# Patient Record
Sex: FEMALE | Race: WHITE | NOT HISPANIC OR LATINO | Employment: OTHER | URBAN - METROPOLITAN AREA
[De-identification: names, ages, dates, MRNs, and addresses within clinical notes are randomized per-mention and may not be internally consistent; named-entity substitution may affect disease eponyms.]

---

## 2017-01-03 ENCOUNTER — TELEPHONE (OUTPATIENT)
Dept: INTERNAL MEDICINE | Facility: CLINIC | Age: 62
End: 2017-01-03

## 2017-01-03 NOTE — TELEPHONE ENCOUNTER
Pt went to the ER with SOB and chest pain radiating into the neck. Feeling weak at times. The ER told her she needed to call us and get a stress test ordered

## 2017-01-03 NOTE — TELEPHONE ENCOUNTER
Called pt back to make an appt and she said that she doesn't;'t understand why I need to be seen first. I explained that dr sepulveda ask that she have an appt first. She said that she would have to call me back

## 2017-01-03 NOTE — TELEPHONE ENCOUNTER
----- Message from Raquel Hanna sent at 1/3/2017  9:40 AM CST -----  Contact: pt  Please call pt @ [phone] regarding an order to get a stress test done.

## 2017-02-16 ENCOUNTER — OFFICE VISIT (OUTPATIENT)
Dept: INTERNAL MEDICINE | Facility: CLINIC | Age: 62
End: 2017-02-16
Payer: COMMERCIAL

## 2017-02-16 VITALS
TEMPERATURE: 99 F | OXYGEN SATURATION: 97 % | BODY MASS INDEX: 28.13 KG/M2 | DIASTOLIC BLOOD PRESSURE: 60 MMHG | HEIGHT: 67 IN | SYSTOLIC BLOOD PRESSURE: 124 MMHG | RESPIRATION RATE: 16 BRPM | HEART RATE: 100 BPM | WEIGHT: 179.25 LBS

## 2017-02-16 DIAGNOSIS — M19.042 PRIMARY LOCALIZED OSTEOARTHROSIS OF HANDS, BILATERAL: ICD-10-CM

## 2017-02-16 DIAGNOSIS — J20.9 ACUTE BRONCHITIS, UNSPECIFIED ORGANISM: Primary | ICD-10-CM

## 2017-02-16 DIAGNOSIS — M19.041 PRIMARY LOCALIZED OSTEOARTHROSIS OF HANDS, BILATERAL: ICD-10-CM

## 2017-02-16 DIAGNOSIS — M79.7 FIBROMYALGIA: ICD-10-CM

## 2017-02-16 PROCEDURE — 99214 OFFICE O/P EST MOD 30 MIN: CPT | Mod: S$GLB,,, | Performed by: FAMILY MEDICINE

## 2017-02-16 PROCEDURE — 99999 PR PBB SHADOW E&M-EST. PATIENT-LVL III: CPT | Mod: PBBFAC,,, | Performed by: FAMILY MEDICINE

## 2017-02-16 PROCEDURE — 3078F DIAST BP <80 MM HG: CPT | Mod: S$GLB,,, | Performed by: FAMILY MEDICINE

## 2017-02-16 PROCEDURE — 3074F SYST BP LT 130 MM HG: CPT | Mod: S$GLB,,, | Performed by: FAMILY MEDICINE

## 2017-02-16 RX ORDER — PRAMIPEXOLE DIHYDROCHLORIDE 0.5 MG/1
0.5 TABLET ORAL DAILY
Qty: 90 TABLET | Refills: 3 | Status: SHIPPED | OUTPATIENT
Start: 2017-02-16 | End: 2017-03-08 | Stop reason: SDUPTHER

## 2017-02-16 RX ORDER — BENAZEPRIL HYDROCHLORIDE AND HYDROCHLOROTHIAZIDE 20; 12.5 MG/1; MG/1
1 TABLET ORAL DAILY
Qty: 90 TABLET | Refills: 3 | Status: SHIPPED | OUTPATIENT
Start: 2017-02-16 | End: 2017-03-08 | Stop reason: SDUPTHER

## 2017-02-16 RX ORDER — BENZONATATE 100 MG/1
100 CAPSULE ORAL 3 TIMES DAILY PRN
Qty: 30 CAPSULE | Refills: 1 | Status: SHIPPED | OUTPATIENT
Start: 2017-02-16 | End: 2017-05-23

## 2017-02-16 RX ORDER — MELOXICAM 7.5 MG/1
1 TABLET ORAL DAILY
COMMUNITY
Start: 2017-01-14 | End: 2017-08-07 | Stop reason: SDUPTHER

## 2017-02-16 RX ORDER — TRAMADOL HYDROCHLORIDE 50 MG/1
50 TABLET ORAL EVERY 8 HOURS PRN
Qty: 90 TABLET | Refills: 1 | Status: SHIPPED | OUTPATIENT
Start: 2017-02-16 | End: 2017-03-18

## 2017-02-16 RX ORDER — CYCLOBENZAPRINE HCL 5 MG
5 TABLET ORAL 3 TIMES DAILY PRN
COMMUNITY
End: 2017-08-07 | Stop reason: SDUPTHER

## 2017-02-16 RX ORDER — PANTOPRAZOLE SODIUM 40 MG/1
40 TABLET, DELAYED RELEASE ORAL DAILY
COMMUNITY
Start: 2015-05-12 | End: 2017-02-16 | Stop reason: SDUPTHER

## 2017-02-16 RX ORDER — PANTOPRAZOLE SODIUM 40 MG/1
40 TABLET, DELAYED RELEASE ORAL DAILY
Qty: 90 TABLET | Refills: 3 | Status: SHIPPED | OUTPATIENT
Start: 2017-02-16 | End: 2017-05-17

## 2017-02-16 RX ORDER — METHYLPREDNISOLONE 4 MG/1
TABLET ORAL
Qty: 1 PACKAGE | Refills: 0 | Status: SHIPPED | OUTPATIENT
Start: 2017-02-16 | End: 2017-05-23

## 2017-02-16 RX ORDER — AZITHROMYCIN 250 MG/1
250 TABLET, FILM COATED ORAL DAILY
Qty: 6 TABLET | Refills: 0 | Status: SHIPPED | OUTPATIENT
Start: 2017-02-16 | End: 2017-02-21

## 2017-02-16 RX ORDER — PRAMIPEXOLE DIHYDROCHLORIDE 0.5 MG/1
0.5 TABLET ORAL DAILY
COMMUNITY
End: 2017-02-16 | Stop reason: SDUPTHER

## 2017-02-16 RX ORDER — DULOXETIN HYDROCHLORIDE 60 MG/1
60 CAPSULE, DELAYED RELEASE ORAL DAILY
Qty: 90 CAPSULE | Refills: 1 | Status: SHIPPED | OUTPATIENT
Start: 2017-02-16 | End: 2017-03-08 | Stop reason: SDUPTHER

## 2017-02-16 NOTE — PROGRESS NOTES
Chief Complaint:    Chief Complaint   Patient presents with    Nasal Congestion    Cough       History of Present Illness:    Patient presents today with complaints of congestion cough and wheezing postnasal drip going on for the past several days no fever.    She also complains of serious pain involving bilateral hand joints and arthritis everywhere.  She does have significantly elevated sedimentation rate was sent to a rheumatologist one time but was told that she does not have rheumatoid arthritis she had seen Dr. Chong.  She is to see pain management but is not going back to them anymore.  Would like to take some tramadol.    She also has fibromyalgia and has constant pain and fatigue.    ROS:  Review of Systems   Constitutional: Negative for activity change, chills, fatigue, fever and unexpected weight change.   HENT: Positive for congestion. Negative for ear discharge, ear pain, hearing loss, postnasal drip and rhinorrhea.    Eyes: Negative for pain and visual disturbance.   Respiratory: Positive for cough and wheezing. Negative for chest tightness and shortness of breath.    Cardiovascular: Negative for chest pain and palpitations.   Gastrointestinal: Negative for abdominal pain, diarrhea and vomiting.   Endocrine: Negative for heat intolerance.   Genitourinary: Negative for dysuria, flank pain, frequency and hematuria.   Musculoskeletal: Negative for back pain, gait problem and neck pain.   Skin: Negative for color change and rash.   Neurological: Negative for dizziness, tremors, seizures, numbness and headaches.   Psychiatric/Behavioral: Negative for agitation, hallucinations, self-injury, sleep disturbance and suicidal ideas. The patient is not nervous/anxious.        Past Medical History   Diagnosis Date    Anxiety     Arthritis     Depression     Hyperlipidemia        Social History:  Social History     Social History    Marital status:      Spouse name: N/A    Number of children: 2     "Years of education: N/A     Social History Main Topics    Smoking status: Former Smoker     Packs/day: 1.00     Years: 40.00     Quit date: 6/10/2012    Smokeless tobacco: Never Used    Alcohol use No    Drug use: No    Sexual activity: Yes     Partners: Male     Birth control/ protection: None     Other Topics Concern    None     Social History Narrative       Family History:   family history includes Cancer in her maternal grandfather and maternal grandmother; Clotting disorder in her maternal grandfather, maternal grandmother, and mother; Hemochromatosis in her mother; Hypertension in her father and mother.    Health Maintenance   Topic Date Due    Hepatitis C Screening  1955    Zoster Vaccine  05/04/2016    Influenza Vaccine  08/01/2016    Mammogram  04/29/2017    Lipid Panel  05/11/2017    Colonoscopy  07/23/2017    Pap Smear  12/01/2018    TETANUS VACCINE  07/19/2026       Physical Exam:    Vital Signs  Temp: 98.5 °F (36.9 °C)  Temp src: Tympanic  Pulse: 100  Resp: 16  SpO2: 97 %  BP: 124/60  BP Location: Left arm  BP Method: Manual  Patient Position: Sitting  Height and Weight  Height: 5' 7" (170.2 cm)  Weight: 81.3 kg (179 lb 3.7 oz)  BSA (Calculated - sq m): 1.96 sq meters  BMI (Calculated): 28.1  Weight in (lb) to have BMI = 25: 159.3]    Body mass index is 28.07 kg/(m^2).    Physical Exam   Constitutional: She is oriented to person, place, and time. She appears well-developed.   HENT:   Mouth/Throat: Oropharynx is clear and moist.   Eyes: Conjunctivae are normal. Pupils are equal, round, and reactive to light.   Neck: Normal range of motion. Neck supple.   Cardiovascular: Normal rate, regular rhythm and normal heart sounds.    No murmur heard.  Pulmonary/Chest: Effort normal and breath sounds normal. No respiratory distress. She has no wheezes. She has no rales. She exhibits no tenderness.   Abdominal: Soft. She exhibits no distension and no mass. There is no tenderness. There is no " guarding.   Musculoskeletal: She exhibits no edema or tenderness.        Hands:  Lymphadenopathy:     She has no cervical adenopathy.   Neurological: She is alert and oriented to person, place, and time. She has normal reflexes.   Skin: Skin is warm and dry.   Psychiatric: She has a normal mood and affect. Her behavior is normal. Judgment and thought content normal.       Lab Results   Component Value Date    CHOL 209 (H) 05/11/2016    CHOL 179 06/06/2013    CHOL 199 02/05/2013    TRIG 173 (H) 05/11/2016    TRIG 149 06/06/2013    TRIG 213 (H) 02/05/2013    HDL 40 05/11/2016    HDL 38 (L) 06/06/2013    HDL 38 (L) 02/05/2013    TOTALCHOLEST 5.2 (H) 05/11/2016    TOTALCHOLEST 4.7 06/06/2013    TOTALCHOLEST 5.2 (H) 02/05/2013    NONHDLCHOL 169 05/11/2016       Lab Results   Component Value Date    HGBA1C 5.7 05/11/2016       Assessment:      ICD-10-CM ICD-9-CM   1. Acute bronchitis, unspecified organism J20.9 466.0   2. Primary localized osteoarthrosis of hands, bilateral M19.041 715.14    M19.042    3. Fibromyalgia M79.7 729.1         Plan:  1.  Acute bronchitis treat with Z-Jeremy and Medrol Dosepak.  2.  Arthritis involving hand DIP and PIP will refer her to rheumatology Dr. PARKER for further evaluation and treatment  3.  Fibromyalgia, we can definitely treat her with tramadol limited to no more than 3 pills per day, #90 given with 1 refill.  Addiction potential explained.  Orders Placed This Encounter   Procedures    Ambulatory Referral to Rheumatology       Current Outpatient Prescriptions   Medication Sig Dispense Refill    albuterol (ACCUNEB) 0.63 mg/3 mL Nebu Take 3 mLs (0.63 mg total) by nebulization every 6 (six) hours as needed. 3 mL 11    amlodipine (NORVASC) 10 MG tablet Take 1 tablet (10 mg total) by mouth once daily. 90 tablet 3    aspirin-acetaminophen-caffeine 250-250-65 mg (EXCEDRIN MIGRAINE) 250-250-65 mg per tablet Take 1 tablet by mouth every 6 (six) hours as needed for Pain.       benazepril-hydrochlorthiazide (LOTENSIN HCT) 20-12.5 mg per tablet Take 1 tablet by mouth once daily. 90 tablet 3    buPROPion (WELLBUTRIN SR) 150 MG TBSR 12 hr tablet Take 1 tablet (150 mg total) by mouth 2 (two) times daily. 180 tablet 3    cyclobenzaprine (FLEXERIL) 5 MG tablet Take 5 mg by mouth 3 (three) times daily as needed for Muscle spasms.      duloxetine (CYMBALTA) 60 MG capsule Take 1 capsule (60 mg total) by mouth once daily. 90 capsule 1    inhalation device (BREATHERITE SPACER-MASK,ADULT) Use as directed for inhalation. 1 Device 0    meloxicam (MOBIC) 7.5 MG tablet Take 1 tablet by mouth once daily.      pantoprazole (PROTONIX) 40 MG tablet Take 1 tablet (40 mg total) by mouth once daily. 90 tablet 3    potassium 99 mg Tab Take by mouth. 2 tablet Oral Every day      pramipexole (MIRAPEX) 0.5 MG tablet Take 1 tablet (0.5 mg total) by mouth once daily. 90 tablet 3    azithromycin (ZITHROMAX Z-ADRIA) 250 MG tablet Take 1 tablet (250 mg total) by mouth once daily. Take 2 tabs on day 1 thereafter one tab daily by mouth for 4 days. 6 tablet 0    benzonatate (TESSALON) 100 MG capsule Take 1 capsule (100 mg total) by mouth 3 (three) times daily as needed for Cough. 30 capsule 1    methylPREDNISolone (MEDROL DOSEPACK) 4 mg tablet use as directed 1 Package 0    tramadol (ULTRAM) 50 mg tablet Take 1 tablet (50 mg total) by mouth every 8 (eight) hours as needed. 90 tablet 1     Current Facility-Administered Medications   Medication Dose Route Frequency Provider Last Rate Last Dose    albuterol inhaler 2 puff  2 puff Inhalation Q6H PRN Helena Jones MD        albuterol sulfate nebulizer solution 2.5 mg  2.5 mg Nebulization 1 time in Clinic/HOD Helena Jones MD           Medications Discontinued During This Encounter   Medication Reason    cyclobenzaprine (FLEXERIL) 5 MG tablet Patient no longer taking    zolpidem (AMBIEN) 5 MG Tab Patient no longer taking    vitamin E 1000 UNIT capsule Patient no  longer taking    pramipexole (MIRAPEX) 1 MG tablet     pramipexole (MIRAPEX) 1 MG tablet     pantoprazole (PROTONIX) 40 MG tablet     pantoprazole (PROTONIX) 40 MG tablet     pramipexole (MIRAPEX) 0.5 MG tablet Reorder    pantoprazole (PROTONIX) 40 MG tablet Reorder    duloxetine (CYMBALTA) 60 MG capsule Reorder    benazepril-hydrochlorthiazide (LOTENSIN HCT) 20-12.5 mg per tablet Reorder       No Follow-up on file.      Dr Helena Jones MD    Disclaimer: This note is prepared using voice recognition system and as such is likely to have errors and is not proof read.

## 2017-02-16 NOTE — MR AVS SNAPSHOT
[facility]  [address] [place] [address]  Phone: [phone]                  Erin Soriano   2017 4:40 PM   Office Visit    Description:  Female : 1955   Provider:  Helena Jones MD   Department:  [facility]           Reason for Visit     Nasal Congestion     Cough           Diagnoses this Visit        Comments    Acute bronchitis, unspecified organism    -  Primary     Primary localized osteoarthrosis of hands, bilateral         Fibromyalgia                To Do List           Goals (5 Years of Data)     None       These Medications        Disp Refills Start End    pramipexole (MIRAPEX) 0.5 MG tablet 90 tablet 3 2017    Take 1 tablet (0.5 mg total) by mouth once daily. - Oral    Pharmacy: [place] [address] Ph #: [phone]       pantoprazole (PROTONIX) 40 MG tablet 90 tablet 3 2017    Take 1 tablet (40 mg total) by mouth once daily. - Oral    Pharmacy: [facility] [address] [place] [address] Ph #: [phone]       duloxetine (CYMBALTA) 60 MG capsule 90 capsule 1 2017     Take 1 capsule (60 mg total) by mouth once daily. - Oral    Pharmacy: [facility] [address] [place] [address] Ph #: [phone]       benazepril-hydrochlorthiazide (LOTENSIN HCT) 20-12.5 mg per tablet 90 tablet 3 2017     Take 1 tablet by mouth once daily. - Oral    Pharmacy: [facility] [address] [place] [address] Ph #: [phone]       tramadol (ULTRAM) 50 mg tablet 90 tablet 1 2017 3/18/2017    Take 1 tablet (50 mg total) by mouth every 8 (eight) hours as needed. - Oral    Pharmacy: [facility] [address] [place] [address] Ph #: [phone]       methylPREDNISolone (MEDROL DOSEPACK) 4 mg tablet 1 Package 0 2017     use as directed    Pharmacy: [facility] [address] [place] [address] [id number] [address] #: [phone]       azithromycin (ZITHROMAX Z-ADRIA) 250 MG tablet 6 tablet 0 2/16/2017 2/21/2017    Take 1 tablet (250 mg total) by mouth once daily. Take 2 tabs on day 1 thereafter one tab daily by mouth for 4 days. - Oral    Pharmacy: [facility] [address] [place] [address] #: [phone]       Notes to Pharmacy: Pharmacist to go over side effects.    benzonatate (TESSALON) 100 MG capsule 30 capsule 1 2/16/2017     Take 1 capsule (100 mg total) by mouth 3 (three) times daily as needed for Cough. - Oral    Pharmacy: [address] #: [phone]         [facility]     [facility] On Call Nurse Care Line - 24/7 Assistance  Registered nurses in the [facility] provide clinical advisement, health education, appointment booking, and other advisory services.  Call for this free service at [phone].             Medications           Message regarding Medications     Verify the changes and/or additions to your medication regime listed below are the same as discussed with your clinician today.  If any of these changes or additions are incorrect, please notify your healthcare provider.        START taking these NEW medications        Refills    pramipexole (MIRAPEX) 0.5 MG tablet 3    Sig: Take 1 tablet (0.5 mg total) by mouth once daily.    Class: Normal    Route: Oral    pantoprazole (PROTONIX) 40 MG tablet 3    Sig: Take 1 tablet (40 mg total) by mouth once daily.    Class: Normal    Route: Oral    tramadol (ULTRAM) 50 mg tablet 1    Sig: Take 1 tablet (50 mg total) by mouth every 8 (eight) hours as needed.    Class: Normal    Route: Oral    methylPREDNISolone (MEDROL DOSEPACK) 4 mg tablet 0    Sig: use as directed    Class: Normal    azithromycin (ZITHROMAX Z-ADRIA) 250 MG tablet 0    Sig: Take 1 tablet (250 mg total) by mouth once daily. Take 2 tabs on day 1  thereafter one tab daily by mouth for 4 days.    Class: Normal    Route: Oral    benzonatate (TESSALON) 100 MG capsule 1    Sig: Take 1 capsule (100 mg total) by mouth 3 (three) times daily as needed for Cough.    Class: Normal    Route: Oral      STOP taking these medications     zolpidem (AMBIEN) 5 MG Tab Take 1 tablet (5 mg total) by mouth nightly as needed.    vitamin E 1000 UNIT capsule Take 1,000 Units by mouth once daily.           Verify that the below list of medications is an accurate representation of the medications you are currently taking.  If none reported, the list may be blank. If incorrect, please contact your healthcare provider. Carry this list with you in case of emergency.           Current Medications     albuterol (ACCUNEB) 0.63 mg/3 mL Nebu Take 3 mLs (0.63 mg total) by nebulization every 6 (six) hours as needed.    amlodipine (NORVASC) 10 MG tablet Take 1 tablet (10 mg total) by mouth once daily.    aspirin-acetaminophen-caffeine 250-250-65 mg (EXCEDRIN MIGRAINE) 250-250-65 mg per tablet Take 1 tablet by mouth every 6 (six) hours as needed for Pain.    benazepril-hydrochlorthiazide (LOTENSIN HCT) 20-12.5 mg per tablet Take 1 tablet by mouth once daily.    buPROPion (WELLBUTRIN SR) 150 MG TBSR 12 hr tablet Take 1 tablet (150 mg total) by mouth 2 (two) times daily.    cyclobenzaprine (FLEXERIL) 5 MG tablet Take 5 mg by mouth 3 (three) times daily as needed for Muscle spasms.    duloxetine (CYMBALTA) 60 MG capsule Take 1 capsule (60 mg total) by mouth once daily.    inhalation device (BREATHERITE SPACER-MASK,ADULT) Use as directed for inhalation.    meloxicam (MOBIC) 7.5 MG tablet Take 1 tablet by mouth once daily.    pantoprazole (PROTONIX) 40 MG tablet Take 1 tablet (40 mg total) by mouth once daily.    potassium 99 mg Tab Take by mouth. 2 tablet Oral Every day    pramipexole (MIRAPEX) 0.5 MG tablet Take 1 tablet (0.5 mg total) by mouth once daily.    azithromycin (ZITHROMAX Z-ADRIA) 250 MG  "tablet Take 1 tablet (250 mg total) by mouth once daily. Take 2 tabs on day 1 thereafter one tab daily by mouth for 4 days.    benzonatate (TESSALON) 100 MG capsule Take 1 capsule (100 mg total) by mouth 3 (three) times daily as needed for Cough.    methylPREDNISolone (MEDROL DOSEPACK) 4 mg tablet use as directed    tramadol (ULTRAM) 50 mg tablet Take 1 tablet (50 mg total) by mouth every 8 (eight) hours as needed.           Clinical Reference Information           Your Vitals Were     BP Pulse Temp Resp    124/60 (BP Location: Left arm, Patient Position: Sitting, BP Method: Manual) 100 98.5 °F (36.9 °C) (Tympanic) 16    Height Weight SpO2 BMI    5' 7" (1.702 m) 81.3 kg (179 lb 3.7 oz) 97% 28.07 kg/m2      Blood Pressure          Most Recent Value    BP  124/60      Allergies as of 2/16/2017     Clindamycin      Immunizations Administered on Date of Encounter - 2/16/2017     None      Orders Placed During Today's Visit      Normal Orders This Visit    Ambulatory Referral to Rheumatology       Language Assistance Services     ATTENTION: Language assistance services are available, free of charge. Please call [phone].      ATENCIÓN: Si habla leonel, tiene a rosario disposición servicios gratuitos de asistencia lingüística. Llame al [phone].     AYALA Ý: N?u b?n nói Ti?ng Vi?t, có các d?ch v? h? tr? ngôn ng? mi?n phí dành cho b?n. G?i s? [phone].         [facility] complies with applicable Federal civil rights laws and does not discriminate on the basis of race, color, national origin, age, disability, or sex.        "

## 2017-02-21 ENCOUNTER — TELEPHONE (OUTPATIENT)
Dept: RHEUMATOLOGY | Facility: CLINIC | Age: 62
End: 2017-02-21

## 2017-02-22 ENCOUNTER — TELEPHONE (OUTPATIENT)
Dept: RHEUMATOLOGY | Facility: CLINIC | Age: 62
End: 2017-02-22

## 2017-02-22 NOTE — TELEPHONE ENCOUNTER
Called patient regarding referral from Dr. Jones, no answer, left message for pt to call clinic back.

## 2017-02-23 ENCOUNTER — TELEPHONE (OUTPATIENT)
Dept: RHEUMATOLOGY | Facility: CLINIC | Age: 62
End: 2017-02-23

## 2017-02-23 NOTE — TELEPHONE ENCOUNTER
----- Message from Jaxon Amin LPN sent at 2/23/2017  8:29 AM CST -----      ----- Message -----     From: Nikki Angeles     Sent: 2/22/2017   3:12 PM       To: [facility] Staff    Patient states that she was returning your call regarding a new patient appointment.   Call her at [phone].                                            edmonds

## 2017-03-06 ENCOUNTER — TELEPHONE (OUTPATIENT)
Dept: RHEUMATOLOGY | Facility: CLINIC | Age: 62
End: 2017-03-06

## 2017-03-06 NOTE — TELEPHONE ENCOUNTER
----- Message from Robert Elliott sent at 3/6/2017 12:34 PM CST -----  Contact: New Pt  Caller states she is returning nurse call to schedule an apt, please contact caller at [phone]

## 2017-03-06 NOTE — TELEPHONE ENCOUNTER
Returned call to patient regarding Dr. Jones, made an apt with Dr. PARKER for 5.30.17 at 8 am, added to wait list as requested. Will mail apt slip as well. Pt verbalized understanding.

## 2017-03-08 ENCOUNTER — OFFICE VISIT (OUTPATIENT)
Dept: INTERNAL MEDICINE | Facility: CLINIC | Age: 62
End: 2017-03-08
Payer: COMMERCIAL

## 2017-03-08 ENCOUNTER — TELEPHONE (OUTPATIENT)
Dept: INTERNAL MEDICINE | Facility: CLINIC | Age: 62
End: 2017-03-08

## 2017-03-08 ENCOUNTER — APPOINTMENT (OUTPATIENT)
Dept: LAB | Facility: HOSPITAL | Age: 62
End: 2017-03-08
Attending: FAMILY MEDICINE
Payer: COMMERCIAL

## 2017-03-08 VITALS
WEIGHT: 182.56 LBS | TEMPERATURE: 98 F | BODY MASS INDEX: 28.65 KG/M2 | DIASTOLIC BLOOD PRESSURE: 80 MMHG | RESPIRATION RATE: 16 BRPM | OXYGEN SATURATION: 98 % | HEART RATE: 102 BPM | SYSTOLIC BLOOD PRESSURE: 140 MMHG | HEIGHT: 67 IN

## 2017-03-08 DIAGNOSIS — J01.90 ACUTE SINUSITIS, UNSPECIFIED: Primary | ICD-10-CM

## 2017-03-08 DIAGNOSIS — M54.9 BACKACHE WITHOUT RADIATION: ICD-10-CM

## 2017-03-08 LAB
BILIRUB UR QL STRIP: NEGATIVE
CLARITY UR REFRACT.AUTO: CLEAR
COLOR UR AUTO: YELLOW
GLUCOSE UR QL STRIP: NEGATIVE
HGB UR QL STRIP: NEGATIVE
KETONES UR QL STRIP: NEGATIVE
LEUKOCYTE ESTERASE UR QL STRIP: NEGATIVE
NITRITE UR QL STRIP: NEGATIVE
PH UR STRIP: 5 [PH] (ref 5–8)
PROT UR QL STRIP: NEGATIVE
SP GR UR STRIP: 1.01 (ref 1–1.03)
URN SPEC COLLECT METH UR: NORMAL
UROBILINOGEN UR STRIP-ACNC: NEGATIVE EU/DL

## 2017-03-08 PROCEDURE — 87147 CULTURE TYPE IMMUNOLOGIC: CPT

## 2017-03-08 PROCEDURE — 81003 URINALYSIS AUTO W/O SCOPE: CPT

## 2017-03-08 PROCEDURE — 96372 THER/PROPH/DIAG INJ SC/IM: CPT | Mod: S$GLB,,, | Performed by: FAMILY MEDICINE

## 2017-03-08 PROCEDURE — 87088 URINE BACTERIA CULTURE: CPT

## 2017-03-08 PROCEDURE — 87086 URINE CULTURE/COLONY COUNT: CPT

## 2017-03-08 PROCEDURE — 99999 PR PBB SHADOW E&M-EST. PATIENT-LVL V: CPT | Mod: PBBFAC,,, | Performed by: FAMILY MEDICINE

## 2017-03-08 PROCEDURE — 99214 OFFICE O/P EST MOD 30 MIN: CPT | Mod: 25,S$GLB,, | Performed by: FAMILY MEDICINE

## 2017-03-08 PROCEDURE — 3077F SYST BP >= 140 MM HG: CPT | Mod: S$GLB,,, | Performed by: FAMILY MEDICINE

## 2017-03-08 PROCEDURE — 1160F RVW MEDS BY RX/DR IN RCRD: CPT | Mod: S$GLB,,, | Performed by: FAMILY MEDICINE

## 2017-03-08 PROCEDURE — 3079F DIAST BP 80-89 MM HG: CPT | Mod: S$GLB,,, | Performed by: FAMILY MEDICINE

## 2017-03-08 RX ORDER — BENAZEPRIL HYDROCHLORIDE AND HYDROCHLOROTHIAZIDE 20; 12.5 MG/1; MG/1
1 TABLET ORAL DAILY
Qty: 90 TABLET | Refills: 3 | Status: SHIPPED | OUTPATIENT
Start: 2017-03-08 | End: 2017-11-14

## 2017-03-08 RX ORDER — PRAMIPEXOLE DIHYDROCHLORIDE 0.5 MG/1
0.5 TABLET ORAL DAILY
Qty: 90 TABLET | Refills: 3 | Status: SHIPPED | OUTPATIENT
Start: 2017-03-08 | End: 2017-08-07

## 2017-03-08 RX ORDER — BENZONATATE 100 MG/1
100 CAPSULE ORAL 3 TIMES DAILY PRN
Qty: 30 CAPSULE | Refills: 1 | Status: SHIPPED | OUTPATIENT
Start: 2017-03-08 | End: 2017-05-23

## 2017-03-08 RX ORDER — DULOXETIN HYDROCHLORIDE 60 MG/1
60 CAPSULE, DELAYED RELEASE ORAL DAILY
Qty: 90 CAPSULE | Refills: 1 | Status: SHIPPED | OUTPATIENT
Start: 2017-03-08 | End: 2018-01-26 | Stop reason: SDUPTHER

## 2017-03-08 RX ORDER — AMOXICILLIN AND CLAVULANATE POTASSIUM 875; 125 MG/1; MG/1
1 TABLET, FILM COATED ORAL EVERY 12 HOURS
Qty: 14 TABLET | Refills: 0 | Status: SHIPPED | OUTPATIENT
Start: 2017-03-08 | End: 2017-03-15

## 2017-03-08 RX ORDER — METHYLPREDNISOLONE ACETATE 80 MG/ML
80 INJECTION, SUSPENSION INTRA-ARTICULAR; INTRALESIONAL; INTRAMUSCULAR; SOFT TISSUE
Status: COMPLETED | OUTPATIENT
Start: 2017-03-08 | End: 2017-03-08

## 2017-03-08 RX ADMIN — METHYLPREDNISOLONE ACETATE 80 MG: 80 INJECTION, SUSPENSION INTRA-ARTICULAR; INTRALESIONAL; INTRAMUSCULAR; SOFT TISSUE at 08:03

## 2017-03-08 NOTE — TELEPHONE ENCOUNTER
----- Message from Samy Harris sent at 3/8/2017 10:13 AM CST -----  Contact: [phone]  Pt Would like to consult with the nurse about Rx Medication , pt states that Telson Pearls does not work and would like cough syrup with codeine.  Please call in a cough Syrup Rx to the pharmacy.  Please call back @ [phone].  Thanks-[facility]      Pt uses:  [facility]  - [facility] [place]

## 2017-03-08 NOTE — MR AVS SNAPSHOT
[facility]  [address] [place] [id number]  Phone: [phone]                  Erin Soriano   3/8/2017 8:00 AM   Office Visit    Description:  Female : 1955   Provider:  Helena Jones MD   Department:  [facility]           Reason for Visit     Sore Throat           Diagnoses this Visit        Comments    Acute sinusitis, unspecified    -  Primary     Backache without radiation                To Do List           Future Appointments        Provider Department Dept Phone    2017 8:00 AM Pasquale Whyte MD [facility] - Rheumatology [phone]      Goals (5 Years of Data)     None       These Medications        Disp Refills Start End    pramipexole (MIRAPEX) 0.5 MG tablet 90 tablet 3 3/8/2017 2017    Take 1 tablet (0.5 mg total) by mouth once daily. - Oral    Pharmacy: [facility] - [address] Ph #: [phone]       duloxetine (CYMBALTA) 60 MG capsule 90 capsule 1 3/8/2017     Take 1 capsule (60 mg total) by mouth once daily. - Oral    Pharmacy: [facility] - [address] Ph #: [phone]       benazepril-hydrochlorthiazide (LOTENSIN HCT) 20-12.5 mg per tablet 90 tablet 3 3/8/2017     Take 1 tablet by mouth once daily. - Oral    Pharmacy: [facility] - [address] Ph #: [phone]       amoxicillin-clavulanate 875-125mg (AUGMENTIN) 875-125 mg per tablet 14 tablet 0 3/8/2017 3/15/2017    Take 1 tablet by mouth every 12 (twelve) hours. - Oral    Pharmacy: [facility] [address] [place] [address] [address] [address] Ph #: [phone]       benzonatate (TESSALON) 100 MG capsule 30 capsule 1 3/8/2017     Take 1 capsule (100 mg total) by mouth 3 (three) times daily as needed for Cough. - Oral    Pharmacy: [facility] [address] [place] [address] [address] [address] Ph #: [phone]          [facility]     [facility] - 24/7 Assistance  Registered nurses in the [facility] provide clinical advisement, health education, appointment booking, and other advisory services.  Call for this free service at [phone].             Medications           Message regarding Medications     Verify the changes and/or additions to your medication regime listed below are the same as discussed with your clinician today.  If any of these changes or additions are incorrect, please notify your healthcare provider.        START taking these NEW medications        Refills    amoxicillin-clavulanate 875-125mg (AUGMENTIN) 875-125 mg per tablet 0    Sig: Take 1 tablet by mouth every 12 (twelve) hours.    Class: Normal    Route: Oral    benzonatate (TESSALON) 100 MG capsule 1    Sig: Take 1 capsule (100 mg total) by mouth 3 (three) times daily as needed for Cough.    Class: Normal    Route: Oral      These medications were administered today        Dose Freq    methylPREDNISolone acetate injection 80 mg 80 mg Clinic/HOD 1 time    Sig: Inject 1 mL (80 mg total) into the muscle one time.    Class: Normal    Route: Intramuscular           Verify that the below list of medications is an accurate representation of the medications you are currently taking.  If none reported, the list may be blank. If incorrect, please contact your healthcare provider. Carry this list with you in case of emergency.           Current Medications     albuterol (ACCUNEB) 0.63 mg/3 mL Nebu Take 3 mLs (0.63 mg total) by nebulization every 6 (six) hours as needed.    amlodipine (NORVASC) 10 MG tablet Take 1 tablet (10 mg total) by mouth once daily.    aspirin-acetaminophen-caffeine 250-250-65 mg (EXCEDRIN MIGRAINE) 250-250-65 mg per tablet Take 1 tablet by mouth every 6 (six) hours as needed for Pain.    benazepril-hydrochlorthiazide (LOTENSIN HCT) 20-12.5 mg per tablet Take 1 tablet by mouth once daily.     "benzonatate (TESSALON) 100 MG capsule Take 1 capsule (100 mg total) by mouth 3 (three) times daily as needed for Cough.    buPROPion (WELLBUTRIN SR) 150 MG TBSR 12 hr tablet Take 1 tablet (150 mg total) by mouth 2 (two) times daily.    cyclobenzaprine (FLEXERIL) 5 MG tablet Take 5 mg by mouth 3 (three) times daily as needed for Muscle spasms.    duloxetine (CYMBALTA) 60 MG capsule Take 1 capsule (60 mg total) by mouth once daily.    inhalation device (BREATHERITE SPACER-MASK,ADULT) Use as directed for inhalation.    meloxicam (MOBIC) 7.5 MG tablet Take 1 tablet by mouth once daily.    methylPREDNISolone (MEDROL DOSEPACK) 4 mg tablet use as directed    pantoprazole (PROTONIX) 40 MG tablet Take 1 tablet (40 mg total) by mouth once daily.    potassium 99 mg Tab Take by mouth. 2 tablet Oral Every day    pramipexole (MIRAPEX) 0.5 MG tablet Take 1 tablet (0.5 mg total) by mouth once daily.    tramadol (ULTRAM) 50 mg tablet Take 1 tablet (50 mg total) by mouth every 8 (eight) hours as needed.    amoxicillin-clavulanate 875-125mg (AUGMENTIN) 875-125 mg per tablet Take 1 tablet by mouth every 12 (twelve) hours.    benzonatate (TESSALON) 100 MG capsule Take 1 capsule (100 mg total) by mouth 3 (three) times daily as needed for Cough.           Clinical Reference Information           Your Vitals Were     BP Pulse Temp Resp    140/80 (BP Location: Left arm, Patient Position: Sitting, BP Method: Manual) 102 98.3 °F (36.8 °C) (Tympanic) 16    Height Weight SpO2 BMI    5' 7" (1.702 m) 82.8 kg (182 lb 8.7 oz) 98% 28.59 kg/m2      Blood Pressure          Most Recent Value    BP  (!)  140/80      Allergies as of 3/8/2017     Clindamycin      Immunizations Administered on Date of Encounter - 3/8/2017     None      Orders Placed During Today's Visit      Normal Orders This Visit    URINALYSIS     Urine culture       Administrations This Visit     methylPREDNISolone acetate injection 80 mg     Admin Date Action Dose Route Administered " By             03/08/2017 Given 80 mg Intramuscular Sasha Buchanan LPN                      Language Assistance Services     ATTENTION: Language assistance services are available, free of charge. Please call [phone].      ATENCIÓN: Si mica castaneda, tiene a rosario disposición servicios gratuitos de asistencia lingüística. Llame al [phone].     [facility] Ý: N?u b?n nói Ti?ng Vi?t, có các d?ch v? h? tr? ngôn ng? mi?n phí dành cho b?n. G?i s? [phone].         [facility] complies with applicable Federal civil rights laws and does not discriminate on the basis of race, color, national origin, age, disability, or sex.

## 2017-03-08 NOTE — PROGRESS NOTES
Chief Complaint:    Chief Complaint   Patient presents with    Sore Throat       History of Present Illness:  Patient presents today with a three-day history of congestion sinus pain body aches sore throat cough but no fever.  She's also been having some low back pain for the past few days is not radiate no tingling numbness weakness no bladder or bowel dysfunction.  She thinks she could have a UTI.      ROS:  Review of Systems   Constitutional: Negative for activity change, chills, fatigue, fever and unexpected weight change.   HENT: Positive for congestion, rhinorrhea, sinus pressure and sore throat. Negative for ear discharge, ear pain, hearing loss and postnasal drip.    Eyes: Negative for pain and visual disturbance.   Respiratory: Negative for cough, chest tightness and shortness of breath.    Cardiovascular: Negative for chest pain and palpitations.   Gastrointestinal: Negative for abdominal pain, diarrhea and vomiting.   Endocrine: Negative for heat intolerance.   Genitourinary: Negative for dysuria, flank pain, frequency and hematuria.   Musculoskeletal: Positive for back pain. Negative for gait problem and neck pain.   Skin: Negative for color change and rash.   Neurological: Negative for dizziness, tremors, seizures, numbness and headaches.   Psychiatric/Behavioral: Negative for agitation, hallucinations, self-injury, sleep disturbance and suicidal ideas. The patient is not nervous/anxious.        Past Medical History:   Diagnosis Date    Anxiety     Arthritis     Depression     Hyperlipidemia        Social History:  Social History     Social History    Marital status:      Spouse name: N/A    Number of children: 2    Years of education: N/A     Social History Main Topics    Smoking status: Former Smoker     Packs/day: 1.00     Years: 40.00     Quit date: 6/10/2012    Smokeless tobacco: Never Used    Alcohol use No    Drug use: No    Sexual activity: Yes     Partners: Male     Birth  "control/ protection: None     Other Topics Concern    None     Social History Narrative    None       Family History:   family history includes Cancer in her maternal grandfather and maternal grandmother; Clotting disorder in her maternal grandfather, maternal grandmother, and mother; Hemochromatosis in her mother; Hypertension in her father and mother.    Health Maintenance   Topic Date Due    Zoster Vaccine  05/04/2016    Influenza Vaccine  08/01/2016    Mammogram  04/29/2017    Lipid Panel  05/11/2017    Colonoscopy  07/23/2017    Pap Smear  12/01/2018    TETANUS VACCINE  07/19/2026    Hepatitis C Screening  Completed       Physical Exam:    Vital Signs  Temp: 98.3 °F (36.8 °C)  Temp src: Tympanic  Pulse: 102  Resp: 16  SpO2: 98 %  BP: (!) 140/80  BP Location: Left arm  BP Method: Manual  Patient Position: Sitting  Pain Score:   9  Height and Weight  Height: 5' 7" (170.2 cm)  Weight: 82.8 kg (182 lb 8.7 oz)  BSA (Calculated - sq m): 1.98 sq meters  BMI (Calculated): 28.6  Weight in (lb) to have BMI = 25: 159.3]    Body mass index is 28.59 kg/(m^2).    Physical Exam   Constitutional: She is oriented to person, place, and time. She appears well-developed.   HENT:   Right Ear: External ear normal.   Left Ear: External ear normal.   Mouth/Throat: Oropharynx is clear and moist.       Eyes: Conjunctivae are normal. Pupils are equal, round, and reactive to light.   Neck: Normal range of motion. Neck supple.   Cardiovascular: Normal rate, regular rhythm and normal heart sounds.    No murmur heard.  Pulmonary/Chest: Effort normal and breath sounds normal. No respiratory distress. She has no wheezes. She has no rales. She exhibits no tenderness.   Abdominal: Soft. She exhibits no distension and no mass. There is no tenderness. There is no guarding.   Musculoskeletal: She exhibits no edema.        Lumbar back: She exhibits tenderness.   Straight leg raising test is negative bilaterally.   Lymphadenopathy:     She " has no cervical adenopathy.   Neurological: She is alert and oriented to person, place, and time. She has normal reflexes.   Skin: Skin is warm and dry.   Psychiatric: She has a normal mood and affect. Her behavior is normal. Judgment and thought content normal.       Lab Results   Component Value Date    CHOL 209 (H) 05/11/2016    CHOL 179 06/06/2013    CHOL 199 02/05/2013    TRIG 173 (H) 05/11/2016    TRIG 149 06/06/2013    TRIG 213 (H) 02/05/2013    HDL 40 05/11/2016    HDL 38 (L) 06/06/2013    HDL 38 (L) 02/05/2013    TOTALCHOLEST 5.2 (H) 05/11/2016    TOTALCHOLEST 4.7 06/06/2013    TOTALCHOLEST 5.2 (H) 02/05/2013    NONHDLCHOL 169 05/11/2016       Lab Results   Component Value Date    HGBA1C 5.7 05/11/2016       Assessment:      ICD-10-CM ICD-9-CM   1. Acute sinusitis, unspecified J01.90 461.9   2. Backache without radiation M54.9 724.5         Plan:  1.  Sinusitis treat with Augmentin and Depo-Medrol 80 mg given  2.  Back pain appears muscular however per patient's request will check a urinalysis and culture.  Orders Placed This Encounter   Procedures    Urine culture    URINALYSIS       Current Outpatient Prescriptions   Medication Sig Dispense Refill    albuterol (ACCUNEB) 0.63 mg/3 mL Nebu Take 3 mLs (0.63 mg total) by nebulization every 6 (six) hours as needed. 3 mL 11    amlodipine (NORVASC) 10 MG tablet Take 1 tablet (10 mg total) by mouth once daily. 90 tablet 3    aspirin-acetaminophen-caffeine 250-250-65 mg (EXCEDRIN MIGRAINE) 250-250-65 mg per tablet Take 1 tablet by mouth every 6 (six) hours as needed for Pain.      benazepril-hydrochlorthiazide (LOTENSIN HCT) 20-12.5 mg per tablet Take 1 tablet by mouth once daily. 90 tablet 3    benzonatate (TESSALON) 100 MG capsule Take 1 capsule (100 mg total) by mouth 3 (three) times daily as needed for Cough. 30 capsule 1    buPROPion (WELLBUTRIN SR) 150 MG TBSR 12 hr tablet Take 1 tablet (150 mg total) by mouth 2 (two) times daily. 180 tablet 3     cyclobenzaprine (FLEXERIL) 5 MG tablet Take 5 mg by mouth 3 (three) times daily as needed for Muscle spasms.      duloxetine (CYMBALTA) 60 MG capsule Take 1 capsule (60 mg total) by mouth once daily. 90 capsule 1    inhalation device (BREATHERITE SPACER-MASK,ADULT) Use as directed for inhalation. 1 Device 0    meloxicam (MOBIC) 7.5 MG tablet Take 1 tablet by mouth once daily.      methylPREDNISolone (MEDROL DOSEPACK) 4 mg tablet use as directed 1 Package 0    pantoprazole (PROTONIX) 40 MG tablet Take 1 tablet (40 mg total) by mouth once daily. 90 tablet 3    potassium 99 mg Tab Take by mouth. 2 tablet Oral Every day      pramipexole (MIRAPEX) 0.5 MG tablet Take 1 tablet (0.5 mg total) by mouth once daily. 90 tablet 3    tramadol (ULTRAM) 50 mg tablet Take 1 tablet (50 mg total) by mouth every 8 (eight) hours as needed. 90 tablet 1    amoxicillin-clavulanate 875-125mg (AUGMENTIN) 875-125 mg per tablet Take 1 tablet by mouth every 12 (twelve) hours. 14 tablet 0    benzonatate (TESSALON) 100 MG capsule Take 1 capsule (100 mg total) by mouth 3 (three) times daily as needed for Cough. 30 capsule 1     Current Facility-Administered Medications   Medication Dose Route Frequency Provider Last Rate Last Dose    albuterol inhaler 2 puff  2 puff Inhalation Q6H PRN Helena Jones MD        albuterol sulfate nebulizer solution 2.5 mg  2.5 mg Nebulization 1 time in Clinic/HOD Helena Jones MD           Medications Discontinued During This Encounter   Medication Reason    pramipexole (MIRAPEX) 0.5 MG tablet Reorder    duloxetine (CYMBALTA) 60 MG capsule Reorder    benazepril-hydrochlorthiazide (LOTENSIN HCT) 20-12.5 mg per tablet Reorder       No Follow-up on file.      Dr Helena Jones MD    Disclaimer: This note is prepared using voice recognition system and as such is likely to have errors and is not proof read.

## 2017-03-08 NOTE — PROGRESS NOTES
Depo Medrol 80 mg given IM left ventrogluteal, pt tolerated well. She left the clinic in stable condition

## 2017-03-10 LAB — BACTERIA UR CULT: NORMAL

## 2017-04-19 RX ORDER — PANTOPRAZOLE SODIUM 40 MG/1
TABLET, DELAYED RELEASE ORAL
Qty: 90 TABLET | Refills: 2 | Status: SHIPPED | OUTPATIENT
Start: 2017-04-19 | End: 2018-01-14 | Stop reason: SDUPTHER

## 2017-05-03 RX ORDER — TRAMADOL HYDROCHLORIDE 50 MG/1
TABLET ORAL
Qty: 90 TABLET | Refills: 0 | OUTPATIENT
Start: 2017-05-03

## 2017-05-23 ENCOUNTER — OFFICE VISIT (OUTPATIENT)
Dept: INTERNAL MEDICINE | Facility: CLINIC | Age: 62
End: 2017-05-23
Payer: COMMERCIAL

## 2017-05-23 VITALS
SYSTOLIC BLOOD PRESSURE: 138 MMHG | HEIGHT: 66 IN | DIASTOLIC BLOOD PRESSURE: 84 MMHG | BODY MASS INDEX: 29.55 KG/M2 | OXYGEN SATURATION: 96 % | WEIGHT: 183.88 LBS | TEMPERATURE: 99 F | HEART RATE: 94 BPM

## 2017-05-23 DIAGNOSIS — M54.50 ACUTE BILATERAL LOW BACK PAIN WITHOUT SCIATICA: ICD-10-CM

## 2017-05-23 DIAGNOSIS — J06.9 VIRAL URI WITH COUGH: ICD-10-CM

## 2017-05-23 PROCEDURE — 3075F SYST BP GE 130 - 139MM HG: CPT | Mod: S$GLB,,, | Performed by: NURSE PRACTITIONER

## 2017-05-23 PROCEDURE — 99999 PR PBB SHADOW E&M-EST. PATIENT-LVL IV: CPT | Mod: PBBFAC,,, | Performed by: NURSE PRACTITIONER

## 2017-05-23 PROCEDURE — 3079F DIAST BP 80-89 MM HG: CPT | Mod: S$GLB,,, | Performed by: NURSE PRACTITIONER

## 2017-05-23 PROCEDURE — 99213 OFFICE O/P EST LOW 20 MIN: CPT | Mod: 25,S$GLB,, | Performed by: NURSE PRACTITIONER

## 2017-05-23 PROCEDURE — 1160F RVW MEDS BY RX/DR IN RCRD: CPT | Mod: S$GLB,,, | Performed by: NURSE PRACTITIONER

## 2017-05-23 PROCEDURE — 96372 THER/PROPH/DIAG INJ SC/IM: CPT | Mod: S$GLB,,, | Performed by: NURSE PRACTITIONER

## 2017-05-23 RX ORDER — TRAMADOL HYDROCHLORIDE 50 MG/1
50 TABLET ORAL EVERY 12 HOURS PRN
Qty: 60 TABLET | Refills: 0 | Status: SHIPPED | OUTPATIENT
Start: 2017-05-23 | End: 2017-08-07 | Stop reason: SDUPTHER

## 2017-05-23 RX ORDER — METHYLPREDNISOLONE ACETATE 80 MG/ML
80 INJECTION, SUSPENSION INTRA-ARTICULAR; INTRALESIONAL; INTRAMUSCULAR; SOFT TISSUE
Status: COMPLETED | OUTPATIENT
Start: 2017-05-23 | End: 2017-05-23

## 2017-05-23 RX ORDER — ZOLPIDEM TARTRATE 5 MG/1
5 TABLET ORAL NIGHTLY PRN
COMMUNITY
Start: 2017-04-03 | End: 2017-08-07

## 2017-05-23 RX ORDER — HYDROCODONE POLISTIREX AND CHLORPHENIRAMINE POLISTIREX 10; 8 MG/5ML; MG/5ML
5 SUSPENSION, EXTENDED RELEASE ORAL NIGHTLY PRN
Qty: 240 ML | Refills: 0 | Status: SHIPPED | OUTPATIENT
Start: 2017-05-23 | End: 2017-08-07

## 2017-05-23 RX ORDER — TRAMADOL HYDROCHLORIDE 50 MG/1
TABLET ORAL
COMMUNITY
Start: 2017-04-03 | End: 2017-05-23 | Stop reason: SDUPTHER

## 2017-05-23 RX ADMIN — METHYLPREDNISOLONE ACETATE 80 MG: 80 INJECTION, SUSPENSION INTRA-ARTICULAR; INTRALESIONAL; INTRAMUSCULAR; SOFT TISSUE at 01:05

## 2017-05-23 NOTE — PROGRESS NOTES
"Subjective:      Patient ID: Erin Soriano is a 62 y.o. female.    Chief Complaint: Cough (congestion)    HPI:  Patient states for the last 4 days she has been congested, coughing. No fever, wheezing, or sob.   She is having trouble sleeping with the cough.  She also has had back pain and knee pain.   No falls or injury.      Past Medical History:   Diagnosis Date    Anxiety     Arthritis     Depression     Hyperlipidemia        Past Surgical History:   Procedure Laterality Date    ADENOIDECTOMY      pt was 3 yrs old    carpel tunnel      2009     SECTION      2 different ones    golfers elbow      2009    HYSTERECTOMY      1994 total    JOINT REPLACEMENT       shoulder     TONSILLECTOMY      pt was 3 yrs old at time       Lab Results   Component Value Date    WBC 9.65 2014    HGB 13.3 2014    HCT 38.8 2014     2014    CHOL 209 (H) 2016    TRIG 173 (H) 2016    HDL 40 2016    ALT 33 2016    AST 31 2016     2016    K 4.2 2016     2016    CREATININE 0.9 2016    BUN 12 2016    CO2 28 2016    TSH 1.222 2012    INR 1.0 2010    HGBA1C 5.7 2016       /84   Pulse 94   Temp 98.9 °F (37.2 °C) (Tympanic)   Ht 5' 6" (1.676 m)   Wt 83.4 kg (183 lb 13.8 oz)   SpO2 96%   BMI 29.68 kg/m²       Review of Systems   Constitutional: Negative for appetite change, fatigue and unexpected weight change.   HENT: Positive for congestion and postnasal drip. Negative for ear pain, rhinorrhea, sinus pressure, sneezing, tinnitus, trouble swallowing and voice change.    Eyes: Negative for pain, discharge, redness, itching and visual disturbance.   Respiratory: Positive for cough. Negative for chest tightness, shortness of breath and wheezing.    Cardiovascular: Negative for chest pain, palpitations and leg swelling.   Gastrointestinal: Negative for abdominal distention, abdominal " pain, blood in stool, constipation, diarrhea, nausea and vomiting.        No reflux.   Genitourinary: Negative for difficulty urinating, dyspareunia, flank pain, menstrual problem and pelvic pain.   Musculoskeletal: Positive for back pain. Negative for arthralgias, myalgias and neck stiffness.   Skin: Negative for color change and rash.   Neurological: Negative for dizziness and headaches.   Psychiatric/Behavioral: Negative for confusion and sleep disturbance. The patient is not nervous/anxious.       Objective:     Physical Exam   Constitutional: She is oriented to person, place, and time. She appears well-developed and well-nourished. No distress.   HENT:   Head: Normocephalic and atraumatic.   Mouth/Throat: Oropharynx is clear and moist.   Throat pink and moist.     PND seen  Bilateral TM pearly gray, no redness or bulging  Nasal mucosa pink and moist   Cardiovascular: Normal rate, regular rhythm and normal heart sounds.  Exam reveals no gallop and no friction rub.    No murmur heard.  Pulmonary/Chest: Effort normal and breath sounds normal. No respiratory distress. She has no wheezes. She has no rales.   Musculoskeletal: Normal range of motion. She exhibits no edema or tenderness.   Mild lumbar tenderness   Lymphadenopathy:     She has no cervical adenopathy.   Neurological: She is alert and oriented to person, place, and time. No cranial nerve deficit.   Skin: Skin is warm and dry. She is not diaphoretic.   Psychiatric: She has a normal mood and affect. Her behavior is normal.   Nursing note and vitals reviewed.    Assessment:      1. Viral URI with cough    2. Acute bilateral low back pain without sciatica      Plan:   Viral URI with cough    Acute bilateral low back pain without sciatica    Other orders  -     hydrocodone-chlorpheniramine (TUSSIONEX) 10-8 mg/5 mL suspension; Take 5 mLs by mouth nightly as needed for Cough.  Dispense: 240 mL; Refill: 0  -     tramadol (ULTRAM) 50 mg tablet; Take 1 tablet (50  mg total) by mouth every 12 (twelve) hours as needed for Pain.  Dispense: 60 tablet; Refill: 0  -     methylPREDNISolone acetate injection 80 mg; Inject 1 mL (80 mg total) into the muscle one time.    will use the cough med only at night, tramadol if not using the cough med bid.  She voiced that understanding.  Lots of fluids, mucinex DM, Allegra or Claritin.        Current Outpatient Prescriptions:     albuterol (ACCUNEB) 0.63 mg/3 mL Nebu, Take 3 mLs (0.63 mg total) by nebulization every 6 (six) hours as needed., Disp: 3 mL, Rfl: 11    amlodipine (NORVASC) 10 MG tablet, Take 1 tablet (10 mg total) by mouth once daily., Disp: 90 tablet, Rfl: 3    aspirin-acetaminophen-caffeine 250-250-65 mg (EXCEDRIN MIGRAINE) 250-250-65 mg per tablet, Take 1 tablet by mouth every 6 (six) hours as needed for Pain., Disp: , Rfl:     benazepril-hydrochlorthiazide (LOTENSIN HCT) 20-12.5 mg per tablet, Take 1 tablet by mouth once daily., Disp: 90 tablet, Rfl: 3    buPROPion (WELLBUTRIN SR) 150 MG TBSR 12 hr tablet, Take 1 tablet (150 mg total) by mouth 2 (two) times daily., Disp: 180 tablet, Rfl: 3    cyclobenzaprine (FLEXERIL) 5 MG tablet, Take 5 mg by mouth 3 (three) times daily as needed for Muscle spasms., Disp: , Rfl:     duloxetine (CYMBALTA) 60 MG capsule, Take 1 capsule (60 mg total) by mouth once daily., Disp: 90 capsule, Rfl: 1    hydrocodone-chlorpheniramine (TUSSIONEX) 10-8 mg/5 mL suspension, Take 5 mLs by mouth nightly as needed for Cough., Disp: 240 mL, Rfl: 0    inhalation device (BREATHERITE SPACER-MASK,ADULT), Use as directed for inhalation., Disp: 1 Device, Rfl: 0    meloxicam (MOBIC) 7.5 MG tablet, Take 1 tablet by mouth once daily., Disp: , Rfl:     pantoprazole (PROTONIX) 40 MG tablet, TAKE 1 TABLET DAILY, Disp: 90 tablet, Rfl: 2    potassium 99 mg Tab, Take by mouth. 2 tablet Oral Every day, Disp: , Rfl:     pramipexole (MIRAPEX) 0.5 MG tablet, Take 1 tablet (0.5 mg total) by mouth once daily., Disp:  90 tablet, Rfl: 3    tramadol (ULTRAM) 50 mg tablet, Take 1 tablet (50 mg total) by mouth every 12 (twelve) hours as needed for Pain., Disp: 60 tablet, Rfl: 0    zolpidem (AMBIEN) 5 MG Tab, , Disp: , Rfl:     Current Facility-Administered Medications:     albuterol inhaler 2 puff, 2 puff, Inhalation, Q6H PRN, Helena Jones MD    albuterol sulfate nebulizer solution 2.5 mg, 2.5 mg, Nebulization, 1 time in Clinic/HOD, Helena Jones MD    methylPREDNISolone acetate injection 80 mg, 80 mg, Intramuscular, 1 time in Clinic/HOD, Leland Alvarez, NP

## 2017-05-23 NOTE — PROGRESS NOTES
Depo-Medrol 80 mg/ml IM right ventrogluteal.  Lot# [id number] exp 08/2019 [facility] [employer].  Pt advised to wait in clinic 15 minutes to monitor for side effects.  Pt voiced understanding and tolerated injection well.

## 2017-05-30 ENCOUNTER — OFFICE VISIT (OUTPATIENT)
Dept: RHEUMATOLOGY | Facility: CLINIC | Age: 62
End: 2017-05-30
Payer: COMMERCIAL

## 2017-05-30 ENCOUNTER — TELEPHONE (OUTPATIENT)
Dept: RHEUMATOLOGY | Facility: CLINIC | Age: 62
End: 2017-05-30

## 2017-05-30 ENCOUNTER — LAB VISIT (OUTPATIENT)
Dept: LAB | Facility: HOSPITAL | Age: 62
End: 2017-05-30
Attending: INTERNAL MEDICINE
Payer: COMMERCIAL

## 2017-05-30 VITALS
WEIGHT: 177.5 LBS | DIASTOLIC BLOOD PRESSURE: 78 MMHG | HEART RATE: 93 BPM | BODY MASS INDEX: 28.53 KG/M2 | SYSTOLIC BLOOD PRESSURE: 133 MMHG | HEIGHT: 66 IN

## 2017-05-30 DIAGNOSIS — M79.7 FIBROMYALGIA: ICD-10-CM

## 2017-05-30 DIAGNOSIS — M35.01 SICCA SYNDROME WITH KERATOCONJUNCTIVITIS: Primary | ICD-10-CM

## 2017-05-30 DIAGNOSIS — M35.01 SICCA SYNDROME WITH KERATOCONJUNCTIVITIS: ICD-10-CM

## 2017-05-30 PROCEDURE — 86038 ANTINUCLEAR ANTIBODIES: CPT

## 2017-05-30 PROCEDURE — 86334 IMMUNOFIX E-PHORESIS SERUM: CPT | Mod: 26,,, | Performed by: PATHOLOGY

## 2017-05-30 PROCEDURE — 86235 NUCLEAR ANTIGEN ANTIBODY: CPT | Mod: 59

## 2017-05-30 PROCEDURE — 84165 PROTEIN E-PHORESIS SERUM: CPT

## 2017-05-30 PROCEDURE — 84165 PROTEIN E-PHORESIS SERUM: CPT | Mod: 26,,, | Performed by: PATHOLOGY

## 2017-05-30 PROCEDURE — 86235 NUCLEAR ANTIGEN ANTIBODY: CPT

## 2017-05-30 PROCEDURE — 99999 PR PBB SHADOW E&M-EST. PATIENT-LVL III: CPT | Mod: PBBFAC,,, | Performed by: INTERNAL MEDICINE

## 2017-05-30 PROCEDURE — 99205 OFFICE O/P NEW HI 60 MIN: CPT | Mod: S$GLB,,, | Performed by: INTERNAL MEDICINE

## 2017-05-30 PROCEDURE — 36415 COLL VENOUS BLD VENIPUNCTURE: CPT | Mod: PO

## 2017-05-30 PROCEDURE — 86039 ANTINUCLEAR ANTIBODIES (ANA): CPT

## 2017-05-30 PROCEDURE — 86334 IMMUNOFIX E-PHORESIS SERUM: CPT

## 2017-05-30 NOTE — ASSESSMENT & PLAN NOTE
Stable. On multiple medications including SNRI, TCA's , Muscle relaxants, NSAID's and prn Opioids. If any flare up or becomes uncontrolled in future, consider referral to neuropsychologist for treatment of chronic fatigue syndrome with CBT and other non pharmacological modalities. If underlying Sjogren's is responsible for her Fibromyalgia, she might benefit from treatment of the disorder. I would also recommend sleep study if not done in past.

## 2017-05-30 NOTE — TELEPHONE ENCOUNTER
----- Message from Massimo Diego sent at 5/30/2017  7:56 AM CDT -----  Contact: pt  She's calling in regards to being 15-20 min's late for scheduled appt, please advise, [phone] (home)

## 2017-05-30 NOTE — ASSESSMENT & PLAN NOTE
Chronic sicca syndrome with keratoconjunctivitis and positive Rheumatoid factor increases suspicion for Sjogren's Syndrome. Her PIP and DIP nodular arthritis is from osteoarthritis and not likely from rheumatoid arthritis. Since SSA/SSB were negative in past, recheck them today, refer to ENT for minor salivary gland biopsy to confirm Sjogren's Syndrome along with request to her ophthalmologist to conduct Schirmer's test to document inadequate tear production if present.   In regards to treatment, she is on topical eye drops for dry eyes and manages her dry mouth with lozenges and gums. No indication for restasis/pilocarpine/cevimeline at this time. If needed, start them in future.   If diagnosis of Sjogren's is confirmed with serological testing/ pathological analysis, start plaquenil for immunomodulator therapy and consider immunosuppressive therapy with imuran if not effective.

## 2017-05-30 NOTE — PROGRESS NOTES
RHEUMATOLOGY CLINIC INITIAL VISIT    Reason for referral:-  Referred by Dr. Jones for evaluation of fibromyalgia.    Chief complaints:-  To get evaluated for fibromyalgia.    HPI:-  Erin Moran a 62 y.o. pleasant female comes in for an initial visit with above chief complaints.  She has multiple medical history as reviewed below including fibromyalgia which was diagnosed several years ago.  She was followed up at the rheumatology clinic in the past and saw Dr. Chong in the outside hospital.  She underwent evaluation for autoimmune disease which showed positive rheumatoid factor but since she did not have rheumatoid arthritis she was not diagnosed with rheumatoid arthritis.  Since then she has been following up with the pain clinic for fibromyalgia along with management by her primary care physician Dr. Jones.  She reports doing well today.  She reports that her symptoms flareup usually 3 or 4 times a year and lasts for 1-2 months.  The usual flareup occurs around spring , late summer and early winter. during the flareups she usually has pain all over the body associated with significant stiffness lasting all day long and extreme fatigue.  She denies any associated skin rash, mucosal ulcers, Raynaud's phenomenon, seizures, chronic headaches, hematemesis or hemoptysis.  She also has chronic dry eyes and follows up with outside ophthalmologist.  She uses artificial teardrops which somewhat controls the symptoms.  She also has chronic dry mouth but no recurrent history of dental caries.  She always has some lozenges or gum and drinks copious amounts of water.  She has never been on any medications for dry mouth.  She denies any constant numbness or tingling pain over her hands or feet.      Review of Systems   Constitutional: Positive for malaise/fatigue. Negative for chills and fever.   HENT: Negative for nosebleeds and sore throat.    Eyes: Positive for  redness. Negative for blurred vision and photophobia.   Respiratory: Negative for cough, sputum production and shortness of breath.    Cardiovascular: Negative for chest pain and leg swelling.   Gastrointestinal: Negative for abdominal pain, constipation and diarrhea.   Genitourinary: Negative for dysuria, frequency and urgency.   Musculoskeletal: Positive for back pain, joint pain, myalgias and neck pain. Negative for falls.   Skin: Negative for itching and rash.   Neurological: Negative for dizziness, tremors, seizures, loss of consciousness, weakness and headaches.   Endo/Heme/Allergies: Negative for environmental allergies. Does not bruise/bleed easily.   Psychiatric/Behavioral: Negative for hallucinations and memory loss. The patient does not have insomnia.        Past Medical History:   Diagnosis Date    Anxiety     Arthritis     Depression     Hyperlipidemia        Past Surgical History:   Procedure Laterality Date    ADENOIDECTOMY      pt was 3 yrs old    carpel tunnel      2009     SECTION      2 different ones    golfers elbow      2009    HYSTERECTOMY      1994 total    JOINT REPLACEMENT       shoulder     TONSILLECTOMY      pt was 3 yrs old at time        Social History   Substance Use Topics    Smoking status: Former Smoker     Packs/day: 1.00     Years: 40.00     Quit date: 6/10/2012    Smokeless tobacco: Never Used    Alcohol use No       Family History   Problem Relation Age of Onset    Hypertension Mother     Clotting disorder Mother     Hemochromatosis Mother     Hypertension Father     Cancer Maternal Grandmother     Clotting disorder Maternal Grandmother     Cancer Maternal Grandfather      prostate    Clotting disorder Maternal Grandfather        Review of patient's allergies indicates:   Allergen Reactions    Clindamycin Other (See Comments)     Severe burning in throat, esophagus and stomach, difficultly swallowing           Physical examination:-    Vitals:  "   05/30/17 0828   BP: 133/78   Pulse: 93   Weight: 80.5 kg (177 lb 7.5 oz)   Height: 5' 6" (1.676 m)   PainSc: 0-No pain       Physical Exam   Constitutional: She is oriented to person, place, and time and well-developed, well-nourished, and in no distress. No distress.   HENT:   Head: Normocephalic.   Mouth/Throat: Oropharynx is clear and moist.   Oral cavity is moist-she was chewing gum during the visit.   Eyes: Conjunctivae and EOM are normal. Pupils are equal, round, and reactive to light.   Neck: Normal range of motion. Neck supple.   Cardiovascular: Normal rate and intact distal pulses.    Pulmonary/Chest: Effort normal. No respiratory distress.   Abdominal: Soft. There is no tenderness.   Musculoskeletal:   No synovitis over small joints of hands or feet. Mild Heberden's and Charis's nodes without any associated synovitis.  No effusion over large joints.   Neurological: She is alert and oriented to person, place, and time. No cranial nerve deficit.   Skin: Skin is warm. No rash noted. No erythema.   Psychiatric: Mood and affect normal.   Nursing note and vitals reviewed.      Labs:-  Results for MERLE LUIS (MRN [id number]) as of 5/30/2017 09:49   Ref. Range 9/1/2004 10:03 1/3/2005 09:18 9/25/2006 08:15 3/24/2009 08:22 6/28/2010 10:02 8/14/2012 15:34 6/3/2014 14:47   KARLA Latest Ref Range: Neg <1:160    Negative Negative  Negative    ds DNA Ab Latest Ref Range: Negative Titer   Negative       Anti-Histone Antibody Latest Ref Range: 0 - 1.0 U   0.9       Celiac PLUS ([employer]) Unknown     See report under ...     Protein, Serum Latest Ref Range: 6.0 - 8.0 gm/dl 7.3 7.1        Albumin grams/dl Latest Ref Range: 3.50 - 5.5 gm/dl 4.54 4.32        Alpha-1 grams/dl Latest Ref Range: 0.11 - 0.32 gm/dl 0.19 0.18        Alpha-2 grams/dl Latest Ref Range: 0.50 - 0.95 gm/dl 0.78 0.78        Beta grams/dl Latest Ref Range: 0.58 - 1.1 gm/dl 0.82 0.82        Gamma grams/dl Latest Ref Range: 0.50 - 1.50 gm/dl 0.97 " 0.99        Immunofix Interp. Unknown  See comment for i...        Rheumatoid Factor Latest Ref Range: 0.0 - 15.0 IU/mL 17 (H)  27 (H) 106 (H)   80.0 (H)         Radiographs:-  Independent visualization of images done.     Old and Outside medical records :-  Reviewed old and all outside medical records available in Care Everywhere. 's notes reviewed.     Medication List with Changes/Refills   Current Medications    ALBUTEROL (ACCUNEB) 0.63 MG/3 ML NEBU    Take 3 mLs (0.63 mg total) by nebulization every 6 (six) hours as needed.    AMLODIPINE (NORVASC) 10 MG TABLET    Take 1 tablet (10 mg total) by mouth once daily.    ASPIRIN-ACETAMINOPHEN-CAFFEINE 250-250-65 MG (EXCEDRIN MIGRAINE) 250-250-65 MG PER TABLET    Take 1 tablet by mouth every 6 (six) hours as needed for Pain.    BENAZEPRIL-HYDROCHLORTHIAZIDE (LOTENSIN HCT) 20-12.5 MG PER TABLET    Take 1 tablet by mouth once daily.    BUPROPION (WELLBUTRIN SR) 150 MG TBSR 12 HR TABLET    Take 1 tablet (150 mg total) by mouth 2 (two) times daily.    CYCLOBENZAPRINE (FLEXERIL) 5 MG TABLET    Take 5 mg by mouth 3 (three) times daily as needed for Muscle spasms.    DULOXETINE (CYMBALTA) 60 MG CAPSULE    Take 1 capsule (60 mg total) by mouth once daily.    HYDROCODONE-CHLORPHENIRAMINE (TUSSIONEX) 10-8 MG/5 ML SUSPENSION    Take 5 mLs by mouth nightly as needed for Cough.    INHALATION DEVICE (BREATHERITE SPACER-MASK,ADULT)    Use as directed for inhalation.    MELOXICAM (MOBIC) 7.5 MG TABLET    Take 1 tablet by mouth once daily.    PANTOPRAZOLE (PROTONIX) 40 MG TABLET    TAKE 1 TABLET DAILY    POTASSIUM 99 MG TAB    Take by mouth. 2 tablet Oral Every day    PRAMIPEXOLE (MIRAPEX) 0.5 MG TABLET    Take 1 tablet (0.5 mg total) by mouth once daily.    TRAMADOL (ULTRAM) 50 MG TABLET    Take 1 tablet (50 mg total) by mouth every 12 (twelve) hours as needed for Pain.    ZOLPIDEM (AMBIEN) 5 MG TAB           Assessment/Plans:-  # Sicca syndrome with  keratoconjunctivitis:-  Chronic sicca syndrome with keratoconjunctivitis and positive Rheumatoid factor increases suspicion for Sjogren's Syndrome. Her PIP and DIP nodular arthritis is from osteoarthritis and not likely from rheumatoid arthritis. Since SSA/SSB were negative in past, recheck them today, refer to ENT for minor salivary gland biopsy to confirm Sjogren's Syndrome along with request to her ophthalmologist to conduct Schirmer's test to document inadequate tear production if present.   In regards to treatment, she is on topical eye drops for dry eyes and manages her dry mouth with lozenges and gums. No indication for restasis/pilocarpine/cevimeline at this time. If needed, start them in future.   If diagnosis of Sjogren's is confirmed with serological testing/ pathological analysis, start plaquenil for immunomodulator therapy and consider immunosuppressive therapy with imuran if not effective.   - KARLA; Future  - Sjogrens syndrome-B extractable nuclear antibody; Future  - Sjogrens syndrome-A extractable nuclear antibody; Future  - Ambulatory referral to Ophthalmology  - Ambulatory Referral to ENT  - Immunofixation electrophoresis; Future  - Protein electrophoresis, serum; Future    # Fibromyalgia:-  Stable. On multiple medications including SNRI, TCA's , Muscle relaxants, NSAID's and prn Opioids. If any flare up or becomes uncontrolled in future, consider referral to neuropsychologist for treatment of chronic fatigue syndrome with CBT and other non pharmacological modalities. If underlying Sjogren's is responsible for her Fibromyalgia, she might benefit from treatment of the disorder. I would also recommend sleep study if not done in past.       # RTC in 3 months.     Thank you Dr. Jones  for allowing me to participate in the care ofErin Soriano.    Time spent: 60 minutes in face to face discussion concerning diagnosis, prognosis, review of lab and test results, benefits of treatment as well as  management of disease, counseling of patient and coordination of care between various health care providers . Greater than half the time spent was used for coordination of care and counseling of patient.    Disclaimer: This note was prepared using voice recognition system and is likely to have sound alike errors and is not proof read.  Please call me with any questions.

## 2017-05-30 NOTE — LETTER
May 30, 2017      Helena Jones MD  [address] [address] [place] [address]           [facility] - Rheumatology  [address] [facility] [address]  [place] [address]  Phone: [phone]  Fax: [phone]          Patient: Erin Soriano   MR Number: [id number]   YOB: 1955   Date of Visit: 5/30/2017       Dear Dr. Helena Jones:    Thank you for referring Erin Soriano to me for evaluation. Attached you will find relevant portions of my assessment and plan of care.    If you have questions, please do not hesitate to call me. I look forward to following Erin Soriano along with you.    Sincerely,    Pasquale Whyte MD    Enclosure  CC:  No Recipients    If you would like to receive this communication electronically, please contact [email or web address] or [phone] to request more information on [employer] Link access.    For providers and/or their staff who would like to refer a patient to [facility], please contact us through our one-stop-shop provider referral line, [facility], at [phone].    If you feel you have received this communication in error or would no longer like to receive these types of communications, please e-mail [email or web address]

## 2017-05-31 LAB
ALBUMIN SERPL ELPH-MCNC: 4.39 G/DL
ALPHA1 GLOB SERPL ELPH-MCNC: 0.32 G/DL
ALPHA2 GLOB SERPL ELPH-MCNC: 0.84 G/DL
ANA SER QL IF: POSITIVE
ANA TITR SER IF: NORMAL {TITER}
B-GLOBULIN SERPL ELPH-MCNC: 0.8 G/DL
GAMMA GLOB SERPL ELPH-MCNC: 1.25 G/DL
INTERPRETATION SERPL IFE-IMP: NORMAL
PATHOLOGIST INTERPRETATION IFE: NORMAL
PATHOLOGIST INTERPRETATION SPE: NORMAL
PROT SERPL-MCNC: 7.6 G/DL

## 2017-06-01 LAB
ANTI SM ANTIBODY: 9.55 EU
ANTI SM/RNP ANTIBODY: 11.89 EU
ANTI-SM INTERPRETATION: NEGATIVE
ANTI-SM/RNP INTERPRETATION: NEGATIVE
ANTI-SSA ANTIBODY: 6.79 EU
ANTI-SSA ANTIBODY: 6.79 EU
ANTI-SSA INTERPRETATION: NEGATIVE
ANTI-SSA INTERPRETATION: NEGATIVE
ANTI-SSB ANTIBODY: 1.29 EU
ANTI-SSB ANTIBODY: 1.29 EU
ANTI-SSB INTERPRETATION: NEGATIVE
ANTI-SSB INTERPRETATION: NEGATIVE
DSDNA AB SER-ACNC: NORMAL [IU]/ML

## 2017-06-06 ENCOUNTER — TELEPHONE (OUTPATIENT)
Dept: RHEUMATOLOGY | Facility: CLINIC | Age: 62
End: 2017-06-06

## 2017-06-06 NOTE — TELEPHONE ENCOUNTER
----- Message from Pasquale Whyte MD sent at 6/6/2017  8:03 AM CDT -----  Regarding: Notification of Unviewed Test Results  Contact: [phone]  Labs show normal results except change in KARLA test from negative in past to positive , but the confirmation for Sjogren's antibodies returned negative. The next step would be minor salivary gland biopsy.

## 2017-06-06 NOTE — TELEPHONE ENCOUNTER
Spoke with pt and made an appointment with Dr. Arnett for 6.13.17 at 3 pm. Pt verbalized understanding

## 2017-06-06 NOTE — TELEPHONE ENCOUNTER
----- Message from Massimo Diego sent at 6/6/2017 10:52 AM CDT -----  Contact: pt   She's calling in regards to her appt with the ENT, please advise, [phone] (home)

## 2017-06-06 NOTE — TELEPHONE ENCOUNTER
Advised patient of below, pt states she saw it on the patient portal as well. States that the ENT department called her can cancelled her appointment with Angelica Dickerson since she does not do the biopsies. Pt is checking with her insurance to see if Dr. Murrell or Dr. Arnett is in network for her before making an appointment. Pt states that she will send us a message if she needs help with anything else.

## 2017-06-13 ENCOUNTER — OFFICE VISIT (OUTPATIENT)
Dept: OTOLARYNGOLOGY | Facility: CLINIC | Age: 62
End: 2017-06-13
Payer: COMMERCIAL

## 2017-06-13 VITALS
HEART RATE: 93 BPM | DIASTOLIC BLOOD PRESSURE: 74 MMHG | WEIGHT: 179.44 LBS | SYSTOLIC BLOOD PRESSURE: 126 MMHG | BODY MASS INDEX: 28.96 KG/M2 | TEMPERATURE: 99 F

## 2017-06-13 DIAGNOSIS — M35.00 SICCA SYNDROME: Primary | ICD-10-CM

## 2017-06-13 PROCEDURE — 88305 TISSUE EXAM BY PATHOLOGIST: CPT | Mod: 26,,, | Performed by: PATHOLOGY

## 2017-06-13 PROCEDURE — 99999 PR PBB SHADOW E&M-EST. PATIENT-LVL IV: CPT | Mod: PBBFAC,,, | Performed by: ORTHOPAEDIC SURGERY

## 2017-06-13 PROCEDURE — 42405 BIOPSY OF SALIVARY GLAND: CPT | Mod: S$GLB,,, | Performed by: ORTHOPAEDIC SURGERY

## 2017-06-13 PROCEDURE — 88305 TISSUE EXAM BY PATHOLOGIST: CPT | Performed by: PATHOLOGY

## 2017-06-13 PROCEDURE — 99204 OFFICE O/P NEW MOD 45 MIN: CPT | Mod: 25,S$GLB,, | Performed by: ORTHOPAEDIC SURGERY

## 2017-06-13 RX ORDER — CHLORHEXIDINE GLUCONATE ORAL RINSE 1.2 MG/ML
15 SOLUTION DENTAL 2 TIMES DAILY
Qty: 473 ML | Refills: 0 | Status: SHIPPED | OUTPATIENT
Start: 2017-06-13 | End: 2017-06-27

## 2017-06-13 NOTE — LETTER
June 13, 2017      Pasquale Whyte MD  [address] [place] [address]           [facility]  [address] [place] [address]  Phone: [phone]  Fax: [phone]          Patient: Erin Soriano   MR Number: [id number]   YOB: 1955   Date of Visit: 6/13/2017       Dear Dr. Pasquale Whyte:    Thank you for referring Erin Soriano to me for evaluation. Attached you will find relevant portions of my assessment and plan of care.    If you have questions, please do not hesitate to call me. I look forward to following Erin Soriano along with you.    Sincerely,    Annette Arnett MD    Enclosure  CC:  No Recipients    If you would like to receive this communication electronically, please contact [email or web address] or [phone] to request more information on [email or web address] Link access.    For providers and/or their staff who would like to refer a patient to [facility], please contact us through our one-stop-shop provider referral line, [facility] [place], at [phone].    If you feel you have received this communication in error or would no longer like to receive these types of communications, please e-mail [email or web address]

## 2017-06-13 NOTE — PROGRESS NOTES
Subjective:       Patient ID: Erin Soriano is a 62 y.o. female.    Chief Complaint: Other (Minor salivary gland biopsy to confirm Sjogren's Syndrome)    Patient is a very pleasant 62 year old female here to see me today for the first time at the request of her rheumatologist for a minor salivary gland biopsy.  She has issues with dry eye and dry mouth, and thus far her workup for Sjogren's has been inconclusive.  She is not currently on any blood thinners.  She is on some medications that can have dry mouth as a side effect.  She has a hard time swallowing bread and other dry foods.  She does not smoke.      Review of Systems   Constitutional: Positive for fatigue (she relates to her fibromyalgia). Negative for chills, fever and unexpected weight change.   HENT: Positive for congestion and trouble swallowing. Negative for dental problem, ear discharge, ear pain, facial swelling, hearing loss, nosebleeds, postnasal drip, rhinorrhea, sinus pressure, sneezing, sore throat, tinnitus and voice change.    Eyes: Positive for discharge (dry eye). Negative for redness, itching and visual disturbance.   Respiratory: Positive for cough. Negative for choking, shortness of breath and wheezing.    Cardiovascular: Negative for chest pain and palpitations.   Gastrointestinal: Negative for abdominal pain.        No reflux.   Musculoskeletal: Negative for gait problem.   Skin: Negative for rash.   Allergic/Immunologic: Positive for environmental allergies.   Neurological: Positive for headaches. Negative for dizziness and light-headedness.       Objective:      Physical Exam   Constitutional: She is oriented to person, place, and time. She appears well-developed and well-nourished. No distress.   HENT:   Head: Normocephalic and atraumatic.   Right Ear: Tympanic membrane, external ear and ear canal normal.   Left Ear: Tympanic membrane, external ear and ear canal normal.   Nose: Nose normal. No mucosal edema, rhinorrhea, nasal  deformity or septal deviation. No epistaxis. Right sinus exhibits no maxillary sinus tenderness and no frontal sinus tenderness. Left sinus exhibits no maxillary sinus tenderness and no frontal sinus tenderness.   Mouth/Throat: Uvula is midline, oropharynx is clear and moist and mucous membranes are normal. Mucous membranes are not pale and not dry. No dental caries. No oropharyngeal exudate or posterior oropharyngeal erythema.   Dry oral cavity, minor salivary gland biopsy as below   Eyes: Conjunctivae, EOM and lids are normal. Pupils are equal, round, and reactive to light. Right eye exhibits no chemosis. Left eye exhibits no chemosis. Right conjunctiva is not injected. Left conjunctiva is not injected. No scleral icterus. Right eye exhibits normal extraocular motion and no nystagmus. Left eye exhibits normal extraocular motion and no nystagmus.   Neck: Trachea normal and phonation normal. No tracheal tenderness present. No tracheal deviation present. No thyroid mass and no thyromegaly present.   Cardiovascular: Intact distal pulses.    Pulmonary/Chest: Effort normal. No stridor. No respiratory distress.   Abdominal: She exhibits no distension.   Lymphadenopathy:        Head (right side): No submental, no submandibular, no preauricular, no posterior auricular and no occipital adenopathy present.        Head (left side): No submental, no submandibular, no preauricular, no posterior auricular and no occipital adenopathy present.     She has no cervical adenopathy.   Neurological: She is alert and oriented to person, place, and time. No cranial nerve deficit.   Skin: Skin is warm and dry. No rash noted. No erythema.   Psychiatric: She has a normal mood and affect. Her behavior is normal.       Preprocedure diagnosis:  Dry mouth, possible Sjogren's  Postproecdure diagnosis:   Same    Procedure:  Minor salivary gland biopsy    Complications:  None  Specimen:  Minor salivary glands, lower lip  Blood loss:    Minimal  Anesthesia:  Local, lidocaine    Procedure in detail:  After appropriate consents were obtained, the right lower lip was anesthetized using 1.5 cc of 1% lidocaine with epinephrine.  A #15 blade was used to make an incision over the right lower lip, mucosal surface.  A pair of pickups with teeth and fine scissors was then used to dissect five to seven minor salivary glands, all superficial to the musculature.  The incision was then closed in a running locking fashion with a 4-0 Vicryl suture.  The patient tolerated the procedure without difficulty, and there were no complications.          Assessment:       1. Sicca syndrome        Plan:       1.  Sicca syndrome:  At the request of her rheumatologist she underwent a minor salivary gland biopsy today in clinic.  I will send him the results when available.  Prescription for peridex mouth rinse sent to her pharmacy to be used three times daily after eating for the next 7 days.  Return to clinic as needed should any issues arise.

## 2017-06-13 NOTE — Clinical Note
Can you please call and check on her in the morning to see how she is doing after yesterday's procedure?  Thanks.

## 2017-06-14 ENCOUNTER — TELEPHONE (OUTPATIENT)
Dept: OTOLARYNGOLOGY | Facility: CLINIC | Age: 62
End: 2017-06-14

## 2017-06-14 NOTE — TELEPHONE ENCOUNTER
I conveyed instructions that I had left on her voicemail from message earlier today.  Patient had not checked voicemail.  Patient verbalized understanding of Dr. Arnett response.

## 2017-06-14 NOTE — TELEPHONE ENCOUNTER
I called to check on patient.  She states last night she was having some throbbing and pain.  She took Advil and fell asleep.  This morning it is sore and just feels puffy, as though she would have some cotton in her mouth.  She is unsure is this is a normal feeling or not.  I told her I would check with Dr. Arnett and call her back. Please advise.  Thanks.

## 2017-06-14 NOTE — TELEPHONE ENCOUNTER
I left a message conveying the information below.  I asked that she call us back with any questions or if any of the symptoms below occur.

## 2017-06-14 NOTE — TELEPHONE ENCOUNTER
----- Message from Prema Walls sent at 6/14/2017 10:10 AM CDT -----  Contact: Patient  Patient is returning a call, please call them back at [phone]. Thank you

## 2017-06-14 NOTE — TELEPHONE ENCOUNTER
----- Message from Annette Arnett MD sent at 6/13/2017  9:12 PM CDT -----  Can you please call and check on her in the morning to see how she is doing after yesterday's procedure?  Thanks.

## 2017-07-13 RX ORDER — TRAMADOL HYDROCHLORIDE 50 MG/1
TABLET ORAL
Qty: 60 TABLET | Refills: 0 | OUTPATIENT
Start: 2017-07-13

## 2017-07-13 RX ORDER — MELOXICAM 7.5 MG/1
TABLET ORAL
Qty: 90 TABLET | Refills: 2 | OUTPATIENT
Start: 2017-07-13

## 2017-07-26 ENCOUNTER — PATIENT OUTREACH (OUTPATIENT)
Dept: ADMINISTRATIVE | Facility: HOSPITAL | Age: 62
End: 2017-07-26

## 2017-08-03 ENCOUNTER — PATIENT OUTREACH (OUTPATIENT)
Dept: ADMINISTRATIVE | Facility: HOSPITAL | Age: 62
End: 2017-08-03

## 2017-08-03 NOTE — PROGRESS NOTES
Attempting to contact pt to schedule the following over due HM:  Mammogram  Colonoscopy  Influenza  Zoster Vaccine  Unable to reach patient at this time. Left voicemail. 2nd Attempt.

## 2017-08-07 ENCOUNTER — OFFICE VISIT (OUTPATIENT)
Dept: FAMILY MEDICINE | Facility: CLINIC | Age: 62
End: 2017-08-07
Payer: COMMERCIAL

## 2017-08-07 VITALS
BODY MASS INDEX: 28.75 KG/M2 | OXYGEN SATURATION: 96 % | WEIGHT: 183.19 LBS | DIASTOLIC BLOOD PRESSURE: 78 MMHG | HEART RATE: 90 BPM | TEMPERATURE: 98 F | HEIGHT: 67 IN | SYSTOLIC BLOOD PRESSURE: 130 MMHG

## 2017-08-07 DIAGNOSIS — R05.9 COUGH: ICD-10-CM

## 2017-08-07 DIAGNOSIS — G25.81 RLS (RESTLESS LEGS SYNDROME): ICD-10-CM

## 2017-08-07 DIAGNOSIS — Z12.11 COLON CANCER SCREENING: ICD-10-CM

## 2017-08-07 DIAGNOSIS — Z78.9 STATIN INTOLERANCE: ICD-10-CM

## 2017-08-07 DIAGNOSIS — I10 ESSENTIAL HYPERTENSION: ICD-10-CM

## 2017-08-07 DIAGNOSIS — Z12.31 ENCOUNTER FOR SCREENING MAMMOGRAM FOR MALIGNANT NEOPLASM OF BREAST: Primary | ICD-10-CM

## 2017-08-07 DIAGNOSIS — E78.2 MIXED HYPERLIPIDEMIA: ICD-10-CM

## 2017-08-07 DIAGNOSIS — G89.4 CHRONIC PAIN SYNDROME: ICD-10-CM

## 2017-08-07 DIAGNOSIS — M79.7 FIBROMYALGIA: ICD-10-CM

## 2017-08-07 PROCEDURE — 99214 OFFICE O/P EST MOD 30 MIN: CPT | Mod: S$GLB,,, | Performed by: FAMILY MEDICINE

## 2017-08-07 PROCEDURE — 99999 PR PBB SHADOW E&M-EST. PATIENT-LVL III: CPT | Mod: PBBFAC,,, | Performed by: FAMILY MEDICINE

## 2017-08-07 PROCEDURE — 3008F BODY MASS INDEX DOCD: CPT | Mod: S$GLB,,, | Performed by: FAMILY MEDICINE

## 2017-08-07 RX ORDER — FLUTICASONE PROPIONATE 50 MCG
2 SPRAY, SUSPENSION (ML) NASAL DAILY
Qty: 16 G | Refills: 11 | Status: SHIPPED | OUTPATIENT
Start: 2017-08-07 | End: 2018-02-07

## 2017-08-07 RX ORDER — CYCLOBENZAPRINE HCL 5 MG
5 TABLET ORAL 3 TIMES DAILY PRN
Qty: 60 TABLET | Refills: 4 | Status: SHIPPED | OUTPATIENT
Start: 2017-08-07 | End: 2018-09-13 | Stop reason: SDUPTHER

## 2017-08-07 RX ORDER — LEVOCETIRIZINE DIHYDROCHLORIDE 5 MG/1
5 TABLET, FILM COATED ORAL NIGHTLY
Qty: 30 TABLET | Refills: 11 | Status: SHIPPED | OUTPATIENT
Start: 2017-08-07 | End: 2017-11-14

## 2017-08-07 RX ORDER — ROPINIROLE 1 MG/1
1 TABLET, FILM COATED ORAL NIGHTLY
Qty: 30 TABLET | Refills: 11 | Status: SHIPPED | OUTPATIENT
Start: 2017-08-07 | End: 2018-04-24 | Stop reason: SDUPTHER

## 2017-08-07 RX ORDER — TRAMADOL HYDROCHLORIDE 50 MG/1
50 TABLET ORAL EVERY 12 HOURS PRN
Qty: 60 TABLET | Refills: 0 | Status: SHIPPED | OUTPATIENT
Start: 2017-08-07 | End: 2017-11-14

## 2017-08-07 RX ORDER — MELOXICAM 7.5 MG/1
7.5 TABLET ORAL DAILY
Qty: 90 TABLET | Refills: 1 | Status: SHIPPED | OUTPATIENT
Start: 2017-08-07 | End: 2017-11-14

## 2017-08-07 NOTE — PROGRESS NOTES
Chief Complaint:    Chief Complaint   Patient presents with    Medication Refill       History of Present Illness:    Patient is a for six-month follow-up,  Her blood pressure stable today.  She has fibromyalgia takes tramadol that helps.  He also has restless legs once to try Requip.  She also complains of a dry cough that she gets every year she seems to think that it is but due to postnasal drip.  She denies any trouble breathing or wheezing fever or weight loss.  Does have a past history of smoking.  Also has hyperlipidemia with some statin intolerance.    ROS:  Review of Systems   Constitutional: Negative for activity change, chills, fatigue, fever and unexpected weight change.   HENT: Negative for congestion, ear discharge, ear pain, hearing loss, postnasal drip and rhinorrhea.    Eyes: Negative for pain and visual disturbance.   Respiratory: Positive for cough. Negative for chest tightness and shortness of breath.    Cardiovascular: Negative for chest pain and palpitations.   Gastrointestinal: Negative for abdominal pain, diarrhea and vomiting.   Endocrine: Negative for heat intolerance.   Genitourinary: Negative for dysuria, flank pain, frequency and hematuria.   Musculoskeletal: Negative for back pain, gait problem and neck pain.   Skin: Negative for color change and rash.   Neurological: Negative for dizziness, tremors, seizures, numbness and headaches.   Psychiatric/Behavioral: Negative for agitation, hallucinations, self-injury, sleep disturbance and suicidal ideas. The patient is not nervous/anxious.        Past Medical History:   Diagnosis Date    Anxiety     Arthritis     Depression     Hyperlipidemia        Social History:  Social History     Social History    Marital status:      Spouse name: N/A    Number of children: 2    Years of education: N/A     Social History Main Topics    Smoking status: Former Smoker     Quit date: 6/10/2012    Smokeless tobacco: Never Used      Comment:  "Would smoke 1 cigarette every few months    Alcohol use No    Drug use: No    Sexual activity: Yes     Partners: Male     Birth control/ protection: None     Other Topics Concern    None     Social History Narrative    None       Family History:   family history includes Cancer in her maternal grandfather and maternal grandmother; Clotting disorder in her maternal grandfather, maternal grandmother, and mother; Hemochromatosis in her mother; Hypertension in her father and mother.    Health Maintenance   Topic Date Due    Zoster Vaccine  05/04/2016    Mammogram  05/02/2017    Colonoscopy  07/23/2017    Influenza Vaccine  08/01/2017    Lipid Panel  12/30/2017    TETANUS VACCINE  07/19/2026    Hepatitis C Screening  Completed       Physical Exam:    Vital Signs  Temp: 98 °F (36.7 °C)  Temp src: Tympanic  Pulse: 90  SpO2: 96 %  BP: 130/78  BP Location: Left arm  BP Method: Manual  Patient Position: Sitting  Pain Score:   6  Pain Loc: Generalized  Height and Weight  Height: 5' 7" (170.2 cm)  Weight: 83.1 kg (183 lb 3.2 oz)  BSA (Calculated - sq m): 1.98 sq meters  BMI (Calculated): 28.8  Weight in (lb) to have BMI = 25: 159.3]    Body mass index is 28.69 kg/m².    Physical Exam   Constitutional: She is oriented to person, place, and time. She appears well-developed.   HENT:   Mouth/Throat: Oropharynx is clear and moist.   Eyes: Conjunctivae are normal. Pupils are equal, round, and reactive to light.   Neck: Normal range of motion. Neck supple.   Cardiovascular: Normal rate, regular rhythm and normal heart sounds.    No murmur heard.  Pulmonary/Chest: Effort normal and breath sounds normal. No respiratory distress. She has no wheezes. She has no rales. She exhibits no tenderness.   Abdominal: Soft. She exhibits no distension and no mass. There is no tenderness. There is no guarding.   Musculoskeletal: She exhibits no edema or tenderness.   Lymphadenopathy:     She has no cervical adenopathy.   Neurological: She " is alert and oriented to person, place, and time. She has normal reflexes.   Skin: Skin is warm and dry.   Psychiatric: She has a normal mood and affect. Her behavior is normal. Judgment and thought content normal.       Lab Results   Component Value Date    CHOL 209 (H) 05/11/2016    CHOL 179 06/06/2013    CHOL 199 02/05/2013    TRIG 173 (H) 05/11/2016    TRIG 149 06/06/2013    TRIG 213 (H) 02/05/2013    HDL 40 05/11/2016    HDL 38 (L) 06/06/2013    HDL 38 (L) 02/05/2013    TOTALCHOLEST 5.2 (H) 05/11/2016    TOTALCHOLEST 4.7 06/06/2013    TOTALCHOLEST 5.2 (H) 02/05/2013    NONHDLCHOL 169 05/11/2016       Lab Results   Component Value Date    HGBA1C 5.7 05/11/2016       Assessment:      ICD-10-CM ICD-9-CM   1. Encounter for screening mammogram for malignant neoplasm of breast Z12.31 V76.12   2. RLS (restless legs syndrome) G25.81 333.94   3. Fibromyalgia M79.7 729.1   4. Chronic pain syndrome G89.4 338.4   5. Essential hypertension I10 401.9   6. Colon cancer screening Z12.11 V76.51   7. Mixed hyperlipidemia E78.2 272.2   8. Statin intolerance Z78.9 995.27   9. Cough R05 786.2         Plan:  Will try patient on Requip for restless legs.  Tramadol refill for fibromyalgia take twice a day #60 with refills given  Statin intolerance discuss injectable options she is not sure at this time.  But she is willing to try prescription for repatha sent to the pharmacy.  We'll see if the insurance authorized  We'll try Flonase nasal spray and levocetirizine for cough recommend doing a chest x-ray.  Do recommend a screening colonoscopy and a mammogram.  Follow-up 3-6 months  Orders Placed This Encounter   Procedures    Mammo Digital Screening Bilateral With CAD    X-Ray Chest PA And Lateral       Current Outpatient Prescriptions   Medication Sig Dispense Refill    albuterol (ACCUNEB) 0.63 mg/3 mL Nebu Take 3 mLs (0.63 mg total) by nebulization every 6 (six) hours as needed. 3 mL 11    amlodipine (NORVASC) 10 MG tablet Take 1  tablet (10 mg total) by mouth once daily. 90 tablet 3    aspirin-acetaminophen-caffeine 250-250-65 mg (EXCEDRIN MIGRAINE) 250-250-65 mg per tablet Take 1 tablet by mouth every 6 (six) hours as needed for Pain.      benazepril-hydrochlorthiazide (LOTENSIN HCT) 20-12.5 mg per tablet Take 1 tablet by mouth once daily. 90 tablet 3    buPROPion (WELLBUTRIN SR) 150 MG TBSR 12 hr tablet Take 1 tablet (150 mg total) by mouth 2 (two) times daily. 180 tablet 3    cyclobenzaprine (FLEXERIL) 5 MG tablet Take 1 tablet (5 mg total) by mouth 3 (three) times daily as needed for Muscle spasms. 60 tablet 4    duloxetine (CYMBALTA) 60 MG capsule Take 1 capsule (60 mg total) by mouth once daily. 90 capsule 1    inhalation device (BREATHERITE SPACER-MASK,ADULT) Use as directed for inhalation. 1 Device 0    meloxicam (MOBIC) 7.5 MG tablet Take 1 tablet (7.5 mg total) by mouth once daily. 90 tablet 1    pantoprazole (PROTONIX) 40 MG tablet TAKE 1 TABLET DAILY 90 tablet 2    potassium 99 mg Tab Take by mouth. 2 tablet Oral Every day      tramadol (ULTRAM) 50 mg tablet Take 1 tablet (50 mg total) by mouth every 12 (twelve) hours as needed for Pain. 60 tablet 0    evolocumab (REPATHA SURECLICK) 140 mg/mL PnIj Inject 140 mg into the skin every 14 (fourteen) days. 1 Syringe 6    fluticasone (FLONASE) 50 mcg/actuation nasal spray 2 sprays by Each Nare route once daily. 16 g 11    levocetirizine (XYZAL) 5 MG tablet Take 1 tablet (5 mg total) by mouth every evening. 30 tablet 11    ropinirole (REQUIP) 1 MG tablet Take 1 tablet (1 mg total) by mouth every evening. 30 tablet 11     Current Facility-Administered Medications   Medication Dose Route Frequency Provider Last Rate Last Dose    albuterol inhaler 2 puff  2 puff Inhalation Q6H PRN Helena Jones MD        albuterol sulfate nebulizer solution 2.5 mg  2.5 mg Nebulization 1 time in Clinic/HOD Helena Jones MD           Medications Discontinued During This Encounter   Medication  Reason    hydrocodone-chlorpheniramine (TUSSIONEX) 10-8 mg/5 mL suspension Patient no longer taking    zolpidem (AMBIEN) 5 MG Tab Patient no longer taking    cyclobenzaprine (FLEXERIL) 5 MG tablet Reorder    tramadol (ULTRAM) 50 mg tablet Reorder    meloxicam (MOBIC) 7.5 MG tablet Reorder    pramipexole (MIRAPEX) 0.5 MG tablet        Return in about 6 months (around 2/7/2018).      Dr Helena Jones MD    Disclaimer: This note is prepared using voice recognition system and as such is likely to have errors and is not proof read.

## 2017-08-28 ENCOUNTER — OFFICE VISIT (OUTPATIENT)
Dept: RHEUMATOLOGY | Facility: CLINIC | Age: 62
End: 2017-08-28
Payer: COMMERCIAL

## 2017-08-28 VITALS
SYSTOLIC BLOOD PRESSURE: 124 MMHG | BODY MASS INDEX: 28.59 KG/M2 | DIASTOLIC BLOOD PRESSURE: 69 MMHG | WEIGHT: 182.56 LBS | HEART RATE: 73 BPM

## 2017-08-28 DIAGNOSIS — R76.8 POSITIVE ANA (ANTINUCLEAR ANTIBODY): ICD-10-CM

## 2017-08-28 DIAGNOSIS — M35.01 SICCA SYNDROME WITH KERATOCONJUNCTIVITIS: Primary | ICD-10-CM

## 2017-08-28 DIAGNOSIS — M79.7 FIBROMYALGIA: ICD-10-CM

## 2017-08-28 PROCEDURE — 3008F BODY MASS INDEX DOCD: CPT | Mod: S$GLB,,, | Performed by: INTERNAL MEDICINE

## 2017-08-28 PROCEDURE — 99999 PR PBB SHADOW E&M-EST. PATIENT-LVL III: CPT | Mod: PBBFAC,,, | Performed by: INTERNAL MEDICINE

## 2017-08-28 PROCEDURE — 3078F DIAST BP <80 MM HG: CPT | Mod: S$GLB,,, | Performed by: INTERNAL MEDICINE

## 2017-08-28 PROCEDURE — 3074F SYST BP LT 130 MM HG: CPT | Mod: S$GLB,,, | Performed by: INTERNAL MEDICINE

## 2017-08-28 PROCEDURE — 99214 OFFICE O/P EST MOD 30 MIN: CPT | Mod: S$GLB,,, | Performed by: INTERNAL MEDICINE

## 2017-08-28 RX ORDER — OXYCODONE AND ACETAMINOPHEN 7.5; 325 MG/1; MG/1
TABLET ORAL
COMMUNITY
Start: 2017-08-24 | End: 2017-11-14

## 2017-08-28 RX ORDER — CLINDAMYCIN HYDROCHLORIDE 300 MG/1
CAPSULE ORAL
COMMUNITY
Start: 2017-08-24 | End: 2017-11-14

## 2017-08-28 RX ORDER — PILOCARPINE HYDROCHLORIDE 5 MG/1
5 TABLET, FILM COATED ORAL 3 TIMES DAILY
Qty: 90 TABLET | Refills: 1 | Status: SHIPPED | OUTPATIENT
Start: 2017-08-28 | End: 2017-11-14

## 2017-08-28 RX ORDER — OLOPATADINE HYDROCHLORIDE 1 MG/ML
SOLUTION/ DROPS OPHTHALMIC
COMMUNITY
Start: 2017-08-23 | End: 2017-11-14

## 2017-08-28 NOTE — PROGRESS NOTES
RHEUMATOLOGY CLINIC FOLLOW UP VISIT  Chief complaints:-  I hurt all over.    HPI:-  Erin Moran a 62 y.o. pleasant female comes in for a follow up visit with above chief complaints.  She has significant past medical history as reviewed below including fibromyalgia, chronic sicca syndrome keratoconjunctivitis and positive KARLA.  Her sicca syndrome raised possibility of Sjogren's syndrome along with her abnormal serologies.  Since her SSA SSB antibodies were negative we did a small salivary gland biopsy with the help of our otorhinolaryngologist.  The biopsy came back negative for any Sjogren's syndrome.  The glandular tissue showed no lymphocyte accumulation.  She has persistent symptoms.  She also has pain all over the body which has been worsening.  She is already on high-dose Cymbalta and Flexeril.  She failed gabapentin and had severe hallucinations with the Lyrica.  No skin rash today.    Physical examination:-    Vitals:    08/28/17 1601   BP: 124/69   Pulse: 73   Weight: 82.8 kg (182 lb 8.7 oz)   PainSc:   5   PainLoc: Generalized       Physical Exam   Constitutional: She is oriented to person, place, and time and well-developed, well-nourished, and in no distress. No distress.   HENT:   Head: Normocephalic.   Mouth/Throat: Oropharynx is clear and moist.   Eyes: Conjunctivae and EOM are normal. Pupils are equal, round, and reactive to light.   Neck: Normal range of motion. Neck supple.   Cardiovascular: Normal rate and intact distal pulses.    Pulmonary/Chest: Effort normal. No respiratory distress.   Abdominal: Soft. There is no tenderness.   Musculoskeletal:   Several tender points. No synovitis over small joints of hands or feet.  No effusion over large joints.   Neurological: She is alert and oriented to person, place, and time. No cranial nerve deficit.   Skin: Skin is warm. No rash noted. No erythema.   Psychiatric: Mood and affect normal.   Nursing note and vitals  reviewed.      Labs:-  Results for MERLE LUIS (MRN [id number]) as of 8/28/2017 16:28   Ref. Range 6/3/2014 14:47 5/30/2017 09:45 5/30/2017 09:45   KARLA HEP-2 Titer Unknown  Positive 1:320 Ho...    Anti-SSA Antibody Latest Ref Range: 0.00 - 19.99 EU  6.79 6.79   Anti-SSA Interpretation Latest Ref Range: Negative   Negative Negative   Anti-SSB Antibody Latest Ref Range: 0.00 - 19.99 EU  1.29 1.29   Anti-SSB Interpretation Latest Ref Range: Negative   Negative Negative   ds DNA Ab Latest Ref Range: Negative 1:10    Negative 1:10   Anti Sm Antibody Latest Ref Range: 0.00 - 19.99 EU   9.55   Anti-Sm Interpretation Latest Ref Range: Negative    Negative   Anti Sm/RNP Antibody Latest Ref Range: 0.00 - 19.99 EU   11.89   Anti-Sm/RNP Interpretation Latest Ref Range: Negative    Negative   Protein, Serum Latest Ref Range: 6.0 - 8.4 g/dL  7.6    Albumin grams/dl Latest Ref Range: 3.35 - 5.55 g/dL  4.39    Alpha-1 grams/dl Latest Ref Range: 0.17 - 0.41 g/dL  0.32    Alpha-2 grams/dl Latest Ref Range: 0.43 - 0.99 g/dL  0.84    Beta grams/dl Latest Ref Range: 0.50 - 1.10 g/dL  0.80    Gamma grams/dl Latest Ref Range: 0.67 - 1.58 g/dL  1.25    Pathologist Interpretation SPE Unknown  REVIEWED    Pathologist Interpretation SEBAS Unknown  REVIEWED    Immunofix Interp. Unknown  SEE COMMENT    Rheumatoid Factor Latest Ref Range: 0.0 - 15.0 IU/mL 80.0 (H)           Medication List with Changes/Refills   New Medications    PILOCARPINE (SALAGEN) 5 MG TAB    Take 1 tablet (5 mg total) by mouth 3 (three) times daily.   Current Medications    ALBUTEROL (ACCUNEB) 0.63 MG/3 ML NEBU    Take 3 mLs (0.63 mg total) by nebulization every 6 (six) hours as needed.    AMLODIPINE (NORVASC) 10 MG TABLET    Take 1 tablet (10 mg total) by mouth once daily.    ASPIRIN-ACETAMINOPHEN-CAFFEINE 250-250-65 MG (EXCEDRIN MIGRAINE) 250-250-65 MG PER TABLET    Take 1 tablet by mouth every 6 (six) hours as needed for Pain.    BENAZEPRIL-HYDROCHLORTHIAZIDE  (LOTENSIN HCT) 20-12.5 MG PER TABLET    Take 1 tablet by mouth once daily.    BUPROPION (WELLBUTRIN SR) 150 MG TBSR 12 HR TABLET    Take 1 tablet (150 mg total) by mouth 2 (two) times daily.    CLINDAMYCIN (CLEOCIN) 300 MG CAPSULE        CYCLOBENZAPRINE (FLEXERIL) 5 MG TABLET    Take 1 tablet (5 mg total) by mouth 3 (three) times daily as needed for Muscle spasms.    DULOXETINE (CYMBALTA) 60 MG CAPSULE    Take 1 capsule (60 mg total) by mouth once daily.    EVOLOCUMAB (REPATHA SURECLICK) 140 MG/ML PNIJ    Inject 140 mg into the skin every 14 (fourteen) days.    FLUTICASONE (FLONASE) 50 MCG/ACTUATION NASAL SPRAY    2 sprays by Each Nare route once daily.    INHALATION DEVICE (BREATHERITE SPACER-MASK,ADULT)    Use as directed for inhalation.    LEVOCETIRIZINE (XYZAL) 5 MG TABLET    Take 1 tablet (5 mg total) by mouth every evening.    MELOXICAM (MOBIC) 7.5 MG TABLET    Take 1 tablet (7.5 mg total) by mouth once daily.    OLOPATADINE (PATANOL) 0.1 % OPHTHALMIC SOLUTION        OXYCODONE-ACETAMINOPHEN (PERCOCET) 7.5-325 MG PER TABLET        PANTOPRAZOLE (PROTONIX) 40 MG TABLET    TAKE 1 TABLET DAILY    POTASSIUM 99 MG TAB    Take by mouth. 2 tablet Oral Every day    ROPINIROLE (REQUIP) 1 MG TABLET    Take 1 tablet (1 mg total) by mouth every evening.    TRAMADOL (ULTRAM) 50 MG TABLET    Take 1 tablet (50 mg total) by mouth every 12 (twelve) hours as needed for Pain.       Assessment/Plans:-  Sicca syndrome with keratoconjunctivitis  Chronic sicca syndrome with keratoconjunctivitis and positive rheumatoid factor.  Minor salivary gland biopsy negative for Sjogren syndrome.  Positive KARLA with negative OTILIA.  Could be underlying undifferentiated connective tissue disease or sicca symptoms are related to polypharmacy.  We will try pilocarpine 5 and request primary care physician Dr. Jones to evaluate for sicca syndrome secondary to medications.  - pilocarpine (SALAGEN) 5 MG Tab; Take 1 tablet (5 mg total) by mouth 3 (three)  times daily.  Dispense: 90 tablet; Refill: 1    Positive KARLA (antinuclear antibody)  Positive KARLA with negative OTILIA labs.  No evidence of systemic lupus erythematosus, myositis, rheumatoid arthritis, scleroderma at this time.  Sicca syndrome was considered possibly secondary to Sjogren's but with negative SSA SSB and a negative minor salivary gland biopsy makes it very unlikely.  Monitor.    Fibromyalgia  Uncontrolled fibromyalgia on multiple medications including high-dose Cymbalta and Flexeril.  No benefit from gabapentin.  Had hallucinations from Lyrica.  [facility] for further management of fibromyalgia and also treatment of her spondylosis.  - Ambulatory Referral to Pain Clinic     # Return in about 1 year (around 8/28/2018).  Disclaimer: This note was prepared using voice recognition system and is likely to have sound alike errors and is not proof read.  Please call me with any questions.

## 2017-08-28 NOTE — ASSESSMENT & PLAN NOTE
Chronic sicca syndrome with keratoconjunctivitis and positive rheumatoid factor.  Minor salivary gland biopsy negative for Sjogren syndrome.  Positive KARLA with negative OTILIA.  Could be underlying undifferentiated connective tissue disease or sicca symptoms are related to polypharmacy.  We will try pilocarpine 5 and request primary care physician Dr. Jones to evaluate for sicca syndrome secondary to medications.

## 2017-08-28 NOTE — ASSESSMENT & PLAN NOTE
Positive KARLA with negative OTILIA labs.  No evidence of systemic lupus erythematosus, myositis, rheumatoid arthritis, scleroderma at this time.  Sicca syndrome was considered possibly secondary to Sjogren's but with negative SSA SSB and a negative minor salivary gland biopsy makes it very unlikely.  Monitor.

## 2017-08-28 NOTE — ASSESSMENT & PLAN NOTE
Uncontrolled fibromyalgia on multiple medications including high-dose Cymbalta and Flexeril.  No benefit from gabapentin.  Had hallucinations from Lyrica.  [facility] for further management of fibromyalgia and also treatment of her spondylosis.

## 2017-09-08 ENCOUNTER — TELEPHONE (OUTPATIENT)
Dept: RHEUMATOLOGY | Facility: CLINIC | Age: 62
End: 2017-09-08

## 2017-09-08 DIAGNOSIS — M79.7 FIBROMYALGIA: Primary | ICD-10-CM

## 2017-09-08 NOTE — TELEPHONE ENCOUNTER
----- Message from Samy Harris sent at 9/8/2017  1:22 PM CDT -----  Contact: [phone]   Pt would like a new referral for pain management, please joanne back to consult at [phone].  Thx-[facility]

## 2017-09-08 NOTE — TELEPHONE ENCOUNTER
Spoke with pt and she would like a referral to [facility] with Dr. Abran Bull since she is not able to see Dr. Ramirez due to being discharged from pain management before. Pt states she spoke with [facility] and they will accept her as a patient with a referral. Please Advise,

## 2017-11-13 ENCOUNTER — PATIENT MESSAGE (OUTPATIENT)
Dept: FAMILY MEDICINE | Facility: CLINIC | Age: 62
End: 2017-11-13

## 2017-11-14 ENCOUNTER — OFFICE VISIT (OUTPATIENT)
Dept: FAMILY MEDICINE | Facility: CLINIC | Age: 62
End: 2017-11-14
Payer: COMMERCIAL

## 2017-11-14 VITALS
OXYGEN SATURATION: 98 % | BODY MASS INDEX: 28.88 KG/M2 | TEMPERATURE: 98 F | WEIGHT: 179.69 LBS | HEIGHT: 66 IN | HEART RATE: 97 BPM | SYSTOLIC BLOOD PRESSURE: 122 MMHG | DIASTOLIC BLOOD PRESSURE: 70 MMHG

## 2017-11-14 DIAGNOSIS — I10 ESSENTIAL HYPERTENSION: ICD-10-CM

## 2017-11-14 DIAGNOSIS — T78.3XXA ANGIOEDEMA, INITIAL ENCOUNTER: Primary | ICD-10-CM

## 2017-11-14 DIAGNOSIS — R60.0 PEDAL EDEMA: ICD-10-CM

## 2017-11-14 PROCEDURE — 99214 OFFICE O/P EST MOD 30 MIN: CPT | Mod: S$GLB,,, | Performed by: FAMILY MEDICINE

## 2017-11-14 PROCEDURE — 99999 PR PBB SHADOW E&M-EST. PATIENT-LVL III: CPT | Mod: PBBFAC,,, | Performed by: FAMILY MEDICINE

## 2017-11-14 RX ORDER — OXYCODONE AND ACETAMINOPHEN 10; 325 MG/1; MG/1
1.5 TABLET ORAL DAILY
Refills: 0 | COMMUNITY
Start: 2017-10-27 | End: 2018-04-24

## 2017-11-14 RX ORDER — GABAPENTIN 300 MG/1
1 CAPSULE ORAL 2 TIMES DAILY
COMMUNITY
Start: 2017-11-11 | End: 2018-04-12 | Stop reason: SDUPTHER

## 2017-11-14 RX ORDER — EPINEPHRINE 0.3 MG/.3ML
1 INJECTION SUBCUTANEOUS ONCE
Qty: 0.3 ML | Refills: 4 | Status: SHIPPED | OUTPATIENT
Start: 2017-11-14 | End: 2019-11-07 | Stop reason: SDUPTHER

## 2017-11-14 RX ORDER — LOSARTAN POTASSIUM AND HYDROCHLOROTHIAZIDE 12.5; 5 MG/1; MG/1
1 TABLET ORAL DAILY
Qty: 90 TABLET | Refills: 3 | Status: SHIPPED | OUTPATIENT
Start: 2017-11-14 | End: 2018-04-24 | Stop reason: SDUPTHER

## 2017-11-14 NOTE — PROGRESS NOTES
Chief Complaint:    Chief Complaint   Patient presents with    Follow-up       History of Present Illness:    Patient presents today she says over the last several weeks or possibly a month or 2 she's had trouble with the lip swelling.  She is unable to tell me if it's something the diaper medicine that's doing it.  She denies any instances where she had trouble breathing.  She denies any wheezing.  She has not started any new medicine  She's also had some increased edema when she stands up she has tried some compression stocking that help her  She has hypertension and blood pressures running okay she takes Lotensin.    ROS:  Review of Systems   Constitutional: Negative for activity change, chills, fatigue, fever and unexpected weight change.   HENT: Negative for congestion, ear discharge, ear pain, hearing loss, postnasal drip and rhinorrhea.    Eyes: Negative for pain and visual disturbance.   Respiratory: Negative for cough, chest tightness and shortness of breath.    Cardiovascular: Negative for chest pain and palpitations.   Gastrointestinal: Negative for abdominal pain, diarrhea and vomiting.   Endocrine: Negative for heat intolerance.   Genitourinary: Negative for dysuria, flank pain, frequency and hematuria.   Musculoskeletal: Negative for back pain, gait problem and neck pain.   Skin: Negative for color change and rash.   Neurological: Negative for dizziness, tremors, seizures, numbness and headaches.   Psychiatric/Behavioral: Negative for agitation, hallucinations, self-injury, sleep disturbance and suicidal ideas. The patient is not nervous/anxious.        Past Medical History:   Diagnosis Date    Anxiety     Arthritis     Depression     Hyperlipidemia        Social History:  Social History     Social History    Marital status:      Spouse name: N/A    Number of children: 2    Years of education: N/A     Social History Main Topics    Smoking status: Former Smoker     Quit date: 6/10/2012  "   Smokeless tobacco: Never Used      Comment: Would smoke 1 cigarette every few months    Alcohol use No    Drug use: No    Sexual activity: Yes     Partners: Male     Birth control/ protection: None     Other Topics Concern    None     Social History Narrative    None       Family History:   family history includes Cancer in her maternal grandfather and maternal grandmother; Clotting disorder in her maternal grandfather, maternal grandmother, and mother; Hemochromatosis in her mother; Hypertension in her father and mother.    Health Maintenance   Topic Date Due    Zoster Vaccine  05/04/2016    Mammogram  05/02/2017    Colonoscopy  07/23/2017    Influenza Vaccine  08/01/2017    Lipid Panel  12/30/2017    TETANUS VACCINE  07/19/2026    Hepatitis C Screening  Completed       Physical Exam:    Vital Signs  Temp: 98.1 °F (36.7 °C)  Temp src: Tympanic  Pulse: 97  SpO2: 98 %  BP: 122/70  BP Location: Left arm  Patient Position: Sitting  Pain Score: 0-No pain  Height and Weight  Height: 5' 6" (167.6 cm)  Weight: 81.5 kg (179 lb 10.8 oz)  BSA (Calculated - sq m): 1.95 sq meters  BMI (Calculated): 29.1  Weight in (lb) to have BMI = 25: 154.6]    Body mass index is 29 kg/m².    Physical Exam   Constitutional: She is oriented to person, place, and time. She appears well-developed.   HENT:   Mouth/Throat: Oropharynx is clear and moist.   Eyes: Conjunctivae are normal. Pupils are equal, round, and reactive to light.   Neck: Normal range of motion. Neck supple.   Cardiovascular: Normal rate, regular rhythm and normal heart sounds.    No murmur heard.  Pulmonary/Chest: Effort normal and breath sounds normal. No respiratory distress. She has no wheezes. She has no rales. She exhibits no tenderness.   Abdominal: Soft. She exhibits no distension and no mass. There is no tenderness. There is no guarding.   Musculoskeletal: She exhibits no edema or tenderness.   Lymphadenopathy:     She has no cervical adenopathy. "   Neurological: She is alert and oriented to person, place, and time. She has normal reflexes.   Skin: Skin is warm and dry.   Psychiatric: She has a normal mood and affect. Her behavior is normal. Judgment and thought content normal.       Lab Results   Component Value Date    CHOL 209 (H) 05/11/2016    CHOL 179 06/06/2013    CHOL 199 02/05/2013    TRIG 173 (H) 05/11/2016    TRIG 149 06/06/2013    TRIG 213 (H) 02/05/2013    HDL 40 05/11/2016    HDL 38 (L) 06/06/2013    HDL 38 (L) 02/05/2013    TOTALCHOLEST 5.2 (H) 05/11/2016    TOTALCHOLEST 4.7 06/06/2013    TOTALCHOLEST 5.2 (H) 02/05/2013    NONHDLCHOL 169 05/11/2016       Lab Results   Component Value Date    HGBA1C 5.7 05/11/2016       Assessment:      ICD-10-CM ICD-9-CM   1. Angioedema, initial encounter T78.3XXA 995.1   2. Essential hypertension I10 401.9   3. Pedal edema R60.0 782.3         Plan:  It appears that she is has a case of angioedema with immediately stop Lotensin hydrochlorothiazide and put her in her ALLERGY list, start her on a losartan hydrochlorothiazide 50-12 0.5 she's to monitor blood pressure carefully  Offered appointment with our ALLERGY specialist but she refused she has an established ALLERGY specialist that she will go and see.  I have also given her prescription for EpiPen to use if she experience angioedema threatening to cause laryngospasm.  Recommend that she monitor blood pressure carefully  Recommend that she use compression stockings for edema    No orders of the defined types were placed in this encounter.      Current Outpatient Prescriptions   Medication Sig Dispense Refill    aspirin-acetaminophen-caffeine 250-250-65 mg (EXCEDRIN MIGRAINE) 250-250-65 mg per tablet Take 1 tablet by mouth every 6 (six) hours as needed for Pain.      cyclobenzaprine (FLEXERIL) 5 MG tablet Take 1 tablet (5 mg total) by mouth 3 (three) times daily as needed for Muscle spasms. 60 tablet 4    duloxetine (CYMBALTA) 60 MG capsule Take 1 capsule  (60 mg total) by mouth once daily. 90 capsule 1    fluticasone (FLONASE) 50 mcg/actuation nasal spray 2 sprays by Each Nare route once daily. 16 g 11    gabapentin (NEURONTIN) 300 MG capsule Take 1 capsule by mouth 2 (two) times daily.      oxyCODONE-acetaminophen (PERCOCET)  mg per tablet Take 1.5 tablets by mouth once daily.  0    pantoprazole (PROTONIX) 40 MG tablet TAKE 1 TABLET DAILY 90 tablet 2    potassium 99 mg Tab Take by mouth. 2 tablet Oral Every day      ropinirole (REQUIP) 1 MG tablet Take 1 tablet (1 mg total) by mouth every evening. 30 tablet 11    amlodipine (NORVASC) 10 MG tablet Take 1 tablet (10 mg total) by mouth once daily. 90 tablet 3    EPINEPHrine (EPIPEN) 0.3 mg/0.3 mL AtIn Inject 0.3 mLs (0.3 mg total) into the muscle once. 0.3 mL 4    losartan-hydrochlorothiazide 50-12.5 mg (HYZAAR) 50-12.5 mg per tablet Take 1 tablet by mouth once daily. 90 tablet 3     Current Facility-Administered Medications   Medication Dose Route Frequency Provider Last Rate Last Dose    albuterol inhaler 2 puff  2 puff Inhalation Q6H PRN Helena Jones MD        albuterol sulfate nebulizer solution 2.5 mg  2.5 mg Nebulization 1 time in Clinic/HOD Helena Jones MD           Medications Discontinued During This Encounter   Medication Reason    clindamycin (CLEOCIN) 300 MG capsule Patient no longer taking    evolocumab (REPATHA SURECLICK) 140 mg/mL PnIj Patient no longer taking    levocetirizine (XYZAL) 5 MG tablet Patient no longer taking    meloxicam (MOBIC) 7.5 MG tablet Patient no longer taking    olopatadine (PATANOL) 0.1 % ophthalmic solution Patient no longer taking    oxycodone-acetaminophen (PERCOCET) 7.5-325 mg per tablet Patient no longer taking    tramadol (ULTRAM) 50 mg tablet Patient no longer taking    pilocarpine (SALAGEN) 5 MG Tab Patient no longer taking    inhalation device (BREATHERITE SPACER-MASK,ADULT) Therapy completed    benazepril-hydrochlorthiazide (LOTENSIN HCT)  20-12.5 mg per tablet        No Follow-up on file.      Helena Jones MD

## 2017-12-23 RX ORDER — BUPROPION HYDROCHLORIDE 150 MG/1
TABLET, EXTENDED RELEASE ORAL
Qty: 180 TABLET | Refills: 3 | Status: SHIPPED | OUTPATIENT
Start: 2017-12-23 | End: 2018-02-21 | Stop reason: SDUPTHER

## 2018-01-14 RX ORDER — PANTOPRAZOLE SODIUM 40 MG/1
TABLET, DELAYED RELEASE ORAL
Qty: 90 TABLET | Refills: 2 | Status: SHIPPED | OUTPATIENT
Start: 2018-01-14 | End: 2018-04-24 | Stop reason: SDUPTHER

## 2018-01-26 RX ORDER — SODIUM, POTASSIUM,MAG SULFATES 17.5-3.13G
SOLUTION, RECONSTITUTED, ORAL ORAL
Qty: 354 ML | Refills: 0 | Status: SHIPPED | OUTPATIENT
Start: 2018-01-26 | End: 2018-02-07 | Stop reason: ALTCHOICE

## 2018-01-27 RX ORDER — DULOXETIN HYDROCHLORIDE 60 MG/1
60 CAPSULE, DELAYED RELEASE ORAL DAILY
Qty: 90 CAPSULE | Refills: 1 | Status: SHIPPED | OUTPATIENT
Start: 2018-01-27 | End: 2018-02-07

## 2018-01-30 ENCOUNTER — PATIENT OUTREACH (OUTPATIENT)
Dept: ADMINISTRATIVE | Facility: HOSPITAL | Age: 63
End: 2018-01-30

## 2018-01-30 DIAGNOSIS — E78.5 HYPERLIPIDEMIA, UNSPECIFIED HYPERLIPIDEMIA TYPE: Primary | ICD-10-CM

## 2018-01-30 NOTE — PROGRESS NOTES
I spoke with pt that did schedule her lipid panel for right after her F/U appt with Dr Hunt. Pt will come in fasting. Pt verbalized understanding of needed appt.

## 2018-02-07 ENCOUNTER — OFFICE VISIT (OUTPATIENT)
Dept: FAMILY MEDICINE | Facility: CLINIC | Age: 63
End: 2018-02-07
Payer: COMMERCIAL

## 2018-02-07 ENCOUNTER — LAB VISIT (OUTPATIENT)
Dept: LAB | Facility: HOSPITAL | Age: 63
End: 2018-02-07
Attending: FAMILY MEDICINE
Payer: COMMERCIAL

## 2018-02-07 VITALS
SYSTOLIC BLOOD PRESSURE: 122 MMHG | HEIGHT: 66 IN | TEMPERATURE: 97 F | DIASTOLIC BLOOD PRESSURE: 70 MMHG | WEIGHT: 181.44 LBS | BODY MASS INDEX: 29.16 KG/M2 | HEART RATE: 94 BPM | OXYGEN SATURATION: 96 %

## 2018-02-07 DIAGNOSIS — R60.0 PEDAL EDEMA: Primary | ICD-10-CM

## 2018-02-07 DIAGNOSIS — R10.9 FLANK PAIN: ICD-10-CM

## 2018-02-07 DIAGNOSIS — I10 ESSENTIAL HYPERTENSION: ICD-10-CM

## 2018-02-07 DIAGNOSIS — R60.0 PEDAL EDEMA: ICD-10-CM

## 2018-02-07 DIAGNOSIS — G25.81 RLS (RESTLESS LEGS SYNDROME): ICD-10-CM

## 2018-02-07 DIAGNOSIS — R07.9 CHEST PAIN, UNSPECIFIED TYPE: ICD-10-CM

## 2018-02-07 LAB
ALBUMIN SERPL BCP-MCNC: 3.8 G/DL
ALP SERPL-CCNC: 163 U/L
ALT SERPL W/O P-5'-P-CCNC: 35 U/L
AMORPH CRY UR QL COMP ASSIST: NORMAL
ANION GAP SERPL CALC-SCNC: 8 MMOL/L
AST SERPL-CCNC: 27 U/L
BACTERIA #/AREA URNS AUTO: NORMAL /HPF
BASOPHILS # BLD AUTO: 0.11 K/UL
BASOPHILS NFR BLD: 1.2 %
BILIRUB SERPL-MCNC: 0.5 MG/DL
BILIRUB UR QL STRIP: NEGATIVE
BNP SERPL-MCNC: <10 PG/ML
BUN SERPL-MCNC: 16 MG/DL
CALCIUM SERPL-MCNC: 9.5 MG/DL
CHLORIDE SERPL-SCNC: 104 MMOL/L
CHOLEST SERPL-MCNC: 197 MG/DL
CHOLEST/HDLC SERPL: 5.1 {RATIO}
CLARITY UR REFRACT.AUTO: ABNORMAL
CO2 SERPL-SCNC: 29 MMOL/L
COLOR UR AUTO: YELLOW
CREAT SERPL-MCNC: 0.8 MG/DL
DIFFERENTIAL METHOD: ABNORMAL
EOSINOPHIL # BLD AUTO: 0.3 K/UL
EOSINOPHIL NFR BLD: 3.3 %
ERYTHROCYTE [DISTWIDTH] IN BLOOD BY AUTOMATED COUNT: 13.4 %
EST. GFR  (AFRICAN AMERICAN): >60 ML/MIN/1.73 M^2
EST. GFR  (NON AFRICAN AMERICAN): >60 ML/MIN/1.73 M^2
ESTIMATED AVG GLUCOSE: 117 MG/DL
GLUCOSE SERPL-MCNC: 105 MG/DL
GLUCOSE UR QL STRIP: NEGATIVE
HBA1C MFR BLD HPLC: 5.7 %
HCT VFR BLD AUTO: 40.4 %
HDLC SERPL-MCNC: 39 MG/DL
HDLC SERPL: 19.8 %
HGB BLD-MCNC: 13.8 G/DL
HGB UR QL STRIP: NEGATIVE
IMM GRANULOCYTES # BLD AUTO: 0.02 K/UL
IMM GRANULOCYTES NFR BLD AUTO: 0.2 %
KETONES UR QL STRIP: NEGATIVE
LDLC SERPL CALC-MCNC: 122.4 MG/DL
LEUKOCYTE ESTERASE UR QL STRIP: ABNORMAL
LYMPHOCYTES # BLD AUTO: 3 K/UL
LYMPHOCYTES NFR BLD: 32.9 %
MCH RBC QN AUTO: 26.8 PG
MCHC RBC AUTO-ENTMCNC: 34.2 G/DL
MCV RBC AUTO: 79 FL
MICROSCOPIC COMMENT: NORMAL
MONOCYTES # BLD AUTO: 0.6 K/UL
MONOCYTES NFR BLD: 6.3 %
NEUTROPHILS # BLD AUTO: 5.2 K/UL
NEUTROPHILS NFR BLD: 56.1 %
NITRITE UR QL STRIP: NEGATIVE
NONHDLC SERPL-MCNC: 158 MG/DL
NRBC BLD-RTO: 0 /100 WBC
PH UR STRIP: 5 [PH] (ref 5–8)
PLATELET # BLD AUTO: 216 K/UL
PMV BLD AUTO: 11.2 FL
POTASSIUM SERPL-SCNC: 3.9 MMOL/L
PROT SERPL-MCNC: 7.4 G/DL
PROT UR QL STRIP: NEGATIVE
RBC # BLD AUTO: 5.14 M/UL
SODIUM SERPL-SCNC: 141 MMOL/L
SP GR UR STRIP: 1.02 (ref 1–1.03)
SQUAMOUS #/AREA URNS AUTO: 1 /HPF
TRIGL SERPL-MCNC: 178 MG/DL
URN SPEC COLLECT METH UR: ABNORMAL
UROBILINOGEN UR STRIP-ACNC: NEGATIVE EU/DL
WBC # BLD AUTO: 9.21 K/UL
WBC #/AREA URNS AUTO: 1 /HPF (ref 0–5)

## 2018-02-07 PROCEDURE — 83036 HEMOGLOBIN GLYCOSYLATED A1C: CPT

## 2018-02-07 PROCEDURE — 90686 IIV4 VACC NO PRSV 0.5 ML IM: CPT | Mod: S$GLB,,, | Performed by: FAMILY MEDICINE

## 2018-02-07 PROCEDURE — 90471 IMMUNIZATION ADMIN: CPT | Mod: S$GLB,,, | Performed by: FAMILY MEDICINE

## 2018-02-07 PROCEDURE — 99999 PR PBB SHADOW E&M-EST. PATIENT-LVL V: CPT | Mod: PBBFAC,,, | Performed by: FAMILY MEDICINE

## 2018-02-07 PROCEDURE — 80061 LIPID PANEL: CPT

## 2018-02-07 PROCEDURE — 85025 COMPLETE CBC W/AUTO DIFF WBC: CPT

## 2018-02-07 PROCEDURE — 99214 OFFICE O/P EST MOD 30 MIN: CPT | Mod: 25,S$GLB,, | Performed by: FAMILY MEDICINE

## 2018-02-07 PROCEDURE — 3008F BODY MASS INDEX DOCD: CPT | Mod: S$GLB,,, | Performed by: FAMILY MEDICINE

## 2018-02-07 PROCEDURE — 83880 ASSAY OF NATRIURETIC PEPTIDE: CPT

## 2018-02-07 PROCEDURE — 36415 COLL VENOUS BLD VENIPUNCTURE: CPT | Mod: PO

## 2018-02-07 PROCEDURE — 87086 URINE CULTURE/COLONY COUNT: CPT

## 2018-02-07 PROCEDURE — 80053 COMPREHEN METABOLIC PANEL: CPT

## 2018-02-07 PROCEDURE — 81001 URINALYSIS AUTO W/SCOPE: CPT

## 2018-02-07 RX ORDER — IBUPROFEN 100 MG/5ML
1000 SUSPENSION, ORAL (FINAL DOSE FORM) ORAL DAILY
COMMUNITY

## 2018-02-07 RX ORDER — PROMETHAZINE HYDROCHLORIDE 12.5 MG/1
12.5 TABLET ORAL 4 TIMES DAILY
Qty: 10 TABLET | Refills: 0 | Status: SHIPPED | OUTPATIENT
Start: 2018-02-07 | End: 2018-05-31 | Stop reason: ALTCHOICE

## 2018-02-07 RX ORDER — ESCITALOPRAM OXALATE 10 MG/1
10 TABLET ORAL DAILY
Qty: 30 TABLET | Refills: 11 | Status: SHIPPED | OUTPATIENT
Start: 2018-02-07 | End: 2018-04-24

## 2018-02-07 RX ORDER — PRAMIPEXOLE DIHYDROCHLORIDE 0.5 MG/1
0.5 TABLET ORAL DAILY PRN
COMMUNITY
End: 2018-04-24 | Stop reason: SDUPTHER

## 2018-02-07 RX ORDER — AMLODIPINE BESYLATE 10 MG/1
10 TABLET ORAL DAILY
COMMUNITY
End: 2018-03-07 | Stop reason: SDUPTHER

## 2018-02-07 NOTE — PROGRESS NOTES
Chief Complaint:    Chief Complaint   Patient presents with    Follow-up       History of Present Illness:    She presents today she has complaints of swelling of left leg it starts to hurt worse towards the end of the day.  She is worried about heart failure she has some shortness of breath and some chest pain but not associated with exertion.  She has seen Dr. Villarreal in the past.  Wants to change from Cymbalta to Lexapro because of weight gain depression is controlled.  She has chronic pain and seeing pain management but is not satisfied with them.    She's also had some flank pain on the left side she thinks it could be urinary tract infection.  Her urine checked but she denies any fever or any blood in urine or any pain.  Blood pressure stable        ROS:  Review of Systems   Constitutional: Negative for activity change, chills, fatigue, fever and unexpected weight change.   HENT: Negative for congestion, ear discharge, ear pain, hearing loss, postnasal drip and rhinorrhea.    Eyes: Negative for pain and visual disturbance.   Respiratory: Negative for cough, chest tightness and shortness of breath.    Cardiovascular: Negative for chest pain and palpitations.   Gastrointestinal: Negative for abdominal pain, diarrhea and vomiting.   Endocrine: Negative for heat intolerance.   Genitourinary: Negative for dysuria, flank pain, frequency and hematuria.   Musculoskeletal: Negative for back pain, gait problem and neck pain.   Skin: Negative for color change and rash.   Neurological: Negative for dizziness, tremors, seizures, numbness and headaches.   Psychiatric/Behavioral: Negative for agitation, hallucinations, self-injury, sleep disturbance and suicidal ideas. The patient is not nervous/anxious.        Past Medical History:   Diagnosis Date    Anxiety     Arthritis     Depression     Hyperlipidemia        Social History:  Social History     Social History    Marital status:      Spouse name: N/A     "Number of children: 2    Years of education: N/A     Social History Main Topics    Smoking status: Former Smoker     Quit date: 6/10/2012    Smokeless tobacco: Never Used      Comment: Would smoke 1 cigarette every few months    Alcohol use No    Drug use: No    Sexual activity: Yes     Partners: Male     Birth control/ protection: None     Other Topics Concern    None     Social History Narrative    None       Family History:   family history includes Cancer in her maternal grandfather and maternal grandmother; Clotting disorder in her maternal grandfather, maternal grandmother, and mother; Hemochromatosis in her mother; Hypertension in her father and mother.    Health Maintenance   Topic Date Due    Zoster Vaccine  05/04/2016    Mammogram  05/02/2017    Colonoscopy  07/23/2017    Influenza Vaccine  08/01/2017    Lipid Panel  12/30/2017    TETANUS VACCINE  07/19/2026    Hepatitis C Screening  Completed       Physical Exam:    Vital Signs  Temp: 96.8 °F (36 °C)  Temp src: Tympanic  Pulse: 94  SpO2: 96 %  BP: 122/70  BP Location: Left arm  Patient Position: Sitting  Pain Score:   7  Height and Weight  Height: 5' 6" (167.6 cm)  Weight: 82.3 kg (181 lb 7 oz)  BSA (Calculated - sq m): 1.96 sq meters  BMI (Calculated): 29.3  Weight in (lb) to have BMI = 25: 154.6]    Body mass index is 29.28 kg/m².    Physical Exam   Musculoskeletal:        Feet:        Lab Results   Component Value Date    CHOL 209 (H) 05/11/2016    CHOL 179 06/06/2013    CHOL 199 02/05/2013    TRIG 173 (H) 05/11/2016    TRIG 149 06/06/2013    TRIG 213 (H) 02/05/2013    HDL 40 05/11/2016    HDL 38 (L) 06/06/2013    HDL 38 (L) 02/05/2013    TOTALCHOLEST 5.2 (H) 05/11/2016    TOTALCHOLEST 4.7 06/06/2013    TOTALCHOLEST 5.2 (H) 02/05/2013    NONHDLCHOL 169 05/11/2016       Lab Results   Component Value Date    HGBA1C 5.7 05/11/2016       Assessment:      ICD-10-CM ICD-9-CM   1. Pedal edema R60.0 782.3   2. Flank pain R10.9 789.09   3. Chest " pain, unspecified type R07.9 786.50   4. Essential hypertension I10 401.9   5. RLS (restless legs syndrome) G25.81 333.94         Plan:  We will get a venous Doppler to rule out a DVT.  She will be referred to Dr. Villarreal for a full workup.  We'll check a urinalysis and culture.  Cymbalta will be weaned off and once off will start on Lexapro  Other medical problems are stable continue current plan.  Phenergan given to help with the nausea associated with didn't drinking the GI prep for colonoscopy she says that appointment as scheduled and she just wants to reschedule it.  She also has a mammogram scheduled.  Orders Placed This Encounter   Procedures    US Lower Extremity Veins Left    CBC auto differential    Comprehensive metabolic panel    Lipid panel    Hemoglobin A1c    Brain natriuretic peptide    Ambulatory referral to Cardiology       Current Outpatient Prescriptions   Medication Sig Dispense Refill    amLODIPine (NORVASC) 10 MG tablet Take 10 mg by mouth once daily.      ascorbic acid, vitamin C, (VITAMIN C) 1000 MG tablet Take 1,000 mg by mouth once daily.      aspirin-acetaminophen-caffeine 250-250-65 mg (EXCEDRIN MIGRAINE) 250-250-65 mg per tablet Take 1 tablet by mouth every 6 (six) hours as needed for Pain.      buPROPion (WELLBUTRIN SR) 150 MG TBSR 12 hr tablet TAKE 1 TABLET TWICE A  tablet 3    cyclobenzaprine (FLEXERIL) 5 MG tablet Take 1 tablet (5 mg total) by mouth 3 (three) times daily as needed for Muscle spasms. 60 tablet 4    gabapentin (NEURONTIN) 300 MG capsule Take 1 capsule by mouth 2 (two) times daily.      losartan-hydrochlorothiazide 50-12.5 mg (HYZAAR) 50-12.5 mg per tablet Take 1 tablet by mouth once daily. 90 tablet 3    oxyCODONE-acetaminophen (PERCOCET)  mg per tablet Take 1.5 tablets by mouth once daily.  0    pantoprazole (PROTONIX) 40 MG tablet TAKE 1 TABLET DAILY 90 tablet 2    potassium 99 mg Tab Take by mouth. 2 tablet Oral Every day       pramipexole (MIRAPEX) 0.5 MG tablet Take 0.5 mg by mouth daily as needed.      ropinirole (REQUIP) 1 MG tablet Take 1 tablet (1 mg total) by mouth every evening. 30 tablet 11    EPINEPHrine (EPIPEN) 0.3 mg/0.3 mL AtIn Inject 0.3 mLs (0.3 mg total) into the muscle once. 0.3 mL 4    escitalopram oxalate (LEXAPRO) 10 MG tablet Take 1 tablet (10 mg total) by mouth once daily. 30 tablet 11    promethazine (PHENERGAN) 12.5 MG Tab Take 1 tablet (12.5 mg total) by mouth 4 (four) times daily. 10 tablet 0     Current Facility-Administered Medications   Medication Dose Route Frequency Provider Last Rate Last Dose    albuterol inhaler 2 puff  2 puff Inhalation Q6H PRN Helena Jones MD        albuterol sulfate nebulizer solution 2.5 mg  2.5 mg Nebulization 1 time in Clinic/HOD Helena Jones MD           Medications Discontinued During This Encounter   Medication Reason    fluticasone (FLONASE) 50 mcg/actuation nasal spray Patient no longer taking    sodium,potassium,mag sulfates (SUPREP BOWEL PREP KIT) 17.5-3.13-1.6 gram SolR Therapy completed    DULoxetine (CYMBALTA) 60 MG capsule        Follow-up in about 6 months (around 8/7/2018).      Helena Jones MD

## 2018-02-09 ENCOUNTER — TELEPHONE (OUTPATIENT)
Dept: FAMILY MEDICINE | Facility: CLINIC | Age: 63
End: 2018-02-09

## 2018-02-09 DIAGNOSIS — R74.8 ELEVATED ALKALINE PHOSPHATASE LEVEL: Primary | ICD-10-CM

## 2018-02-09 LAB
BACTERIA UR CULT: NORMAL
BACTERIA UR CULT: NORMAL

## 2018-02-12 RX ORDER — AMLODIPINE BESYLATE 10 MG/1
TABLET ORAL
Qty: 90 TABLET | Refills: 3 | Status: SHIPPED | OUTPATIENT
Start: 2018-02-12 | End: 2018-03-07 | Stop reason: SDUPTHER

## 2018-02-21 RX ORDER — BUPROPION HYDROCHLORIDE 150 MG/1
150 TABLET, EXTENDED RELEASE ORAL 2 TIMES DAILY
Qty: 60 TABLET | Refills: 2 | Status: SHIPPED | OUTPATIENT
Start: 2018-02-21 | End: 2018-04-24 | Stop reason: SDUPTHER

## 2018-02-21 NOTE — TELEPHONE ENCOUNTER
Patient stated that she had someone come clean her house and thinks she threw it away because her bupropion was on her [place] still in the bag that it comes in from the mail order. Patient knows her insurance will not pay for it so she is asking for a 30 day supply sent to the local pharmacy.

## 2018-02-21 NOTE — TELEPHONE ENCOUNTER
----- Message from Yadira Chung sent at 2/20/2018 12:01 PM CST -----  Contact: self [phone]  Would like to consult with nurse regarding refill for bupropion, medication has bee misplaced, would like to have refill.  Please call back at [phone].  Md Tiffany

## 2018-02-26 ENCOUNTER — TELEPHONE (OUTPATIENT)
Dept: RADIOLOGY | Facility: HOSPITAL | Age: 63
End: 2018-02-26

## 2018-02-27 ENCOUNTER — HOSPITAL ENCOUNTER (OUTPATIENT)
Dept: RADIOLOGY | Facility: HOSPITAL | Age: 63
Discharge: HOME | End: 2018-02-27
Attending: FAMILY MEDICINE
Payer: COMMERCIAL

## 2018-02-27 DIAGNOSIS — R60.0 PEDAL EDEMA: ICD-10-CM

## 2018-02-27 PROCEDURE — 93971 EXTREMITY STUDY: CPT | Mod: TC

## 2018-02-27 PROCEDURE — 93971 EXTREMITY STUDY: CPT | Mod: 26,,, | Performed by: RADIOLOGY

## 2018-03-07 ENCOUNTER — PATIENT MESSAGE (OUTPATIENT)
Dept: FAMILY MEDICINE | Facility: CLINIC | Age: 63
End: 2018-03-07

## 2018-03-07 RX ORDER — AMLODIPINE BESYLATE 10 MG/1
10 TABLET ORAL DAILY
Qty: 90 TABLET | Refills: 1 | Status: SHIPPED | OUTPATIENT
Start: 2018-03-07 | End: 2018-04-24 | Stop reason: SDUPTHER

## 2018-03-07 NOTE — TELEPHONE ENCOUNTER
----- Message from Remedios Barros sent at 3/7/2018  2:38 PM CST -----  Contact: pt  Call pt at [phone] (home)   Pt needs a refill on AMLODOPINE. Pt is also requesting a medication to be prescribed from previous appt.

## 2018-03-08 RX ORDER — VILAZODONE HYDROCHLORIDE 20 MG/1
20 TABLET ORAL DAILY
Qty: 30 TABLET | Refills: 11 | Status: SHIPPED | OUTPATIENT
Start: 2018-03-08 | End: 2018-04-24 | Stop reason: SDUPTHER

## 2018-03-29 ENCOUNTER — TELEPHONE (OUTPATIENT)
Dept: FAMILY MEDICINE | Facility: CLINIC | Age: 63
End: 2018-03-29

## 2018-03-29 NOTE — TELEPHONE ENCOUNTER
----- Message from Cyndi Grullon sent at 3/29/2018  3:38 PM CDT -----  Contact: Patient  Patient called and stated her insurance won't cover the Anti-depressant that Dr. Jones called in. She wants to know if she can get something else called in today.     [facility] [address] [place] [address] [address] [address]  [address] [address] [address]  Phone: [phone] Fax: [phone]    She can be contacted at [phone].    Thanks,  Cyndi

## 2018-04-03 ENCOUNTER — TELEPHONE (OUTPATIENT)
Dept: FAMILY MEDICINE | Facility: CLINIC | Age: 63
End: 2018-04-03

## 2018-04-03 RX ORDER — PRAMIPEXOLE DIHYDROCHLORIDE 0.5 MG/1
TABLET ORAL
Qty: 30 TABLET | Refills: 11 | OUTPATIENT
Start: 2018-04-03

## 2018-04-03 NOTE — TELEPHONE ENCOUNTER
----- Message from Norberto Hogan sent at 4/3/2018  4:03 PM CDT -----  Contact: pt  Pt is requesting a call back from nurse in regards to med for pt.       [phone] (home)

## 2018-04-03 NOTE — TELEPHONE ENCOUNTER
----- Message from Norberto Hogan sent at 4/3/2018  4:03 PM CDT -----  Contact: pt  Pt is requesting a call back from nurse in regards to med for pt.       [phone] (home)

## 2018-04-03 NOTE — TELEPHONE ENCOUNTER
Spoke with patient and informed her I would check on this prior authorization and phone her back tomorrow.

## 2018-04-04 ENCOUNTER — PATIENT MESSAGE (OUTPATIENT)
Dept: FAMILY MEDICINE | Facility: CLINIC | Age: 63
End: 2018-04-04

## 2018-04-12 ENCOUNTER — PATIENT MESSAGE (OUTPATIENT)
Dept: FAMILY MEDICINE | Facility: CLINIC | Age: 63
End: 2018-04-12

## 2018-04-12 RX ORDER — GABAPENTIN 300 MG/1
300 CAPSULE ORAL 2 TIMES DAILY
Qty: 60 CAPSULE | Refills: 0 | Status: SHIPPED | OUTPATIENT
Start: 2018-04-12 | End: 2018-04-24 | Stop reason: SDUPTHER

## 2018-04-24 ENCOUNTER — LAB VISIT (OUTPATIENT)
Dept: LAB | Facility: HOSPITAL | Age: 63
End: 2018-04-24
Attending: FAMILY MEDICINE
Payer: COMMERCIAL

## 2018-04-24 ENCOUNTER — PATIENT OUTREACH (OUTPATIENT)
Dept: ADMINISTRATIVE | Facility: HOSPITAL | Age: 63
End: 2018-04-24

## 2018-04-24 ENCOUNTER — OFFICE VISIT (OUTPATIENT)
Dept: FAMILY MEDICINE | Facility: CLINIC | Age: 63
End: 2018-04-24
Payer: COMMERCIAL

## 2018-04-24 VITALS
DIASTOLIC BLOOD PRESSURE: 82 MMHG | HEIGHT: 67 IN | OXYGEN SATURATION: 97 % | BODY MASS INDEX: 28.84 KG/M2 | SYSTOLIC BLOOD PRESSURE: 171 MMHG | RESPIRATION RATE: 20 BRPM | HEART RATE: 82 BPM | WEIGHT: 183.75 LBS | TEMPERATURE: 99 F

## 2018-04-24 DIAGNOSIS — G89.29 CHRONIC BILATERAL LOW BACK PAIN WITHOUT SCIATICA: ICD-10-CM

## 2018-04-24 DIAGNOSIS — Z12.11 COLON CANCER SCREENING: ICD-10-CM

## 2018-04-24 DIAGNOSIS — I10 ESSENTIAL HYPERTENSION: Primary | ICD-10-CM

## 2018-04-24 DIAGNOSIS — R74.8 ELEVATED ALKALINE PHOSPHATASE LEVEL: ICD-10-CM

## 2018-04-24 DIAGNOSIS — M54.50 CHRONIC BILATERAL LOW BACK PAIN WITHOUT SCIATICA: ICD-10-CM

## 2018-04-24 DIAGNOSIS — K21.00 GASTROESOPHAGEAL REFLUX DISEASE WITH ESOPHAGITIS: ICD-10-CM

## 2018-04-24 DIAGNOSIS — Z12.39 BREAST CANCER SCREENING: ICD-10-CM

## 2018-04-24 DIAGNOSIS — J45.990 EXERCISE-INDUCED ASTHMA: ICD-10-CM

## 2018-04-24 PROCEDURE — 3079F DIAST BP 80-89 MM HG: CPT | Mod: CPTII,S$GLB,, | Performed by: FAMILY MEDICINE

## 2018-04-24 PROCEDURE — 3077F SYST BP >= 140 MM HG: CPT | Mod: CPTII,S$GLB,, | Performed by: FAMILY MEDICINE

## 2018-04-24 PROCEDURE — 84080 ASSAY ALKALINE PHOSPHATASES: CPT

## 2018-04-24 PROCEDURE — 36415 COLL VENOUS BLD VENIPUNCTURE: CPT | Mod: PO

## 2018-04-24 PROCEDURE — 99999 PR PBB SHADOW E&M-EST. PATIENT-LVL IV: CPT | Mod: PBBFAC,,, | Performed by: FAMILY MEDICINE

## 2018-04-24 PROCEDURE — 99214 OFFICE O/P EST MOD 30 MIN: CPT | Mod: S$GLB,,, | Performed by: FAMILY MEDICINE

## 2018-04-24 PROCEDURE — 84075 ASSAY ALKALINE PHOSPHATASE: CPT

## 2018-04-24 RX ORDER — PRAMIPEXOLE DIHYDROCHLORIDE 0.5 MG/1
0.5 TABLET ORAL DAILY PRN
Qty: 90 TABLET | Refills: 3 | Status: ON HOLD | OUTPATIENT
Start: 2018-04-24 | End: 2018-10-01 | Stop reason: CLARIF

## 2018-04-24 RX ORDER — ASPIRIN 325 MG
325 TABLET ORAL DAILY
COMMUNITY
End: 2019-01-17

## 2018-04-24 RX ORDER — ACETAMINOPHEN AND PHENYLEPHRINE HCL 325; 5 MG/1; MG/1
10000 TABLET ORAL DAILY
COMMUNITY
End: 2019-02-12

## 2018-04-24 RX ORDER — VILAZODONE HYDROCHLORIDE 20 MG/1
20 TABLET ORAL DAILY
Qty: 90 TABLET | Refills: 3 | Status: SHIPPED | OUTPATIENT
Start: 2018-04-24 | End: 2018-05-22

## 2018-04-24 RX ORDER — AMLODIPINE BESYLATE 10 MG/1
10 TABLET ORAL DAILY
Qty: 30 TABLET | Refills: 1 | Status: SHIPPED | OUTPATIENT
Start: 2018-04-24 | End: 2018-04-24 | Stop reason: SDUPTHER

## 2018-04-24 RX ORDER — AMLODIPINE BESYLATE 10 MG/1
10 TABLET ORAL DAILY
Qty: 90 TABLET | Refills: 3 | Status: SHIPPED | OUTPATIENT
Start: 2018-04-24 | End: 2018-09-13

## 2018-04-24 RX ORDER — ROPINIROLE 1 MG/1
1 TABLET, FILM COATED ORAL NIGHTLY
Qty: 90 TABLET | Refills: 3 | Status: SHIPPED | OUTPATIENT
Start: 2018-04-24 | End: 2018-09-13 | Stop reason: SDUPTHER

## 2018-04-24 RX ORDER — GABAPENTIN 300 MG/1
300 CAPSULE ORAL 2 TIMES DAILY
Qty: 180 CAPSULE | Refills: 3 | Status: SHIPPED | OUTPATIENT
Start: 2018-04-24 | End: 2018-09-13 | Stop reason: SDUPTHER

## 2018-04-24 RX ORDER — TRAMADOL HYDROCHLORIDE 50 MG/1
50 TABLET ORAL EVERY 12 HOURS PRN
Qty: 60 TABLET | Refills: 1 | Status: SHIPPED | OUTPATIENT
Start: 2018-04-24 | End: 2018-05-24

## 2018-04-24 RX ORDER — DICLOFENAC SODIUM 10 MG/G
2 GEL TOPICAL DAILY
Qty: 1 TUBE | Refills: 2 | Status: SHIPPED | OUTPATIENT
Start: 2018-04-24 | End: 2018-06-27

## 2018-04-24 RX ORDER — BUPROPION HYDROCHLORIDE 150 MG/1
150 TABLET, EXTENDED RELEASE ORAL 2 TIMES DAILY
Qty: 180 TABLET | Refills: 3 | Status: SHIPPED | OUTPATIENT
Start: 2018-04-24 | End: 2018-09-13 | Stop reason: SDUPTHER

## 2018-04-24 RX ORDER — PANTOPRAZOLE SODIUM 40 MG/1
40 TABLET, DELAYED RELEASE ORAL DAILY
Qty: 90 TABLET | Refills: 3 | Status: SHIPPED | OUTPATIENT
Start: 2018-04-24 | End: 2019-07-10 | Stop reason: SDUPTHER

## 2018-04-24 RX ORDER — LOSARTAN POTASSIUM AND HYDROCHLOROTHIAZIDE 12.5; 5 MG/1; MG/1
1 TABLET ORAL DAILY
Qty: 90 TABLET | Refills: 3 | Status: SHIPPED | OUTPATIENT
Start: 2018-04-24 | End: 2018-09-13

## 2018-04-24 NOTE — PROGRESS NOTES
Chief Complaint:    Chief Complaint   Patient presents with    Medication Refill       History of Present Illness:    She presents today she says her blood pressures running high because he has an out of her amlodipine.  She also gets headaches because of that.    She was going to pain management but does not want to go anymore because was not happy with COPD this was to take tramadol with us.  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  She did okay in the past with tramadol.    Alkaline phosphatase was elevated last time she has not followed up on that    She says sometimes with physical exertion she feels her throat tightening and unable to breathe she does suffer with asthma.  But does not exercise induced asthma.    Also suffers with significant heartburn once EGD done.      ROS:  Review of Systems   Constitutional: Negative for activity change, chills, fatigue, fever and unexpected weight change.   HENT: Negative for congestion, ear discharge, ear pain, hearing loss, postnasal drip and rhinorrhea.    Eyes: Negative for pain and visual disturbance.   Respiratory: Negative for cough, chest tightness and shortness of breath.    Cardiovascular: Negative for chest pain and palpitations.   Gastrointestinal: Negative for abdominal pain, diarrhea and vomiting.   Endocrine: Negative for heat intolerance.   Genitourinary: Negative for dysuria, flank pain, frequency and hematuria.   Musculoskeletal: Positive for back pain. Negative for gait problem and neck pain.   Skin: Negative for color change and rash.   Neurological: Negative for dizziness, tremors, seizures, numbness and headaches.   Psychiatric/Behavioral: Negative for agitation, hallucinations, self-injury, sleep disturbance and suicidal ideas. The patient is not nervous/anxious.        Past Medical History:   Diagnosis Date    Anxiety     Arthritis     Depression     Hyperlipidemia        Social History:  Social History     Social  "History    Marital status:      Spouse name: N/A    Number of children: 2    Years of education: N/A     Social History Main Topics    Smoking status: Former Smoker     Quit date: 6/10/2012    Smokeless tobacco: Never Used      Comment: Would smoke 1 cigarette every few months    Alcohol use 0.6 oz/week     1 Standard drinks or equivalent per week    Drug use: No    Sexual activity: Yes     Partners: Male     Birth control/ protection: None     Other Topics Concern    None     Social History Narrative    None       Family History:   family history includes Cancer in her maternal grandfather and maternal grandmother; Clotting disorder in her maternal grandfather, maternal grandmother, and mother; Hemochromatosis in her mother; Hypertension in her father and mother.    Health Maintenance   Topic Date Due    Zoster Vaccine  05/04/2016    Mammogram  05/02/2017    Colonoscopy  07/23/2017    Lipid Panel  02/07/2019    TETANUS VACCINE  07/19/2026    Hepatitis C Screening  Completed    Influenza Vaccine  Completed       Physical Exam:    Vital Signs  Temp: 98.9 °F (37.2 °C)  Temp src: Tympanic  Pulse: 82  Resp: 20  SpO2: 97 %  BP: (!) 171/82  BP Location: Left arm  Patient Position: Sitting  Pain Score:   3  Pain Loc: Shoulder  Height and Weight  Height: 5' 6.5" (168.9 cm)  Weight: 83.3 kg (183 lb 12.1 oz)  BSA (Calculated - sq m): 1.98 sq meters  BMI (Calculated): 29.3  Weight in (lb) to have BMI = 25: 156.9]    Body mass index is 29.21 kg/m².    Physical Exam   Constitutional: She is oriented to person, place, and time. She appears well-developed.   HENT:   Mouth/Throat: Oropharynx is clear and moist.   Eyes: Conjunctivae are normal. Pupils are equal, round, and reactive to light.   Neck: Normal range of motion. Neck supple.   Cardiovascular: Normal rate, regular rhythm and normal heart sounds.    No murmur heard.  Pulmonary/Chest: Effort normal and breath sounds normal. No respiratory distress. " She has no wheezes. She has no rales. She exhibits no tenderness.   Abdominal: Soft. She exhibits no distension and no mass. There is no tenderness. There is no guarding.   Musculoskeletal: She exhibits no edema.        Lumbar back: She exhibits tenderness.   Lymphadenopathy:     She has no cervical adenopathy.   Neurological: She is alert and oriented to person, place, and time. She has normal reflexes.   Skin: Skin is warm and dry.   Psychiatric: She has a normal mood and affect. Her behavior is normal. Judgment and thought content normal.       Results for orders placed or performed in visit on 02/07/18   CBC auto differential   Result Value Ref Range    WBC 9.21 3.90 - 12.70 K/uL    RBC 5.14 4.00 - 5.40 M/uL    Hemoglobin 13.8 12.0 - 16.0 g/dL    Hematocrit 40.4 37.0 - 48.5 %    MCV 79 (L) 82 - 98 fL    MCH 26.8 (L) 27.0 - 31.0 pg    MCHC 34.2 32.0 - 36.0 g/dL    RDW 13.4 11.5 - 14.5 %    Platelets 216 150 - 350 K/uL    MPV 11.2 9.2 - 12.9 fL    Immature Granulocytes 0.2 0.0 - 0.5 %    Gran # (ANC) 5.2 1.8 - 7.7 K/uL    Immature Grans (Abs) 0.02 0.00 - 0.04 K/uL    Lymph # 3.0 1.0 - 4.8 K/uL    Mono # 0.6 0.3 - 1.0 K/uL    Eos # 0.3 0.0 - 0.5 K/uL    Baso # 0.11 0.00 - 0.20 K/uL    nRBC 0 0 /100 WBC    Gran% 56.1 38.0 - 73.0 %    Lymph% 32.9 18.0 - 48.0 %    Mono% 6.3 4.0 - 15.0 %    Eosinophil% 3.3 0.0 - 8.0 %    Basophil% 1.2 0.0 - 1.9 %    Differential Method Automated    Comprehensive metabolic panel   Result Value Ref Range    Sodium 141 136 - 145 mmol/L    Potassium 3.9 3.5 - 5.1 mmol/L    Chloride 104 95 - 110 mmol/L    CO2 29 23 - 29 mmol/L    Glucose 105 70 - 110 mg/dL    BUN, Bld 16 8 - 23 mg/dL    Creatinine 0.8 0.5 - 1.4 mg/dL    Calcium 9.5 8.7 - 10.5 mg/dL    Total Protein 7.4 6.0 - 8.4 g/dL    Albumin 3.8 3.5 - 5.2 g/dL    Total Bilirubin 0.5 0.1 - 1.0 mg/dL    Alkaline Phosphatase 163 (H) 55 - 135 U/L    AST 27 10 - 40 U/L    ALT 35 10 - 44 U/L    Anion Gap 8 8 - 16 mmol/L    eGFR if African  American >60.0 >60 mL/min/1.73 m^2    eGFR if non African American >60.0 >60 mL/min/1.73 m^2   Lipid panel   Result Value Ref Range    Cholesterol 197 120 - 199 mg/dL    Triglycerides 178 (H) 30 - 150 mg/dL    HDL 39 (L) 40 - 75 mg/dL    LDL Cholesterol 122.4 63.0 - 159.0 mg/dL    HDL/Chol Ratio 19.8 (L) 20.0 - 50.0 %    Total Cholesterol/HDL Ratio 5.1 (H) 2.0 - 5.0    Non-HDL Cholesterol 158 mg/dL   Hemoglobin A1c   Result Value Ref Range    Hemoglobin A1C 5.7 (H) 4.0 - 5.6 %    Estimated Avg Glucose 117 68 - 131 mg/dL   Brain natriuretic peptide   Result Value Ref Range    BNP <10 0 - 99 pg/mL         Lab Results   Component Value Date    HGBA1C 5.7 (H) 02/07/2018       Assessment:      ICD-10-CM ICD-9-CM   1. Essential hypertension I10 401.9   2. Elevated alkaline phosphatase level R74.8 790.5   3. Exercise-induced asthma J45.990 493.81   4. Colon cancer screening Z12.11 V76.51   5. Gastroesophageal reflux disease with esophagitis K21.0 530.11   6. Chronic bilateral low back pain without sciatica M54.5 724.2    G89.29 338.29   7. Breast cancer screening Z12.31 V76.10         Plan:    Amlodipine refill please monitor home blood pressure readings call back with numbers  Check fractionated alkaline phosphatase  Recommend trying albuterol inhaler before exercise see A difference.  Methacholine challenge can be done.  Patient given tramadol 60 pills she'll sign a pain contract next visit and we will do a urine drug screen  Schedule screening colonoscopy and colonoscopy since she did been on Protonix and still has symptoms of GERD.  Patient's MRI report received this report is returned on 10/11/2017, done at [facility].  Impression:    This patient narrowing disc bulging and facet arthropathy produce spinal and bilateral foraminal stenosis and impingement at L5-S1  Disc bulge at L1-L2, L4-5 and L5-S1.  Right foraminal protrusion L3-4  Facet arthropathy L3-4, L4-5 and L5-S1  For mild stenosis on the left  L2-3 and on the right L3-4 and bilaterally at L5-S1.  This report will be scanned into the system.    Follow-up 3 weeks        Orders Placed This Encounter   Procedures    Mammo Digital Screening Bilat with CAD    Alkaline phosphatase, isoenzymes       Current Outpatient Prescriptions   Medication Sig Dispense Refill    amLODIPine (NORVASC) 10 MG tablet Take 1 tablet (10 mg total) by mouth once daily. 30 tablet 1    ascorbic acid, vitamin C, (VITAMIN C) 1000 MG tablet Take 1,000 mg by mouth once daily.      aspirin 325 MG tablet Take 325 mg by mouth once daily.      aspirin-acetaminophen-caffeine 250-250-65 mg (EXCEDRIN MIGRAINE) 250-250-65 mg per tablet Take 1 tablet by mouth every 6 (six) hours as needed for Pain.      biotin 10,000 mcg Cap Take 10,000 mcg by mouth once daily.      buPROPion (WELLBUTRIN SR) 150 MG TBSR 12 hr tablet Take 1 tablet (150 mg total) by mouth 2 (two) times daily. 60 tablet 2    cyclobenzaprine (FLEXERIL) 5 MG tablet Take 1 tablet (5 mg total) by mouth 3 (three) times daily as needed for Muscle spasms. (Patient taking differently: Take 10 mg by mouth 2 (two) times daily as needed for Muscle spasms. ) 60 tablet 4    gabapentin (NEURONTIN) 300 MG capsule Take 1 capsule (300 mg total) by mouth 2 (two) times daily. 60 capsule 0    losartan-hydrochlorothiazide 50-12.5 mg (HYZAAR) 50-12.5 mg per tablet Take 1 tablet by mouth once daily. 90 tablet 3    pantoprazole (PROTONIX) 40 MG tablet TAKE 1 TABLET DAILY 90 tablet 2    potassium 99 mg Tab Take by mouth. 2 tablet Oral Every day      pramipexole (MIRAPEX) 0.5 MG tablet Take 0.5 mg by mouth daily as needed.      ropinirole (REQUIP) 1 MG tablet Take 1 tablet (1 mg total) by mouth every evening. 30 tablet 11    vilazodone (VIIBRYD) 20 mg Tab Take 1 tablet (20 mg total) by mouth once daily. 30 tablet 11    diclofenac sodium (VOLTAREN) 1 % Gel Apply 2 g topically once daily. 1 Tube 2    EPINEPHrine (EPIPEN) 0.3 mg/0.3 mL AtIn  Inject 0.3 mLs (0.3 mg total) into the muscle once. 0.3 mL 4    promethazine (PHENERGAN) 12.5 MG Tab Take 1 tablet (12.5 mg total) by mouth 4 (four) times daily. 10 tablet 0    traMADol (ULTRAM) 50 mg tablet Take 1 tablet (50 mg total) by mouth every 12 (twelve) hours as needed. 60 tablet 1     Current Facility-Administered Medications   Medication Dose Route Frequency Provider Last Rate Last Dose    albuterol inhaler 2 puff  2 puff Inhalation Q6H PRN Helena Jones MD        albuterol sulfate nebulizer solution 2.5 mg  2.5 mg Nebulization 1 time in Clinic/HOD Helena Jones MD           Medications Discontinued During This Encounter   Medication Reason    amLODIPine (NORVASC) 10 MG tablet Reorder    oxyCODONE-acetaminophen (PERCOCET)  mg per tablet     escitalopram oxalate (LEXAPRO) 10 MG tablet Patient no longer taking       Follow-up in about 3 weeks (around 5/15/2018).      Helena Jones MD

## 2018-04-27 ENCOUNTER — DOCUMENTATION ONLY (OUTPATIENT)
Dept: ENDOSCOPY | Facility: HOSPITAL | Age: 63
End: 2018-04-27

## 2018-04-27 LAB
ALP BONE CFR SERPL: 61 % (ref 28–66)
ALP INTEST CFR SERPL: 0 % (ref 1–24)
ALP LIVER CFR SERPL: 39 % (ref 25–69)
ALP PLAC CFR SERPL: 0 %
ALP SERPL-CCNC: 141 U/L (ref 33–130)

## 2018-04-27 NOTE — PROGRESS NOTES
Pt returned call and scheduled for 6/18/18.    4/26/18 Per [employer], Message was delivered on an answering machine

## 2018-04-29 RX ORDER — SODIUM, POTASSIUM,MAG SULFATES 17.5-3.13G
SOLUTION, RECONSTITUTED, ORAL ORAL
Qty: 354 ML | Refills: 0 | Status: ON HOLD | OUTPATIENT
Start: 2018-04-29 | End: 2018-10-01 | Stop reason: CLARIF

## 2018-05-03 ENCOUNTER — PATIENT OUTREACH (OUTPATIENT)
Dept: ADMINISTRATIVE | Facility: HOSPITAL | Age: 63
End: 2018-05-03

## 2018-05-03 ENCOUNTER — HOSPITAL ENCOUNTER (OUTPATIENT)
Dept: RADIOLOGY | Facility: HOSPITAL | Age: 63
Discharge: HOME | End: 2018-05-03
Attending: FAMILY MEDICINE
Payer: COMMERCIAL

## 2018-05-03 DIAGNOSIS — Z12.31 ENCOUNTER FOR SCREENING MAMMOGRAM FOR MALIGNANT NEOPLASM OF BREAST: ICD-10-CM

## 2018-05-03 PROCEDURE — 77067 SCR MAMMO BI INCL CAD: CPT | Mod: TC

## 2018-05-03 PROCEDURE — 77067 SCR MAMMO BI INCL CAD: CPT | Mod: 26,,, | Performed by: RADIOLOGY

## 2018-05-03 PROCEDURE — 77063 BREAST TOMOSYNTHESIS BI: CPT | Mod: 26,,, | Performed by: RADIOLOGY

## 2018-05-07 ENCOUNTER — HOSPITAL ENCOUNTER (OUTPATIENT)
Dept: RADIOLOGY | Facility: HOSPITAL | Age: 63
Discharge: HOME | End: 2018-05-07
Attending: FAMILY MEDICINE
Payer: COMMERCIAL

## 2018-05-07 DIAGNOSIS — R05.9 COUGH: ICD-10-CM

## 2018-05-07 PROCEDURE — 71046 X-RAY EXAM CHEST 2 VIEWS: CPT | Mod: TC,FY,PO

## 2018-05-07 PROCEDURE — 71046 X-RAY EXAM CHEST 2 VIEWS: CPT | Mod: 26,,, | Performed by: RADIOLOGY

## 2018-05-15 RX ORDER — GABAPENTIN 300 MG/1
CAPSULE ORAL
Qty: 60 CAPSULE | Refills: 0 | Status: SHIPPED | OUTPATIENT
Start: 2018-05-15 | End: 2018-05-31 | Stop reason: SDUPTHER

## 2018-05-22 ENCOUNTER — PATIENT MESSAGE (OUTPATIENT)
Dept: FAMILY MEDICINE | Facility: CLINIC | Age: 63
End: 2018-05-22

## 2018-05-22 RX ORDER — PRAMIPEXOLE DIHYDROCHLORIDE 0.5 MG/1
TABLET ORAL
Qty: 30 TABLET | Refills: 11 | Status: SHIPPED | OUTPATIENT
Start: 2018-05-22 | End: 2018-05-31 | Stop reason: SDUPTHER

## 2018-05-22 RX ORDER — DULOXETIN HYDROCHLORIDE 30 MG/1
30 CAPSULE, DELAYED RELEASE ORAL DAILY
Qty: 30 CAPSULE | Refills: 11 | Status: SHIPPED | OUTPATIENT
Start: 2018-05-22 | End: 2018-06-27 | Stop reason: SDUPTHER

## 2018-05-28 ENCOUNTER — PATIENT OUTREACH (OUTPATIENT)
Dept: ADMINISTRATIVE | Facility: HOSPITAL | Age: 63
End: 2018-05-28

## 2018-05-31 ENCOUNTER — OFFICE VISIT (OUTPATIENT)
Dept: FAMILY MEDICINE | Facility: CLINIC | Age: 63
End: 2018-05-31
Payer: COMMERCIAL

## 2018-05-31 VITALS
HEIGHT: 67 IN | BODY MASS INDEX: 27.77 KG/M2 | TEMPERATURE: 100 F | WEIGHT: 176.94 LBS | OXYGEN SATURATION: 97 % | HEART RATE: 90 BPM | SYSTOLIC BLOOD PRESSURE: 136 MMHG | DIASTOLIC BLOOD PRESSURE: 72 MMHG

## 2018-05-31 DIAGNOSIS — J01.01 ACUTE RECURRENT MAXILLARY SINUSITIS: Primary | ICD-10-CM

## 2018-05-31 DIAGNOSIS — I10 HTN, GOAL BELOW 130/80: ICD-10-CM

## 2018-05-31 DIAGNOSIS — G89.29 CHRONIC BILATERAL LOW BACK PAIN WITHOUT SCIATICA: ICD-10-CM

## 2018-05-31 DIAGNOSIS — M54.50 CHRONIC BILATERAL LOW BACK PAIN WITHOUT SCIATICA: ICD-10-CM

## 2018-05-31 PROCEDURE — 96372 THER/PROPH/DIAG INJ SC/IM: CPT | Mod: S$GLB,,, | Performed by: FAMILY MEDICINE

## 2018-05-31 PROCEDURE — 3078F DIAST BP <80 MM HG: CPT | Mod: CPTII,S$GLB,, | Performed by: FAMILY MEDICINE

## 2018-05-31 PROCEDURE — 3008F BODY MASS INDEX DOCD: CPT | Mod: CPTII,S$GLB,, | Performed by: FAMILY MEDICINE

## 2018-05-31 PROCEDURE — 3075F SYST BP GE 130 - 139MM HG: CPT | Mod: CPTII,S$GLB,, | Performed by: FAMILY MEDICINE

## 2018-05-31 PROCEDURE — 99214 OFFICE O/P EST MOD 30 MIN: CPT | Mod: 25,S$GLB,, | Performed by: FAMILY MEDICINE

## 2018-05-31 PROCEDURE — 99999 PR PBB SHADOW E&M-EST. PATIENT-LVL V: CPT | Mod: PBBFAC,,, | Performed by: FAMILY MEDICINE

## 2018-05-31 RX ORDER — BETAMETHASONE SODIUM PHOSPHATE AND BETAMETHASONE ACETATE 3; 3 MG/ML; MG/ML
9 INJECTION, SUSPENSION INTRA-ARTICULAR; INTRALESIONAL; INTRAMUSCULAR; SOFT TISSUE
Status: COMPLETED | OUTPATIENT
Start: 2018-05-31 | End: 2018-05-31

## 2018-05-31 RX ORDER — PROMETHAZINE HYDROCHLORIDE AND DEXTROMETHORPHAN HYDROBROMIDE 6.25; 15 MG/5ML; MG/5ML
5 SYRUP ORAL 3 TIMES DAILY PRN
Qty: 118 ML | Refills: 2 | Status: SHIPPED | OUTPATIENT
Start: 2018-05-31 | End: 2018-06-07

## 2018-05-31 RX ORDER — CETIRIZINE HYDROCHLORIDE 10 MG/1
10 TABLET ORAL DAILY
Qty: 30 TABLET | Refills: 1 | Status: ON HOLD | OUTPATIENT
Start: 2018-05-31 | End: 2018-10-01 | Stop reason: CLARIF

## 2018-05-31 RX ORDER — AMOXICILLIN AND CLAVULANATE POTASSIUM 875; 125 MG/1; MG/1
1 TABLET, FILM COATED ORAL EVERY 12 HOURS
Qty: 20 TABLET | Refills: 0 | Status: SHIPPED | OUTPATIENT
Start: 2018-05-31 | End: 2018-06-27 | Stop reason: ALTCHOICE

## 2018-05-31 RX ADMIN — BETAMETHASONE SODIUM PHOSPHATE AND BETAMETHASONE ACETATE 9 MG: 3; 3 INJECTION, SUSPENSION INTRA-ARTICULAR; INTRALESIONAL; INTRAMUSCULAR; SOFT TISSUE at 02:05

## 2018-05-31 NOTE — PATIENT INSTRUCTIONS
Acute Sinusitis    Acute sinusitis is irritation and swelling of the sinuses. It is usually caused by a viral infection after a common cold. Your doctor can help you find relief.  What is acute sinusitis?  Sinuses are air-filled spaces in the skull behind the face. They are kept moist and clean by a lining of mucosa. Things such as pollen, smoke, and chemical fumes can irritate the mucosa. It can then swell up. As a response to irritation, the mucosa makes more mucus and other fluids. Tiny hairlike cilia cover the mucosa. Cilia help carry mucus toward the opening of the sinus. Too much mucus may cause the cilia to stop working. This blocks the sinus opening. A buildup of fluid in the sinuses then causes pain and pressure. It can also encourage bacteria to grow in the sinuses.  Common symptoms of acute sinusitis  You may have:  · Facial soreness pain  · Headache  · Fever  · Fluid draining in the back of the throat (postnasal drip)  · Congestion  · Drainage that is thick and colored, instead of clear  · Cough  Diagnosing acute sinusitis  Your doctor will ask about your symptoms and health history. He or she will look at your ear, nose, and throat. You usually won't need to have X-rays taken.    The doctor may take a sample of mucus to check for bacteria. If you have sinusitis that keeps coming back, you may need imaging tests such as X-rays or CAT scans. This will help your doctor check for a structural problem that may be causing the infection.  Treating acute sinusitis  Treatment is aimed at unblocking the sinus opening and helping the cilia work again. You may need to take antihistamine and decongestant medicine. These can reduce inflammation and decrease the amount of fluid your sinuses make. If you have a bacterial infection, you will need to take antibiotic medicine for 10 to 14 days. Take this medicine until it is gone, even if you feel better.  Date Last Reviewed: 10/1/2016  © 0558-1272 [employer] [address] [place] [id number]. All rights reserved. This information is not intended as a substitute for professional medical care. Always follow your healthcare professional's instructions.

## 2018-05-31 NOTE — PROGRESS NOTES
Subjective:       Patient ID: Erin Soriano is a 63 y.o. female.    Chief Complaint: Sore throat.  Sinusitis: x 4 days. Associated with sore throat, nasal congestion, facial pain, post nasal, cough, low grade fever and malaise. Worsening. She has not tried any medication yet.  Chronic Back pain without sciatica: On pain contract. She is on tramadol but it is helping only a little. She is taking Excedrin and tylenol twice a day. She got referred to Dr Ramirez- pain management but the office wants peer to peer telephone conversation before they can decide whether to take her.   She still has some tramadol and not ready for refill today. On amlodipine and hyzaar which she takes 100% of the time.  HTN: chronic, Was elevated last visit. Controled today. On amlodipine and Hyzaar and takes them 100% of the time.  Past Medical History:   Diagnosis Date    Anxiety     Arthritis     Depression     Hyperlipidemia      Family History   Problem Relation Age of Onset    Hypertension Mother     Clotting disorder Mother     Hemochromatosis Mother     Hypertension Father     Cancer Maternal Grandmother     Clotting disorder Maternal Grandmother     Cancer Maternal Grandfather         prostate    Clotting disorder Maternal Grandfather      Social History     Social History    Marital status:      Spouse name: N/A    Number of children: 2    Years of education: N/A     Occupational History    Not on file.     Social History Main Topics    Smoking status: Former Smoker     Quit date: 6/10/2012    Smokeless tobacco: Never Used      Comment: Would smoke 1 cigarette every few months    Alcohol use 0.6 oz/week     1 Standard drinks or equivalent per week    Drug use: No    Sexual activity: Yes     Partners: Male     Birth control/ protection: None     Other Topics Concern    Not on file     Social History Narrative    No narrative on file       Review of Systems   Constitutional: Negative for chills and  "fever.   HENT: Positive for postnasal drip, sinus pain, sinus pressure and sore throat. Negative for congestion and facial swelling.    Eyes: Negative for discharge and itching.   Respiratory: Negative for cough and wheezing.    Cardiovascular: Negative for chest pain and palpitations.   Gastrointestinal: Negative for abdominal pain, nausea and vomiting.   Endocrine: Negative for cold intolerance and heat intolerance.   Genitourinary: Negative for dysuria and flank pain.   Musculoskeletal: Negative for myalgias and neck stiffness.   Skin: Negative for pallor and wound.   Neurological: Negative for facial asymmetry and weakness.   Psychiatric/Behavioral: Negative for agitation and suicidal ideas.       Objective:      /72 (BP Location: Right arm, Patient Position: Sitting, BP Method: Large (Manual))   Pulse 90   Temp 99.7 °F (37.6 °C) (Oral)   Ht 5' 7" (1.702 m)   Wt 80.3 kg (176 lb 14.7 oz)   SpO2 97%   BMI 27.71 kg/m²   Results for orders placed or performed in visit on 04/24/18   Alkaline phosphatase, isoenzymes   Result Value Ref Range    Alkaline Phosphatase, Total 141 (H) 33 - 130 U/L    Bone, Alk Phos 61 28 - 66 %    Liver, Alk Phos 39 25 - 69 %    Intestine, Alk Phos 0 (L) 1 - 24 %    PLACENTAL ISOENZYME 0 0 %     Physical Exam   Constitutional: She is oriented to person, place, and time. She appears well-developed and well-nourished. She appears ill.   HENT:   Head: Normocephalic and atraumatic.   Right Ear: Tympanic membrane and external ear normal.   Left Ear: Tympanic membrane and external ear normal.   Nose: Mucosal edema present. Right sinus exhibits maxillary sinus tenderness. Left sinus exhibits maxillary sinus tenderness.   Mouth/Throat: Posterior oropharyngeal erythema present.   Eyes: Conjunctivae are normal. Right eye exhibits no discharge. Left eye exhibits no discharge.   Neck: Normal range of motion. Neck supple.   Cardiovascular: Normal rate, regular rhythm, normal heart sounds and " intact distal pulses.    Pulmonary/Chest: Effort normal and breath sounds normal. No respiratory distress. She has no wheezes. She exhibits no tenderness.   Abdominal: Soft. Normal appearance and bowel sounds are normal. She exhibits no distension. There is no hepatosplenomegaly.   Lymphadenopathy:     She has no cervical adenopathy.   Neurological: She is alert and oriented to person, place, and time.   Skin: Skin is warm and dry. No erythema.   Psychiatric: She has a normal mood and affect. Her behavior is normal.   Nursing note and vitals reviewed.      Assessment:       1. Acute recurrent maxillary sinusitis    2. HTN, goal below 130/80    3. Chronic bilateral low back pain without sciatica        Plan:   Acute recurrent maxillary sinusitis  -     cetirizine (ZYRTEC) 10 MG tablet; Take 1 tablet (10 mg total) by mouth once daily.  Dispense: 30 tablet; Refill: 1  -     betamethasone acetate-betamethasone sodium phosphate injection 9 mg; Inject 1.5 mLs (9 mg total) into the muscle one time.  -     promethazine-dextromethorphan (PROMETHAZINE-DM) 6.25-15 mg/5 mL Syrp; Take 5 mLs by mouth 3 (three) times daily as needed.  Dispense: 118 mL; Refill: 2    HTN, goal below 130/80: stable  Monitored, examined, assessed and treated.  Chronic bilateral low back pain without sciatica: chronic, stable, no red flags  Follow up with PCP

## 2018-06-18 ENCOUNTER — DOCUMENTATION ONLY (OUTPATIENT)
Dept: ENDOSCOPY | Facility: HOSPITAL | Age: 63
End: 2018-06-18

## 2018-06-18 NOTE — PROGRESS NOTES
Picked up message left on [employer] vm. Pt stated in message that she fell during the weekend and was seen in the ER.  I called and left her a message to return call once feeling better to reschedule procedure.

## 2018-06-27 ENCOUNTER — LAB VISIT (OUTPATIENT)
Dept: LAB | Facility: HOSPITAL | Age: 63
End: 2018-06-27
Attending: FAMILY MEDICINE
Payer: COMMERCIAL

## 2018-06-27 ENCOUNTER — OFFICE VISIT (OUTPATIENT)
Dept: FAMILY MEDICINE | Facility: CLINIC | Age: 63
End: 2018-06-27
Payer: COMMERCIAL

## 2018-06-27 VITALS
OXYGEN SATURATION: 96 % | TEMPERATURE: 99 F | DIASTOLIC BLOOD PRESSURE: 63 MMHG | HEART RATE: 91 BPM | SYSTOLIC BLOOD PRESSURE: 128 MMHG | BODY MASS INDEX: 27.39 KG/M2 | HEIGHT: 67 IN | WEIGHT: 174.5 LBS

## 2018-06-27 DIAGNOSIS — K52.9 GASTROENTERITIS: ICD-10-CM

## 2018-06-27 DIAGNOSIS — K52.9 GASTROENTERITIS: Primary | ICD-10-CM

## 2018-06-27 LAB
ALBUMIN SERPL BCP-MCNC: 3.9 G/DL
ALP SERPL-CCNC: 140 U/L
ALT SERPL W/O P-5'-P-CCNC: 33 U/L
ANION GAP SERPL CALC-SCNC: 10 MMOL/L
AST SERPL-CCNC: 29 U/L
BILIRUB SERPL-MCNC: 0.5 MG/DL
BUN SERPL-MCNC: 12 MG/DL
CALCIUM SERPL-MCNC: 9.7 MG/DL
CHLORIDE SERPL-SCNC: 102 MMOL/L
CO2 SERPL-SCNC: 29 MMOL/L
CREAT SERPL-MCNC: 0.9 MG/DL
EST. GFR  (AFRICAN AMERICAN): >60 ML/MIN/1.73 M^2
EST. GFR  (NON AFRICAN AMERICAN): >60 ML/MIN/1.73 M^2
GLUCOSE SERPL-MCNC: 122 MG/DL
POTASSIUM SERPL-SCNC: 3.9 MMOL/L
PROT SERPL-MCNC: 7.6 G/DL
SODIUM SERPL-SCNC: 141 MMOL/L

## 2018-06-27 PROCEDURE — 36415 COLL VENOUS BLD VENIPUNCTURE: CPT | Mod: PO

## 2018-06-27 PROCEDURE — 80053 COMPREHEN METABOLIC PANEL: CPT

## 2018-06-27 PROCEDURE — 3074F SYST BP LT 130 MM HG: CPT | Mod: CPTII,S$GLB,, | Performed by: FAMILY MEDICINE

## 2018-06-27 PROCEDURE — 99213 OFFICE O/P EST LOW 20 MIN: CPT | Mod: S$GLB,,, | Performed by: FAMILY MEDICINE

## 2018-06-27 PROCEDURE — 99999 PR PBB SHADOW E&M-EST. PATIENT-LVL III: CPT | Mod: PBBFAC,,, | Performed by: FAMILY MEDICINE

## 2018-06-27 PROCEDURE — 3078F DIAST BP <80 MM HG: CPT | Mod: CPTII,S$GLB,, | Performed by: FAMILY MEDICINE

## 2018-06-27 PROCEDURE — 3008F BODY MASS INDEX DOCD: CPT | Mod: CPTII,S$GLB,, | Performed by: FAMILY MEDICINE

## 2018-06-27 RX ORDER — IBUPROFEN 600 MG/1
TABLET ORAL
COMMUNITY
Start: 2018-06-17 | End: 2024-01-10 | Stop reason: ALTCHOICE

## 2018-06-27 RX ORDER — HYOSCYAMINE SULFATE 0.125 MG
125 TABLET ORAL EVERY 4 HOURS PRN
Qty: 30 TABLET | Refills: 1 | Status: SHIPPED | OUTPATIENT
Start: 2018-06-27 | End: 2019-02-12

## 2018-06-27 RX ORDER — DULOXETIN HYDROCHLORIDE 30 MG/1
30 CAPSULE, DELAYED RELEASE ORAL DAILY
Qty: 90 CAPSULE | Refills: 3 | Status: SHIPPED | OUTPATIENT
Start: 2018-06-27 | End: 2019-11-07 | Stop reason: SDUPTHER

## 2018-06-27 NOTE — PROGRESS NOTES
Chief Complaint:    Chief Complaint   Patient presents with    Abdominal Pain       History of Present Illness:     the patient presents today she said she developed severe abdominal cramping and diarrhea lose watery in nature about 5 days back, she was having a lot of bowel movement about 10 or 12 a day but not is slowing down.  Initially she noticed some blood but there is no more blood did have some nausea or vomit but that no more of that.  No fever.  She says the diarrhea is slowing down but she is feels weak and dizzy lightheaded.    ROS:  Review of Systems   Constitutional: Negative for activity change, chills, fatigue, fever and unexpected weight change.   HENT: Negative for congestion, ear discharge, ear pain, hearing loss, postnasal drip and rhinorrhea.    Eyes: Negative for pain and visual disturbance.   Respiratory: Negative for cough, chest tightness and shortness of breath.    Cardiovascular: Negative for chest pain and palpitations.   Gastrointestinal: Positive for abdominal pain and diarrhea. Negative for vomiting.   Endocrine: Negative for heat intolerance.   Genitourinary: Negative for dysuria, flank pain, frequency and hematuria.   Musculoskeletal: Negative for back pain, gait problem and neck pain.   Skin: Negative for color change and rash.   Neurological: Negative for dizziness, tremors, seizures, numbness and headaches.   Psychiatric/Behavioral: Negative for agitation, hallucinations, self-injury, sleep disturbance and suicidal ideas. The patient is not nervous/anxious.        Past Medical History:   Diagnosis Date    Anxiety     Arthritis     Depression     Hyperlipidemia        Social History:  Social History     Social History    Marital status:      Spouse name: N/A    Number of children: 2    Years of education: N/A     Social History Main Topics    Smoking status: Former Smoker     Quit date: 6/10/2012    Smokeless tobacco: Never Used      Comment: Would smoke 1  "cigarette every few months    Alcohol use 0.6 oz/week     1 Standard drinks or equivalent per week    Drug use: No    Sexual activity: Yes     Partners: Male     Birth control/ protection: None     Other Topics Concern    None     Social History Narrative    None       Family History:   family history includes Cancer in her maternal grandfather and maternal grandmother; Clotting disorder in her maternal grandfather, maternal grandmother, and mother; Hemochromatosis in her mother; Hypertension in her father and mother.    Health Maintenance   Topic Date Due    Colonoscopy  07/23/2017    Influenza Vaccine  08/01/2018    Lipid Panel  02/07/2019    Mammogram  05/03/2019    TETANUS VACCINE  07/19/2026    Hepatitis C Screening  Completed    Zoster Vaccine  Completed       Physical Exam:    Vital Signs  Temp: 98.7 °F (37.1 °C)  Temp src: Tympanic  Pulse: 91  SpO2: 96 %  BP: 128/63  BP Location: Left arm  Patient Position: Sitting  Pain Score:   8  Pain Loc: Abdomen  Height and Weight  Height: 5' 7" (170.2 cm)  Weight: 79.2 kg (174 lb 7.9 oz)  BSA (Calculated - sq m): 1.93 sq meters  BMI (Calculated): 27.4  Weight in (lb) to have BMI = 25: 159.3]    Body mass index is 27.33 kg/m².    Physical Exam   Constitutional: She is oriented to person, place, and time. She appears well-developed.   HENT:   Mouth/Throat: Oropharynx is clear and moist.   Eyes: Conjunctivae are normal. Pupils are equal, round, and reactive to light.   Neck: Normal range of motion. Neck supple.   Cardiovascular: Normal rate, regular rhythm and normal heart sounds.    No murmur heard.  Pulmonary/Chest: Effort normal and breath sounds normal. No respiratory distress. She has no wheezes. She has no rales. She exhibits no tenderness.   Abdominal: Soft. She exhibits no distension and no mass. There is generalized tenderness. There is no guarding.       Musculoskeletal: She exhibits no edema or tenderness.   Lymphadenopathy:     She has no cervical " adenopathy.   Neurological: She is alert and oriented to person, place, and time. She has normal reflexes.   Skin: Skin is warm and dry.   Psychiatric: She has a normal mood and affect. Her behavior is normal. Judgment and thought content normal.       Results for orders placed or performed in visit on 04/24/18   Alkaline phosphatase, isoenzymes   Result Value Ref Range    Alkaline Phosphatase, Total 141 (H) 33 - 130 U/L    Bone, Alk Phos 61 28 - 66 %    Liver, Alk Phos 39 25 - 69 %    Intestine, Alk Phos 0 (L) 1 - 24 %    PLACENTAL ISOENZYME 0 0 %         Lab Results   Component Value Date    HGBA1C 5.7 (H) 02/07/2018       Assessment:      ICD-10-CM ICD-9-CM   1. Gastroenteritis K52.9 558.9         Plan:      Acute gastroenteritis with possibly some dehydration check a CMP panel today  We need to cut back on blood pressure medication since she has lost all the fluid in her blood pressure will drop which will worsen her symptoms  Please hydrated with Gatorade or Powerade and water in between.    To the ED if symptoms worsen  If the diarrhea symptoms do not improve in the next 3-4 days consider stool studies.  Levsin given for stomach cramps, I explained to patient that appears to be infectious and in most cases conservative treatment is beneficial.      Orders Placed This Encounter   Procedures    Comprehensive metabolic panel       Current Outpatient Prescriptions   Medication Sig Dispense Refill    amLODIPine (NORVASC) 10 MG tablet Take 1 tablet (10 mg total) by mouth once daily. 90 tablet 3    ascorbic acid, vitamin C, (VITAMIN C) 1000 MG tablet Take 1,000 mg by mouth once daily.      aspirin 325 MG tablet Take 325 mg by mouth once daily.      aspirin-acetaminophen-caffeine 250-250-65 mg (EXCEDRIN MIGRAINE) 250-250-65 mg per tablet Take 1 tablet by mouth every 6 (six) hours as needed for Pain.      biotin 10,000 mcg Cap Take 10,000 mcg by mouth once daily.      buPROPion (WELLBUTRIN SR) 150 MG TBSR 12 hr  tablet Take 1 tablet (150 mg total) by mouth 2 (two) times daily. 180 tablet 3    cetirizine (ZYRTEC) 10 MG tablet Take 1 tablet (10 mg total) by mouth once daily. 30 tablet 1    cyclobenzaprine (FLEXERIL) 5 MG tablet Take 1 tablet (5 mg total) by mouth 3 (three) times daily as needed for Muscle spasms. (Patient taking differently: Take 10 mg by mouth 2 (two) times daily as needed for Muscle spasms. ) 60 tablet 4    DULoxetine (CYMBALTA) 30 MG capsule Take 1 capsule (30 mg total) by mouth once daily. 30 capsule 11    gabapentin (NEURONTIN) 300 MG capsule Take 1 capsule (300 mg total) by mouth 2 (two) times daily. 180 capsule 3    losartan-hydrochlorothiazide 50-12.5 mg (HYZAAR) 50-12.5 mg per tablet Take 1 tablet by mouth once daily. 90 tablet 3    pantoprazole (PROTONIX) 40 MG tablet Take 1 tablet (40 mg total) by mouth once daily. 90 tablet 3    potassium 99 mg Tab Take by mouth. 1 tablet Oral Every day      pramipexole (MIRAPEX) 0.5 MG tablet Take 1 tablet (0.5 mg total) by mouth daily as needed. 90 tablet 3    rOPINIRole (REQUIP) 1 MG tablet Take 1 tablet (1 mg total) by mouth every evening. 90 tablet 3    sodium,potassium,mag sulfates (SUPREP BOWEL PREP KIT) 17.5-3.13-1.6 gram SolR As directed 354 mL 0    EPINEPHrine (EPIPEN) 0.3 mg/0.3 mL AtIn Inject 0.3 mLs (0.3 mg total) into the muscle once. 0.3 mL 4    hyoscyamine (ANASPAZ,LEVSIN) 0.125 mg Tab Take 1 tablet (125 mcg total) by mouth every 4 (four) hours as needed. 30 tablet 1    ibuprofen (ADVIL,MOTRIN) 600 MG tablet        Current Facility-Administered Medications   Medication Dose Route Frequency Provider Last Rate Last Dose    albuterol inhaler 2 puff  2 puff Inhalation Q6H PRN Helena Jones MD        albuterol sulfate nebulizer solution 2.5 mg  2.5 mg Nebulization 1 time in Clinic/HOD Helena Jones MD           Medications Discontinued During This Encounter   Medication Reason    amoxicillin-clavulanate 875-125mg (AUGMENTIN) 875-125 mg  per tablet Therapy completed    diclofenac sodium (VOLTAREN) 1 % Gel Patient no longer taking       No Follow-up on file.      Hleena Jones MD

## 2018-07-27 ENCOUNTER — PATIENT MESSAGE (OUTPATIENT)
Dept: FAMILY MEDICINE | Facility: CLINIC | Age: 63
End: 2018-07-27

## 2018-08-07 ENCOUNTER — PATIENT MESSAGE (OUTPATIENT)
Dept: FAMILY MEDICINE | Facility: CLINIC | Age: 63
End: 2018-08-07

## 2018-09-13 ENCOUNTER — OFFICE VISIT (OUTPATIENT)
Dept: FAMILY MEDICINE | Facility: CLINIC | Age: 63
End: 2018-09-13
Payer: COMMERCIAL

## 2018-09-13 VITALS
SYSTOLIC BLOOD PRESSURE: 138 MMHG | BODY MASS INDEX: 29.09 KG/M2 | DIASTOLIC BLOOD PRESSURE: 80 MMHG | HEIGHT: 66 IN | TEMPERATURE: 98 F | WEIGHT: 181 LBS | OXYGEN SATURATION: 96 % | HEART RATE: 84 BPM

## 2018-09-13 DIAGNOSIS — I16.0 HYPERTENSIVE URGENCY: Primary | ICD-10-CM

## 2018-09-13 DIAGNOSIS — M79.642 HAND PAIN, LEFT: ICD-10-CM

## 2018-09-13 DIAGNOSIS — F51.04 CHRONIC INSOMNIA: ICD-10-CM

## 2018-09-13 DIAGNOSIS — E78.2 MIXED HYPERLIPIDEMIA: ICD-10-CM

## 2018-09-13 PROCEDURE — 99999 PR PBB SHADOW E&M-EST. PATIENT-LVL V: CPT | Mod: PBBFAC,,, | Performed by: FAMILY MEDICINE

## 2018-09-13 PROCEDURE — 3079F DIAST BP 80-89 MM HG: CPT | Mod: CPTII,S$GLB,, | Performed by: FAMILY MEDICINE

## 2018-09-13 PROCEDURE — 3075F SYST BP GE 130 - 139MM HG: CPT | Mod: CPTII,S$GLB,, | Performed by: FAMILY MEDICINE

## 2018-09-13 PROCEDURE — 3008F BODY MASS INDEX DOCD: CPT | Mod: CPTII,S$GLB,, | Performed by: FAMILY MEDICINE

## 2018-09-13 PROCEDURE — 99214 OFFICE O/P EST MOD 30 MIN: CPT | Mod: S$GLB,,, | Performed by: FAMILY MEDICINE

## 2018-09-13 RX ORDER — LOSARTAN POTASSIUM AND HYDROCHLOROTHIAZIDE 12.5; 1 MG/1; MG/1
1 TABLET ORAL DAILY
Qty: 90 TABLET | Refills: 3 | Status: SHIPPED | OUTPATIENT
Start: 2018-09-13 | End: 2018-11-13 | Stop reason: SDUPTHER

## 2018-09-13 RX ORDER — CARVEDILOL 6.25 MG/1
6.25 TABLET ORAL 2 TIMES DAILY WITH MEALS
Qty: 60 TABLET | Refills: 11 | Status: SHIPPED | OUTPATIENT
Start: 2018-09-13 | End: 2018-10-30 | Stop reason: SDUPTHER

## 2018-09-13 RX ORDER — ROPINIROLE 1 MG/1
1 TABLET, FILM COATED ORAL NIGHTLY
Qty: 90 TABLET | Refills: 3 | Status: SHIPPED | OUTPATIENT
Start: 2018-09-13 | End: 2018-11-13 | Stop reason: SDUPTHER

## 2018-09-13 RX ORDER — ZOLPIDEM TARTRATE 5 MG/1
5 TABLET ORAL NIGHTLY PRN
Qty: 21 TABLET | Refills: 3 | Status: SHIPPED | OUTPATIENT
Start: 2018-09-13 | End: 2019-03-15 | Stop reason: SDUPTHER

## 2018-09-13 RX ORDER — TRAMADOL HYDROCHLORIDE 50 MG/1
50 TABLET ORAL EVERY 12 HOURS PRN
COMMUNITY
End: 2018-09-13 | Stop reason: SDUPTHER

## 2018-09-13 RX ORDER — BUPROPION HYDROCHLORIDE 150 MG/1
150 TABLET, EXTENDED RELEASE ORAL 2 TIMES DAILY
Qty: 180 TABLET | Refills: 3 | Status: SHIPPED | OUTPATIENT
Start: 2018-09-13 | End: 2018-11-13 | Stop reason: SDUPTHER

## 2018-09-13 RX ORDER — CYCLOBENZAPRINE HCL 5 MG
10 TABLET ORAL 2 TIMES DAILY PRN
Qty: 60 TABLET | Refills: 2 | Status: SHIPPED | OUTPATIENT
Start: 2018-09-13 | End: 2019-02-12 | Stop reason: SDUPTHER

## 2018-09-13 RX ORDER — TRAMADOL HYDROCHLORIDE 50 MG/1
50 TABLET ORAL EVERY 12 HOURS PRN
Qty: 60 TABLET | Refills: 0 | Status: SHIPPED | OUTPATIENT
Start: 2018-09-13 | End: 2018-11-13 | Stop reason: SDUPTHER

## 2018-09-13 RX ORDER — GABAPENTIN 300 MG/1
300 CAPSULE ORAL 2 TIMES DAILY
Qty: 180 CAPSULE | Refills: 3 | Status: SHIPPED | OUTPATIENT
Start: 2018-09-13 | End: 2019-02-12 | Stop reason: SDUPTHER

## 2018-09-13 RX ORDER — CLONIDINE HYDROCHLORIDE 0.2 MG/1
0.2 TABLET ORAL
Status: COMPLETED | OUTPATIENT
Start: 2018-09-13 | End: 2018-09-13

## 2018-09-13 RX ADMIN — CLONIDINE HYDROCHLORIDE 0.2 MG: 0.2 TABLET ORAL at 04:09

## 2018-09-13 NOTE — PROGRESS NOTES
Chief Complaint:    Chief Complaint   Patient presents with    Medication Refill    Headache       History of Present Illness:    Patient presents today she says her blood pressure is running high even a pressure in size she gets bad headache.  She has been having trouble with sleep she was given Ambien month time that helped.  She suffers with arthritis in lot of pain in both hands worse in the left hand she had an injury that and wall the middle finger long time back and is still very painful.  She is taking her blood pressure medication she tried amlodipine and it caused significant swelling so she stopped using it.    ROS:  Review of Systems   Constitutional: Negative for activity change, chills, fatigue, fever and unexpected weight change.   HENT: Negative for congestion, ear discharge, ear pain, hearing loss, postnasal drip and rhinorrhea.    Eyes: Negative for pain and visual disturbance.   Respiratory: Negative for cough, chest tightness and shortness of breath.    Cardiovascular: Negative for chest pain and palpitations.   Gastrointestinal: Negative for abdominal pain, diarrhea and vomiting.   Endocrine: Negative for heat intolerance.   Genitourinary: Negative for dysuria, flank pain, frequency and hematuria.   Musculoskeletal: Positive for arthralgias. Negative for back pain, gait problem and neck pain.   Skin: Negative for color change and rash.   Neurological: Positive for headaches. Negative for dizziness, tremors, seizures and numbness.   Psychiatric/Behavioral: Positive for sleep disturbance. Negative for agitation, hallucinations, self-injury and suicidal ideas. The patient is not nervous/anxious.        Past Medical History:   Diagnosis Date    Anxiety     Arthritis     Depression     Hyperlipidemia        Social History:  Social History     Socioeconomic History    Marital status:      Spouse name: None    Number of children: 2    Years of education: None    Highest education  "level: None   Social Needs    Financial resource strain: None    Food insecurity - worry: None    Food insecurity - inability: None    Transportation needs - medical: None    Transportation needs - non-medical: None   Occupational History    None   Tobacco Use    Smoking status: Former Smoker     Last attempt to quit: 6/10/2012     Years since quittin.2    Smokeless tobacco: Never Used    Tobacco comment: Would smoke 1 cigarette every few months   Substance and Sexual Activity    Alcohol use: Yes     Alcohol/week: 0.6 oz     Types: 1 Standard drinks or equivalent per week    Drug use: No    Sexual activity: Yes     Partners: Male     Birth control/protection: None   Other Topics Concern    None   Social History Narrative    None       Family History:   family history includes Cancer in her maternal grandfather and maternal grandmother; Clotting disorder in her maternal grandfather, maternal grandmother, and mother; Hemochromatosis in her mother; Hypertension in her father and mother.    Health Maintenance   Topic Date Due    Colonoscopy  2017    Influenza Vaccine  2018    Lipid Panel  2019    Mammogram  2019    TETANUS VACCINE  2026    Hepatitis C Screening  Completed    Zoster Vaccine  Completed       Physical Exam:    Vital Signs  Temp: 97.9 °F (36.6 °C)  Temp src: Tympanic  Pulse: 84  SpO2: 96 %  BP: 138/80  BP Location: Left arm  Patient Position: Sitting  Pain Score:   4  Pain Loc: Head  Height and Weight  Height: 5' 6" (167.6 cm)  Weight: 82.1 kg (180 lb 16 oz)  BSA (Calculated - sq m): 1.96 sq meters  BMI (Calculated): 29.3  Weight in (lb) to have BMI = 25: 154.6]    Body mass index is 29.21 kg/m².    Physical Exam   Constitutional: She is oriented to person, place, and time. She appears well-developed.   HENT:   Mouth/Throat: Oropharynx is clear and moist.   Eyes: Conjunctivae are normal. Pupils are equal, round, and reactive to light.   Neck: Normal " range of motion. Neck supple.   Cardiovascular: Normal rate, regular rhythm and normal heart sounds.   No murmur heard.  Pulmonary/Chest: Effort normal and breath sounds normal. No respiratory distress. She has no wheezes. She has no rales. She exhibits no tenderness.   Abdominal: Soft. She exhibits no distension and no mass. There is no tenderness. There is no guarding.   Musculoskeletal: She exhibits no edema or tenderness.        Hands:  Lymphadenopathy:     She has no cervical adenopathy.   Neurological: She is alert and oriented to person, place, and time. She has normal reflexes.   Skin: Skin is warm and dry.   Psychiatric: She has a normal mood and affect. Her behavior is normal. Judgment and thought content normal.         Assessment:      ICD-10-CM ICD-9-CM   1. Hypertensive urgency I16.0 401.9   2. Hand pain, left M79.642 729.5   3. Chronic insomnia F51.04 780.52   4. Mixed hyperlipidemia E78.2 272.2         Plan:    Patient given clonidine in the office and her blood pressure came down he felt better.  Will increase losartan to 100 mg and add Coreg to start monitoring blood pressure readings every day and bring the numbers back  Start on Ambien for insomnia  She will be given tramadol for joint pain and she will be referred to Orthopedic.  Follow-up in 3 weeks      Orders Placed This Encounter   Procedures    Ambulatory referral to Orthopedics       Current Outpatient Medications   Medication Sig Dispense Refill    ascorbic acid, vitamin C, (VITAMIN C) 1000 MG tablet Take 1,000 mg by mouth once daily.      aspirin 325 MG tablet Take 325 mg by mouth once daily.      aspirin-acetaminophen-caffeine 250-250-65 mg (EXCEDRIN MIGRAINE) 250-250-65 mg per tablet Take 1 tablet by mouth every 6 (six) hours as needed for Pain.      biotin 10,000 mcg Cap Take 10,000 mcg by mouth once daily.      buPROPion (WELLBUTRIN SR) 150 MG TBSR 12 hr tablet Take 1 tablet (150 mg total) by mouth 2 (two) times daily. 180  tablet 3    cetirizine (ZYRTEC) 10 MG tablet Take 1 tablet (10 mg total) by mouth once daily. 30 tablet 1    cyclobenzaprine (FLEXERIL) 5 MG tablet Take 2 tablets (10 mg total) by mouth 2 (two) times daily as needed for Muscle spasms. 60 tablet 2    DULoxetine (CYMBALTA) 30 MG capsule Take 1 capsule (30 mg total) by mouth once daily. 90 capsule 3    gabapentin (NEURONTIN) 300 MG capsule Take 1 capsule (300 mg total) by mouth 2 (two) times daily. 180 capsule 3    ibuprofen (ADVIL,MOTRIN) 600 MG tablet       pantoprazole (PROTONIX) 40 MG tablet Take 1 tablet (40 mg total) by mouth once daily. 90 tablet 3    potassium 99 mg Tab Take by mouth. 1 tablet Oral Every day      pramipexole (MIRAPEX) 0.5 MG tablet Take 1 tablet (0.5 mg total) by mouth daily as needed. 90 tablet 3    rOPINIRole (REQUIP) 1 MG tablet Take 1 tablet (1 mg total) by mouth every evening. 90 tablet 3    traMADol (ULTRAM) 50 mg tablet Take 1 tablet (50 mg total) by mouth every 12 (twelve) hours as needed for Pain. 60 tablet 0    carvedilol (COREG) 6.25 MG tablet Take 1 tablet (6.25 mg total) by mouth 2 (two) times daily with meals. 60 tablet 11    EPINEPHrine (EPIPEN) 0.3 mg/0.3 mL AtIn Inject 0.3 mLs (0.3 mg total) into the muscle once. 0.3 mL 4    hyoscyamine (ANASPAZ,LEVSIN) 0.125 mg Tab Take 1 tablet (125 mcg total) by mouth every 4 (four) hours as needed. 30 tablet 1    losartan-hydrochlorothiazide 100-12.5 mg (HYZAAR) 100-12.5 mg Tab Take 1 tablet by mouth once daily. 90 tablet 3    sodium,potassium,mag sulfates (SUPREP BOWEL PREP KIT) 17.5-3.13-1.6 gram SolR As directed 354 mL 0    zolpidem (AMBIEN) 5 MG Tab Take 1 tablet (5 mg total) by mouth nightly as needed. 21 tablet 3     Current Facility-Administered Medications   Medication Dose Route Frequency Provider Last Rate Last Dose    albuterol inhaler 2 puff  2 puff Inhalation Q6H PRN Arif Jones, MD        albuterol sulfate nebulizer solution 2.5 mg  2.5 mg Nebulization 1  time in Clinic/HOD Helena Jones MD           Medications Discontinued During This Encounter   Medication Reason    amLODIPine (NORVASC) 10 MG tablet Patient no longer taking    losartan-hydrochlorothiazide 50-12.5 mg (HYZAAR) 50-12.5 mg per tablet     cyclobenzaprine (FLEXERIL) 5 MG tablet Reorder    rOPINIRole (REQUIP) 1 MG tablet Reorder    gabapentin (NEURONTIN) 300 MG capsule Reorder    traMADol (ULTRAM) 50 mg tablet Reorder    buPROPion (WELLBUTRIN SR) 150 MG TBSR 12 hr tablet Reorder       No Follow-up on file.      Helena Jones MD

## 2018-09-30 PROBLEM — Z12.11 SPECIAL SCREENING FOR MALIGNANT NEOPLASMS, COLON: Status: ACTIVE | Noted: 2018-09-30

## 2018-09-30 RX ORDER — SODIUM CHLORIDE, SODIUM LACTATE, POTASSIUM CHLORIDE, CALCIUM CHLORIDE 600; 310; 30; 20 MG/100ML; MG/100ML; MG/100ML; MG/100ML
INJECTION, SOLUTION INTRAVENOUS CONTINUOUS
Status: CANCELLED | OUTPATIENT
Start: 2018-09-30

## 2018-10-01 ENCOUNTER — PATIENT MESSAGE (OUTPATIENT)
Dept: GASTROENTEROLOGY | Facility: HOSPITAL | Age: 63
End: 2018-10-01

## 2018-10-01 ENCOUNTER — ANESTHESIA (OUTPATIENT)
Dept: ENDOSCOPY | Facility: HOSPITAL | Age: 63
End: 2018-10-01
Payer: COMMERCIAL

## 2018-10-01 ENCOUNTER — HOSPITAL ENCOUNTER (OUTPATIENT)
Facility: HOSPITAL | Age: 63
Discharge: HOME | End: 2018-10-01
Attending: INTERNAL MEDICINE | Admitting: INTERNAL MEDICINE
Payer: COMMERCIAL

## 2018-10-01 ENCOUNTER — ANESTHESIA EVENT (OUTPATIENT)
Dept: ENDOSCOPY | Facility: HOSPITAL | Age: 63
End: 2018-10-01
Payer: COMMERCIAL

## 2018-10-01 VITALS
OXYGEN SATURATION: 99 % | BODY MASS INDEX: 27.15 KG/M2 | HEIGHT: 67 IN | TEMPERATURE: 98 F | WEIGHT: 173 LBS | RESPIRATION RATE: 17 BRPM | DIASTOLIC BLOOD PRESSURE: 68 MMHG | SYSTOLIC BLOOD PRESSURE: 138 MMHG | HEART RATE: 72 BPM

## 2018-10-01 DIAGNOSIS — K21.9 GERD (GASTROESOPHAGEAL REFLUX DISEASE): Primary | ICD-10-CM

## 2018-10-01 DIAGNOSIS — K57.30 DIVERTICULOSIS OF LARGE INTESTINE WITHOUT HEMORRHAGE: ICD-10-CM

## 2018-10-01 DIAGNOSIS — Z12.11 SPECIAL SCREENING FOR MALIGNANT NEOPLASMS, COLON: ICD-10-CM

## 2018-10-01 DIAGNOSIS — D12.4 BENIGN NEOPLASM OF DESCENDING COLON: ICD-10-CM

## 2018-10-01 DIAGNOSIS — R00.0 TACHYCARDIA: ICD-10-CM

## 2018-10-01 LAB — POCT GLUCOSE: 121 MG/DL (ref 70–110)

## 2018-10-01 PROCEDURE — 45385 COLONOSCOPY W/LESION REMOVAL: CPT | Mod: 33,,, | Performed by: INTERNAL MEDICINE

## 2018-10-01 PROCEDURE — 43235 EGD DIAGNOSTIC BRUSH WASH: CPT | Performed by: INTERNAL MEDICINE

## 2018-10-01 PROCEDURE — 45385 COLONOSCOPY W/LESION REMOVAL: CPT | Performed by: INTERNAL MEDICINE

## 2018-10-01 PROCEDURE — 37000009 HC ANESTHESIA EA ADD 15 MINS: Performed by: INTERNAL MEDICINE

## 2018-10-01 PROCEDURE — 43235 EGD DIAGNOSTIC BRUSH WASH: CPT | Mod: 51,,, | Performed by: INTERNAL MEDICINE

## 2018-10-01 PROCEDURE — 27201089 HC SNARE, DISP (ANY): Performed by: INTERNAL MEDICINE

## 2018-10-01 PROCEDURE — 63600175 PHARM REV CODE 636 W HCPCS: Performed by: NURSE ANESTHETIST, CERTIFIED REGISTERED

## 2018-10-01 PROCEDURE — 88305 TISSUE EXAM BY PATHOLOGIST: CPT | Mod: 26,,, | Performed by: PATHOLOGY

## 2018-10-01 PROCEDURE — 25000003 PHARM REV CODE 250: Performed by: INTERNAL MEDICINE

## 2018-10-01 PROCEDURE — 93010 ELECTROCARDIOGRAM REPORT: CPT | Mod: ,,, | Performed by: INTERNAL MEDICINE

## 2018-10-01 PROCEDURE — 25000003 PHARM REV CODE 250: Performed by: NURSE ANESTHETIST, CERTIFIED REGISTERED

## 2018-10-01 PROCEDURE — 37000008 HC ANESTHESIA 1ST 15 MINUTES: Performed by: INTERNAL MEDICINE

## 2018-10-01 PROCEDURE — 88305 TISSUE EXAM BY PATHOLOGIST: CPT | Performed by: PATHOLOGY

## 2018-10-01 RX ORDER — SODIUM CHLORIDE, SODIUM LACTATE, POTASSIUM CHLORIDE, CALCIUM CHLORIDE 600; 310; 30; 20 MG/100ML; MG/100ML; MG/100ML; MG/100ML
INJECTION, SOLUTION INTRAVENOUS CONTINUOUS
Status: ACTIVE | OUTPATIENT
Start: 2018-10-01

## 2018-10-01 RX ORDER — PROPOFOL 10 MG/ML
VIAL (ML) INTRAVENOUS
Status: DISCONTINUED | OUTPATIENT
Start: 2018-10-01 | End: 2018-10-01

## 2018-10-01 RX ORDER — LIDOCAINE HYDROCHLORIDE 10 MG/ML
INJECTION INFILTRATION; PERINEURAL
Status: DISCONTINUED | OUTPATIENT
Start: 2018-10-01 | End: 2018-10-01

## 2018-10-01 RX ORDER — SODIUM CHLORIDE 0.9 % (FLUSH) 0.9 %
3 SYRINGE (ML) INJECTION
Status: DISCONTINUED | OUTPATIENT
Start: 2018-10-01 | End: 2018-10-01 | Stop reason: HOSPADM

## 2018-10-01 RX ORDER — ACETAMINOPHEN 325 MG/1
650 TABLET ORAL EVERY 6 HOURS PRN
Status: DISCONTINUED | OUTPATIENT
Start: 2018-10-01 | End: 2018-10-01 | Stop reason: HOSPADM

## 2018-10-01 RX ADMIN — ACETAMINOPHEN 650 MG: 325 TABLET ORAL at 12:10

## 2018-10-01 RX ADMIN — PROPOFOL 30 MG: 10 INJECTION, EMULSION INTRAVENOUS at 11:10

## 2018-10-01 RX ADMIN — PROPOFOL 30 MG: 10 INJECTION, EMULSION INTRAVENOUS at 10:10

## 2018-10-01 RX ADMIN — SODIUM CHLORIDE, SODIUM LACTATE, POTASSIUM CHLORIDE, AND CALCIUM CHLORIDE: .6; .31; .03; .02 INJECTION, SOLUTION INTRAVENOUS at 10:10

## 2018-10-01 RX ADMIN — LIDOCAINE HYDROCHLORIDE 40 MG: 10 INJECTION, SOLUTION INFILTRATION; PERINEURAL at 10:10

## 2018-10-01 RX ADMIN — PROPOFOL 100 MG: 10 INJECTION, EMULSION INTRAVENOUS at 10:10

## 2018-10-01 NOTE — DISCHARGE SUMMARY
Endoscopy Discharge Summary      Admit Date: 10/1/2018    Discharge Date and Time:  10/1/2018 11:32 AM    Attending Physician: Johnson Bacon MD     Discharge Physician: Johnson Bacon MD     Principal Admitting Diagnoses: GERD (gastroesophageal reflux disease)         Discharge Diagnosis: The primary encounter diagnosis was GERD (gastroesophageal reflux disease). Diagnoses of Special screening for malignant neoplasms, colon, Tachycardia, Benign neoplasm of descending colon, and Diverticulosis of large intestine without hemorrhage were also pertinent to this visit.     Discharged Condition: Good    Indication for Admission: GERD (gastroesophageal reflux disease)     Hospital Course: Patient was admitted for an inpatient procedure and tolerated the procedure well with no complications.    Significant Diagnostic Studies: EGD & COLON    Pathology (if any):  Specimen (12h ago, onward)    Start     Ordered    10/01/18 1119  Specimen to Pathology - Surgery  Once     Comments:  #1 colon polyp     Start Status   10/01/18 1119 Collected (10/01/18 1123)       10/01/18 1122          Disposition: Home.    Bleeding: None    No Implants         Follow Up/Patient Instructions:   Current Discharge Medication List      CONTINUE these medications which have NOT CHANGED    Details   ascorbic acid, vitamin C, (VITAMIN C) 1000 MG tablet Take 1,000 mg by mouth once daily.      biotin 10,000 mcg Cap Take 10,000 mcg by mouth once daily.      buPROPion (WELLBUTRIN SR) 150 MG TBSR 12 hr tablet Take 1 tablet (150 mg total) by mouth 2 (two) times daily.  Qty: 180 tablet, Refills: 3      carvedilol (COREG) 6.25 MG tablet Take 1 tablet (6.25 mg total) by mouth 2 (two) times daily with meals.  Qty: 60 tablet, Refills: 11      cyclobenzaprine (FLEXERIL) 5 MG tablet Take 2 tablets (10 mg total) by mouth 2 (two) times daily as needed for Muscle spasms.  Qty: 60 tablet, Refills: 2      DULoxetine (CYMBALTA) 30 MG capsule Take 1 capsule (30 mg total)  by mouth once daily.  Qty: 90 capsule, Refills: 3      gabapentin (NEURONTIN) 300 MG capsule Take 1 capsule (300 mg total) by mouth 2 (two) times daily.  Qty: 180 capsule, Refills: 3      losartan-hydrochlorothiazide 100-12.5 mg (HYZAAR) 100-12.5 mg Tab Take 1 tablet by mouth once daily.  Qty: 90 tablet, Refills: 3      pantoprazole (PROTONIX) 40 MG tablet Take 1 tablet (40 mg total) by mouth once daily.  Qty: 90 tablet, Refills: 3      potassium 99 mg Tab Take by mouth. 1 tablet Oral Every day      rOPINIRole (REQUIP) 1 MG tablet Take 1 tablet (1 mg total) by mouth every evening.  Qty: 90 tablet, Refills: 3      zolpidem (AMBIEN) 5 MG Tab Take 1 tablet (5 mg total) by mouth nightly as needed.  Qty: 21 tablet, Refills: 3      aspirin 325 MG tablet Take 325 mg by mouth once daily.      aspirin-acetaminophen-caffeine 250-250-65 mg (EXCEDRIN MIGRAINE) 250-250-65 mg per tablet Take 1 tablet by mouth every 6 (six) hours as needed for Pain.      EPINEPHrine (EPIPEN) 0.3 mg/0.3 mL AtIn Inject 0.3 mLs (0.3 mg total) into the muscle once.  Qty: 0.3 mL, Refills: 4      hyoscyamine (ANASPAZ,LEVSIN) 0.125 mg Tab Take 1 tablet (125 mcg total) by mouth every 4 (four) hours as needed.  Qty: 30 tablet, Refills: 1      ibuprofen (ADVIL,MOTRIN) 600 MG tablet       traMADol (ULTRAM) 50 mg tablet Take 1 tablet (50 mg total) by mouth every 12 (twelve) hours as needed for Pain.  Qty: 60 tablet, Refills: 0             Discharge Procedure Orders   Diet general     Call MD for:  temperature >100.4     Call MD for:  persistent nausea and vomiting     Call MD for:  severe uncontrolled pain     Call MD for:  difficulty breathing, headache or visual disturbances     Call MD for:  redness, tenderness, or signs of infection (pain, swelling, redness, odor or green/yellow discharge around incision site)     Call MD for:  hives     Call MD for:  persistent dizziness or light-headedness     No dressing needed       Follow-up Information     Helena  MD Robert.    Specialty:  Family Medicine  Why:  As needed  Contact information:  [address] [address] [address] [phone]

## 2018-10-01 NOTE — ANESTHESIA POSTPROCEDURE EVALUATION
"Anesthesia Post Evaluation    Patient: Erin Soriano    Procedure(s) Performed: Procedure(s) (LRB):  COLONOSCOPY (N/A)  ESOPHAGOGASTRODUODENOSCOPY (EGD) (N/A)    Final Anesthesia Type: MAC  Patient location during evaluation: PACU  Patient participation: Yes- Able to Participate  Level of consciousness: awake and alert  Post-procedure vital signs: reviewed and stable  Pain management: adequate  Airway patency: patent  PONV status at discharge: No PONV  Anesthetic complications: no      Cardiovascular status: blood pressure returned to baseline  Respiratory status: unassisted  Hydration status: euvolemic  Follow-up not needed.        Visit Vitals  Ht 5' 6.5" (1.689 m)   Wt 78.5 kg (173 lb)   Breastfeeding? No   BMI 27.50 kg/m²       Pain/Rowena Score: Pain Assessment Performed: Yes (10/1/2018 10:42 AM)  Presence of Pain: complains of pain/discomfort (10/1/2018 10:42 AM)        "

## 2018-10-01 NOTE — ANESTHESIA RELEASE NOTE
"Anesthesia Release from PACU Note    Patient: Erin Soriano    Procedure(s) Performed: Procedure(s) (LRB):  COLONOSCOPY (N/A)  ESOPHAGOGASTRODUODENOSCOPY (EGD) (N/A)    Anesthesia type: MAC    Post pain: Adequate analgesia    Post assessment: no apparent anesthetic complications    Last Vitals:   Visit Vitals  Ht 5' 6.5" (1.689 m)   Wt 78.5 kg (173 lb)   Breastfeeding? No   BMI 27.50 kg/m²       Post vital signs: stable    Level of consciousness: awake    Nausea/Vomiting: no nausea/no vomiting    Complications: none    Airway Patency: patent    Respiratory: unassisted    Cardiovascular: stable and blood pressure at baseline    Hydration: euvolemic  "

## 2018-10-01 NOTE — OR NURSING
Family updated on pt status via status board. Final time out, anesthesia agrees pt adequately sedated for procedure. See anesthesia record for meds/vitals/fluid documentation

## 2018-10-01 NOTE — ANESTHESIA PREPROCEDURE EVALUATION
10/01/2018  Erin Soriano is a 63 y.o., female.    Anesthesia Evaluation    I have reviewed the Patient Summary Reports.    I have reviewed the Nursing Notes.   I have reviewed the Medications.     Review of Systems  Anesthesia Hx:  No problems with previous Anesthesia    Social:  Non-Smoker    Hematology/Oncology:  Hematology Normal   Oncology Normal     EENT/Dental:EENT/Dental Normal   Cardiovascular:   Hypertension, well controlled hyperlipidemia    Pulmonary:   Asthma    Renal/:  Renal/ Normal     Hepatic/GI:   Bowel Prep. GERD, poorly controlled    Musculoskeletal:  Musculoskeletal Normal  Muscle Disorders: Fibromyalgia    Neurological:   Neuromuscular Disease,    Endocrine:  Endocrine Normal    Dermatological:  Skin Normal    Psych:   Psychiatric History anxiety depression          Physical Exam  General:  Well nourished    Airway/Jaw/Neck:  Airway Findings: Mouth Opening: Normal Tongue: Normal       Chest/Lungs:  Chest/Lungs Findings: Clear to auscultation     Heart/Vascular:  Heart Findings: Rate: Normal             Anesthesia Plan  Type of Anesthesia, risks & benefits discussed:  Anesthesia Type:  MAC  Patient's Preference:   Intra-op Monitoring Plan: standard ASA monitors  Intra-op Monitoring Plan Comments:   Post Op Pain Control Plan:   Post Op Pain Control Plan Comments:   Induction:   IV  Beta Blocker:  Patient is not currently on a Beta-Blocker (No further documentation required).       Informed Consent: Patient understands risks and agrees with Anesthesia plan.  Questions answered. Anesthesia consent signed with patient.  ASA Score: 2     Day of Surgery Review of History & Physical: I have interviewed and examined the patient. I have reviewed the patient's H&P dated:  There are no significant changes.          Ready For Surgery From Anesthesia Perspective.

## 2018-10-01 NOTE — PLAN OF CARE
Dr Bacon came to bedside and discussed findings. NO N/V,  no abdominal pain, no GI bleeding, and vitals stable.  Pt discharged from unit.

## 2018-10-01 NOTE — TRANSFER OF CARE
"Anesthesia Transfer of Care Note    Patient: Erin Soriano    Procedure(s) Performed: Procedure(s) (LRB):  COLONOSCOPY (N/A)  ESOPHAGOGASTRODUODENOSCOPY (EGD) (N/A)    Patient location: PACU    Anesthesia Type: MAC    Transport from OR: Transported from OR on room air with adequate spontaneous ventilation    Post pain: adequate analgesia    Post assessment: no apparent anesthetic complications    Post vital signs: stable    Level of consciousness: awake    Nausea/Vomiting: no nausea/vomiting    Complications: none    Transfer of care protocol was followed      Last vitals:   Visit Vitals  Ht 5' 6.5" (1.689 m)   Wt 78.5 kg (173 lb)   Breastfeeding? No   BMI 27.50 kg/m²     "

## 2018-10-01 NOTE — [employer] PATIENT INSTRUCTIONS
Discharge Summary/Instructions after an Endoscopic Procedure  Patient Name: Erin Soriano  Patient MRN: [id number]  Patient YOB: 1955 Monday, October 01, 2018 Johnson Bacon MD  RESTRICTIONS:  During your procedure today, you received medications for sedation.  These   medications may affect your judgment, balance and coordination.  Therefore,   for 24 hours, you have the following restrictions:   - DO NOT drive a car, operate machinery, make legal/financial decisions,   sign important papers or drink alcohol.    ACTIVITY:  Today: no heavy lifting, straining or running due to procedural   sedation/anesthesia.  The following day: return to full activity including work.  DIET:  Eat and drink normally unless instructed otherwise.     TREATMENT FOR COMMON SIDE EFFECTS:  - Mild abdominal pain, nausea, belching, bloating or excessive gas:  rest,   eat lightly and use a heating pad.  - Sore Throat: treat with throat lozenges and/or gargle with warm salt   water.  - Because air was used during the procedure, expelling large amounts of air   from your rectum or belching is normal.  - If a bowel prep was taken, you may not have a bowel movement for 1-3 days.    This is normal.  SYMPTOMS TO WATCH FOR AND REPORT TO YOUR PHYSICIAN:  1. Abdominal pain or bloating, other than gas cramps.  2. Chest pain.  3. Back pain.  4. Signs of infection such as: chills or fever occurring within 24 hours   after the procedure.  5. Rectal bleeding, which would show as bright red, maroon, or black stools.   (A tablespoon of blood from the rectum is not serious, especially if   hemorrhoids are present.)  6. Vomiting.  7. Weakness or dizziness.  GO DIRECTLY TO THE NEAREST EMERGENCY ROOM IF YOU HAVE ANY OF THE FOLLOWING:      Difficulty breathing              Chills and/or fever over 101 F   Persistent vomiting and/or vomiting blood   Severe abdominal pain   Severe chest pain   Black, tarry stools   Bleeding- more than one  tablespoon   Any other symptom or condition that you feel may need urgent attention  Your doctor recommends these additional instructions:  If any biopsies were taken, your doctors clinic will contact you in 1 to 2   weeks with any results.  - The patient will be observed post-procedure, until all discharge criteria   are met.   - Discharge patient to home (ambulatory).   - Patient has a contact number available for emergencies.  The signs and   symptoms of potential delayed complications were discussed with the   patient.  Return to normal activities tomorrow.  Written discharge   instructions were provided to the patient.   - Resume previous diet.   - Continue present medications.   - No repeat upper endoscopy.   - Return to referring physician as previously scheduled.   - Follow an antireflux regimen daily.  For questions, problems or results please call your physician Johnson Bacon MD at Work:  [phone]  If you have any questions about the above instructions, call the GI   department at [phone] or call the endoscopy unit at [phone]   from 7am until 3 pm.  [facility], EMERGENCY ROOM PHONE NUMBER:   [phone]  IF A COMPLICATION OR EMERGENCY SITUATION ARISES AND YOU ARE UNABLE TO REACH   YOUR PHYSICIAN - GO DIRECTLY TO THE EMERGENCY ROOM.  I have read or have had read to me these discharge instructions for my   procedure and have received a written copy.  I understand these   instructions and will follow-up with my physician if I have any questions.     __________________________________       _____________________________________  Nurse Signature                                          Patient/Designated   Responsible Party Signature  Johnson Bacon MD  10/1/2018 11:26:34 AM  This report has been verified and signed electronically.  [employer]

## 2018-10-01 NOTE — PROVATION PATIENT INSTRUCTIONS
Discharge Summary/Instructions after an Endoscopic Procedure  Patient Name: Erin Soriano  Patient MRN: [id number]  Patient YOB: 1955 Monday, October 01, 2018 Johnson Bacon MD  RESTRICTIONS:  During your procedure today, you received medications for sedation.  These   medications may affect your judgment, balance and coordination.  Therefore,   for 24 hours, you have the following restrictions:   - DO NOT drive a car, operate machinery, make legal/financial decisions,   sign important papers or drink alcohol.    ACTIVITY:  Today: no heavy lifting, straining or running due to procedural   sedation/anesthesia.  The following day: return to full activity including work.  DIET:  Eat and drink normally unless instructed otherwise.     TREATMENT FOR COMMON SIDE EFFECTS:  - Mild abdominal pain, nausea, belching, bloating or excessive gas:  rest,   eat lightly and use a heating pad.  - Sore Throat: treat with throat lozenges and/or gargle with warm salt   water.  - Because air was used during the procedure, expelling large amounts of air   from your rectum or belching is normal.  - If a bowel prep was taken, you may not have a bowel movement for 1-3 days.    This is normal.  SYMPTOMS TO WATCH FOR AND REPORT TO YOUR PHYSICIAN:  1. Abdominal pain or bloating, other than gas cramps.  2. Chest pain.  3. Back pain.  4. Signs of infection such as: chills or fever occurring within 24 hours   after the procedure.  5. Rectal bleeding, which would show as bright red, maroon, or black stools.   (A tablespoon of blood from the rectum is not serious, especially if   hemorrhoids are present.)  6. Vomiting.  7. Weakness or dizziness.  GO DIRECTLY TO THE NEAREST EMERGENCY ROOM IF YOU HAVE ANY OF THE FOLLOWING:      Difficulty breathing              Chills and/or fever over 101 F   Persistent vomiting and/or vomiting blood   Severe abdominal pain   Severe chest pain   Black, tarry stools   Bleeding- more than one  tablespoon   Any other symptom or condition that you feel may need urgent attention  Your doctor recommends these additional instructions:  If any biopsies were taken, your doctors clinic will contact you in 1 to 2   weeks with any results.  - The patient will be observed post-procedure, until all discharge criteria   are met.   - Discharge patient to home (ambulatory).   - Patient has a contact number available for emergencies.  The signs and   symptoms of potential delayed complications were discussed with the   patient.  Return to normal activities tomorrow.  Written discharge   instructions were provided to the patient.   - Resume previous diet.   - Continue present medications.   - Await pathology results.   - Repeat colonoscopy in 5 years for adenoma surveillance.   - Return to referring physician as previously scheduled.  For questions, problems or results please call your physician Johnson Bacon MD at Work:  [phone]  If you have any questions about the above instructions, call the GI   department at [phone] or call the endoscopy unit at [phone]   from 7am until 3 pm.  [facility], EMERGENCY ROOM PHONE NUMBER:   [phone]  IF A COMPLICATION OR EMERGENCY SITUATION ARISES AND YOU ARE UNABLE TO REACH   YOUR PHYSICIAN - GO DIRECTLY TO THE EMERGENCY ROOM.  I have read or have had read to me these discharge instructions for my   procedure and have received a written copy.  I understand these   instructions and will follow-up with my physician if I have any questions.     __________________________________       _____________________________________  Nurse Signature                                          Patient/Designated   Responsible Party Signature  Johnson Bacon MD  10/1/2018 11:29:57 AM  This report has been verified and signed electronically.  [employer]ATION

## 2018-10-01 NOTE — DISCHARGE INSTRUCTIONS
Dear Erin Soriano      If you develop fevers, severe abdominal pain, significant bleeding, nausea or vomiting, please contact us or come to our Emergency Department at [facility].  Our  contact number is [phone] available for emergencies or any question that you may have.      You may return to normal activities tomorrow.  Written discharge instructions were provided to you today and have them handy since you may not remember our conversation today.       If you take Aspirin, please resume taking it at your prior dose today. If we obtained biopsies or removed polyps during your procedure, we will contact you within 5 to 6 days with the results.      Thanks for trusting us with your healthcare needs.      Sincerely,     Johnson Bacon M.D.        To rate your experience with Dr. Bacon, please click on the link below     [email or web address]

## 2018-10-01 NOTE — INTERVAL H&P NOTE
The patient has been examined and the H&P has been reviewed:    I concur with the findings and no changes have occurred since H&P was written.    Anesthesia/Surgery risks, benefits and alternative options discussed and understood by patient/family.          Active Hospital Problems    Diagnosis  POA    *GERD (gastroesophageal reflux disease) [K21.9]  Yes    Special screening for malignant neoplasms, colon [Z12.11]  Not Applicable      Resolved Hospital Problems   No resolved problems to display.

## 2018-10-05 ENCOUNTER — PATIENT MESSAGE (OUTPATIENT)
Dept: FAMILY MEDICINE | Facility: CLINIC | Age: 63
End: 2018-10-05

## 2018-10-06 ENCOUNTER — PATIENT MESSAGE (OUTPATIENT)
Dept: GASTROENTEROLOGY | Facility: HOSPITAL | Age: 63
End: 2018-10-06

## 2018-10-06 NOTE — TELEPHONE ENCOUNTER
FINAL PATHOLOGIC DIAGNOSIS  Colon polyp:  Juvenile (retention) polyp.    Biopsy results sent to the patient.    phani

## 2018-10-28 PROBLEM — I10 ESSENTIAL HYPERTENSION: Status: ACTIVE | Noted: 2018-10-28

## 2018-10-30 ENCOUNTER — OFFICE VISIT (OUTPATIENT)
Dept: CARDIOLOGY | Facility: CLINIC | Age: 63
End: 2018-10-30
Payer: COMMERCIAL

## 2018-10-30 VITALS
SYSTOLIC BLOOD PRESSURE: 148 MMHG | DIASTOLIC BLOOD PRESSURE: 76 MMHG | HEART RATE: 88 BPM | WEIGHT: 183.19 LBS | HEIGHT: 67 IN | BODY MASS INDEX: 28.75 KG/M2

## 2018-10-30 DIAGNOSIS — I10 ESSENTIAL HYPERTENSION: ICD-10-CM

## 2018-10-30 DIAGNOSIS — E78.2 MIXED HYPERLIPIDEMIA: ICD-10-CM

## 2018-10-30 DIAGNOSIS — R07.89 OTHER CHEST PAIN: Primary | ICD-10-CM

## 2018-10-30 DIAGNOSIS — R07.9 CHEST PAIN, UNSPECIFIED TYPE: ICD-10-CM

## 2018-10-30 DIAGNOSIS — I48.0 PAF (PAROXYSMAL ATRIAL FIBRILLATION): ICD-10-CM

## 2018-10-30 DIAGNOSIS — Z78.9 STATIN INTOLERANCE: ICD-10-CM

## 2018-10-30 PROCEDURE — 99999 PR PBB SHADOW E&M-EST. PATIENT-LVL III: CPT | Mod: PBBFAC,,, | Performed by: INTERNAL MEDICINE

## 2018-10-30 PROCEDURE — 99204 OFFICE O/P NEW MOD 45 MIN: CPT | Mod: S$GLB,,, | Performed by: INTERNAL MEDICINE

## 2018-10-30 RX ORDER — PRAMIPEXOLE DIHYDROCHLORIDE 0.5 MG/1
0.5 TABLET ORAL DAILY PRN
COMMUNITY
End: 2019-11-07

## 2018-10-30 RX ORDER — CARVEDILOL 6.25 MG/1
6.25 TABLET ORAL 2 TIMES DAILY WITH MEALS
Qty: 60 TABLET | Refills: 11
Start: 2018-10-30 | End: 2018-11-13 | Stop reason: SDUPTHER

## 2018-10-30 NOTE — PROGRESS NOTES
Subjective:   Patient ID:  Erin Soriano is a 63 y.o. female who presents for evaluation of Establish Care; Chest Pain; Arm Pain; Palpitations; Shortness of Breath; Excessive Sweating; and Leg Swelling      64 yo female referred for chest pain. Physical work at [employer].  PMH HTN, HLD, GERD, anxiety and PAF. Fibromyalgia.  Burning chest pain over a yr, up to throat occurred once a week while walking fast or exertion. With dyspnea, weakness, sweating and clammy. Could last for few minutes and resolved after rest.no nocturnal episodes. Progressively worse of symptoms.  EGD and colonoscopy done on 10/01/2018 normal.  Pt was told having PAF during the EGD.  Pt is on Coreg on once a day  Leg swelling resovled after changed med by PCP  No smoking/drinking  Mother has afib. Father CHF            Past Medical History:   Diagnosis Date    Anxiety     Arthritis     Asthma     Depression     Hyperlipidemia     Hypertension     RLS (restless legs syndrome)        Past Surgical History:   Procedure Laterality Date    ADENOIDECTOMY      pt was 3 yrs old    carpel tunnel      2009     SECTION      2 different ones    COLONOSCOPY N/A 10/1/2018    Procedure: COLONOSCOPY;  Surgeon: Johnson Bacon MD;  Location: [facility];  Service: Endoscopy;  Laterality: N/A;    COLONOSCOPY N/A 10/1/2018    Performed by Johnson Bacon MD at [facility]    ESOPHAGOGASTRODUODENOSCOPY N/A 10/1/2018    Procedure: ESOPHAGOGASTRODUODENOSCOPY (EGD);  Surgeon: Johnson Bacon MD;  Location: [facility];  Service: Endoscopy;  Laterality: N/A;    ESOPHAGOGASTRODUODENOSCOPY (EGD) N/A 10/1/2018    Performed by Johnson Bacon MD at [facility]    golfers elbow      2009    HYSTERECTOMY      1994 total    OOPHORECTOMY      TONSILLECTOMY      pt was 3 yrs old at time       Social History     Tobacco Use    Smoking status: Former Smoker     Last attempt to quit: 6/10/2012     Years since quittin.3    Smokeless tobacco: Never Used     Tobacco comment: Would smoke 1 cigarette every few months   Substance Use Topics    Alcohol use: Yes     Alcohol/week: 0.6 oz     Types: 1 Standard drinks or equivalent per week     Comment: social    Drug use: No       Family History   Problem Relation Age of Onset    Hypertension Mother     Clotting disorder Mother     Hemochromatosis Mother     Hypertension Father     Cancer Maternal Grandmother     Clotting disorder Maternal Grandmother     Cancer Maternal Grandfather         prostate    Clotting disorder Maternal Grandfather        Review of Systems   Constitution: Negative for decreased appetite, diaphoresis, fever, weakness, malaise/fatigue and night sweats.   HENT: Negative for nosebleeds.    Eyes: Negative for blurred vision and double vision.   Cardiovascular: Positive for chest pain and dyspnea on exertion. Negative for claudication, irregular heartbeat, leg swelling, near-syncope, orthopnea, palpitations, paroxysmal nocturnal dyspnea and syncope.   Respiratory: Negative for cough, shortness of breath, sleep disturbances due to breathing, snoring, sputum production and wheezing.    Endocrine: Negative for cold intolerance and polyuria.   Hematologic/Lymphatic: Does not bruise/bleed easily.   Skin: Negative for rash.   Musculoskeletal: Positive for arthritis and back pain. Negative for falls, joint pain, joint swelling and neck pain.   Gastrointestinal: Negative for abdominal pain, heartburn, nausea and vomiting.   Genitourinary: Negative for dysuria, frequency and hematuria.   Neurological: Negative for difficulty with concentration, dizziness, focal weakness, headaches, light-headedness, numbness and seizures.   Psychiatric/Behavioral: Negative for depression, memory loss and substance abuse. The patient does not have insomnia.    Allergic/Immunologic: Negative for HIV exposure and hives.       Objective:   Physical Exam   Constitutional: She is oriented to person, place, and time. She appears  well-nourished.   HENT:   Head: Normocephalic.   Eyes: Pupils are equal, round, and reactive to light.   Neck: Normal carotid pulses and no JVD present. Carotid bruit is not present. No thyromegaly present.   Cardiovascular: Normal rate, regular rhythm, normal heart sounds and normal pulses.  No extrasystoles are present. PMI is not displaced. Exam reveals no gallop and no S3.   No murmur heard.  Pulmonary/Chest: Breath sounds normal. No stridor. No respiratory distress.   Abdominal: Soft. Bowel sounds are normal. There is no tenderness. There is no rebound.   Musculoskeletal: Normal range of motion.   Neurological: She is alert and oriented to person, place, and time.   Skin: Skin is intact. No rash noted.   Psychiatric: Her behavior is normal.       Lab Results   Component Value Date    CHOL 197 02/07/2018    CHOL 209 (H) 05/11/2016    CHOL 179 06/06/2013     Lab Results   Component Value Date    HDL 39 (L) 02/07/2018    HDL 40 05/11/2016    HDL 38 (L) 06/06/2013     Lab Results   Component Value Date    LDLCALC 122.4 02/07/2018    LDLCALC 134.4 05/11/2016    LDLCALC 111.0 06/06/2013     Lab Results   Component Value Date    TRIG 178 (H) 02/07/2018    TRIG 173 (H) 05/11/2016    TRIG 149 06/06/2013     Lab Results   Component Value Date    CHOLHDL 19.8 (L) 02/07/2018    CHOLHDL 19.1 (L) 05/11/2016    CHOLHDL 21.2 06/06/2013       Chemistry        Component Value Date/Time     06/27/2018 1456    K 3.9 06/27/2018 1456     06/27/2018 1456    CO2 29 06/27/2018 1456    BUN 12 06/27/2018 1456    CREATININE 0.9 06/27/2018 1456     (H) 06/27/2018 1456        Component Value Date/Time    CALCIUM 9.7 06/27/2018 1456    ALKPHOS 140 (H) 06/27/2018 1456    AST 29 06/27/2018 1456    ALT 33 06/27/2018 1456    BILITOT 0.5 06/27/2018 1456    ESTGFRAFRICA >60.0 06/27/2018 1456    EGFRNONAA >60.0 06/27/2018 1456          Lab Results   Component Value Date    HGBA1C 5.7 (H) 02/07/2018     Lab Results   Component  Value Date    TSH 1.222 05/16/2012     Lab Results   Component Value Date    INR 1.0 09/20/2010     Lab Results   Component Value Date    WBC 9.21 02/07/2018    HGB 13.8 02/07/2018    HCT 40.4 02/07/2018    MCV 79 (L) 02/07/2018     02/07/2018     BNP  @LABRCNTIP(BNP,BNPTRIAGEBLO)@  CrCl cannot be calculated (Patient's most recent lab result is older than the maximum 7 days allowed.).  No results found in the last 24 hours.  No results found in the last 24 hours.  No results found in the last 24 hours.    Assessment:      1. Other chest pain    2. Essential hypertension    3. Mixed hyperlipidemia    4. Statin intolerance    5. PAF (paroxysmal atrial fibrillation)    6. Chest pain, unspecified type      Burning chest pain.   PAF  BP high     Plan:   Other chest pain  -     2D echo with color flow doppler; Future  -     NM Multi Tread Stress Cardiac Component; Future  -     TSH; Future; Expected date: 10/30/2018    ZIOPACTH for Aif Dx ad burden  Increase Coreg from 6.25 mg daily to bid  Continue ASA and Hyzaar  F/u with pcp

## 2018-11-13 ENCOUNTER — OFFICE VISIT (OUTPATIENT)
Dept: FAMILY MEDICINE | Facility: CLINIC | Age: 63
End: 2018-11-13
Payer: COMMERCIAL

## 2018-11-13 VITALS
HEART RATE: 95 BPM | DIASTOLIC BLOOD PRESSURE: 70 MMHG | SYSTOLIC BLOOD PRESSURE: 138 MMHG | WEIGHT: 187.94 LBS | OXYGEN SATURATION: 96 % | HEIGHT: 66 IN | BODY MASS INDEX: 30.2 KG/M2 | TEMPERATURE: 98 F

## 2018-11-13 DIAGNOSIS — F32.A DEPRESSION, UNSPECIFIED DEPRESSION TYPE: ICD-10-CM

## 2018-11-13 DIAGNOSIS — F51.04 CHRONIC INSOMNIA: ICD-10-CM

## 2018-11-13 DIAGNOSIS — I10 ESSENTIAL HYPERTENSION: Primary | ICD-10-CM

## 2018-11-13 DIAGNOSIS — G25.81 RLS (RESTLESS LEGS SYNDROME): ICD-10-CM

## 2018-11-13 PROCEDURE — 3078F DIAST BP <80 MM HG: CPT | Mod: CPTII,S$GLB,, | Performed by: FAMILY MEDICINE

## 2018-11-13 PROCEDURE — 90471 IMMUNIZATION ADMIN: CPT | Mod: S$GLB,,, | Performed by: FAMILY MEDICINE

## 2018-11-13 PROCEDURE — 90686 IIV4 VACC NO PRSV 0.5 ML IM: CPT | Mod: S$GLB,,, | Performed by: FAMILY MEDICINE

## 2018-11-13 PROCEDURE — 99999 PR PBB SHADOW E&M-EST. PATIENT-LVL V: CPT | Mod: PBBFAC,,, | Performed by: FAMILY MEDICINE

## 2018-11-13 PROCEDURE — 3008F BODY MASS INDEX DOCD: CPT | Mod: CPTII,S$GLB,, | Performed by: FAMILY MEDICINE

## 2018-11-13 PROCEDURE — 3075F SYST BP GE 130 - 139MM HG: CPT | Mod: CPTII,S$GLB,, | Performed by: FAMILY MEDICINE

## 2018-11-13 PROCEDURE — 99214 OFFICE O/P EST MOD 30 MIN: CPT | Mod: 25,S$GLB,, | Performed by: FAMILY MEDICINE

## 2018-11-13 RX ORDER — CARVEDILOL 6.25 MG/1
6.25 TABLET ORAL 2 TIMES DAILY WITH MEALS
Qty: 180 TABLET | Refills: 3 | Status: SHIPPED | OUTPATIENT
Start: 2018-11-13 | End: 2019-10-23 | Stop reason: SDUPTHER

## 2018-11-13 RX ORDER — BUPROPION HYDROCHLORIDE 150 MG/1
150 TABLET, EXTENDED RELEASE ORAL 2 TIMES DAILY
Qty: 180 TABLET | Refills: 3 | Status: SHIPPED | OUTPATIENT
Start: 2018-11-13 | End: 2019-11-07 | Stop reason: SDUPTHER

## 2018-11-13 RX ORDER — TRAMADOL HYDROCHLORIDE 50 MG/1
50 TABLET ORAL EVERY 12 HOURS PRN
Qty: 60 TABLET | Refills: 0 | Status: SHIPPED | OUTPATIENT
Start: 2018-11-13 | End: 2019-02-12 | Stop reason: SDUPTHER

## 2018-11-13 RX ORDER — ROPINIROLE 1 MG/1
1 TABLET, FILM COATED ORAL NIGHTLY
Qty: 90 TABLET | Refills: 3 | Status: SHIPPED | OUTPATIENT
Start: 2018-11-13 | End: 2019-11-03 | Stop reason: SDUPTHER

## 2018-11-13 RX ORDER — LOSARTAN POTASSIUM AND HYDROCHLOROTHIAZIDE 12.5; 1 MG/1; MG/1
1 TABLET ORAL DAILY
Qty: 90 TABLET | Refills: 3 | Status: SHIPPED | OUTPATIENT
Start: 2018-11-13 | End: 2019-11-07 | Stop reason: SDUPTHER

## 2018-11-13 NOTE — PROGRESS NOTES
Chief Complaint:    Chief Complaint   Patient presents with    Medication Refill       History of Present Illness:    She presents today for six-month follow-up chronic medical problems,  She says her blood pressure has come down and is running okay.  Depression is doing okay  Taking Ambien for chronic insomnia      ROS:  Review of Systems   Constitutional: Negative for activity change, chills, fatigue, fever and unexpected weight change.   HENT: Negative for congestion, ear discharge, ear pain, hearing loss, postnasal drip and rhinorrhea.    Eyes: Negative for pain and visual disturbance.   Respiratory: Negative for cough, chest tightness and shortness of breath.    Cardiovascular: Negative for chest pain and palpitations.   Gastrointestinal: Negative for abdominal pain, diarrhea and vomiting.   Endocrine: Negative for heat intolerance.   Genitourinary: Negative for dysuria, flank pain, frequency and hematuria.   Musculoskeletal: Negative for back pain, gait problem and neck pain.   Skin: Negative for color change and rash.   Neurological: Negative for dizziness, tremors, seizures, numbness and headaches.   Psychiatric/Behavioral: Negative for agitation, hallucinations, self-injury, sleep disturbance and suicidal ideas. The patient is not nervous/anxious.        Past Medical History:   Diagnosis Date    Anxiety     Arthritis     Asthma     Depression     Hyperlipidemia     Hypertension     RLS (restless legs syndrome)        Social History:  Social History     Socioeconomic History    Marital status:      Spouse name: None    Number of children: 2    Years of education: None    Highest education level: None   Social Needs    Financial resource strain: None    Food insecurity - worry: None    Food insecurity - inability: None    Transportation needs - medical: None    Transportation needs - non-medical: None   Occupational History    None   Tobacco Use    Smoking status: Former Smoker      "Last attempt to quit: 6/10/2012     Years since quittin.4    Smokeless tobacco: Never Used    Tobacco comment: Would smoke 1 cigarette every few months   Substance and Sexual Activity    Alcohol use: Yes     Alcohol/week: 0.6 oz     Types: 1 Standard drinks or equivalent per week     Comment: social    Drug use: No    Sexual activity: Yes     Partners: Male     Birth control/protection: None   Other Topics Concern    None   Social History Narrative    None       Family History:   family history includes Cancer in her maternal grandfather and maternal grandmother; Clotting disorder in her maternal grandfather, maternal grandmother, and mother; Hemochromatosis in her mother; Hypertension in her father and mother.    Health Maintenance   Topic Date Due    Lipid Panel  2019    Mammogram  2019    Colonoscopy  10/01/2023    TETANUS VACCINE  2026    Hepatitis C Screening  Completed    Zoster Vaccine  Completed    Influenza Vaccine  Completed       Physical Exam:    Vital Signs  Temp: 98.4 °F (36.9 °C)  Temp src: Tympanic  Pulse: 95  SpO2: 96 %  BP: 138/70  BP Location: Left arm  Patient Position: Sitting  Pain Score:   5  Pain Loc: Back  Height and Weight  Height: 5' 6" (167.6 cm)  Weight: 85.2 kg (187 lb 15.1 oz)  BSA (Calculated - sq m): 1.99 sq meters  BMI (Calculated): 30.4  Weight in (lb) to have BMI = 25: 154.6]    Body mass index is 30.33 kg/m².    Physical Exam   Constitutional: She is oriented to person, place, and time. She appears well-developed.   HENT:   Mouth/Throat: Oropharynx is clear and moist.   Eyes: Conjunctivae are normal. Pupils are equal, round, and reactive to light.   Neck: Normal range of motion. Neck supple.   Cardiovascular: Normal rate, regular rhythm and normal heart sounds.   No murmur heard.  Pulmonary/Chest: Effort normal and breath sounds normal. No respiratory distress. She has no wheezes. She has no rales. She exhibits no tenderness.   Abdominal: Soft. " She exhibits no distension and no mass. There is no tenderness. There is no guarding.   Musculoskeletal: She exhibits no edema or tenderness.   Lymphadenopathy:     She has no cervical adenopathy.   Neurological: She is alert and oriented to person, place, and time. She has normal reflexes.   Skin: Skin is warm and dry.   Psychiatric: She has a normal mood and affect. Her behavior is normal. Judgment and thought content normal.         Assessment:      ICD-10-CM ICD-9-CM   1. Essential hypertension I10 401.9   2. Depression, unspecified depression type F32.9 311   3. RLS (restless legs syndrome) G25.81 333.94   4. Chronic insomnia F51.04 780.52         Plan:    Medical problems as above stable continue current meds and plan  Continue monitor home blood pressure readings  Follow-up 6 months      Orders Placed This Encounter   Procedures    Influenza - Quadrivalent (3 years & older) (PF)       Current Outpatient Medications   Medication Sig Dispense Refill    ascorbic acid, vitamin C, (VITAMIN C) 1000 MG tablet Take 1,000 mg by mouth once daily.      aspirin 325 MG tablet Take 325 mg by mouth once daily.      aspirin-acetaminophen-caffeine 250-250-65 mg (EXCEDRIN MIGRAINE) 250-250-65 mg per tablet Take 1 tablet by mouth every 6 (six) hours as needed for Pain.      biotin 10,000 mcg Cap Take 10,000 mcg by mouth once daily.      buPROPion (WELLBUTRIN SR) 150 MG TBSR 12 hr tablet Take 1 tablet (150 mg total) by mouth 2 (two) times daily. 180 tablet 3    carvedilol (COREG) 6.25 MG tablet Take 1 tablet (6.25 mg total) by mouth 2 (two) times daily with meals. 180 tablet 3    cyclobenzaprine (FLEXERIL) 5 MG tablet Take 2 tablets (10 mg total) by mouth 2 (two) times daily as needed for Muscle spasms. 60 tablet 2    DULoxetine (CYMBALTA) 30 MG capsule Take 1 capsule (30 mg total) by mouth once daily. 90 capsule 3    EPINEPHrine (EPIPEN) 0.3 mg/0.3 mL AtIn Inject 0.3 mLs (0.3 mg total) into the muscle once. 0.3 mL 4     gabapentin (NEURONTIN) 300 MG capsule Take 1 capsule (300 mg total) by mouth 2 (two) times daily. 180 capsule 3    hyoscyamine (ANASPAZ,LEVSIN) 0.125 mg Tab Take 1 tablet (125 mcg total) by mouth every 4 (four) hours as needed. 30 tablet 1    ibuprofen (ADVIL,MOTRIN) 600 MG tablet       losartan-hydrochlorothiazide 100-12.5 mg (HYZAAR) 100-12.5 mg Tab Take 1 tablet by mouth once daily. 90 tablet 3    pantoprazole (PROTONIX) 40 MG tablet Take 1 tablet (40 mg total) by mouth once daily. 90 tablet 3    potassium 99 mg Tab Take by mouth. 1 tablet Oral Every day      pramipexole (MIRAPEX) 0.5 MG tablet Take 0.5 mg by mouth daily as needed.      rOPINIRole (REQUIP) 1 MG tablet Take 1 tablet (1 mg total) by mouth every evening. 90 tablet 3    traMADol (ULTRAM) 50 mg tablet Take 1 tablet (50 mg total) by mouth every 12 (twelve) hours as needed for Pain. 60 tablet 0    zolpidem (AMBIEN) 5 MG Tab Take 1 tablet (5 mg total) by mouth nightly as needed. 21 tablet 3     Current Facility-Administered Medications   Medication Dose Route Frequency Provider Last Rate Last Dose    albuterol inhaler 2 puff  2 puff Inhalation Q6H PRN Helena Jones MD        albuterol sulfate nebulizer solution 2.5 mg  2.5 mg Nebulization 1 time in Clinic/HOD Helena Jones MD         Facility-Administered Medications Ordered in Other Visits   Medication Dose Route Frequency Provider Last Rate Last Dose    lactated ringers infusion   Intravenous Continuous Vanda Mathis MD   Stopped at 10/01/18 1140       Medications Discontinued During This Encounter   Medication Reason    buPROPion (WELLBUTRIN SR) 150 MG TBSR 12 hr tablet Reorder    losartan-hydrochlorothiazide 100-12.5 mg (HYZAAR) 100-12.5 mg Tab Reorder    rOPINIRole (REQUIP) 1 MG tablet Reorder    carvedilol (COREG) 6.25 MG tablet Reorder    traMADol (ULTRAM) 50 mg tablet Reorder       No Follow-up on file.      Helena Jones MD

## 2018-11-14 ENCOUNTER — TELEPHONE (OUTPATIENT)
Dept: CARDIOLOGY | Facility: CLINIC | Age: 63
End: 2018-11-14

## 2018-11-16 ENCOUNTER — TELEPHONE (OUTPATIENT)
Dept: CARDIOLOGY | Facility: CLINIC | Age: 63
End: 2018-11-16

## 2018-11-16 NOTE — TELEPHONE ENCOUNTER
Spoke with pt.  Peer to Peer completed for nm stress by Lizette BARRETT [phone].  Spoke with pt and rescheduled appointments.

## 2018-11-19 ENCOUNTER — TELEPHONE (OUTPATIENT)
Dept: CARDIOLOGY | Facility: CLINIC | Age: 63
End: 2018-11-19

## 2018-11-19 NOTE — TELEPHONE ENCOUNTER
Spoke with pt with dates on tests and approval from insurance.  Pt verbalized understanding.     ----- Message from Izabela Baumann sent at 11/19/2018  2:07 PM CST -----  Contact: patient  Calling regarding cost and questions about procedures. Please call patient @ [phone]. Thanks, ewelina

## 2018-12-10 ENCOUNTER — HOSPITAL ENCOUNTER (OUTPATIENT)
Dept: RADIOLOGY | Facility: HOSPITAL | Age: 63
Discharge: HOME | End: 2018-12-10
Attending: INTERNAL MEDICINE
Payer: COMMERCIAL

## 2018-12-10 ENCOUNTER — CLINICAL SUPPORT (OUTPATIENT)
Dept: CARDIOLOGY | Facility: CLINIC | Age: 63
End: 2018-12-10
Attending: INTERNAL MEDICINE
Payer: COMMERCIAL

## 2018-12-10 DIAGNOSIS — R07.89 OTHER CHEST PAIN: ICD-10-CM

## 2018-12-10 DIAGNOSIS — R07.9 CHEST PAIN, UNSPECIFIED TYPE: ICD-10-CM

## 2018-12-10 DIAGNOSIS — I48.0 PAF (PAROXYSMAL ATRIAL FIBRILLATION): ICD-10-CM

## 2018-12-10 LAB
DIASTOLIC DYSFUNCTION: NO
DIASTOLIC DYSFUNCTION: NO
ESTIMATED PA SYSTOLIC PRESSURE: 25.85
RETIRED EF AND QEF - SEE NOTES: 60 (ref 55–65)

## 2018-12-10 PROCEDURE — 78452 HT MUSCLE IMAGE SPECT MULT: CPT | Mod: TC,PO

## 2018-12-10 PROCEDURE — 0296T HOLTER MONITOR - 3-14 DAY ADULT: CPT | Mod: S$GLB,,, | Performed by: NUCLEAR MEDICINE

## 2018-12-10 PROCEDURE — 93015 CV STRESS TEST SUPVJ I&R: CPT | Mod: S$GLB,,, | Performed by: NUCLEAR MEDICINE

## 2018-12-10 PROCEDURE — 78452 HT MUSCLE IMAGE SPECT MULT: CPT | Mod: 26,,, | Performed by: NUCLEAR MEDICINE

## 2018-12-10 PROCEDURE — 93306 TTE W/DOPPLER COMPLETE: CPT | Mod: S$GLB,,, | Performed by: NUCLEAR MEDICINE

## 2018-12-10 PROCEDURE — 0298T HOLTER MONITOR - 3-14 DAY ADULT: CPT | Mod: S$GLB,,, | Performed by: NUCLEAR MEDICINE

## 2018-12-11 ENCOUNTER — TELEPHONE (OUTPATIENT)
Dept: CARDIOLOGY | Facility: CLINIC | Age: 63
End: 2018-12-11

## 2018-12-11 NOTE — TELEPHONE ENCOUNTER
Spoke with dental office who would like and OK from Dr. Umanzor for pt to wear heart monitor for dental extractions.  They will be faxing over paper work.

## 2018-12-11 NOTE — TELEPHONE ENCOUNTER
Spoke with pt who stated she will be under mild sedation while wearing the monitor.  Pt was advised to put that information in the diary.  Pt stated she would do that.      ----- Message from Luis Eldridge sent at 12/11/2018  9:37 AM CST -----  Pt is requesting a call from nurse to discuss weather she can wear a Holter monitor while having a dental procedure.        Please call pt back at [phone]

## 2019-01-07 NOTE — TELEPHONE ENCOUNTER
I have attempted without success to contact this patient by phone to inform them of their results. I left a message for them to call back at [phone].

## 2019-01-09 ENCOUNTER — TELEPHONE (OUTPATIENT)
Dept: CARDIOLOGY | Facility: CLINIC | Age: 64
End: 2019-01-09

## 2019-01-09 NOTE — TELEPHONE ENCOUNTER
I have attempted without success to contact this patient by phone to advise her that holter showed PAF.  Left message to contact office.

## 2019-01-10 ENCOUNTER — TELEPHONE (OUTPATIENT)
Dept: PHARMACY | Facility: CLINIC | Age: 64
End: 2019-01-10

## 2019-01-10 ENCOUNTER — TELEPHONE (OUTPATIENT)
Dept: CARDIOLOGY | Facility: CLINIC | Age: 64
End: 2019-01-10

## 2019-01-10 NOTE — TELEPHONE ENCOUNTER
Patient returned call placed to her.    Notified patient PA approved for Eliquis 5mg resulting in copayment of $75.00.  Assisted patient with obtaining copayment card reducing copayment to $5.00.    Patient asked to have prescription transferred to [facility] in [place].  Prescription was transferred and copayment card was emailed to patient and given to [facility] as well.    Thanks,   Devika Mondragon CPhT, B.A  Patient Care Advocate   [facility]  Phone: [phone] Ext [phone]  Fax: [phone]

## 2019-01-10 NOTE — TELEPHONE ENCOUNTER
----- Message from Juliet Colunga sent at 1/10/2019 12:42 PM CST -----  states that blood clotting still hasn't been sent to [facility] (ph:[phone]), needs asap. pls call when done...[phone] (home)

## 2019-01-10 NOTE — TELEPHONE ENCOUNTER
Attempted without success to contact pt with holter results and start elioquis.  Left vm to return call.  Pt does have scheduled appt in one week.  Called pharmacy to see if pt picked up medication.  Pt changed insurance and prescription was still on hold.  Pharmacy to retry with new insurance and call me back.

## 2019-01-10 NOTE — TELEPHONE ENCOUNTER
Reason for call:    Notify patient PA for Eliquis was approved resulting in a $75.00 copayment.  Copayment card will be applied to reduce copay once patient is reached.    Will continue to reach out to patient.    PA Information:  [employer]   [phone]  PA Case ID # [id number]  PA Approval Dates: 12/11/18-1/10/20  PA Approved for Eliquis 5mg #60 per 30 days    Thanks,   Devika Mondragon CPhT, BKoffiA  Patient Care Advocate   [facility]  Phone: [phone] Ext [phone]  Fax: [phone]

## 2019-01-17 ENCOUNTER — OFFICE VISIT (OUTPATIENT)
Dept: CARDIOLOGY | Facility: CLINIC | Age: 64
End: 2019-01-17
Payer: COMMERCIAL

## 2019-01-17 VITALS
BODY MASS INDEX: 30.36 KG/M2 | WEIGHT: 188.94 LBS | HEART RATE: 88 BPM | DIASTOLIC BLOOD PRESSURE: 82 MMHG | SYSTOLIC BLOOD PRESSURE: 148 MMHG | HEIGHT: 66 IN

## 2019-01-17 DIAGNOSIS — Z78.9 STATIN INTOLERANCE: ICD-10-CM

## 2019-01-17 DIAGNOSIS — I10 ESSENTIAL HYPERTENSION: ICD-10-CM

## 2019-01-17 DIAGNOSIS — E78.2 MIXED HYPERLIPIDEMIA: ICD-10-CM

## 2019-01-17 DIAGNOSIS — I48.0 PAF (PAROXYSMAL ATRIAL FIBRILLATION): Primary | ICD-10-CM

## 2019-01-17 PROCEDURE — 3008F PR BODY MASS INDEX (BMI) DOCUMENTED: ICD-10-PCS | Mod: CPTII,S$GLB,, | Performed by: INTERNAL MEDICINE

## 2019-01-17 PROCEDURE — 3008F BODY MASS INDEX DOCD: CPT | Mod: CPTII,S$GLB,, | Performed by: INTERNAL MEDICINE

## 2019-01-17 PROCEDURE — 99214 OFFICE O/P EST MOD 30 MIN: CPT | Mod: S$GLB,,, | Performed by: INTERNAL MEDICINE

## 2019-01-17 PROCEDURE — 3079F PR MOST RECENT DIASTOLIC BLOOD PRESSURE 80-89 MM HG: ICD-10-PCS | Mod: CPTII,S$GLB,, | Performed by: INTERNAL MEDICINE

## 2019-01-17 PROCEDURE — 99214 PR OFFICE/OUTPT VISIT, EST, LEVL IV, 30-39 MIN: ICD-10-PCS | Mod: S$GLB,,, | Performed by: INTERNAL MEDICINE

## 2019-01-17 PROCEDURE — 99999 PR PBB SHADOW E&M-EST. PATIENT-LVL III: ICD-10-PCS | Mod: PBBFAC,,, | Performed by: INTERNAL MEDICINE

## 2019-01-17 PROCEDURE — 3079F DIAST BP 80-89 MM HG: CPT | Mod: CPTII,S$GLB,, | Performed by: INTERNAL MEDICINE

## 2019-01-17 PROCEDURE — 3077F SYST BP >= 140 MM HG: CPT | Mod: CPTII,S$GLB,, | Performed by: INTERNAL MEDICINE

## 2019-01-17 PROCEDURE — 3077F PR MOST RECENT SYSTOLIC BLOOD PRESSURE >= 140 MM HG: ICD-10-PCS | Mod: CPTII,S$GLB,, | Performed by: INTERNAL MEDICINE

## 2019-01-17 PROCEDURE — 99999 PR PBB SHADOW E&M-EST. PATIENT-LVL III: CPT | Mod: PBBFAC,,, | Performed by: INTERNAL MEDICINE

## 2019-01-17 RX ORDER — DILTIAZEM HYDROCHLORIDE 60 MG/1
120 CAPSULE, EXTENDED RELEASE ORAL 2 TIMES DAILY
Qty: 120 CAPSULE | Refills: 11 | Status: SHIPPED | OUTPATIENT
Start: 2019-01-17 | End: 2019-01-18 | Stop reason: ALTCHOICE

## 2019-01-17 NOTE — PROGRESS NOTES
Subjective:   Patient ID:  Erin Soriano is a 63 y.o. female who presents for follow up of Follow-up      62 yo female routine f/u. Physical work at [employer].  PMH HTN, HLD, GERD, anxiety and PAF. Fibromyalgia. Negative sleep study before.   EGD and colonoscopy done on 10/01/2018 normal.  Pt was told having PAF during the EGD.  MPI and ECHO done in  normal EF and no stress induced ischemia  No smoking/drinking  Mother has afib. Father CHF    C/o fatigue for a yr, associated with palpitation and faint. No chest pain.  Her ZIOPATCH done in  showed 3% PAF and PSVT.  Started exercise recently  Faithfully taking med                Past Medical History:   Diagnosis Date    Anxiety     Arthritis     Asthma     Depression     Hyperlipidemia     Hypertension     RLS (restless legs syndrome)        Past Surgical History:   Procedure Laterality Date    ADENOIDECTOMY      pt was 3 yrs old    carpel tunnel      2009     SECTION      2 different ones    COLONOSCOPY N/A 10/1/2018    [facility]    ESOPHAGOGASTRODUODENOSCOPY (EGD) N/A 10/1/2018    [facility]    golfers elbow      2009    HYSTERECTOMY      1994 total    OOPHORECTOMY      TONSILLECTOMY      pt was 3 yrs old at time       Social History     Tobacco Use    Smoking status: Former Smoker     Last attempt to quit: 6/10/2012     Years since quittin.6    Smokeless tobacco: Never Used    Tobacco comment: Would smoke 1 cigarette every few months   Substance Use Topics    Alcohol use: Yes     Alcohol/week: 0.6 oz     Types: 1 Standard drinks or equivalent per week     Comment: social    Drug use: No       Family History   Problem Relation Age of Onset    Hypertension Mother     Clotting disorder Mother     Hemochromatosis Mother     Hypertension Father     Cancer Maternal Grandmother     Clotting disorder Maternal Grandmother     Cancer Maternal  Grandfather         prostate    Clotting disorder Maternal Grandfather          Review of Systems   Constitution: Positive for malaise/fatigue. Negative for decreased appetite, diaphoresis, fever, weakness and night sweats.   HENT: Negative for nosebleeds.    Eyes: Negative for blurred vision and double vision.   Cardiovascular: Positive for chest pain, dyspnea on exertion and near-syncope. Negative for claudication, irregular heartbeat, leg swelling, orthopnea, palpitations, paroxysmal nocturnal dyspnea and syncope.   Respiratory: Negative for cough, shortness of breath, sleep disturbances due to breathing, snoring, sputum production and wheezing.    Endocrine: Negative for cold intolerance and polyuria.   Hematologic/Lymphatic: Does not bruise/bleed easily.   Skin: Negative for rash.   Musculoskeletal: Positive for arthritis and back pain. Negative for falls, joint pain, joint swelling and neck pain.   Gastrointestinal: Negative for abdominal pain, heartburn, nausea and vomiting.   Genitourinary: Negative for dysuria, frequency and hematuria.   Neurological: Negative for difficulty with concentration, dizziness, focal weakness, headaches, light-headedness, numbness and seizures.   Psychiatric/Behavioral: Negative for depression, memory loss and substance abuse. The patient does not have insomnia.    Allergic/Immunologic: Negative for HIV exposure and hives.       Objective:   Physical Exam   Constitutional: She is oriented to person, place, and time. She appears well-nourished.   HENT:   Head: Normocephalic.   Eyes: Pupils are equal, round, and reactive to light.   Neck: Normal carotid pulses and no JVD present. Carotid bruit is not present. No thyromegaly present.   Cardiovascular: Normal rate, regular rhythm, normal heart sounds and normal pulses.  No extrasystoles are present. PMI is not displaced. Exam reveals no gallop and no S3.   No murmur heard.  Pulmonary/Chest: Breath sounds normal. No stridor. No  respiratory distress.   Abdominal: Soft. Bowel sounds are normal. There is no tenderness. There is no rebound.   Musculoskeletal: Normal range of motion.   Neurological: She is alert and oriented to person, place, and time.   Skin: Skin is intact. No rash noted.   Psychiatric: Her behavior is normal.       Lab Results   Component Value Date    CHOL 197 02/07/2018    CHOL 209 (H) 05/11/2016    CHOL 179 06/06/2013     Lab Results   Component Value Date    HDL 39 (L) 02/07/2018    HDL 40 05/11/2016    HDL 38 (L) 06/06/2013     Lab Results   Component Value Date    LDLCALC 122.4 02/07/2018    LDLCALC 134.4 05/11/2016    LDLCALC 111.0 06/06/2013     Lab Results   Component Value Date    TRIG 178 (H) 02/07/2018    TRIG 173 (H) 05/11/2016    TRIG 149 06/06/2013     Lab Results   Component Value Date    CHOLHDL 19.8 (L) 02/07/2018    CHOLHDL 19.1 (L) 05/11/2016    CHOLHDL 21.2 06/06/2013       Chemistry        Component Value Date/Time     06/27/2018 1456    K 3.9 06/27/2018 1456     06/27/2018 1456    CO2 29 06/27/2018 1456    BUN 12 06/27/2018 1456    CREATININE 0.9 06/27/2018 1456     (H) 06/27/2018 1456        Component Value Date/Time    CALCIUM 9.7 06/27/2018 1456    ALKPHOS 140 (H) 06/27/2018 1456    AST 29 06/27/2018 1456    ALT 33 06/27/2018 1456    BILITOT 0.5 06/27/2018 1456    ESTGFRAFRICA >60.0 06/27/2018 1456    EGFRNONAA >60.0 06/27/2018 1456          Lab Results   Component Value Date    HGBA1C 5.7 (H) 02/07/2018     Lab Results   Component Value Date    TSH 1.222 05/16/2012     Lab Results   Component Value Date    INR 1.0 09/20/2010     Lab Results   Component Value Date    WBC 9.21 02/07/2018    HGB 13.8 02/07/2018    HCT 40.4 02/07/2018    MCV 79 (L) 02/07/2018     02/07/2018     BMP  Sodium   Date Value Ref Range Status   06/27/2018 141 136 - 145 mmol/L Final     Potassium   Date Value Ref Range Status   06/27/2018 3.9 3.5 - 5.1 mmol/L Final     Chloride   Date Value Ref Range  Status   06/27/2018 102 95 - 110 mmol/L Final     CO2   Date Value Ref Range Status   06/27/2018 29 23 - 29 mmol/L Final     BUN, Bld   Date Value Ref Range Status   06/27/2018 12 8 - 23 mg/dL Final     Creatinine   Date Value Ref Range Status   06/27/2018 0.9 0.5 - 1.4 mg/dL Final     Calcium   Date Value Ref Range Status   06/27/2018 9.7 8.7 - 10.5 mg/dL Final     Anion Gap   Date Value Ref Range Status   06/27/2018 10 8 - 16 mmol/L Final     eGFR if    Date Value Ref Range Status   06/27/2018 >60.0 >60 mL/min/1.73 m^2 Final     eGFR if non    Date Value Ref Range Status   06/27/2018 >60.0 >60 mL/min/1.73 m^2 Final     Comment:     Calculation used to obtain the estimated glomerular filtration  rate (eGFR) is the CKD-EPI equation.        BNP  @LABRCNTIP(BNP,BNPTRIAGEBLO)@  @LABRCNTIP(troponini)@  CrCl cannot be calculated (Patient's most recent lab result is older than the maximum 7 days allowed.).  No results found in the last 24 hours.  No results found in the last 24 hours.  No results found in the last 24 hours.    Assessment:      1. PAF (paroxysmal atrial fibrillation)    2. Essential hypertension    3. Mixed hyperlipidemia    4. Statin intolerance      Fatigue and faint related to PAF and PSVT.  [id number] 2.  BP, LDL and HGB wnl  Plan:   Add cardizem 120 mg bid  Continue coreg and Hyzaar. OK to hold Hyzaar if BP low  Nurse check BP and pulse in 2 weeks  continue eliquis  Continue exercise.  Avoid stimulants  RTC in 2 months    ADDENDUM ON 01/01  HOLD CARDIZEM DUE TO NEW RASH

## 2019-01-18 ENCOUNTER — TELEPHONE (OUTPATIENT)
Dept: CARDIOLOGY | Facility: CLINIC | Age: 64
End: 2019-01-18

## 2019-01-18 ENCOUNTER — PATIENT MESSAGE (OUTPATIENT)
Dept: FAMILY MEDICINE | Facility: CLINIC | Age: 64
End: 2019-01-18

## 2019-01-18 DIAGNOSIS — I48.0 PAF (PAROXYSMAL ATRIAL FIBRILLATION): Primary | ICD-10-CM

## 2019-01-18 RX ORDER — VERAPAMIL HYDROCHLORIDE 120 MG/1
240 CAPSULE, EXTENDED RELEASE ORAL DAILY
Qty: 60 CAPSULE | Refills: 11 | Status: SHIPPED | OUTPATIENT
Start: 2019-01-18 | End: 2019-02-12 | Stop reason: SDUPTHER

## 2019-01-18 NOTE — TELEPHONE ENCOUNTER
Spoke with pt who stated [facility] did not have Rx for Diltiazem 60mg so she asked to transfer Rx to [facility].  Called [facility]. And they are out of it also but will have it for Monday when pt would like to pick it up.

## 2019-01-31 ENCOUNTER — PATIENT MESSAGE (OUTPATIENT)
Dept: CARDIOLOGY | Facility: CLINIC | Age: 64
End: 2019-01-31

## 2019-02-01 ENCOUNTER — TELEPHONE (OUTPATIENT)
Dept: CARDIOLOGY | Facility: CLINIC | Age: 64
End: 2019-02-01

## 2019-02-12 ENCOUNTER — HOSPITAL ENCOUNTER (OUTPATIENT)
Dept: RADIOLOGY | Facility: HOSPITAL | Age: 64
Discharge: HOME | End: 2019-02-12
Attending: FAMILY MEDICINE
Payer: COMMERCIAL

## 2019-02-12 ENCOUNTER — OFFICE VISIT (OUTPATIENT)
Dept: FAMILY MEDICINE | Facility: CLINIC | Age: 64
End: 2019-02-12
Payer: COMMERCIAL

## 2019-02-12 ENCOUNTER — TELEPHONE (OUTPATIENT)
Dept: CARDIOLOGY | Facility: CLINIC | Age: 64
End: 2019-02-12

## 2019-02-12 VITALS
BODY MASS INDEX: 29.76 KG/M2 | HEART RATE: 72 BPM | DIASTOLIC BLOOD PRESSURE: 76 MMHG | OXYGEN SATURATION: 98 % | HEIGHT: 67 IN | WEIGHT: 189.63 LBS | SYSTOLIC BLOOD PRESSURE: 138 MMHG | TEMPERATURE: 98 F

## 2019-02-12 DIAGNOSIS — M35.01 SICCA SYNDROME WITH KERATOCONJUNCTIVITIS: ICD-10-CM

## 2019-02-12 DIAGNOSIS — Z01.818 PREOP GENERAL PHYSICAL EXAM: Primary | ICD-10-CM

## 2019-02-12 DIAGNOSIS — Z01.818 PREOP GENERAL PHYSICAL EXAM: ICD-10-CM

## 2019-02-12 DIAGNOSIS — F32.A DEPRESSION, UNSPECIFIED DEPRESSION TYPE: ICD-10-CM

## 2019-02-12 DIAGNOSIS — I10 ESSENTIAL HYPERTENSION: ICD-10-CM

## 2019-02-12 DIAGNOSIS — M79.7 FIBROMYALGIA: ICD-10-CM

## 2019-02-12 DIAGNOSIS — E78.2 MIXED HYPERLIPIDEMIA: ICD-10-CM

## 2019-02-12 PROCEDURE — 71046 XR CHEST PA AND LATERAL: ICD-10-PCS | Mod: 26,,, | Performed by: RADIOLOGY

## 2019-02-12 PROCEDURE — 99999 PR PBB SHADOW E&M-EST. PATIENT-LVL V: CPT | Mod: PBBFAC,,, | Performed by: FAMILY MEDICINE

## 2019-02-12 PROCEDURE — 99999 PR PBB SHADOW E&M-EST. PATIENT-LVL V: ICD-10-PCS | Mod: PBBFAC,,, | Performed by: FAMILY MEDICINE

## 2019-02-12 PROCEDURE — 99243 PR OFFICE CONSULTATION,LEVEL III: ICD-10-PCS | Mod: S$GLB,,, | Performed by: FAMILY MEDICINE

## 2019-02-12 PROCEDURE — 99243 OFF/OP CNSLTJ NEW/EST LOW 30: CPT | Mod: S$GLB,,, | Performed by: FAMILY MEDICINE

## 2019-02-12 PROCEDURE — 71046 X-RAY EXAM CHEST 2 VIEWS: CPT | Mod: TC,FY,PO

## 2019-02-12 PROCEDURE — 71046 X-RAY EXAM CHEST 2 VIEWS: CPT | Mod: 26,,, | Performed by: RADIOLOGY

## 2019-02-12 RX ORDER — TRAMADOL HYDROCHLORIDE 50 MG/1
50 TABLET ORAL EVERY 12 HOURS PRN
Qty: 60 TABLET | Refills: 2 | Status: SHIPPED | OUTPATIENT
Start: 2019-02-12 | End: 2019-11-07 | Stop reason: SDUPTHER

## 2019-02-12 RX ORDER — VERAPAMIL HYDROCHLORIDE 120 MG/1
240 CAPSULE, EXTENDED RELEASE ORAL DAILY
Qty: 180 CAPSULE | Refills: 3 | Status: SHIPPED | OUTPATIENT
Start: 2019-02-12 | End: 2019-11-07 | Stop reason: SDUPTHER

## 2019-02-12 RX ORDER — ACETAMINOPHEN 500 MG
500 TABLET ORAL EVERY 6 HOURS PRN
COMMUNITY
End: 2023-09-11

## 2019-02-12 RX ORDER — CYCLOBENZAPRINE HCL 5 MG
10 TABLET ORAL 2 TIMES DAILY PRN
Qty: 60 TABLET | Refills: 2 | Status: SHIPPED | OUTPATIENT
Start: 2019-02-12 | End: 2019-11-07 | Stop reason: SDUPTHER

## 2019-02-12 RX ORDER — GABAPENTIN 300 MG/1
300 CAPSULE ORAL 2 TIMES DAILY
Qty: 180 CAPSULE | Refills: 3 | Status: SHIPPED | OUTPATIENT
Start: 2019-02-12 | End: 2019-11-07 | Stop reason: SDUPTHER

## 2019-02-12 NOTE — PROGRESS NOTES
Chief Complaint:    Chief Complaint   Patient presents with    Pre-op Exam       History of Present Illness:    Here for preop for cervical fusion surgery as requested by Dr. Misha Frausto [phone]  Patient's underlying medical problems include hypertension which is controlled, depression, and fibromyalgia.  She has had cardiac testing recently which was normal  Denies any chest pain or shortness of breath.      ROS:  Review of Systems   Constitutional: Negative for activity change, chills, fatigue, fever and unexpected weight change.   HENT: Negative for congestion, ear discharge, ear pain, hearing loss, postnasal drip and rhinorrhea.    Eyes: Negative for pain and visual disturbance.   Respiratory: Negative for cough, chest tightness and shortness of breath.    Cardiovascular: Negative for chest pain and palpitations.   Gastrointestinal: Negative for abdominal pain, diarrhea and vomiting.   Endocrine: Negative for heat intolerance.   Genitourinary: Negative for dysuria, flank pain, frequency and hematuria.   Musculoskeletal: Negative for back pain, gait problem and neck pain.   Skin: Negative for color change and rash.   Neurological: Negative for dizziness, tremors, seizures, numbness and headaches.   Psychiatric/Behavioral: Negative for agitation, hallucinations, self-injury, sleep disturbance and suicidal ideas. The patient is not nervous/anxious.        Past Medical History:   Diagnosis Date    Anxiety     Arthritis     Asthma     Depression     Hyperlipidemia     Hypertension     RLS (restless legs syndrome)        Social History:  Social History     Socioeconomic History    Marital status:      Spouse name: None    Number of children: 2    Years of education: None    Highest education level: None   Social Needs    Financial resource strain: None    Food insecurity - worry: None    Food insecurity - inability: None    Transportation needs - medical: None    Transportation needs  "- non-medical: None   Occupational History    None   Tobacco Use    Smoking status: Former Smoker     Last attempt to quit: 6/10/2012     Years since quittin.6    Smokeless tobacco: Never Used    Tobacco comment: Would smoke 1 cigarette every few months   Substance and Sexual Activity    Alcohol use: Yes     Alcohol/week: 0.6 oz     Types: 1 Standard drinks or equivalent per week     Comment: social    Drug use: No    Sexual activity: Yes     Partners: Male     Birth control/protection: None   Other Topics Concern    None   Social History Narrative    None       Family History:   family history includes Cancer in her maternal grandfather and maternal grandmother; Clotting disorder in her maternal grandfather, maternal grandmother, and mother; Hemochromatosis in her mother; Hypertension in her father and mother.    Health Maintenance   Topic Date Due    Lipid Panel  2019    Mammogram  2019    Colonoscopy  10/01/2023    TETANUS VACCINE  2026    Hepatitis C Screening  Completed    Zoster Vaccine  Completed    Influenza Vaccine  Completed       Physical Exam:    Vital Signs  Temp: 98.2 °F (36.8 °C)  Temp src: Tympanic  Pulse: 72  SpO2: 98 %  BP: 138/76  BP Location: Left arm  Patient Position: Sitting  Pain Score:   6  Pain Loc: Neck  Height and Weight  Height: 5' 7" (170.2 cm)  Weight: 86 kg (189 lb 9.5 oz)  BSA (Calculated - sq m): 2.02 sq meters  BMI (Calculated): 29.8  Weight in (lb) to have BMI = 25: 159.3]    Body mass index is 29.69 kg/m².    Physical Exam   Constitutional: She is oriented to person, place, and time. She appears well-developed.   HENT:   Mouth/Throat: Oropharynx is clear and moist.   Eyes: Conjunctivae are normal. Pupils are equal, round, and reactive to light.   Neck: Normal range of motion. Neck supple.   Cardiovascular: Normal rate, regular rhythm and normal heart sounds.   No murmur heard.  Pulmonary/Chest: Effort normal and breath sounds normal. No " respiratory distress. She has no wheezes. She has no rales. She exhibits no tenderness.   Abdominal: Soft. She exhibits no distension and no mass. There is no tenderness. There is no guarding.   Musculoskeletal: She exhibits no edema or tenderness.   Lymphadenopathy:     She has no cervical adenopathy.   Neurological: She is alert and oriented to person, place, and time. She has normal reflexes.   Skin: Skin is warm and dry.   Psychiatric: She has a normal mood and affect. Her behavior is normal. Judgment and thought content normal.         Assessment:      ICD-10-CM ICD-9-CM   1. Preop general physical exam Z01.818 V72.83   2. Essential hypertension I10 401.9   3. Mixed hyperlipidemia E78.2 272.2   4. Sicca syndrome with keratoconjunctivitis M35.01 710.2   5. Fibromyalgia M79.7 729.1   6. Depression, unspecified depression type F32.9 311         Plan:    Patient with the above-mentioned medical problems essentially stable  Patient's CBC CMP urinalysis PT INR within normal limits  Patient's surgical risk is low routine perioperative care is advise.  She will be talking to her cardiologist about holding the Eliquis before surgery.      Orders Placed This Encounter   Procedures    X-Ray Chest PA And Lateral    Lipid panel       Current Outpatient Medications   Medication Sig Dispense Refill    acetaminophen (TYLENOL EXTRA STRENGTH) 500 MG tablet Take 500 mg by mouth every 6 (six) hours as needed for Pain.      apixaban (ELIQUIS) 5 mg Tab Take 1 tablet (5 mg total) by mouth 2 (two) times daily. 60 tablet 5    ascorbic acid, vitamin C, (VITAMIN C) 1000 MG tablet Take 1,000 mg by mouth once daily.      buPROPion (WELLBUTRIN SR) 150 MG TBSR 12 hr tablet Take 1 tablet (150 mg total) by mouth 2 (two) times daily. 180 tablet 3    carvedilol (COREG) 6.25 MG tablet Take 1 tablet (6.25 mg total) by mouth 2 (two) times daily with meals. 180 tablet 3    cyclobenzaprine (FLEXERIL) 5 MG tablet Take 2 tablets (10 mg total)  by mouth 2 (two) times daily as needed for Muscle spasms. 60 tablet 2    DULoxetine (CYMBALTA) 30 MG capsule Take 1 capsule (30 mg total) by mouth once daily. 90 capsule 3    gabapentin (NEURONTIN) 300 MG capsule Take 1 capsule (300 mg total) by mouth 2 (two) times daily. 180 capsule 3    ibuprofen (ADVIL,MOTRIN) 600 MG tablet       losartan-hydrochlorothiazide 100-12.5 mg (HYZAAR) 100-12.5 mg Tab Take 1 tablet by mouth once daily. 90 tablet 3    pantoprazole (PROTONIX) 40 MG tablet Take 1 tablet (40 mg total) by mouth once daily. 90 tablet 3    potassium 99 mg Tab Take by mouth. 1 tablet Oral Every day      pramipexole (MIRAPEX) 0.5 MG tablet Take 0.5 mg by mouth daily as needed.      rOPINIRole (REQUIP) 1 MG tablet Take 1 tablet (1 mg total) by mouth every evening. 90 tablet 3    traMADol (ULTRAM) 50 mg tablet Take 1 tablet (50 mg total) by mouth every 12 (twelve) hours as needed for Pain. 60 tablet 2    verapamil (VERELAN) 120 MG C24P Take 2 capsules (240 mg total) by mouth once daily. 180 capsule 3    zolpidem (AMBIEN) 5 MG Tab Take 1 tablet (5 mg total) by mouth nightly as needed. 21 tablet 3    EPINEPHrine (EPIPEN) 0.3 mg/0.3 mL AtIn Inject 0.3 mLs (0.3 mg total) into the muscle once. 0.3 mL 4     Current Facility-Administered Medications   Medication Dose Route Frequency Provider Last Rate Last Dose    albuterol inhaler 2 puff  2 puff Inhalation Q6H PRN Helena Jones MD        albuterol sulfate nebulizer solution 2.5 mg  2.5 mg Nebulization 1 time in Clinic/HOD Helena Jones MD         Facility-Administered Medications Ordered in Other Visits   Medication Dose Route Frequency Provider Last Rate Last Dose    lactated ringers infusion   Intravenous Continuous Vanda Mathis MD   Stopped at 10/01/18 1140       Medications Discontinued During This Encounter   Medication Reason    aspirin-acetaminophen-caffeine 250-250-65 mg (EXCEDRIN MIGRAINE) 250-250-65 mg per tablet Patient no longer taking     biotin 10,000 mcg Cap Patient no longer taking    hyoscyamine (ANASPAZ,LEVSIN) 0.125 mg Tab Patient no longer taking    verapamil (VERELAN) 120 MG C24P Reorder    gabapentin (NEURONTIN) 300 MG capsule Reorder    cyclobenzaprine (FLEXERIL) 5 MG tablet Reorder    traMADol (ULTRAM) 50 mg tablet Reorder       No Follow-up on file.      Helena Jones MD

## 2019-02-21 ENCOUNTER — TELEPHONE (OUTPATIENT)
Dept: HEMATOLOGY/ONCOLOGY | Facility: CLINIC | Age: 64
End: 2019-02-21

## 2019-02-21 DIAGNOSIS — D68.9 COAGULATION PROBLEM: Primary | ICD-10-CM

## 2019-02-21 NOTE — TELEPHONE ENCOUNTER
Appointment schedule with provider.  Patient is to call back with dates of last lab draws.  If over two weeks labs will be rescheduled.  Patient verbalized understanding.    ----- Message from Sherly Roberts sent at 2/21/2019  2:02 PM CST -----  Contact: self  Patient needs first available appointment due to clearance for surgery. Please call patient at [phone]. Thanks!

## 2019-02-27 ENCOUNTER — OFFICE VISIT (OUTPATIENT)
Dept: HEMATOLOGY/ONCOLOGY | Facility: CLINIC | Age: 64
End: 2019-02-27
Payer: COMMERCIAL

## 2019-02-27 ENCOUNTER — LAB VISIT (OUTPATIENT)
Dept: LAB | Facility: HOSPITAL | Age: 64
End: 2019-02-27
Attending: INTERNAL MEDICINE
Payer: COMMERCIAL

## 2019-02-27 VITALS
DIASTOLIC BLOOD PRESSURE: 71 MMHG | HEART RATE: 75 BPM | WEIGHT: 187.38 LBS | RESPIRATION RATE: 18 BRPM | HEIGHT: 67 IN | TEMPERATURE: 99 F | SYSTOLIC BLOOD PRESSURE: 116 MMHG | BODY MASS INDEX: 29.41 KG/M2

## 2019-02-27 DIAGNOSIS — M50.20 HERNIATED CERVICAL DISC: ICD-10-CM

## 2019-02-27 DIAGNOSIS — Z79.01 CHRONIC ANTICOAGULATION: ICD-10-CM

## 2019-02-27 DIAGNOSIS — D68.9 COAGULATION PROBLEM: ICD-10-CM

## 2019-02-27 DIAGNOSIS — I48.0 PAROXYSMAL ATRIAL FIBRILLATION: Primary | ICD-10-CM

## 2019-02-27 LAB
ALBUMIN SERPL BCP-MCNC: 4.4 G/DL
ALP SERPL-CCNC: 107 U/L
ALT SERPL W/O P-5'-P-CCNC: 32 U/L
ANION GAP SERPL CALC-SCNC: 6 MMOL/L
AST SERPL-CCNC: 39 U/L
BASOPHILS # BLD AUTO: 0.07 K/UL
BASOPHILS NFR BLD: 0.7 %
BILIRUB SERPL-MCNC: 0.4 MG/DL
BUN SERPL-MCNC: 27 MG/DL
CALCIUM SERPL-MCNC: 9.3 MG/DL
CHLORIDE SERPL-SCNC: 104 MMOL/L
CO2 SERPL-SCNC: 30 MMOL/L
CREAT SERPL-MCNC: 0.79 MG/DL
DIFFERENTIAL METHOD: ABNORMAL
EOSINOPHIL # BLD AUTO: 0.2 K/UL
EOSINOPHIL NFR BLD: 1.9 %
ERYTHROCYTE [DISTWIDTH] IN BLOOD BY AUTOMATED COUNT: 13.6 %
EST. GFR  (AFRICAN AMERICAN): >60 ML/MIN/1.73 M^2
EST. GFR  (NON AFRICAN AMERICAN): >60 ML/MIN/1.73 M^2
GLUCOSE SERPL-MCNC: 141 MG/DL
HCT VFR BLD AUTO: 43.8 %
HGB BLD-MCNC: 14.3 G/DL
IMM GRANULOCYTES # BLD AUTO: 0.05 K/UL
IMM GRANULOCYTES NFR BLD AUTO: 0.5 %
LDH SERPL L TO P-CCNC: 584 U/L
LYMPHOCYTES # BLD AUTO: 3.3 K/UL
LYMPHOCYTES NFR BLD: 32.7 %
MAGNESIUM SERPL-MCNC: 2.5 MG/DL
MCH RBC QN AUTO: 27 PG
MCHC RBC AUTO-ENTMCNC: 32.6 G/DL
MCV RBC AUTO: 83 FL
MONOCYTES # BLD AUTO: 0.6 K/UL
MONOCYTES NFR BLD: 5.7 %
NEUTROPHILS # BLD AUTO: 6 K/UL
NEUTROPHILS NFR BLD: 58.5 %
NRBC BLD-RTO: 0 /100 WBC
PLATELET # BLD AUTO: 238 K/UL
PMV BLD AUTO: 10 FL
POTASSIUM SERPL-SCNC: 4.5 MMOL/L
PROT SERPL-MCNC: 7.9 G/DL
RBC # BLD AUTO: 5.29 M/UL
SODIUM SERPL-SCNC: 140 MMOL/L
WBC # BLD AUTO: 10.18 K/UL

## 2019-02-27 PROCEDURE — 80053 COMPREHEN METABOLIC PANEL: CPT | Mod: PN

## 2019-02-27 PROCEDURE — 83615 LACTATE (LD) (LDH) ENZYME: CPT

## 2019-02-27 PROCEDURE — 99999 PR PBB SHADOW E&M-EST. PATIENT-LVL IV: CPT | Mod: PBBFAC,,, | Performed by: INTERNAL MEDICINE

## 2019-02-27 PROCEDURE — 83735 ASSAY OF MAGNESIUM: CPT | Mod: PN

## 2019-02-27 PROCEDURE — 83615 LACTATE (LD) (LDH) ENZYME: CPT | Mod: PN

## 2019-02-27 PROCEDURE — 85025 COMPLETE CBC W/AUTO DIFF WBC: CPT

## 2019-02-27 PROCEDURE — 99244 PR OFFICE CONSULTATION,LEVEL IV: ICD-10-PCS | Mod: S$GLB,,, | Performed by: INTERNAL MEDICINE

## 2019-02-27 PROCEDURE — 99244 OFF/OP CNSLTJ NEW/EST MOD 40: CPT | Mod: S$GLB,,, | Performed by: INTERNAL MEDICINE

## 2019-02-27 PROCEDURE — 80053 COMPREHEN METABOLIC PANEL: CPT

## 2019-02-27 PROCEDURE — 99999 PR PBB SHADOW E&M-EST. PATIENT-LVL IV: ICD-10-PCS | Mod: PBBFAC,,, | Performed by: INTERNAL MEDICINE

## 2019-02-27 PROCEDURE — 83735 ASSAY OF MAGNESIUM: CPT

## 2019-02-27 PROCEDURE — 85025 COMPLETE CBC W/AUTO DIFF WBC: CPT | Mod: PN

## 2019-02-27 PROCEDURE — 36415 COLL VENOUS BLD VENIPUNCTURE: CPT | Mod: PN

## 2019-02-27 RX ORDER — ENOXAPARIN SODIUM 150 MG/ML
120 INJECTION SUBCUTANEOUS DAILY
Qty: 10 SYRINGE | Refills: 1 | Status: SHIPPED | OUTPATIENT
Start: 2019-02-27 | End: 2020-10-15

## 2019-02-27 NOTE — LETTER
February 27, 2019        Misha Frausto MD  [id number]  [address]  [facility] [address]             [facility] [address]  [facility] [address]  Phone: [phone]  Fax: [phone]   Patient: Erin Soriano   MR Number: [id number]   YOB: 1955   Date of Visit: 2/27/2019       Dear Dr. Frausto:    Thank you for referring Erin Soriano to me for assistance with bridging anticoagulation. Below are the relevant portions of my assessment and plan of care.  If you have questions, please do not hesitate to call me. I look forward to following Erin along with you.    Sincerely,      MD BREE Adames MD Barrett A. Johnston, MD Alpesh D. Patel, MD Saravanan Thiagarajan, MD Zhe Zheng, MD

## 2019-02-27 NOTE — PROGRESS NOTES
Chief complaint:  Anticoagulation bridging    History of present illness:  The patient is a 63-year-old white female who has planned cervical spine surgery for 2 herniated discs, suffers from paroxysmal atrial fibrillation, and is managed with Eliquis 5 mg twice daily.  She presents for recommendations regarding management of anticoagulation in the perioperative period.  No other complaints for pertinent findings on a 14 point review of systems.    Past medical history:  1.  Hypertension  2.  Asthma  3.  Fibromyalgia  4.  History of colon polyps  5.  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  6.  Restless leg syndrome  7.  Prior cells white atrial fibrillation  8.  Positive antinuclear antibody  9.  Hyperlipidemia  10.  GERD  11.  Diverticulosis  12.  Depression  13.  Status post hysterectomy  14.  Status post tonsillectomy  15.  Status post  x2    Allergies:  1.  Statins  2.  Benazepril  3.  Amlodipine    Family/social history:  Former smoker has not consumed tobacco products in 20 years.  Alcohol use is rare.  Maternal grandmother suffers from undisclosed clotting disorder.    Physical examination:  Well-developed, well-nourished, elderly, white female, in no acute distress, who has a weight of 187.5 lb.  VITAL SIGNS: Documented  and reviewed this visit.  HEENT: Normocephalic, atraumatic. Oral mucosa pink and moist. Lips without   lesions. Tongue midline. Oropharynx clear. Nonicteric sclerae.   NECK: Supple, no adenopathy. No carotid bruits, thyromegaly or thyroid nodule.   HEART: Regular rate and rhythm without murmur, gallop or rub.   LUNGS: Clear to auscultation bilaterally. Normal respiratory effort.   ABDOMEN: Soft, nontender, nondistended with positive normoactive bowel sounds,   no hepatosplenomegaly.   EXTREMITIES: No cyanosis, clubbing or edema. Distal pulses are intact.   AXILLAE AND GROIN: No palpable pathologic lymphadenopathy is appreciated.   SKIN: Intact/turgor normal    NEUROLOGIC: Cranial nerves II-XII grossly intact. Motor: Good muscle bulk and   tone. Strength/sensory 5/5 throughout. Gait stable.     Impression:  1.  Herniated cervical discs in need surgical decompression.  2.  Paroxysmal atrial fibrillation.  3.  Chronic anticoagulation with Eliquis.    Plan:  1.  Patient is to hold Eliquis therapy for 7 days in anticipation of surgery.  2.  During that time, the patient is to be bridged with Lovenox for 1.5 milligram/kilogram (120 mg) daily.  3.  Patient is to receive no anticoagulation on the day of surgery.  4.  As long as the patient has adequate hemostasis, Lovenox may be resumed on postop day 1 and continued until a surgeon has instructed the patient to resume her usual dose of Eliquis.  5.  As long as these interventions are taken, the patient is cleared to proceed with surgery from Hematology standpoint.  A prescription for Lovenox has been sent to the patient's pharmacy in anticipation of surgery.    josue Allen.

## 2019-03-17 RX ORDER — ZOLPIDEM TARTRATE 5 MG/1
TABLET ORAL
Qty: 15 TABLET | Refills: 5 | Status: SHIPPED | OUTPATIENT
Start: 2019-03-17 | End: 2020-10-15

## 2019-03-19 ENCOUNTER — TELEPHONE (OUTPATIENT)
Dept: CARDIOLOGY | Facility: CLINIC | Age: 64
End: 2019-03-19

## 2019-03-19 NOTE — TELEPHONE ENCOUNTER
Spoke with pt and faxed over EKG tracing and result note to Dr Lefluer at .  Also sent a note to Dr Umanzor to please review lovenox dose that was sent by [facility].    ----- Message from Oumou Holm sent at 3/19/2019  1:38 PM CDT -----  Contact: Patient   Patient would like a call back at [phone], Regards to her appointment.    Thanks  Td

## 2019-03-19 NOTE — TELEPHONE ENCOUNTER
Spoke with pt who needed an EKG faxed to Dr Lefluer at  for two disks in her neck removed.  Pt also would like to look at the Lovenox that was prescribed as surgeon thought it might be too high and would like Dr Umanzor to check it.  Will ask Dr Umanzor to advise.  Faxed over EKG.

## 2019-03-22 ENCOUNTER — OFFICE VISIT (OUTPATIENT)
Dept: FAMILY MEDICINE | Facility: CLINIC | Age: 64
End: 2019-03-22
Payer: COMMERCIAL

## 2019-03-22 ENCOUNTER — PATIENT MESSAGE (OUTPATIENT)
Dept: CARDIOLOGY | Facility: CLINIC | Age: 64
End: 2019-03-22

## 2019-03-22 VITALS
BODY MASS INDEX: 30.85 KG/M2 | SYSTOLIC BLOOD PRESSURE: 128 MMHG | WEIGHT: 191.94 LBS | TEMPERATURE: 98 F | HEIGHT: 66 IN | HEART RATE: 80 BPM | DIASTOLIC BLOOD PRESSURE: 72 MMHG

## 2019-03-22 DIAGNOSIS — N76.0 ACUTE VAGINITIS: ICD-10-CM

## 2019-03-22 DIAGNOSIS — N30.90 CYSTITIS: Primary | ICD-10-CM

## 2019-03-22 LAB
BILIRUB SERPL-MCNC: NORMAL MG/DL
BLOOD URINE, POC: NORMAL
COLOR, POC UA: NORMAL
GLUCOSE UR QL STRIP: NORMAL
KETONES UR QL STRIP: NORMAL
LEUKOCYTE ESTERASE URINE, POC: NORMAL
NITRITE, POC UA: NORMAL
PH, POC UA: 7
PROTEIN, POC: NORMAL
SPECIFIC GRAVITY, POC UA: 1.01
UROBILINOGEN, POC UA: NORMAL

## 2019-03-22 PROCEDURE — 3008F BODY MASS INDEX DOCD: CPT | Mod: CPTII,S$GLB,, | Performed by: FAMILY MEDICINE

## 2019-03-22 PROCEDURE — 3078F DIAST BP <80 MM HG: CPT | Mod: CPTII,S$GLB,, | Performed by: FAMILY MEDICINE

## 2019-03-22 PROCEDURE — 81002 POCT URINE DIPSTICK WITHOUT MICROSCOPE: ICD-10-PCS | Mod: S$GLB,,, | Performed by: FAMILY MEDICINE

## 2019-03-22 PROCEDURE — 3074F SYST BP LT 130 MM HG: CPT | Mod: CPTII,S$GLB,, | Performed by: FAMILY MEDICINE

## 2019-03-22 PROCEDURE — 99213 OFFICE O/P EST LOW 20 MIN: CPT | Mod: 25,S$GLB,, | Performed by: FAMILY MEDICINE

## 2019-03-22 PROCEDURE — 3008F PR BODY MASS INDEX (BMI) DOCUMENTED: ICD-10-PCS | Mod: CPTII,S$GLB,, | Performed by: FAMILY MEDICINE

## 2019-03-22 PROCEDURE — 99999 PR PBB SHADOW E&M-EST. PATIENT-LVL III: CPT | Mod: PBBFAC,,, | Performed by: FAMILY MEDICINE

## 2019-03-22 PROCEDURE — 99213 PR OFFICE/OUTPT VISIT, EST, LEVL III, 20-29 MIN: ICD-10-PCS | Mod: 25,S$GLB,, | Performed by: FAMILY MEDICINE

## 2019-03-22 PROCEDURE — 3078F PR MOST RECENT DIASTOLIC BLOOD PRESSURE < 80 MM HG: ICD-10-PCS | Mod: CPTII,S$GLB,, | Performed by: FAMILY MEDICINE

## 2019-03-22 PROCEDURE — 3074F PR MOST RECENT SYSTOLIC BLOOD PRESSURE < 130 MM HG: ICD-10-PCS | Mod: CPTII,S$GLB,, | Performed by: FAMILY MEDICINE

## 2019-03-22 PROCEDURE — 81002 URINALYSIS NONAUTO W/O SCOPE: CPT | Mod: S$GLB,,, | Performed by: FAMILY MEDICINE

## 2019-03-22 PROCEDURE — 99999 PR PBB SHADOW E&M-EST. PATIENT-LVL III: ICD-10-PCS | Mod: PBBFAC,,, | Performed by: FAMILY MEDICINE

## 2019-03-22 RX ORDER — FLUCONAZOLE 150 MG/1
150 TABLET ORAL ONCE
Qty: 2 TABLET | Refills: 0 | Status: SHIPPED | OUTPATIENT
Start: 2019-03-22 | End: 2019-03-22

## 2019-03-22 RX ORDER — SULFAMETHOXAZOLE AND TRIMETHOPRIM 800; 160 MG/1; MG/1
1 TABLET ORAL 2 TIMES DAILY
Qty: 6 TABLET | Refills: 0 | Status: SHIPPED | OUTPATIENT
Start: 2019-03-22 | End: 2019-03-25

## 2019-03-22 NOTE — PATIENT INSTRUCTIONS

## 2019-03-22 NOTE — PROGRESS NOTES
Subjective:       Patient ID: Erin Soriano is a 63 y.o. female.    Chief Complaint:  UTI  UTI x4 days ; associated with vaginal discomfort  Flank pain and low abd pressure, malaise and nausea.    Reports that she feels the uti despite the negative result.  Denies fever.  She has surgery-discectomy with Dr Frausto.      Past Medical History:   Diagnosis Date    Anxiety     Arthritis     Asthma     Depression     Hyperlipidemia     Hypertension     RLS (restless legs syndrome)      Family History   Problem Relation Age of Onset    Hypertension Mother     Clotting disorder Mother     Hemochromatosis Mother     Hypertension Father     Cancer Maternal Grandmother     Clotting disorder Maternal Grandmother     Cancer Maternal Grandfather         prostate    Clotting disorder Maternal Grandfather      Social History     Socioeconomic History    Marital status:      Spouse name: Not on file    Number of children: 2    Years of education: Not on file    Highest education level: Not on file   Social Needs    Financial resource strain: Not on file    Food insecurity - worry: Not on file    Food insecurity - inability: Not on file    Transportation needs - medical: Not on file    Transportation needs - non-medical: Not on file   Occupational History    Not on file   Tobacco Use    Smoking status: Former Smoker     Last attempt to quit: 6/10/2012     Years since quittin.7    Smokeless tobacco: Never Used    Tobacco comment: Would smoke 1 cigarette every few months   Substance and Sexual Activity    Alcohol use: Yes     Alcohol/week: 0.6 oz     Types: 1 Standard drinks or equivalent per week     Comment: social    Drug use: No    Sexual activity: Yes     Partners: Male     Birth control/protection: None   Other Topics Concern    Not on file   Social History Narrative    Not on file     Outpatient Encounter Medications as of 3/22/2019   Medication Sig Dispense Refill     acetaminophen (TYLENOL EXTRA STRENGTH) 500 MG tablet Take 500 mg by mouth every 6 (six) hours as needed for Pain.      apixaban (ELIQUIS) 5 mg Tab Take 1 tablet (5 mg total) by mouth 2 (two) times daily. 60 tablet 5    ascorbic acid, vitamin C, (VITAMIN C) 1000 MG tablet Take 1,000 mg by mouth once daily.      buPROPion (WELLBUTRIN SR) 150 MG TBSR 12 hr tablet Take 1 tablet (150 mg total) by mouth 2 (two) times daily. 180 tablet 3    carvedilol (COREG) 6.25 MG tablet Take 1 tablet (6.25 mg total) by mouth 2 (two) times daily with meals. 180 tablet 3    cyclobenzaprine (FLEXERIL) 5 MG tablet Take 2 tablets (10 mg total) by mouth 2 (two) times daily as needed for Muscle spasms. 60 tablet 2    DULoxetine (CYMBALTA) 30 MG capsule Take 1 capsule (30 mg total) by mouth once daily. 90 capsule 3    enoxaparin (LOVENOX) 120 mg/0.8 mL Syrg Inject 0.8 mLs (120 mg total) into the skin once daily. 10 Syringe 1    EPINEPHrine (EPIPEN) 0.3 mg/0.3 mL AtIn Inject 0.3 mLs (0.3 mg total) into the muscle once. 0.3 mL 4    fluconazole (DIFLUCAN) 150 MG Tab Take 1 tablet (150 mg total) by mouth once. May repeat dose after one week if symptom is still present for 1 dose 2 tablet 0    gabapentin (NEURONTIN) 300 MG capsule Take 1 capsule (300 mg total) by mouth 2 (two) times daily. 180 capsule 3    ibuprofen (ADVIL,MOTRIN) 600 MG tablet       losartan-hydrochlorothiazide 100-12.5 mg (HYZAAR) 100-12.5 mg Tab Take 1 tablet by mouth once daily. 90 tablet 3    pantoprazole (PROTONIX) 40 MG tablet Take 1 tablet (40 mg total) by mouth once daily. 90 tablet 3    potassium 99 mg Tab Take by mouth. 1 tablet Oral Every day      pramipexole (MIRAPEX) 0.5 MG tablet Take 0.5 mg by mouth daily as needed.      rOPINIRole (REQUIP) 1 MG tablet Take 1 tablet (1 mg total) by mouth every evening. 90 tablet 3    sulfamethoxazole-trimethoprim 800-160mg (BACTRIM DS) 800-160 mg Tab Take 1 tablet by mouth 2 (two) times daily. for 3 days 6 tablet 0  "   traMADol (ULTRAM) 50 mg tablet Take 1 tablet (50 mg total) by mouth every 12 (twelve) hours as needed for Pain. 60 tablet 2    verapamil (VERELAN) 120 MG C24P Take 2 capsules (240 mg total) by mouth once daily. 180 capsule 3    zolpidem (AMBIEN) 5 MG Tab TAKE 1 TABLET BY MOUTH NIGHTLY AS NEEDED 15 tablet 5     Facility-Administered Encounter Medications as of 3/22/2019   Medication Dose Route Frequency Provider Last Rate Last Dose    albuterol inhaler 2 puff  2 puff Inhalation Q6H PRN Helena Jones MD        albuterol sulfate nebulizer solution 2.5 mg  2.5 mg Nebulization 1 time in Clinic/HOD Helena Jones MD        lactated ringers infusion   Intravenous Continuous Vanda Mathis MD   Stopped at 10/01/18 1140       Review of Systems   Constitutional: Negative for chills and fever.   HENT: Negative for congestion and facial swelling.    Eyes: Negative for discharge and itching.   Respiratory: Negative for cough and wheezing.    Cardiovascular: Negative for chest pain and palpitations.   Gastrointestinal: Negative for abdominal pain, nausea and vomiting.   Endocrine: Negative for cold intolerance and heat intolerance.   Genitourinary: Positive for dysuria. Negative for flank pain, vaginal bleeding and vaginal discharge.   Musculoskeletal: Negative for myalgias and neck stiffness.   Skin: Negative for pallor and wound.   Neurological: Negative for facial asymmetry and weakness.   Psychiatric/Behavioral: Negative for agitation and suicidal ideas.       Objective:      /72 (BP Location: Right arm, Patient Position: Sitting, BP Method: Medium (Manual))   Pulse 80   Temp 98.1 °F (36.7 °C) (Oral)   Ht 5' 6" (1.676 m)   Wt 87.1 kg (191 lb 14.6 oz)   BMI 30.98 kg/m²   Physical Exam   Constitutional: She is oriented to person, place, and time. She appears well-developed. No distress.   HENT:   Head: Normocephalic and atraumatic.   Right Ear: External ear normal.   Left Ear: External ear normal.   Eyes: " Conjunctivae and EOM are normal.   Neck: Neck supple. No thyromegaly present.   Cardiovascular: Normal rate and regular rhythm.   Pulmonary/Chest: Effort normal. No respiratory distress.   Abdominal: Soft. She exhibits no distension. There is no tenderness. There is no CVA tenderness.   Genitourinary:   Genitourinary Comments: deferred   Musculoskeletal: She exhibits no edema or deformity.   Lymphadenopathy:        Head (right side): No submandibular adenopathy present.        Head (left side): No submandibular adenopathy present.     She has no cervical adenopathy.   Neurological: She is alert and oriented to person, place, and time.   Skin: Skin is warm and dry.   Psychiatric: She has a normal mood and affect. Her behavior is normal.   Nursing note and vitals reviewed.      Results for orders placed or performed in visit on 03/22/19   POCT URINE DIPSTICK WITHOUT MICROSCOPE   Result Value Ref Range    Color, UA dark yellow     Spec Grav UA 1.010     pH, UA 7     WBC, UA neg     Nitrite, UA neg     Protein trace     Glucose, UA norm     Ketones, UA neg     Urobilinogen, UA norm     Bilirubin neg     Blood, UA trace      Assessment:       1. Cystitis    2. Acute vaginitis        Plan:   Cystitis:  Self reported UTI, abx sent to pharm and patient advised to fill if sxs persists.  -     POCT URINE DIPSTICK WITHOUT MICROSCOPE ; negative for nitrite and leukocytes  -     sulfamethoxazole-trimethoprim 800-160mg (BACTRIM DS) 800-160 mg Tab; Take 1 tablet by mouth 2 (two) times daily. for 3 days  Dispense: 6 tablet; Refill: 0    Acute vaginitis: clinical diagnosis  -     fluconazole (DIFLUCAN) 150 MG Tab; Take 1 tablet (150 mg total) by mouth once. May repeat dose after one week if symptom is still present for 1 dose  Dispense: 2 tablet; Refill: 0

## 2019-03-26 ENCOUNTER — TELEPHONE (OUTPATIENT)
Dept: CARDIOLOGY | Facility: CLINIC | Age: 64
End: 2019-03-26

## 2019-03-26 NOTE — TELEPHONE ENCOUNTER
Spoke with Dr Lefluer's nurse at  gave her Dr Umanzor's number to clarify Lovenox with Dr Umanzor directly.

## 2019-04-30 ENCOUNTER — OFFICE VISIT (OUTPATIENT)
Dept: FAMILY MEDICINE | Facility: CLINIC | Age: 64
End: 2019-04-30
Payer: COMMERCIAL

## 2019-04-30 ENCOUNTER — HOSPITAL ENCOUNTER (OUTPATIENT)
Dept: RADIOLOGY | Facility: HOSPITAL | Age: 64
Discharge: HOME | End: 2019-04-30
Attending: FAMILY MEDICINE
Payer: COMMERCIAL

## 2019-04-30 VITALS
HEART RATE: 81 BPM | OXYGEN SATURATION: 95 % | HEIGHT: 66 IN | BODY MASS INDEX: 30.44 KG/M2 | TEMPERATURE: 97 F | WEIGHT: 189.38 LBS | DIASTOLIC BLOOD PRESSURE: 70 MMHG | SYSTOLIC BLOOD PRESSURE: 124 MMHG

## 2019-04-30 DIAGNOSIS — R05.3 PERSISTENT COUGH: Primary | ICD-10-CM

## 2019-04-30 DIAGNOSIS — L29.9 ITCHING: ICD-10-CM

## 2019-04-30 DIAGNOSIS — M79.645 PAIN OF LEFT THUMB: ICD-10-CM

## 2019-04-30 DIAGNOSIS — R05.3 PERSISTENT COUGH: ICD-10-CM

## 2019-04-30 PROCEDURE — 99214 OFFICE O/P EST MOD 30 MIN: CPT | Mod: S$GLB,,, | Performed by: FAMILY MEDICINE

## 2019-04-30 PROCEDURE — 3074F PR MOST RECENT SYSTOLIC BLOOD PRESSURE < 130 MM HG: ICD-10-PCS | Mod: CPTII,S$GLB,, | Performed by: FAMILY MEDICINE

## 2019-04-30 PROCEDURE — 3008F BODY MASS INDEX DOCD: CPT | Mod: CPTII,S$GLB,, | Performed by: FAMILY MEDICINE

## 2019-04-30 PROCEDURE — 71046 X-RAY EXAM CHEST 2 VIEWS: CPT | Mod: TC,FY,PO

## 2019-04-30 PROCEDURE — 71046 XR CHEST PA AND LATERAL: ICD-10-PCS | Mod: 26,,, | Performed by: RADIOLOGY

## 2019-04-30 PROCEDURE — 99999 PR PBB SHADOW E&M-EST. PATIENT-LVL III: CPT | Mod: PBBFAC,,, | Performed by: FAMILY MEDICINE

## 2019-04-30 PROCEDURE — 71046 X-RAY EXAM CHEST 2 VIEWS: CPT | Mod: 26,,, | Performed by: RADIOLOGY

## 2019-04-30 PROCEDURE — 3008F PR BODY MASS INDEX (BMI) DOCUMENTED: ICD-10-PCS | Mod: CPTII,S$GLB,, | Performed by: FAMILY MEDICINE

## 2019-04-30 PROCEDURE — 73140 XR FINGER 2 OR MORE VIEWS: ICD-10-PCS | Mod: 26,LT,, | Performed by: RADIOLOGY

## 2019-04-30 PROCEDURE — 73140 X-RAY EXAM OF FINGER(S): CPT | Mod: 26,LT,, | Performed by: RADIOLOGY

## 2019-04-30 PROCEDURE — 3078F PR MOST RECENT DIASTOLIC BLOOD PRESSURE < 80 MM HG: ICD-10-PCS | Mod: CPTII,S$GLB,, | Performed by: FAMILY MEDICINE

## 2019-04-30 PROCEDURE — 73140 X-RAY EXAM OF FINGER(S): CPT | Mod: TC,FY,PO

## 2019-04-30 PROCEDURE — 99999 PR PBB SHADOW E&M-EST. PATIENT-LVL III: ICD-10-PCS | Mod: PBBFAC,,, | Performed by: FAMILY MEDICINE

## 2019-04-30 PROCEDURE — 3078F DIAST BP <80 MM HG: CPT | Mod: CPTII,S$GLB,, | Performed by: FAMILY MEDICINE

## 2019-04-30 PROCEDURE — 3074F SYST BP LT 130 MM HG: CPT | Mod: CPTII,S$GLB,, | Performed by: FAMILY MEDICINE

## 2019-04-30 PROCEDURE — 99214 PR OFFICE/OUTPT VISIT, EST, LEVL IV, 30-39 MIN: ICD-10-PCS | Mod: S$GLB,,, | Performed by: FAMILY MEDICINE

## 2019-04-30 RX ORDER — AMOXICILLIN AND CLAVULANATE POTASSIUM 875; 125 MG/1; MG/1
1 TABLET, FILM COATED ORAL EVERY 12 HOURS
Qty: 14 TABLET | Refills: 0 | Status: SHIPPED | OUTPATIENT
Start: 2019-04-30 | End: 2019-05-07

## 2019-04-30 RX ORDER — BENZONATATE 100 MG/1
100 CAPSULE ORAL 3 TIMES DAILY PRN
Qty: 30 CAPSULE | Refills: 1 | Status: SHIPPED | OUTPATIENT
Start: 2019-04-30 | End: 2020-10-15

## 2019-04-30 RX ORDER — OXYCODONE AND ACETAMINOPHEN 10; 325 MG/1; MG/1
TABLET ORAL
Refills: 0 | COMMUNITY
Start: 2019-04-23 | End: 2019-09-10

## 2019-04-30 RX ORDER — HYDROXYZINE PAMOATE 25 MG/1
25 CAPSULE ORAL EVERY 8 HOURS PRN
Qty: 30 CAPSULE | Refills: 2 | Status: SHIPPED | OUTPATIENT
Start: 2019-04-30 | End: 2020-10-15

## 2019-04-30 NOTE — PROGRESS NOTES
Chief Complaint:    Chief Complaint   Patient presents with    Cough       History of Present Illness:  Patient is status post neck surgery on April 2nd ever since she has had this cough some sputum production but denies any fever    She also complains of pain in the left thumb the last several weeks no tingling numbness she seems to think that she may have injured it.    She has also been itching all over body she is on oxycodone for pain.      ROS:  Review of Systems   Constitutional: Negative for activity change, chills, fatigue, fever and unexpected weight change.   HENT: Positive for congestion. Negative for ear discharge, ear pain, hearing loss, postnasal drip and rhinorrhea.    Eyes: Negative for pain and visual disturbance.   Respiratory: Positive for cough. Negative for chest tightness and shortness of breath.    Cardiovascular: Negative for chest pain and palpitations.   Gastrointestinal: Negative for abdominal pain, diarrhea and vomiting.   Endocrine: Negative for heat intolerance.   Genitourinary: Negative for dysuria, flank pain, frequency and hematuria.   Musculoskeletal: Negative for back pain, gait problem and neck pain.   Skin: Negative for color change and rash.   Neurological: Negative for dizziness, tremors, seizures, numbness and headaches.   Psychiatric/Behavioral: Negative for agitation, hallucinations, self-injury, sleep disturbance and suicidal ideas. The patient is not nervous/anxious.        Past Medical History:   Diagnosis Date    Anxiety     Arthritis     Asthma     Depression     Hyperlipidemia     Hypertension     RLS (restless legs syndrome)        Social History:  Social History     Socioeconomic History    Marital status:      Spouse name: Not on file    Number of children: 2    Years of education: Not on file    Highest education level: Not on file   Occupational History    Not on file   Social Needs    Financial resource strain: Not on file    Food  "insecurity:     Worry: Not on file     Inability: Not on file    Transportation needs:     Medical: Not on file     Non-medical: Not on file   Tobacco Use    Smoking status: Former Smoker     Last attempt to quit: 6/10/2012     Years since quittin.8    Smokeless tobacco: Never Used    Tobacco comment: Would smoke 1 cigarette every few months   Substance and Sexual Activity    Alcohol use: Yes     Alcohol/week: 0.6 oz     Types: 1 Standard drinks or equivalent per week     Comment: social    Drug use: No    Sexual activity: Yes     Partners: Male     Birth control/protection: None   Lifestyle    Physical activity:     Days per week: Not on file     Minutes per session: Not on file    Stress: Not on file   Relationships    Social connections:     Talks on phone: Not on file     Gets together: Not on file     Attends Anabaptist service: Not on file     Active member of club or organization: Not on file     Attends meetings of clubs or organizations: Not on file     Relationship status: Not on file   Other Topics Concern    Not on file   Social History Narrative    Not on file       Family History:   family history includes Cancer in her maternal grandfather and maternal grandmother; Clotting disorder in her maternal grandfather, maternal grandmother, and mother; Hemochromatosis in her mother; Hypertension in her father and mother.    Health Maintenance   Topic Date Due    Mammogram  2019    Lipid Panel  2020    Colonoscopy  10/01/2023    TETANUS VACCINE  2026    Hepatitis C Screening  Completed    Zoster Vaccine  Completed    Influenza Vaccine  Completed       Physical Exam:    Vital Signs  Temp: 97.4 °F (36.3 °C)  Temp src: Tympanic  Pulse: 81  SpO2: 95 %  BP: 124/70  BP Location: Left arm  Patient Position: Sitting  Pain Score: 0-No pain  Height and Weight  Height: 5' 6" (167.6 cm)  Weight: 85.9 kg (189 lb 6 oz)  BSA (Calculated - sq m): 2 sq meters  BMI (Calculated): " 30.6  Weight in (lb) to have BMI = 25: 154.6]    Body mass index is 30.57 kg/m².    Physical Exam   Constitutional: She is oriented to person, place, and time. She appears well-developed.   HENT:   Mouth/Throat: Oropharynx is clear and moist.   Eyes: Pupils are equal, round, and reactive to light. Conjunctivae are normal.   Neck: Normal range of motion. Neck supple.   Cardiovascular: Normal rate, regular rhythm and normal heart sounds.   No murmur heard.  Pulmonary/Chest: Effort normal and breath sounds normal. No respiratory distress. She has no wheezes. She has no rales. She exhibits no tenderness.   Abdominal: Soft. She exhibits no distension and no mass. There is no tenderness. There is no guarding.   Musculoskeletal: She exhibits no edema or tenderness.        Hands:  Lymphadenopathy:     She has no cervical adenopathy.   Neurological: She is alert and oriented to person, place, and time. She has normal reflexes.   Skin: Skin is warm and dry.   No skin lesion seen   Psychiatric: She has a normal mood and affect. Her behavior is normal. Judgment and thought content normal.         Assessment:      ICD-10-CM ICD-9-CM   1. Persistent cough R05 786.2   2. Itching L29.9 698.9   3. Pain of left thumb M79.645 729.5         Plan:    Will chest x-ray to rule out pneumonia treat with Augmentin  Will x-ray the thumb if negative consider orthopedic consultation may be tendinitis  Itching could be medication side effect from oxycodone please wean off oxycodone completely and see if the itching goes away Atarax given for itching      Orders Placed This Encounter   Procedures    X-Ray Chest PA And Lateral    X-Ray Finger 2 or More Views       Current Outpatient Medications   Medication Sig Dispense Refill    acetaminophen (TYLENOL EXTRA STRENGTH) 500 MG tablet Take 500 mg by mouth every 6 (six) hours as needed for Pain.      apixaban (ELIQUIS) 5 mg Tab Take 1 tablet (5 mg total) by mouth 2 (two) times daily. 60 tablet 5     ascorbic acid, vitamin C, (VITAMIN C) 1000 MG tablet Take 1,000 mg by mouth once daily.      buPROPion (WELLBUTRIN SR) 150 MG TBSR 12 hr tablet Take 1 tablet (150 mg total) by mouth 2 (two) times daily. 180 tablet 3    carvedilol (COREG) 6.25 MG tablet Take 1 tablet (6.25 mg total) by mouth 2 (two) times daily with meals. 180 tablet 3    cyclobenzaprine (FLEXERIL) 5 MG tablet Take 2 tablets (10 mg total) by mouth 2 (two) times daily as needed for Muscle spasms. 60 tablet 2    DULoxetine (CYMBALTA) 30 MG capsule Take 1 capsule (30 mg total) by mouth once daily. 90 capsule 3    enoxaparin (LOVENOX) 120 mg/0.8 mL Syrg Inject 0.8 mLs (120 mg total) into the skin once daily. 10 Syringe 1    gabapentin (NEURONTIN) 300 MG capsule Take 1 capsule (300 mg total) by mouth 2 (two) times daily. 180 capsule 3    ibuprofen (ADVIL,MOTRIN) 600 MG tablet       losartan-hydrochlorothiazide 100-12.5 mg (HYZAAR) 100-12.5 mg Tab Take 1 tablet by mouth once daily. 90 tablet 3    oxyCODONE-acetaminophen (PERCOCET)  mg per tablet TK 1 T PO Q 4 H PRN P  0    pantoprazole (PROTONIX) 40 MG tablet Take 1 tablet (40 mg total) by mouth once daily. 90 tablet 3    potassium 99 mg Tab Take by mouth. 1 tablet Oral Every day      pramipexole (MIRAPEX) 0.5 MG tablet Take 0.5 mg by mouth daily as needed.      rOPINIRole (REQUIP) 1 MG tablet Take 1 tablet (1 mg total) by mouth every evening. 90 tablet 3    traMADol (ULTRAM) 50 mg tablet Take 1 tablet (50 mg total) by mouth every 12 (twelve) hours as needed for Pain. 60 tablet 2    verapamil (VERELAN) 120 MG C24P Take 2 capsules (240 mg total) by mouth once daily. 180 capsule 3    zolpidem (AMBIEN) 5 MG Tab TAKE 1 TABLET BY MOUTH NIGHTLY AS NEEDED 15 tablet 5    amoxicillin-clavulanate 875-125mg (AUGMENTIN) 875-125 mg per tablet Take 1 tablet by mouth every 12 (twelve) hours. for 7 days 14 tablet 0    benzonatate (TESSALON) 100 MG capsule Take 1 capsule (100 mg total) by mouth  3 (three) times daily as needed for Cough. 30 capsule 1    EPINEPHrine (EPIPEN) 0.3 mg/0.3 mL AtIn Inject 0.3 mLs (0.3 mg total) into the muscle once. 0.3 mL 4    hydrOXYzine pamoate (VISTARIL) 25 MG Cap Take 1 capsule (25 mg total) by mouth every 8 (eight) hours as needed. 30 capsule 2     Current Facility-Administered Medications   Medication Dose Route Frequency Provider Last Rate Last Dose    albuterol inhaler 2 puff  2 puff Inhalation Q6H PRN Helena Jones MD        albuterol sulfate nebulizer solution 2.5 mg  2.5 mg Nebulization 1 time in Clinic/HOD Helena Jones MD         Facility-Administered Medications Ordered in Other Visits   Medication Dose Route Frequency Provider Last Rate Last Dose    lactated ringers infusion   Intravenous Continuous Vanda Mathis MD   Stopped at 10/01/18 1140       There are no discontinued medications.    No follow-ups on file.      Helena Jones MD

## 2019-07-10 RX ORDER — PANTOPRAZOLE SODIUM 40 MG/1
TABLET, DELAYED RELEASE ORAL
Qty: 90 TABLET | Refills: 3 | Status: SHIPPED | OUTPATIENT
Start: 2019-07-10 | End: 2019-11-07 | Stop reason: SDUPTHER

## 2019-07-25 ENCOUNTER — TELEPHONE (OUTPATIENT)
Dept: FAMILY MEDICINE | Facility: CLINIC | Age: 64
End: 2019-07-25

## 2019-07-25 DIAGNOSIS — Z12.31 ENCOUNTER FOR SCREENING MAMMOGRAM FOR MALIGNANT NEOPLASM OF BREAST: Primary | ICD-10-CM

## 2019-07-25 NOTE — TELEPHONE ENCOUNTER
----- Message from Izabela Baumann sent at 7/25/2019 12:36 PM CDT -----  Contact: PATIENT  CALLING TO GET ORDERS TO SCHEDULE A MAMMOGRAM. PLEASE CALL PATIENT [phone]. THANKS, GEO

## 2019-08-09 ENCOUNTER — PATIENT MESSAGE (OUTPATIENT)
Dept: CARDIOLOGY | Facility: CLINIC | Age: 64
End: 2019-08-09

## 2019-08-14 RX ORDER — LOSARTAN POTASSIUM AND HYDROCHLOROTHIAZIDE 12.5; 5 MG/1; MG/1
TABLET ORAL
Qty: 90 TABLET | Refills: 3 | Status: SHIPPED | OUTPATIENT
Start: 2019-08-14 | End: 2019-08-15

## 2019-08-15 NOTE — TELEPHONE ENCOUNTER
----- Message from Hope Langston sent at 8/15/2019 12:31 PM CDT -----  Contact: Pt  Type:  Patient Returning Call    Who Called: pt   Who Left Message for Patient:nurse   Does the patient know what this is regarding?:a question about meds   Would the patient rather a call back or a response via [facility]? Phone   Best Call Back Number: [phone]  Additional Information:

## 2019-08-15 NOTE — TELEPHONE ENCOUNTER
Spoke with patient and she is only taking the Losartan/HCTZ 100/12.5. Will notify [facility]. Medication record updated.

## 2019-09-09 DIAGNOSIS — I48.0 PAF (PAROXYSMAL ATRIAL FIBRILLATION): Primary | ICD-10-CM

## 2019-09-10 ENCOUNTER — CLINICAL SUPPORT (OUTPATIENT)
Dept: CARDIOLOGY | Facility: CLINIC | Age: 64
End: 2019-09-10
Payer: COMMERCIAL

## 2019-09-10 ENCOUNTER — HOSPITAL ENCOUNTER (OUTPATIENT)
Dept: RADIOLOGY | Facility: HOSPITAL | Age: 64
Discharge: HOME | End: 2019-09-10
Attending: FAMILY MEDICINE
Payer: COMMERCIAL

## 2019-09-10 ENCOUNTER — OFFICE VISIT (OUTPATIENT)
Dept: CARDIOLOGY | Facility: CLINIC | Age: 64
End: 2019-09-10
Payer: COMMERCIAL

## 2019-09-10 VITALS
BODY MASS INDEX: 29.73 KG/M2 | HEART RATE: 74 BPM | WEIGHT: 189 LBS | SYSTOLIC BLOOD PRESSURE: 124 MMHG | DIASTOLIC BLOOD PRESSURE: 66 MMHG | HEIGHT: 66 IN | WEIGHT: 185 LBS | BODY MASS INDEX: 30.37 KG/M2 | HEIGHT: 66 IN

## 2019-09-10 DIAGNOSIS — I10 ESSENTIAL HYPERTENSION: ICD-10-CM

## 2019-09-10 DIAGNOSIS — Z78.9 STATIN INTOLERANCE: ICD-10-CM

## 2019-09-10 DIAGNOSIS — E78.2 MIXED HYPERLIPIDEMIA: ICD-10-CM

## 2019-09-10 DIAGNOSIS — I48.0 PAF (PAROXYSMAL ATRIAL FIBRILLATION): ICD-10-CM

## 2019-09-10 DIAGNOSIS — I48.0 PAF (PAROXYSMAL ATRIAL FIBRILLATION): Primary | ICD-10-CM

## 2019-09-10 DIAGNOSIS — Z12.31 ENCOUNTER FOR SCREENING MAMMOGRAM FOR MALIGNANT NEOPLASM OF BREAST: ICD-10-CM

## 2019-09-10 PROCEDURE — 77063 MAMMO DIGITAL SCREENING BILAT WITH TOMOSYNTHESIS_CAD: ICD-10-PCS | Mod: 26,,, | Performed by: RADIOLOGY

## 2019-09-10 PROCEDURE — 77067 SCR MAMMO BI INCL CAD: CPT | Mod: 26,,, | Performed by: RADIOLOGY

## 2019-09-10 PROCEDURE — 99214 PR OFFICE/OUTPT VISIT, EST, LEVL IV, 30-39 MIN: ICD-10-PCS | Mod: S$GLB,,, | Performed by: INTERNAL MEDICINE

## 2019-09-10 PROCEDURE — 99214 OFFICE O/P EST MOD 30 MIN: CPT | Mod: S$GLB,,, | Performed by: INTERNAL MEDICINE

## 2019-09-10 PROCEDURE — 3078F DIAST BP <80 MM HG: CPT | Mod: CPTII,S$GLB,, | Performed by: INTERNAL MEDICINE

## 2019-09-10 PROCEDURE — 93010 EKG 12-LEAD: ICD-10-PCS | Mod: S$GLB,,, | Performed by: INTERNAL MEDICINE

## 2019-09-10 PROCEDURE — 3008F BODY MASS INDEX DOCD: CPT | Mod: CPTII,S$GLB,, | Performed by: INTERNAL MEDICINE

## 2019-09-10 PROCEDURE — 3008F PR BODY MASS INDEX (BMI) DOCUMENTED: ICD-10-PCS | Mod: CPTII,S$GLB,, | Performed by: INTERNAL MEDICINE

## 2019-09-10 PROCEDURE — 3078F PR MOST RECENT DIASTOLIC BLOOD PRESSURE < 80 MM HG: ICD-10-PCS | Mod: CPTII,S$GLB,, | Performed by: INTERNAL MEDICINE

## 2019-09-10 PROCEDURE — 99999 PR PBB SHADOW E&M-EST. PATIENT-LVL III: ICD-10-PCS | Mod: PBBFAC,,, | Performed by: INTERNAL MEDICINE

## 2019-09-10 PROCEDURE — 93005 ELECTROCARDIOGRAM TRACING: CPT | Mod: S$GLB,,, | Performed by: INTERNAL MEDICINE

## 2019-09-10 PROCEDURE — 3074F SYST BP LT 130 MM HG: CPT | Mod: CPTII,S$GLB,, | Performed by: INTERNAL MEDICINE

## 2019-09-10 PROCEDURE — 3074F PR MOST RECENT SYSTOLIC BLOOD PRESSURE < 130 MM HG: ICD-10-PCS | Mod: CPTII,S$GLB,, | Performed by: INTERNAL MEDICINE

## 2019-09-10 PROCEDURE — 93010 ELECTROCARDIOGRAM REPORT: CPT | Mod: S$GLB,,, | Performed by: INTERNAL MEDICINE

## 2019-09-10 PROCEDURE — 77067 MAMMO DIGITAL SCREENING BILAT WITH TOMOSYNTHESIS_CAD: ICD-10-PCS | Mod: 26,,, | Performed by: RADIOLOGY

## 2019-09-10 PROCEDURE — 77067 SCR MAMMO BI INCL CAD: CPT | Mod: TC

## 2019-09-10 PROCEDURE — 93005 EKG 12-LEAD: ICD-10-PCS | Mod: S$GLB,,, | Performed by: INTERNAL MEDICINE

## 2019-09-10 PROCEDURE — 77063 BREAST TOMOSYNTHESIS BI: CPT | Mod: 26,,, | Performed by: RADIOLOGY

## 2019-09-10 PROCEDURE — 99999 PR PBB SHADOW E&M-EST. PATIENT-LVL III: CPT | Mod: PBBFAC,,, | Performed by: INTERNAL MEDICINE

## 2019-09-10 NOTE — PROGRESS NOTES
Subjective:   Patient ID:  Erin Soriano is a 64 y.o. female who presents for follow up of Follow-up      62 yo female routine f/u. Physical work at dog rescue.  PMH HTN, HLD, GERD, anxiety and PAF. Fibromyalgia. Negative sleep study before.   EGD and colonoscopy done on 10/01/2018 normal.  Pt was told having PAF during the EGD.  MPI and ECHO done in  normal EF and no stress induced ischemia  Her ZIOPATCH done in  showed 3% PAF and PSVT.  No smoking/drinking  Mother has afib. Father CHF  Taking excedrin for migraine    Episode of chest tightness, palpitation and dizziness for one month, lasted for 5 minutes and resolved spontaneously. Pt states that she had similar episodes very often last year and much less frequency since started Coreg and verapamil.   Med compliance  Busy in animal rescue              Past Medical History:   Diagnosis Date    Anxiety     Arthritis     Asthma     Depression     Hyperlipidemia     Hypertension     RLS (restless legs syndrome)        Past Surgical History:   Procedure Laterality Date    ADENOIDECTOMY      pt was 3 yrs old    carpel tunnel      2009     SECTION      2 different ones    COLONOSCOPY N/A 10/1/2018    [facility]    ESOPHAGOGASTRODUODENOSCOPY (EGD) N/A 10/1/2018    [facility]    golfers elbow      2009    HYSTERECTOMY      1994 total    OOPHORECTOMY      TONSILLECTOMY      pt was 3 yrs old at time       Social History     Tobacco Use    Smoking status: Former Smoker     Last attempt to quit: 6/10/2012     Years since quittin.2    Smokeless tobacco: Never Used    Tobacco comment: Would smoke 1 cigarette every few months   Substance Use Topics    Alcohol use: Yes     Alcohol/week: 0.6 oz     Types: 1 Standard drinks or equivalent per week     Comment: social    Drug use: No       Family History   Problem Relation Age of Onset    Hypertension Mother      Clotting disorder Mother     Hemochromatosis Mother     Hypertension Father     Cancer Maternal Grandmother     Clotting disorder Maternal Grandmother     Cancer Maternal Grandfather         prostate    Clotting disorder Maternal Grandfather          Review of Systems   Constitution: Positive for malaise/fatigue. Negative for decreased appetite, diaphoresis, fever and night sweats.   HENT: Negative for nosebleeds.    Eyes: Negative for blurred vision and double vision.   Cardiovascular: Positive for chest pain, dyspnea on exertion and near-syncope. Negative for claudication, irregular heartbeat, leg swelling, orthopnea, palpitations, paroxysmal nocturnal dyspnea and syncope.   Respiratory: Negative for cough, shortness of breath, sleep disturbances due to breathing, snoring, sputum production and wheezing.    Endocrine: Negative for cold intolerance and polyuria.   Hematologic/Lymphatic: Does not bruise/bleed easily.   Skin: Negative for rash.   Musculoskeletal: Positive for arthritis and back pain. Negative for falls, joint pain, joint swelling and neck pain.   Gastrointestinal: Negative for abdominal pain, heartburn, nausea and vomiting.   Genitourinary: Negative for dysuria, frequency and hematuria.   Neurological: Negative for difficulty with concentration, dizziness, focal weakness, headaches, light-headedness, numbness, seizures and weakness.   Psychiatric/Behavioral: Negative for depression, memory loss and substance abuse. The patient does not have insomnia.    Allergic/Immunologic: Negative for HIV exposure and hives.       Objective:   Physical Exam   Constitutional: She is oriented to person, place, and time. She appears well-nourished.   HENT:   Head: Normocephalic.   Eyes: Pupils are equal, round, and reactive to light.   Neck: Normal carotid pulses and no JVD present. Carotid bruit is not present. No thyromegaly present.   Cardiovascular: Normal rate, regular rhythm, normal heart sounds and  normal pulses.  No extrasystoles are present. PMI is not displaced. Exam reveals no gallop and no S3.   No murmur heard.  Pulmonary/Chest: Breath sounds normal. No stridor. No respiratory distress.   Abdominal: Soft. Bowel sounds are normal. There is no tenderness. There is no rebound.   Musculoskeletal: Normal range of motion.   Neurological: She is alert and oriented to person, place, and time.   Skin: Skin is intact. No rash noted.   Psychiatric: Her behavior is normal.       Lab Results   Component Value Date    CHOL 189 02/12/2019    CHOL 197 02/07/2018    CHOL 209 (H) 05/11/2016     Lab Results   Component Value Date    HDL 40 02/12/2019    HDL 39 (L) 02/07/2018    HDL 40 05/11/2016     Lab Results   Component Value Date    LDLCALC 85.2 02/12/2019    LDLCALC 122.4 02/07/2018    LDLCALC 134.4 05/11/2016     Lab Results   Component Value Date    TRIG 319 (H) 02/12/2019    TRIG 178 (H) 02/07/2018    TRIG 173 (H) 05/11/2016     Lab Results   Component Value Date    CHOLHDL 21.2 02/12/2019    CHOLHDL 19.8 (L) 02/07/2018    CHOLHDL 19.1 (L) 05/11/2016       Chemistry        Component Value Date/Time     02/27/2019 1403    K 4.5 02/27/2019 1403     02/27/2019 1403    CO2 30 02/27/2019 1403    BUN 27 (H) 02/27/2019 1403    CREATININE 0.79 02/27/2019 1403     (H) 02/27/2019 1403        Component Value Date/Time    CALCIUM 9.3 02/27/2019 1403    ALKPHOS 107 02/27/2019 1403    AST 39 (H) 02/27/2019 1403    ALT 32 02/27/2019 1403    BILITOT 0.4 02/27/2019 1403    ESTGFRAFRICA >60 02/27/2019 1403    EGFRNONAA >60 02/27/2019 1403          Lab Results   Component Value Date    HGBA1C 5.7 (H) 02/07/2018     Lab Results   Component Value Date    TSH 1.222 05/16/2012     Lab Results   Component Value Date    INR 1.0 09/20/2010     Lab Results   Component Value Date    WBC 10.18 02/27/2019    HGB 14.3 02/27/2019    HCT 43.8 02/27/2019    MCV 83 02/27/2019     02/27/2019     BMP  Sodium   Date Value Ref  Range Status   02/27/2019 140 136 - 145 mmol/L Final     Potassium   Date Value Ref Range Status   02/27/2019 4.5 3.5 - 5.1 mmol/L Final     Chloride   Date Value Ref Range Status   02/27/2019 104 95 - 110 mmol/L Final     CO2   Date Value Ref Range Status   02/27/2019 30 22 - 31 mmol/L Final     BUN, Bld   Date Value Ref Range Status   02/27/2019 27 (H) 7 - 18 mg/dL Final     Creatinine   Date Value Ref Range Status   02/27/2019 0.79 0.50 - 1.40 mg/dL Final     Calcium   Date Value Ref Range Status   02/27/2019 9.3 8.4 - 10.2 mg/dL Final     Anion Gap   Date Value Ref Range Status   02/27/2019 6 (L) 8 - 16 mmol/L Final     eGFR if    Date Value Ref Range Status   02/27/2019 >60 >60 mL/min/1.73 m^2 Final     eGFR if non    Date Value Ref Range Status   02/27/2019 >60 >60 mL/min/1.73 m^2 Final     Comment:     Calculation used to obtain the estimated glomerular filtration  rate (eGFR) is the CKD-EPI equation.        BNP  @LABRCNTIP(BNP,BNPTRIAGEBLO)@  @LABRCNTIP(troponini)@  CrCl cannot be calculated (Patient's most recent lab result is older than the maximum 7 days allowed.).  No results found in the last 24 hours.  No results found in the last 24 hours.  No results found in the last 24 hours.    Assessment:      1. PAF (paroxysmal atrial fibrillation)    2. Paroxysmal atrial fibrillation    3. Essential hypertension    4. Mixed hyperlipidemia    5. Statin intolerance      PAF on sinus CHADSVAS score 1 for HTN and 1 for gender  BP and LDL reviewed    Plan:   D/c ELiquis  Continue Excedrin for migraine. ASA included in Excedrin  Continue coreg, verapamil and Hyzaar  Recommend heart-healthy diet, weight control and regular exercise.  Keegan. Risk modification.   RTC in 6 months    I have reviewed all pertinent labs and cardiac studies. Plans and recommendations have been formulated under my direct supervision. All questions answered and patient voiced understanding. Patient to continue  current medications.

## 2019-10-23 RX ORDER — CARVEDILOL 6.25 MG/1
TABLET ORAL
Qty: 180 TABLET | Refills: 4 | Status: SHIPPED | OUTPATIENT
Start: 2019-10-23 | End: 2019-11-07 | Stop reason: SDUPTHER

## 2019-11-04 RX ORDER — ROPINIROLE 1 MG/1
TABLET, FILM COATED ORAL
Qty: 90 TABLET | Refills: 4 | Status: SHIPPED | OUTPATIENT
Start: 2019-11-04 | End: 2019-11-07

## 2019-11-07 ENCOUNTER — OFFICE VISIT (OUTPATIENT)
Dept: FAMILY MEDICINE | Facility: CLINIC | Age: 64
End: 2019-11-07
Payer: COMMERCIAL

## 2019-11-07 ENCOUNTER — LAB VISIT (OUTPATIENT)
Dept: LAB | Facility: HOSPITAL | Age: 64
End: 2019-11-07
Attending: FAMILY MEDICINE
Payer: COMMERCIAL

## 2019-11-07 VITALS
DIASTOLIC BLOOD PRESSURE: 76 MMHG | BODY MASS INDEX: 29.83 KG/M2 | OXYGEN SATURATION: 98 % | HEART RATE: 75 BPM | TEMPERATURE: 99 F | RESPIRATION RATE: 18 BRPM | HEIGHT: 66 IN | WEIGHT: 185.63 LBS | SYSTOLIC BLOOD PRESSURE: 132 MMHG

## 2019-11-07 DIAGNOSIS — G25.81 RLS (RESTLESS LEGS SYNDROME): Primary | ICD-10-CM

## 2019-11-07 DIAGNOSIS — I10 ESSENTIAL HYPERTENSION: ICD-10-CM

## 2019-11-07 DIAGNOSIS — M79.7 FIBROMYALGIA: ICD-10-CM

## 2019-11-07 DIAGNOSIS — G25.81 RLS (RESTLESS LEGS SYNDROME): ICD-10-CM

## 2019-11-07 DIAGNOSIS — F51.04 CHRONIC INSOMNIA: ICD-10-CM

## 2019-11-07 DIAGNOSIS — K21.9 GASTROESOPHAGEAL REFLUX DISEASE, ESOPHAGITIS PRESENCE NOT SPECIFIED: ICD-10-CM

## 2019-11-07 LAB
ALBUMIN SERPL BCP-MCNC: 4.1 G/DL (ref 3.5–5.2)
ALP SERPL-CCNC: 148 U/L (ref 55–135)
ALT SERPL W/O P-5'-P-CCNC: 31 U/L (ref 10–44)
ANION GAP SERPL CALC-SCNC: 13 MMOL/L (ref 8–16)
AST SERPL-CCNC: 25 U/L (ref 10–40)
BASOPHILS # BLD AUTO: 0.12 K/UL (ref 0–0.2)
BASOPHILS NFR BLD: 1.1 % (ref 0–1.9)
BILIRUB SERPL-MCNC: 0.4 MG/DL (ref 0.1–1)
BUN SERPL-MCNC: 16 MG/DL (ref 8–23)
CALCIUM SERPL-MCNC: 9.9 MG/DL (ref 8.7–10.5)
CHLORIDE SERPL-SCNC: 103 MMOL/L (ref 95–110)
CHOLEST SERPL-MCNC: 217 MG/DL (ref 120–199)
CHOLEST/HDLC SERPL: 5 {RATIO} (ref 2–5)
CO2 SERPL-SCNC: 25 MMOL/L (ref 23–29)
CREAT SERPL-MCNC: 0.9 MG/DL (ref 0.5–1.4)
DIFFERENTIAL METHOD: ABNORMAL
EOSINOPHIL # BLD AUTO: 0.3 K/UL (ref 0–0.5)
EOSINOPHIL NFR BLD: 3 % (ref 0–8)
ERYTHROCYTE [DISTWIDTH] IN BLOOD BY AUTOMATED COUNT: 13.8 % (ref 11.5–14.5)
ERYTHROCYTE [SEDIMENTATION RATE] IN BLOOD BY WESTERGREN METHOD: 23 MM/HR (ref 0–36)
EST. GFR  (AFRICAN AMERICAN): >60 ML/MIN/1.73 M^2
EST. GFR  (NON AFRICAN AMERICAN): >60 ML/MIN/1.73 M^2
ESTIMATED AVG GLUCOSE: 126 MG/DL (ref 68–131)
GLUCOSE SERPL-MCNC: 103 MG/DL (ref 70–110)
HBA1C MFR BLD HPLC: 6 % (ref 4–5.6)
HCT VFR BLD AUTO: 44.8 % (ref 37–48.5)
HDLC SERPL-MCNC: 43 MG/DL (ref 40–75)
HDLC SERPL: 19.8 % (ref 20–50)
HGB BLD-MCNC: 14.2 G/DL (ref 12–16)
IMM GRANULOCYTES # BLD AUTO: 0.04 K/UL (ref 0–0.04)
IMM GRANULOCYTES NFR BLD AUTO: 0.4 % (ref 0–0.5)
IRON SERPL-MCNC: 49 UG/DL (ref 30–160)
LDLC SERPL CALC-MCNC: 128.8 MG/DL (ref 63–159)
LYMPHOCYTES # BLD AUTO: 3.4 K/UL (ref 1–4.8)
LYMPHOCYTES NFR BLD: 31.7 % (ref 18–48)
MCH RBC QN AUTO: 26.1 PG (ref 27–31)
MCHC RBC AUTO-ENTMCNC: 31.7 G/DL (ref 32–36)
MCV RBC AUTO: 82 FL (ref 82–98)
MONOCYTES # BLD AUTO: 0.6 K/UL (ref 0.3–1)
MONOCYTES NFR BLD: 5.8 % (ref 4–15)
NEUTROPHILS # BLD AUTO: 6.2 K/UL (ref 1.8–7.7)
NEUTROPHILS NFR BLD: 58 % (ref 38–73)
NONHDLC SERPL-MCNC: 174 MG/DL
NRBC BLD-RTO: 0 /100 WBC
PLATELET # BLD AUTO: 230 K/UL (ref 150–350)
PMV BLD AUTO: 11.7 FL (ref 9.2–12.9)
POTASSIUM SERPL-SCNC: 4.1 MMOL/L (ref 3.5–5.1)
PROT SERPL-MCNC: 8 G/DL (ref 6–8.4)
RBC # BLD AUTO: 5.44 M/UL (ref 4–5.4)
SATURATED IRON: 12 % (ref 20–50)
SODIUM SERPL-SCNC: 141 MMOL/L (ref 136–145)
TOTAL IRON BINDING CAPACITY: 420 UG/DL (ref 250–450)
TRANSFERRIN SERPL-MCNC: 284 MG/DL (ref 200–375)
TRIGL SERPL-MCNC: 226 MG/DL (ref 30–150)
WBC # BLD AUTO: 10.6 K/UL (ref 3.9–12.7)

## 2019-11-07 PROCEDURE — 99999 PR PBB SHADOW E&M-EST. PATIENT-LVL V: CPT | Mod: PBBFAC,,, | Performed by: FAMILY MEDICINE

## 2019-11-07 PROCEDURE — 3008F BODY MASS INDEX DOCD: CPT | Mod: CPTII,S$GLB,, | Performed by: FAMILY MEDICINE

## 2019-11-07 PROCEDURE — 90471 IMMUNIZATION ADMIN: CPT | Mod: S$GLB,,, | Performed by: FAMILY MEDICINE

## 2019-11-07 PROCEDURE — 99999 PR PBB SHADOW E&M-EST. PATIENT-LVL V: ICD-10-PCS | Mod: PBBFAC,,, | Performed by: FAMILY MEDICINE

## 2019-11-07 PROCEDURE — 86431 RHEUMATOID FACTOR QUANT: CPT

## 2019-11-07 PROCEDURE — 3078F DIAST BP <80 MM HG: CPT | Mod: CPTII,S$GLB,, | Performed by: FAMILY MEDICINE

## 2019-11-07 PROCEDURE — 80061 LIPID PANEL: CPT

## 2019-11-07 PROCEDURE — 83036 HEMOGLOBIN GLYCOSYLATED A1C: CPT

## 2019-11-07 PROCEDURE — 3078F PR MOST RECENT DIASTOLIC BLOOD PRESSURE < 80 MM HG: ICD-10-PCS | Mod: CPTII,S$GLB,, | Performed by: FAMILY MEDICINE

## 2019-11-07 PROCEDURE — 80053 COMPREHEN METABOLIC PANEL: CPT

## 2019-11-07 PROCEDURE — 90686 IIV4 VACC NO PRSV 0.5 ML IM: CPT | Mod: S$GLB,,, | Performed by: FAMILY MEDICINE

## 2019-11-07 PROCEDURE — 85652 RBC SED RATE AUTOMATED: CPT

## 2019-11-07 PROCEDURE — 3075F SYST BP GE 130 - 139MM HG: CPT | Mod: CPTII,S$GLB,, | Performed by: FAMILY MEDICINE

## 2019-11-07 PROCEDURE — 90686 FLU VACCINE (QUAD) GREATER THAN OR EQUAL TO 3YO PRESERVATIVE FREE IM: ICD-10-PCS | Mod: S$GLB,,, | Performed by: FAMILY MEDICINE

## 2019-11-07 PROCEDURE — 83540 ASSAY OF IRON: CPT

## 2019-11-07 PROCEDURE — 3008F PR BODY MASS INDEX (BMI) DOCUMENTED: ICD-10-PCS | Mod: CPTII,S$GLB,, | Performed by: FAMILY MEDICINE

## 2019-11-07 PROCEDURE — 90471 FLU VACCINE (QUAD) GREATER THAN OR EQUAL TO 3YO PRESERVATIVE FREE IM: ICD-10-PCS | Mod: S$GLB,,, | Performed by: FAMILY MEDICINE

## 2019-11-07 PROCEDURE — 99214 PR OFFICE/OUTPT VISIT, EST, LEVL IV, 30-39 MIN: ICD-10-PCS | Mod: 25,S$GLB,, | Performed by: FAMILY MEDICINE

## 2019-11-07 PROCEDURE — 99214 OFFICE O/P EST MOD 30 MIN: CPT | Mod: 25,S$GLB,, | Performed by: FAMILY MEDICINE

## 2019-11-07 PROCEDURE — 3075F PR MOST RECENT SYSTOLIC BLOOD PRESS GE 130-139MM HG: ICD-10-PCS | Mod: CPTII,S$GLB,, | Performed by: FAMILY MEDICINE

## 2019-11-07 PROCEDURE — 85025 COMPLETE CBC W/AUTO DIFF WBC: CPT

## 2019-11-07 PROCEDURE — 36415 COLL VENOUS BLD VENIPUNCTURE: CPT | Mod: PO

## 2019-11-07 RX ORDER — TRAMADOL HYDROCHLORIDE 50 MG/1
50 TABLET ORAL EVERY 12 HOURS PRN
Qty: 60 TABLET | Refills: 2 | Status: SHIPPED | OUTPATIENT
Start: 2019-11-07 | End: 2020-05-21 | Stop reason: SDUPTHER

## 2019-11-07 RX ORDER — ROPINIROLE 1 MG/1
1 TABLET, FILM COATED ORAL 3 TIMES DAILY
Qty: 60 TABLET | Refills: 0 | Status: SHIPPED | OUTPATIENT
Start: 2019-11-07 | End: 2019-12-08

## 2019-11-07 RX ORDER — ROPINIROLE 1 MG/1
1 TABLET, FILM COATED ORAL 2 TIMES DAILY PRN
Qty: 180 TABLET | Refills: 4 | Status: SHIPPED | OUTPATIENT
Start: 2019-11-07 | End: 2019-11-07 | Stop reason: SDUPTHER

## 2019-11-07 RX ORDER — VERAPAMIL HYDROCHLORIDE 120 MG/1
240 CAPSULE, EXTENDED RELEASE ORAL DAILY
Qty: 180 CAPSULE | Refills: 3 | Status: SHIPPED | OUTPATIENT
Start: 2019-11-07 | End: 2019-11-07 | Stop reason: SDUPTHER

## 2019-11-07 RX ORDER — VERAPAMIL HYDROCHLORIDE 120 MG/1
240 CAPSULE, EXTENDED RELEASE ORAL DAILY
Qty: 180 CAPSULE | Refills: 3 | Status: SHIPPED | OUTPATIENT
Start: 2019-11-07 | End: 2020-05-21 | Stop reason: SDUPTHER

## 2019-11-07 RX ORDER — GABAPENTIN 300 MG/1
300 CAPSULE ORAL 2 TIMES DAILY
Qty: 180 CAPSULE | Refills: 3 | Status: SHIPPED | OUTPATIENT
Start: 2019-11-07 | End: 2020-05-21 | Stop reason: SDUPTHER

## 2019-11-07 RX ORDER — GABAPENTIN 300 MG/1
300 CAPSULE ORAL 2 TIMES DAILY
Qty: 180 CAPSULE | Refills: 3 | Status: SHIPPED | OUTPATIENT
Start: 2019-11-07 | End: 2019-11-07 | Stop reason: SDUPTHER

## 2019-11-07 RX ORDER — LOSARTAN POTASSIUM AND HYDROCHLOROTHIAZIDE 12.5; 1 MG/1; MG/1
1 TABLET ORAL DAILY
Qty: 90 TABLET | Refills: 3 | Status: SHIPPED | OUTPATIENT
Start: 2019-11-07 | End: 2020-05-21 | Stop reason: SDUPTHER

## 2019-11-07 RX ORDER — ROPINIROLE 1 MG/1
1 TABLET, FILM COATED ORAL 2 TIMES DAILY PRN
Qty: 180 TABLET | Refills: 3 | Status: SHIPPED | OUTPATIENT
Start: 2019-11-07 | End: 2020-05-21 | Stop reason: SDUPTHER

## 2019-11-07 RX ORDER — ALBUTEROL SULFATE 90 UG/1
2 AEROSOL, METERED RESPIRATORY (INHALATION) EVERY 6 HOURS PRN
Qty: 18 G | Refills: 5 | Status: SHIPPED | OUTPATIENT
Start: 2019-11-07 | End: 2020-11-06

## 2019-11-07 RX ORDER — CARVEDILOL 6.25 MG/1
6.25 TABLET ORAL 2 TIMES DAILY WITH MEALS
Qty: 180 TABLET | Refills: 4 | Status: SHIPPED | OUTPATIENT
Start: 2019-11-07 | End: 2020-05-21 | Stop reason: SDUPTHER

## 2019-11-07 RX ORDER — DULOXETIN HYDROCHLORIDE 30 MG/1
30 CAPSULE, DELAYED RELEASE ORAL DAILY
Qty: 90 CAPSULE | Refills: 3 | Status: SHIPPED | OUTPATIENT
Start: 2019-11-07 | End: 2020-05-21 | Stop reason: SDUPTHER

## 2019-11-07 RX ORDER — PANTOPRAZOLE SODIUM 40 MG/1
40 TABLET, DELAYED RELEASE ORAL DAILY
Qty: 90 TABLET | Refills: 3 | Status: SHIPPED | OUTPATIENT
Start: 2019-11-07 | End: 2020-05-21 | Stop reason: SDUPTHER

## 2019-11-07 RX ORDER — LOSARTAN POTASSIUM AND HYDROCHLOROTHIAZIDE 12.5; 1 MG/1; MG/1
1 TABLET ORAL DAILY
Qty: 90 TABLET | Refills: 3 | Status: SHIPPED | OUTPATIENT
Start: 2019-11-07 | End: 2019-11-07 | Stop reason: SDUPTHER

## 2019-11-07 RX ORDER — CYCLOBENZAPRINE HCL 10 MG
10 TABLET ORAL 2 TIMES DAILY PRN
Qty: 180 TABLET | Refills: 1 | Status: SHIPPED | OUTPATIENT
Start: 2019-11-07 | End: 2021-02-22 | Stop reason: SDUPTHER

## 2019-11-07 RX ORDER — EPINEPHRINE 0.3 MG/.3ML
1 INJECTION SUBCUTANEOUS ONCE
Qty: 0.6 ML | Refills: 4 | Status: SHIPPED | OUTPATIENT
Start: 2019-11-07 | End: 2021-04-22 | Stop reason: SDUPTHER

## 2019-11-07 RX ORDER — DULOXETIN HYDROCHLORIDE 30 MG/1
30 CAPSULE, DELAYED RELEASE ORAL DAILY
Qty: 90 CAPSULE | Refills: 3 | Status: SHIPPED | OUTPATIENT
Start: 2019-11-07 | End: 2019-11-07 | Stop reason: SDUPTHER

## 2019-11-07 RX ORDER — BUPROPION HYDROCHLORIDE 150 MG/1
150 TABLET, EXTENDED RELEASE ORAL 2 TIMES DAILY
Qty: 180 TABLET | Refills: 3 | Status: SHIPPED | OUTPATIENT
Start: 2019-11-07 | End: 2020-05-21 | Stop reason: SDUPTHER

## 2019-11-07 NOTE — PROGRESS NOTES
Chief Complaint:    Chief Complaint   Patient presents with    Follow-up     med refills       History of Present Illness:    She presents today for follow-up of chronic medical problems,  She complains of increased leg cramps that happen throughout the day she takes Requip but that is mostly at night some she wants to take it in the daytime also.  Home blood pressure readings are okay  She also has some chronic pain involving the spine and the hip seeing Pain Management she has had injections in the SI joint but will be getting it on the other side.  Complains of a lot of heartburn takes Protonix also takes Excedrin migraine for headaches.  Does drink a lot of milk at causes congestion.    ROS:  Review of Systems   Constitutional: Negative for activity change, chills, fatigue, fever and unexpected weight change.   HENT: Negative for congestion, ear discharge, ear pain, hearing loss, postnasal drip and rhinorrhea.    Eyes: Negative for pain and visual disturbance.   Respiratory: Negative for cough, chest tightness and shortness of breath.    Cardiovascular: Negative for chest pain and palpitations.   Gastrointestinal: Negative for abdominal pain, diarrhea and vomiting.   Endocrine: Negative for heat intolerance.   Genitourinary: Negative for dysuria, flank pain, frequency and hematuria.   Musculoskeletal: Negative for back pain, gait problem and neck pain.   Skin: Negative for color change and rash.   Neurological: Negative for dizziness, tremors, seizures, numbness and headaches.   Psychiatric/Behavioral: Negative for agitation, hallucinations, self-injury, sleep disturbance and suicidal ideas. The patient is not nervous/anxious.        Past Medical History:   Diagnosis Date    Anxiety     Arthritis     Asthma     Depression     Hyperlipidemia     Hypertension     RLS (restless legs syndrome)        Social History:  Social History     Socioeconomic History    Marital status:      Spouse name: Not  on file    Number of children: 2    Years of education: Not on file    Highest education level: Not on file   Occupational History    Not on file   Social Needs    Financial resource strain: Not on file    Food insecurity:     Worry: Not on file     Inability: Not on file    Transportation needs:     Medical: Not on file     Non-medical: Not on file   Tobacco Use    Smoking status: Former Smoker     Last attempt to quit: 6/10/2012     Years since quittin.4    Smokeless tobacco: Never Used    Tobacco comment: Would smoke 1 cigarette every few months   Substance and Sexual Activity    Alcohol use: Yes     Alcohol/week: 1.0 standard drinks     Types: 1 Standard drinks or equivalent per week     Comment: social    Drug use: No    Sexual activity: Yes     Partners: Male     Birth control/protection: None   Lifestyle    Physical activity:     Days per week: Not on file     Minutes per session: Not on file    Stress: Not on file   Relationships    Social connections:     Talks on phone: Not on file     Gets together: Not on file     Attends [employer] service: Not on file     Active member of club or organization: Not on file     Attends meetings of clubs or organizations: Not on file     Relationship status: Not on file   Other Topics Concern    Not on file   Social History Narrative    Not on file       Family History:   family history includes Cancer in her maternal grandfather and maternal grandmother; Clotting disorder in her maternal grandfather, maternal grandmother, and mother; Hemochromatosis in her mother; Hypertension in her father and mother.    Health Maintenance   Topic Date Due    Lipid Panel  2020    Mammogram  09/10/2020    Colonoscopy  10/01/2023    TETANUS VACCINE  2026    Hepatitis C Screening  Completed       Physical Exam:    Vital Signs  Temp: 98.6 °F (37 °C)  Temp src: Oral  Pulse: 75  Resp: 18  SpO2: 98 %  BP: 132/76  BP Location: Left arm  Patient Position:  "Sitting  Pain Score: 0-No pain  Height and Weight  Height: 5' 6" (167.6 cm)  Weight: 84.2 kg (185 lb 10 oz)  BSA (Calculated - sq m): 1.98 sq meters  BMI (Calculated): 30  Weight in (lb) to have BMI = 25: 154.6]    Body mass index is 29.96 kg/m².    Physical Exam   Constitutional: She is oriented to person, place, and time. She appears well-developed.   HENT:   Mouth/Throat: Oropharynx is clear and moist.   Eyes: Pupils are equal, round, and reactive to light. Conjunctivae are normal.   Neck: Normal range of motion. Neck supple.   Cardiovascular: Normal rate, regular rhythm and normal heart sounds.   No murmur heard.  Pulmonary/Chest: Effort normal and breath sounds normal. No respiratory distress. She has no wheezes. She has no rales. She exhibits no tenderness.   Abdominal: Soft. She exhibits no distension and no mass. There is no tenderness. There is no guarding.   Musculoskeletal: She exhibits no edema or tenderness.   Lymphadenopathy:     She has no cervical adenopathy.   Neurological: She is alert and oriented to person, place, and time. She has normal reflexes.   Skin: Skin is warm and dry.   Psychiatric: She has a normal mood and affect. Her behavior is normal. Judgment and thought content normal.         Assessment:      ICD-10-CM ICD-9-CM   1. RLS (restless legs syndrome) G25.81 333.94   2. Chronic insomnia F51.04 780.52   3. Essential hypertension I10 401.9   4. Fibromyalgia M79.7 729.1   5. Gastroesophageal reflux disease, esophagitis presence not specified K21.9 530.81         Plan:        Can take Requip 2 times a day if she wants also recommend trying magnesium oxide over-the-counter and will check iron levels.  Monitor home blood pressure readings carefully  Continue Protonix cut back on x-ray in migraine and if symptoms continue consider taking Protonix b.i.d. may need EGD in such a case.  Fibromyalgia stable  She wants a RA factor checked she has been to rheumatologist in the past and did not have " rheumatoid arthritis.  Other medical problems stable  She will receive influenza vaccination today advised to get a shingles shot.        Orders Placed This Encounter   Procedures    Influenza - Quadrivalent (PF)    Iron and TIBC    Rheumatoid factor    Sedimentation rate    CBC auto differential    Comprehensive metabolic panel    Lipid panel    Hemoglobin A1c       Current Outpatient Medications   Medication Sig Dispense Refill    acetaminophen (TYLENOL EXTRA STRENGTH) 500 MG tablet Take 500 mg by mouth every 6 (six) hours as needed for Pain.      ascorbic acid, vitamin C, (VITAMIN C) 1000 MG tablet Take 1,000 mg by mouth once daily.      benzonatate (TESSALON) 100 MG capsule Take 1 capsule (100 mg total) by mouth 3 (three) times daily as needed for Cough. 30 capsule 1    buPROPion (WELLBUTRIN SR) 150 MG TBSR 12 hr tablet Take 1 tablet (150 mg total) by mouth 2 (two) times daily. 180 tablet 3    carvedilol (COREG) 6.25 MG tablet TAKE 1 TABLET TWICE A DAY WITH MEALS 180 tablet 4    cyclobenzaprine (FLEXERIL) 5 MG tablet Take 2 tablets (10 mg total) by mouth 2 (two) times daily as needed for Muscle spasms. 60 tablet 2    DULoxetine (CYMBALTA) 30 MG capsule Take 1 capsule (30 mg total) by mouth once daily. 90 capsule 3    EPINEPHrine (EPIPEN) 0.3 mg/0.3 mL AtIn Inject 0.3 mLs (0.3 mg total) into the muscle once. 0.3 mL 4    gabapentin (NEURONTIN) 300 MG capsule Take 1 capsule (300 mg total) by mouth 2 (two) times daily. 180 capsule 3    hydrOXYzine pamoate (VISTARIL) 25 MG Cap Take 1 capsule (25 mg total) by mouth every 8 (eight) hours as needed. 30 capsule 2    ibuprofen (ADVIL,MOTRIN) 600 MG tablet       losartan-hydrochlorothiazide 100-12.5 mg (HYZAAR) 100-12.5 mg Tab Take 1 tablet by mouth once daily. 90 tablet 3    pantoprazole (PROTONIX) 40 MG tablet TAKE 1 TABLET DAILY 90 tablet 3    potassium 99 mg Tab Take by mouth. 1 tablet Oral Every day      rOPINIRole (REQUIP) 1 MG tablet Take 1  tablet (1 mg total) by mouth 2 (two) times daily as needed. 180 tablet 4    traMADol (ULTRAM) 50 mg tablet Take 1 tablet (50 mg total) by mouth every 12 (twelve) hours as needed for Pain. 60 tablet 2    verapamil (VERELAN) 120 MG C24P Take 2 capsules (240 mg total) by mouth once daily. 180 capsule 3    enoxaparin (LOVENOX) 120 mg/0.8 mL Syrg Inject 0.8 mLs (120 mg total) into the skin once daily. (Patient not taking: Reported on 11/7/2019) 10 Syringe 1    zolpidem (AMBIEN) 5 MG Tab TAKE 1 TABLET BY MOUTH NIGHTLY AS NEEDED (Patient not taking: Reported on 11/7/2019) 15 tablet 5     Current Facility-Administered Medications   Medication Dose Route Frequency Provider Last Rate Last Dose    albuterol inhaler 2 puff  2 puff Inhalation Q6H PRN Helena Jones MD        albuterol sulfate nebulizer solution 2.5 mg  2.5 mg Nebulization 1 time in Clinic/HOD Helena Jones MD         Facility-Administered Medications Ordered in Other Visits   Medication Dose Route Frequency Provider Last Rate Last Dose    lactated ringers infusion   Intravenous Continuous Vanda Mathis MD   Stopped at 10/01/18 1140       Medications Discontinued During This Encounter   Medication Reason    pramipexole (MIRAPEX) 0.5 MG tablet     rOPINIRole (REQUIP) 1 MG tablet        Follow up in about 6 months (around 5/7/2020).      Helena Jones MD

## 2019-11-08 LAB — RHEUMATOID FACT SERPL-ACNC: 51 IU/ML (ref 0–15)

## 2019-11-12 ENCOUNTER — TELEPHONE (OUTPATIENT)
Dept: FAMILY MEDICINE | Facility: CLINIC | Age: 64
End: 2019-11-12

## 2019-11-12 DIAGNOSIS — E61.1 LOW SERUM IRON: Primary | ICD-10-CM

## 2020-05-21 ENCOUNTER — OFFICE VISIT (OUTPATIENT)
Dept: FAMILY MEDICINE | Facility: CLINIC | Age: 65
End: 2020-05-21
Payer: COMMERCIAL

## 2020-05-21 VITALS
DIASTOLIC BLOOD PRESSURE: 88 MMHG | SYSTOLIC BLOOD PRESSURE: 136 MMHG | BODY MASS INDEX: 29.76 KG/M2 | WEIGHT: 185.19 LBS | OXYGEN SATURATION: 98 % | TEMPERATURE: 97 F | HEART RATE: 90 BPM | HEIGHT: 66 IN

## 2020-05-21 DIAGNOSIS — I10 ESSENTIAL HYPERTENSION: ICD-10-CM

## 2020-05-21 DIAGNOSIS — M79.604 PAIN OF RIGHT LOWER EXTREMITY: Primary | ICD-10-CM

## 2020-05-21 DIAGNOSIS — M15.9 OSTEOARTHROSIS, GENERALIZED, INVOLVING MULTIPLE SITES: ICD-10-CM

## 2020-05-21 DIAGNOSIS — H91.93 BILATERAL HEARING LOSS, UNSPECIFIED HEARING LOSS TYPE: ICD-10-CM

## 2020-05-21 PROCEDURE — 3075F PR MOST RECENT SYSTOLIC BLOOD PRESS GE 130-139MM HG: ICD-10-PCS | Mod: CPTII,S$GLB,, | Performed by: FAMILY MEDICINE

## 2020-05-21 PROCEDURE — 99214 OFFICE O/P EST MOD 30 MIN: CPT | Mod: S$GLB,,, | Performed by: FAMILY MEDICINE

## 2020-05-21 PROCEDURE — 99999 PR PBB SHADOW E&M-EST. PATIENT-LVL IV: CPT | Mod: PBBFAC,,, | Performed by: FAMILY MEDICINE

## 2020-05-21 PROCEDURE — 3008F BODY MASS INDEX DOCD: CPT | Mod: CPTII,S$GLB,, | Performed by: FAMILY MEDICINE

## 2020-05-21 PROCEDURE — 3079F DIAST BP 80-89 MM HG: CPT | Mod: CPTII,S$GLB,, | Performed by: FAMILY MEDICINE

## 2020-05-21 PROCEDURE — 3079F PR MOST RECENT DIASTOLIC BLOOD PRESSURE 80-89 MM HG: ICD-10-PCS | Mod: CPTII,S$GLB,, | Performed by: FAMILY MEDICINE

## 2020-05-21 PROCEDURE — 99999 PR PBB SHADOW E&M-EST. PATIENT-LVL IV: ICD-10-PCS | Mod: PBBFAC,,, | Performed by: FAMILY MEDICINE

## 2020-05-21 PROCEDURE — 99214 PR OFFICE/OUTPT VISIT, EST, LEVL IV, 30-39 MIN: ICD-10-PCS | Mod: S$GLB,,, | Performed by: FAMILY MEDICINE

## 2020-05-21 PROCEDURE — 3008F PR BODY MASS INDEX (BMI) DOCUMENTED: ICD-10-PCS | Mod: CPTII,S$GLB,, | Performed by: FAMILY MEDICINE

## 2020-05-21 PROCEDURE — 3075F SYST BP GE 130 - 139MM HG: CPT | Mod: CPTII,S$GLB,, | Performed by: FAMILY MEDICINE

## 2020-05-21 RX ORDER — BUPROPION HYDROCHLORIDE 150 MG/1
150 TABLET, EXTENDED RELEASE ORAL 2 TIMES DAILY
Qty: 180 TABLET | Refills: 3 | Status: SHIPPED | OUTPATIENT
Start: 2020-05-21 | End: 2021-04-22 | Stop reason: SDUPTHER

## 2020-05-21 RX ORDER — TRAMADOL HYDROCHLORIDE 50 MG/1
50 TABLET ORAL EVERY 12 HOURS PRN
Qty: 60 TABLET | Refills: 3 | Status: SHIPPED | OUTPATIENT
Start: 2020-05-21 | End: 2020-10-15 | Stop reason: SDUPTHER

## 2020-05-21 RX ORDER — TRAMADOL HYDROCHLORIDE 50 MG/1
50 TABLET ORAL EVERY 12 HOURS PRN
Qty: 60 TABLET | Refills: 2 | Status: CANCELLED | OUTPATIENT
Start: 2020-05-21

## 2020-05-21 RX ORDER — ROPINIROLE 1 MG/1
1 TABLET, FILM COATED ORAL 2 TIMES DAILY PRN
Qty: 180 TABLET | Refills: 3 | Status: SHIPPED | OUTPATIENT
Start: 2020-05-21 | End: 2020-10-15 | Stop reason: SDUPTHER

## 2020-05-21 RX ORDER — CYCLOBENZAPRINE HCL 10 MG
10 TABLET ORAL 2 TIMES DAILY PRN
Qty: 180 TABLET | Refills: 1 | Status: CANCELLED | OUTPATIENT
Start: 2020-05-21

## 2020-05-21 RX ORDER — LOSARTAN POTASSIUM AND HYDROCHLOROTHIAZIDE 12.5; 1 MG/1; MG/1
1 TABLET ORAL DAILY
Qty: 90 TABLET | Refills: 3 | Status: SHIPPED | OUTPATIENT
Start: 2020-05-21 | End: 2020-10-21

## 2020-05-21 RX ORDER — CARVEDILOL 6.25 MG/1
6.25 TABLET ORAL 2 TIMES DAILY WITH MEALS
Qty: 180 TABLET | Refills: 4 | Status: SHIPPED | OUTPATIENT
Start: 2020-05-21 | End: 2020-10-21 | Stop reason: SDUPTHER

## 2020-05-21 RX ORDER — DULOXETIN HYDROCHLORIDE 30 MG/1
30 CAPSULE, DELAYED RELEASE ORAL DAILY
Qty: 90 CAPSULE | Refills: 3 | Status: SHIPPED | OUTPATIENT
Start: 2020-05-21 | End: 2020-10-15 | Stop reason: SDUPTHER

## 2020-05-21 RX ORDER — VERAPAMIL HYDROCHLORIDE 120 MG/1
240 CAPSULE, EXTENDED RELEASE ORAL DAILY
Qty: 180 CAPSULE | Refills: 3 | Status: SHIPPED | OUTPATIENT
Start: 2020-05-21 | End: 2020-10-15 | Stop reason: SDUPTHER

## 2020-05-21 RX ORDER — PANTOPRAZOLE SODIUM 40 MG/1
40 TABLET, DELAYED RELEASE ORAL DAILY
Qty: 90 TABLET | Refills: 3 | Status: SHIPPED | OUTPATIENT
Start: 2020-05-21 | End: 2021-04-22 | Stop reason: SDUPTHER

## 2020-05-21 RX ORDER — GABAPENTIN 300 MG/1
300 CAPSULE ORAL 2 TIMES DAILY
Qty: 180 CAPSULE | Refills: 3 | Status: SHIPPED | OUTPATIENT
Start: 2020-05-21 | End: 2021-04-22 | Stop reason: SDUPTHER

## 2020-05-21 RX ORDER — OLOPATADINE HYDROCHLORIDE 1 MG/ML
1 SOLUTION/ DROPS OPHTHALMIC DAILY
COMMUNITY
Start: 2020-03-12 | End: 2023-08-25 | Stop reason: SDUPTHER

## 2020-05-21 NOTE — PROGRESS NOTES
Chief Complaint:    Chief Complaint   Patient presents with    Medication Refill    Leg Pain       History of Present Illness:    She presents today for follow-up of chronic medical problems,  Complains of pain in the back of the right thigh and radiates towards the leg started spontaneously no trauma no tingling numbness weakness.  Home blood pressure readings are okay  She also has some chronic pain involving the spine and the hip seeing Pain Management she has had injections in the SI joint but will be getting it on the other side.  Complains of a lot of heartburn takes Protonix also takes Excedrin migraine for headaches.  Does drink a lot of milk at causes congestion.    ROS:  Review of Systems   Constitutional: Negative for activity change, chills, fatigue, fever and unexpected weight change.   HENT: Negative for congestion, ear discharge, ear pain, hearing loss, postnasal drip and rhinorrhea.    Eyes: Negative for pain and visual disturbance.   Respiratory: Negative for cough, chest tightness and shortness of breath.    Cardiovascular: Negative for chest pain and palpitations.   Gastrointestinal: Negative for abdominal pain, diarrhea and vomiting.   Endocrine: Negative for heat intolerance.   Genitourinary: Negative for dysuria, flank pain, frequency and hematuria.   Musculoskeletal: Negative for back pain, gait problem and neck pain.   Skin: Negative for color change and rash.   Neurological: Negative for dizziness, tremors, seizures, numbness and headaches.   Psychiatric/Behavioral: Negative for agitation, hallucinations, self-injury, sleep disturbance and suicidal ideas. The patient is not nervous/anxious.        Past Medical History:   Diagnosis Date    Anxiety     Arthritis     Asthma     Depression     Hyperlipidemia     Hypertension     RLS (restless legs syndrome)        Social History:  Social History     Socioeconomic History    Marital status:      Spouse name: Not on file     Number of children: 2    Years of education: Not on file    Highest education level: Not on file   Occupational History    Not on file   Social Needs    Financial resource strain: Not on file    Food insecurity:     Worry: Not on file     Inability: Not on file    Transportation needs:     Medical: Not on file     Non-medical: Not on file   Tobacco Use    Smoking status: Former Smoker     Last attempt to quit: 6/10/2012     Years since quittin.9    Smokeless tobacco: Never Used    Tobacco comment: Would smoke 1 cigarette every few months   Substance and Sexual Activity    Alcohol use: Yes     Alcohol/week: 1.0 standard drinks     Types: 1 Standard drinks or equivalent per week     Comment: social    Drug use: No    Sexual activity: Yes     Partners: Male     Birth control/protection: None   Lifestyle    Physical activity:     Days per week: Not on file     Minutes per session: Not on file    Stress: Not on file   Relationships    Social connections:     Talks on phone: Not on file     Gets together: Not on file     Attends Worship service: Not on file     Active member of club or organization: Not on file     Attends meetings of clubs or organizations: Not on file     Relationship status: Not on file   Other Topics Concern    Not on file   Social History Narrative    Not on file       Family History:   family history includes Cancer in her maternal grandfather and maternal grandmother; Clotting disorder in her maternal grandfather, maternal grandmother, and mother; Hemochromatosis in her mother; Hypertension in her father and mother.    Health Maintenance   Topic Date Due    Mammogram  09/10/2020    Lipid Panel  2020    Colonoscopy  10/01/2023    TETANUS VACCINE  2026    Hepatitis C Screening  Completed       Physical Exam:    Vital Signs  Temp: 97 °F (36.1 °C)  Temp src: Oral  Pulse: 90  SpO2: 98 %  BP: 136/88  BP Location: Left arm  Patient Position: Sitting  Pain Score:    "5  Pain Loc: Leg  Height and Weight  Height: 5' 6" (167.6 cm)  Weight: 84 kg (185 lb 3 oz)  BSA (Calculated - sq m): 1.98 sq meters  BMI (Calculated): 29.9  Weight in (lb) to have BMI = 25: 154.6]    Body mass index is 29.89 kg/m².    Physical Exam   Constitutional: She is oriented to person, place, and time. She appears well-developed.   HENT:   Mouth/Throat: Oropharynx is clear and moist.   Eyes: Pupils are equal, round, and reactive to light. Conjunctivae are normal.   Neck: Normal range of motion. Neck supple.   Cardiovascular: Normal rate, regular rhythm and normal heart sounds.   No murmur heard.  Pulmonary/Chest: Effort normal and breath sounds normal. No respiratory distress. She has no wheezes. She has no rales. She exhibits no tenderness.   Abdominal: Soft. She exhibits no distension and no mass. There is no tenderness. There is no guarding.   Musculoskeletal: She exhibits no edema or tenderness.        Feet:    Bilateral knee jerk ankle jerk intact strength is intact range of motion of the knee and hip normal  There is mild tenderness along the back of the right thigh no edema   Lymphadenopathy:     She has no cervical adenopathy.   Neurological: She is alert and oriented to person, place, and time. She has normal reflexes.   Skin: Skin is warm and dry.   Psychiatric: She has a normal mood and affect. Her behavior is normal. Judgment and thought content normal.         Assessment:      ICD-10-CM ICD-9-CM   1. Pain of right lower extremity M79.604 729.5   2. Bilateral hearing loss, unspecified hearing loss type H91.93 389.9   3. Essential hypertension I10 401.9   4. Osteoarthrosis, generalized, involving multiple sites M15.9 715.09         Plan:        leg pain appears to be a musculoskeletal type of pain coming from posterior thigh muscles.  Treat symptomatic any if gets worse please let us know    Monitor home blood pressure readings carefully  GERD stable   Fibromyalgia stable    Referred to " rheumatologist to see if she could be evaluated for inflammatory arthritis.  Other medical problems stable    Follow-up 6      Orders Placed This Encounter   Procedures    Ambulatory referral/consult to Audiology    Ambulatory referral/consult to Rheumatology       Current Outpatient Medications   Medication Sig Dispense Refill    acetaminophen (TYLENOL EXTRA STRENGTH) 500 MG tablet Take 500 mg by mouth every 6 (six) hours as needed for Pain.      albuterol (VENTOLIN HFA) 90 mcg/actuation inhaler Inhale 2 puffs into the lungs every 6 (six) hours as needed for Wheezing. Rescue 18 g 5    ascorbic acid, vitamin C, (VITAMIN C) 1000 MG tablet Take 1,000 mg by mouth once daily.      buPROPion (WELLBUTRIN SR) 150 MG TBSR 12 hr tablet Take 1 tablet (150 mg total) by mouth 2 (two) times daily. 180 tablet 3    carvediloL (COREG) 6.25 MG tablet Take 1 tablet (6.25 mg total) by mouth 2 (two) times daily with meals. 180 tablet 4    cyclobenzaprine (FLEXERIL) 10 MG tablet Take 1 tablet (10 mg total) by mouth 2 (two) times daily as needed for Muscle spasms. 180 tablet 1    DULoxetine (CYMBALTA) 30 MG capsule Take 1 capsule (30 mg total) by mouth once daily. 90 capsule 3    gabapentin (NEURONTIN) 300 MG capsule Take 1 capsule (300 mg total) by mouth 2 (two) times daily. 180 capsule 3    hydrOXYzine pamoate (VISTARIL) 25 MG Cap Take 1 capsule (25 mg total) by mouth every 8 (eight) hours as needed. 30 capsule 2    ibuprofen (ADVIL,MOTRIN) 600 MG tablet       losartan-hydrochlorothiazide 100-12.5 mg (HYZAAR) 100-12.5 mg Tab Take 1 tablet by mouth once daily. 90 tablet 3    pantoprazole (PROTONIX) 40 MG tablet Take 1 tablet (40 mg total) by mouth once daily. 90 tablet 3    potassium 99 mg Tab Take by mouth. 1 tablet Oral Every day      rOPINIRole (REQUIP) 1 MG tablet Take 1 tablet (1 mg total) by mouth 2 (two) times daily as needed. 180 tablet 3    traMADoL (ULTRAM) 50 mg tablet Take 1 tablet (50 mg total) by mouth  every 12 (twelve) hours as needed for Pain. 60 tablet 3    verapamiL (VERELAN) 120 MG C24P Take 2 capsules (240 mg total) by mouth once daily. 180 capsule 3    zolpidem (AMBIEN) 5 MG Tab TAKE 1 TABLET BY MOUTH NIGHTLY AS NEEDED 15 tablet 5    benzonatate (TESSALON) 100 MG capsule Take 1 capsule (100 mg total) by mouth 3 (three) times daily as needed for Cough. (Patient not taking: Reported on 5/21/2020) 30 capsule 1    enoxaparin (LOVENOX) 120 mg/0.8 mL Syrg Inject 0.8 mLs (120 mg total) into the skin once daily. (Patient not taking: Reported on 11/7/2019) 10 Syringe 1    EPINEPHrine (EPIPEN) 0.3 mg/0.3 mL AtIn Inject 0.3 mLs (0.3 mg total) into the muscle once. for 1 dose 0.6 mL 4    olopatadine (PATANOL) 0.1 % ophthalmic solution Place 1 drop into both eyes once daily.       Current Facility-Administered Medications   Medication Dose Route Frequency Provider Last Rate Last Dose    albuterol inhaler 2 puff  2 puff Inhalation Q6H PRN Helena Jones MD        albuterol sulfate nebulizer solution 2.5 mg  2.5 mg Nebulization 1 time in Clinic/HOD Helena Jones MD         Facility-Administered Medications Ordered in Other Visits   Medication Dose Route Frequency Provider Last Rate Last Dose    lactated ringers infusion   Intravenous Continuous Vanda Mathis MD   Stopped at 10/01/18 1140       Medications Discontinued During This Encounter   Medication Reason    buPROPion (WELLBUTRIN SR) 150 MG TBSR 12 hr tablet Reorder    DULoxetine (CYMBALTA) 30 MG capsule Reorder    rOPINIRole (REQUIP) 1 MG tablet Reorder    pantoprazole (PROTONIX) 40 MG tablet Reorder    verapamil (VERELAN) 120 MG C24P Reorder    losartan-hydrochlorothiazide 100-12.5 mg (HYZAAR) 100-12.5 mg Tab Reorder    gabapentin (NEURONTIN) 300 MG capsule Reorder    carvedilol (COREG) 6.25 MG tablet Reorder    traMADol (ULTRAM) 50 mg tablet Reorder       Follow up in about 6 months (around 11/21/2020).      Helena Jones MD

## 2020-06-01 ENCOUNTER — TELEPHONE (OUTPATIENT)
Dept: RHEUMATOLOGY | Facility: CLINIC | Age: 65
End: 2020-06-01

## 2020-06-01 NOTE — TELEPHONE ENCOUNTER
Called patient regarding referral from Dr. Jones, scheduled appt with Dr. PARKER for 7.6.20 at 11.15 am. Pt verbalized understanding

## 2020-06-03 ENCOUNTER — CLINICAL SUPPORT (OUTPATIENT)
Dept: AUDIOLOGY | Facility: CLINIC | Age: 65
End: 2020-06-03
Payer: COMMERCIAL

## 2020-06-03 DIAGNOSIS — H90.3 SENSORINEURAL HEARING LOSS, BILATERAL: Primary | ICD-10-CM

## 2020-06-03 DIAGNOSIS — H91.93 BILATERAL HEARING LOSS, UNSPECIFIED HEARING LOSS TYPE: ICD-10-CM

## 2020-06-03 DIAGNOSIS — H93.13 TINNITUS, BILATERAL: ICD-10-CM

## 2020-06-03 PROCEDURE — 92557 PR COMPREHENSIVE HEARING TEST: ICD-10-PCS | Mod: S$GLB,,, | Performed by: AUDIOLOGIST-HEARING AID FITTER

## 2020-06-03 PROCEDURE — 99999 PR PBB SHADOW E&M-EST. PATIENT-LVL I: CPT | Mod: PBBFAC,,, | Performed by: AUDIOLOGIST-HEARING AID FITTER

## 2020-06-03 PROCEDURE — 92567 PR TYMPA2METRY: ICD-10-PCS | Mod: S$GLB,,, | Performed by: AUDIOLOGIST-HEARING AID FITTER

## 2020-06-03 PROCEDURE — 92567 TYMPANOMETRY: CPT | Mod: S$GLB,,, | Performed by: AUDIOLOGIST-HEARING AID FITTER

## 2020-06-03 PROCEDURE — 99999 PR PBB SHADOW E&M-EST. PATIENT-LVL I: ICD-10-PCS | Mod: PBBFAC,,, | Performed by: AUDIOLOGIST-HEARING AID FITTER

## 2020-06-03 PROCEDURE — 92557 COMPREHENSIVE HEARING TEST: CPT | Mod: S$GLB,,, | Performed by: AUDIOLOGIST-HEARING AID FITTER

## 2020-06-03 NOTE — PROGRESS NOTES
"Referring provider: Dr. Robert DOWNEY Christie was seen 06/03/2020 for an audiological evaluation.  Patient complains of gradual progressive hearing loss in both ears over several years.  She notes equally sided hearing.  She has tinnitus in the bilateral ear that has been present for years and is unchanged.  She describes "cicadas" that fluctuate in volume.  The tinnitus is worse with stress and is alleviated by relaxing and listening to pleasing masking sounds (primarily ocean waves).  Her hearing loss is most problematic when her  speaks to her and walks away.  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  Those are the times she has most difficulty understanding her .  She uses hearing protection around loud noises.  She has history of vertigo ten years ago.  No aural pressure or fullness, no otalgia or drainage from ears.  There is family history of hearing loss, including grandfather and mother with advanced age.     Results reveal a normal-to-moderate sensorineural hearing loss 250-8000 Hz for the right ear, and a normal-to-moderate sensorineural hearing loss 250-8000 Hz for the left ear.   Speech Reception Thresholds were 35 dBHL for the right ear and 30 dBHL for the left ear.   Word recognition scores were excellent for the right ear and excellent for the left ear.   Tympanograms were Type A, normal for the right ear and Type A, normal for the left ear.    Patient was counseled on the above findings.    Recommendations:  1. Annual audiogram to monitor hearing loss  2. Hearing aids with tinnitus noise generator, binaural. Patient is not ready, noting her primary difficult is asking her  to repeat himself.           "

## 2020-08-13 ENCOUNTER — NURSE TRIAGE (OUTPATIENT)
Dept: ADMINISTRATIVE | Facility: CLINIC | Age: 65
End: 2020-08-13

## 2020-08-13 NOTE — TELEPHONE ENCOUNTER
Sob with exertion, cough, scratchy throat, severe headache, joint pain, abdominal pain. Chest pain started when she started coughing. Care advice recommend pt go to ER/[facility]. Pt verbalized understanding.     Reason for Disposition   MILD difficulty breathing (e.g., minimal/no SOB at rest, SOB with walking, pulse <100)    Additional Information   Negative: SEVERE difficulty breathing (e.g., struggling for each breath, speaks in single words)   Negative: Difficult to awaken or acting confused (e.g., disoriented, slurred speech)   Negative: Bluish (or gray) lips or face now   Negative: Shock suspected (e.g., cold/pale/clammy skin, too weak to stand, low BP, rapid pulse)   Negative: Sounds like a life-threatening emergency to the triager   Negative: SEVERE or constant chest pain or pressure (Exception: mild central chest pain, present only when coughing)   Negative: MODERATE difficulty breathing (e.g., speaks in phrases, SOB even at rest, pulse 100-120)    Protocols used: CORONAVIRUS (COVID-19) DIAGNOSED OR [id number]

## 2020-09-17 DIAGNOSIS — Z12.39 BREAST CANCER SCREENING: ICD-10-CM

## 2020-10-15 ENCOUNTER — LAB VISIT (OUTPATIENT)
Dept: LAB | Facility: HOSPITAL | Age: 65
End: 2020-10-15
Attending: FAMILY MEDICINE
Payer: COMMERCIAL

## 2020-10-15 ENCOUNTER — OFFICE VISIT (OUTPATIENT)
Dept: FAMILY MEDICINE | Facility: CLINIC | Age: 65
End: 2020-10-15
Payer: COMMERCIAL

## 2020-10-15 VITALS
HEART RATE: 94 BPM | DIASTOLIC BLOOD PRESSURE: 62 MMHG | TEMPERATURE: 98 F | HEIGHT: 66 IN | BODY MASS INDEX: 29.21 KG/M2 | OXYGEN SATURATION: 98 % | WEIGHT: 181.75 LBS | SYSTOLIC BLOOD PRESSURE: 130 MMHG

## 2020-10-15 DIAGNOSIS — R73.01 IMPAIRED FASTING BLOOD SUGAR: ICD-10-CM

## 2020-10-15 DIAGNOSIS — E78.2 MIXED HYPERLIPIDEMIA: ICD-10-CM

## 2020-10-15 DIAGNOSIS — M25.50 ARTHRALGIA, UNSPECIFIED JOINT: ICD-10-CM

## 2020-10-15 DIAGNOSIS — F43.0 ACUTE STRESS REACTION: Primary | ICD-10-CM

## 2020-10-15 DIAGNOSIS — J98.09 RECURRENT BRONCHOSPASM: ICD-10-CM

## 2020-10-15 DIAGNOSIS — I10 ESSENTIAL HYPERTENSION: ICD-10-CM

## 2020-10-15 DIAGNOSIS — F41.9 ANXIETY: ICD-10-CM

## 2020-10-15 LAB
ALBUMIN SERPL BCP-MCNC: 3.9 G/DL (ref 3.5–5.2)
ALP SERPL-CCNC: 141 U/L (ref 55–135)
ALT SERPL W/O P-5'-P-CCNC: 27 U/L (ref 10–44)
ANION GAP SERPL CALC-SCNC: 9 MMOL/L (ref 8–16)
AST SERPL-CCNC: 24 U/L (ref 10–40)
BASOPHILS # BLD AUTO: 0.1 K/UL (ref 0–0.2)
BASOPHILS NFR BLD: 0.9 % (ref 0–1.9)
BILIRUB SERPL-MCNC: 0.4 MG/DL (ref 0.1–1)
BUN SERPL-MCNC: 17 MG/DL (ref 8–23)
CALCIUM SERPL-MCNC: 9.9 MG/DL (ref 8.7–10.5)
CHLORIDE SERPL-SCNC: 105 MMOL/L (ref 95–110)
CHOLEST SERPL-MCNC: 207 MG/DL (ref 120–199)
CHOLEST/HDLC SERPL: 5.8 {RATIO} (ref 2–5)
CO2 SERPL-SCNC: 29 MMOL/L (ref 23–29)
CREAT SERPL-MCNC: 0.9 MG/DL (ref 0.5–1.4)
DIFFERENTIAL METHOD: ABNORMAL
EOSINOPHIL # BLD AUTO: 0.3 K/UL (ref 0–0.5)
EOSINOPHIL NFR BLD: 3 % (ref 0–8)
ERYTHROCYTE [DISTWIDTH] IN BLOOD BY AUTOMATED COUNT: 13.6 % (ref 11.5–14.5)
ERYTHROCYTE [SEDIMENTATION RATE] IN BLOOD BY WESTERGREN METHOD: 19 MM/HR (ref 0–36)
EST. GFR  (AFRICAN AMERICAN): >60 ML/MIN/1.73 M^2
EST. GFR  (NON AFRICAN AMERICAN): >60 ML/MIN/1.73 M^2
GLUCOSE SERPL-MCNC: 113 MG/DL (ref 70–110)
HCT VFR BLD AUTO: 42.5 % (ref 37–48.5)
HDLC SERPL-MCNC: 36 MG/DL (ref 40–75)
HDLC SERPL: 17.4 % (ref 20–50)
HGB BLD-MCNC: 13.5 G/DL (ref 12–16)
IMM GRANULOCYTES # BLD AUTO: 0.05 K/UL (ref 0–0.04)
IMM GRANULOCYTES NFR BLD AUTO: 0.5 % (ref 0–0.5)
LDLC SERPL CALC-MCNC: 110.2 MG/DL (ref 63–159)
LYMPHOCYTES # BLD AUTO: 3.5 K/UL (ref 1–4.8)
LYMPHOCYTES NFR BLD: 31.6 % (ref 18–48)
MCH RBC QN AUTO: 26.7 PG (ref 27–31)
MCHC RBC AUTO-ENTMCNC: 31.8 G/DL (ref 32–36)
MCV RBC AUTO: 84 FL (ref 82–98)
MONOCYTES # BLD AUTO: 0.6 K/UL (ref 0.3–1)
MONOCYTES NFR BLD: 5.3 % (ref 4–15)
NEUTROPHILS # BLD AUTO: 6.5 K/UL (ref 1.8–7.7)
NEUTROPHILS NFR BLD: 58.7 % (ref 38–73)
NONHDLC SERPL-MCNC: 171 MG/DL
NRBC BLD-RTO: 0 /100 WBC
PLATELET # BLD AUTO: 252 K/UL (ref 150–350)
PMV BLD AUTO: 11.4 FL (ref 9.2–12.9)
POTASSIUM SERPL-SCNC: 4.3 MMOL/L (ref 3.5–5.1)
PROT SERPL-MCNC: 7.7 G/DL (ref 6–8.4)
RBC # BLD AUTO: 5.06 M/UL (ref 4–5.4)
SODIUM SERPL-SCNC: 143 MMOL/L (ref 136–145)
TRIGL SERPL-MCNC: 304 MG/DL (ref 30–150)
WBC # BLD AUTO: 11.08 K/UL (ref 3.9–12.7)

## 2020-10-15 PROCEDURE — 99214 OFFICE O/P EST MOD 30 MIN: CPT | Mod: S$GLB,,, | Performed by: FAMILY MEDICINE

## 2020-10-15 PROCEDURE — 99999 PR PBB SHADOW E&M-EST. PATIENT-LVL V: ICD-10-PCS | Mod: PBBFAC,,, | Performed by: FAMILY MEDICINE

## 2020-10-15 PROCEDURE — 3075F SYST BP GE 130 - 139MM HG: CPT | Mod: CPTII,S$GLB,, | Performed by: FAMILY MEDICINE

## 2020-10-15 PROCEDURE — 99214 PR OFFICE/OUTPT VISIT, EST, LEVL IV, 30-39 MIN: ICD-10-PCS | Mod: S$GLB,,, | Performed by: FAMILY MEDICINE

## 2020-10-15 PROCEDURE — 80061 LIPID PANEL: CPT

## 2020-10-15 PROCEDURE — 3008F PR BODY MASS INDEX (BMI) DOCUMENTED: ICD-10-PCS | Mod: CPTII,S$GLB,, | Performed by: FAMILY MEDICINE

## 2020-10-15 PROCEDURE — 3075F PR MOST RECENT SYSTOLIC BLOOD PRESS GE 130-139MM HG: ICD-10-PCS | Mod: CPTII,S$GLB,, | Performed by: FAMILY MEDICINE

## 2020-10-15 PROCEDURE — 3078F PR MOST RECENT DIASTOLIC BLOOD PRESSURE < 80 MM HG: ICD-10-PCS | Mod: CPTII,S$GLB,, | Performed by: FAMILY MEDICINE

## 2020-10-15 PROCEDURE — 99999 PR PBB SHADOW E&M-EST. PATIENT-LVL V: CPT | Mod: PBBFAC,,, | Performed by: FAMILY MEDICINE

## 2020-10-15 PROCEDURE — 3078F DIAST BP <80 MM HG: CPT | Mod: CPTII,S$GLB,, | Performed by: FAMILY MEDICINE

## 2020-10-15 PROCEDURE — 85652 RBC SED RATE AUTOMATED: CPT

## 2020-10-15 PROCEDURE — 86431 RHEUMATOID FACTOR QUANT: CPT

## 2020-10-15 PROCEDURE — 36415 COLL VENOUS BLD VENIPUNCTURE: CPT | Mod: PO

## 2020-10-15 PROCEDURE — 85025 COMPLETE CBC W/AUTO DIFF WBC: CPT

## 2020-10-15 PROCEDURE — 80053 COMPREHEN METABOLIC PANEL: CPT

## 2020-10-15 PROCEDURE — 83036 HEMOGLOBIN GLYCOSYLATED A1C: CPT

## 2020-10-15 PROCEDURE — 3008F BODY MASS INDEX DOCD: CPT | Mod: CPTII,S$GLB,, | Performed by: FAMILY MEDICINE

## 2020-10-15 RX ORDER — VERAPAMIL HYDROCHLORIDE 120 MG/1
240 CAPSULE, EXTENDED RELEASE ORAL DAILY
Qty: 180 CAPSULE | Refills: 3 | Status: SHIPPED | OUTPATIENT
Start: 2020-10-15 | End: 2022-08-08 | Stop reason: SDUPTHER

## 2020-10-15 RX ORDER — INHALER, ASSIST DEVICES
SPACER (EA) MISCELLANEOUS
Qty: 1 EACH | Refills: 1 | Status: SHIPPED | OUTPATIENT
Start: 2020-10-15

## 2020-10-15 RX ORDER — ALPRAZOLAM 1 MG/1
1 TABLET, EXTENDED RELEASE ORAL DAILY
Qty: 30 TABLET | Refills: 0 | Status: SHIPPED | OUTPATIENT
Start: 2020-10-15 | End: 2021-07-08

## 2020-10-15 RX ORDER — ROPINIROLE 1 MG/1
1 TABLET, FILM COATED ORAL 2 TIMES DAILY PRN
Qty: 180 TABLET | Refills: 3 | Status: SHIPPED | OUTPATIENT
Start: 2020-10-15 | End: 2022-08-08 | Stop reason: SDUPTHER

## 2020-10-15 RX ORDER — TRAMADOL HYDROCHLORIDE 50 MG/1
50 TABLET ORAL EVERY 12 HOURS PRN
Qty: 60 TABLET | Refills: 3 | Status: SHIPPED | OUTPATIENT
Start: 2020-10-15 | End: 2021-02-22 | Stop reason: SDUPTHER

## 2020-10-15 RX ORDER — ALBUTEROL SULFATE 90 UG/1
2 AEROSOL, METERED RESPIRATORY (INHALATION) EVERY 4 HOURS PRN
Qty: 18 G | Refills: 2 | Status: SHIPPED | OUTPATIENT
Start: 2020-10-15 | End: 2021-12-16 | Stop reason: SDUPTHER

## 2020-10-15 RX ORDER — DULOXETIN HYDROCHLORIDE 30 MG/1
30 CAPSULE, DELAYED RELEASE ORAL DAILY
Qty: 90 CAPSULE | Refills: 3 | Status: SHIPPED | OUTPATIENT
Start: 2020-10-15 | End: 2021-04-22 | Stop reason: SDUPTHER

## 2020-10-15 NOTE — PROGRESS NOTES
Chief Complaint:    Chief Complaint   Patient presents with    Medication Refill       History of Present Illness:    Presents today,  She is going through lots S her  stomach cancer she has lot of anxiety she says her mind keeps running.  Also has depression she is wondering if she has got ADD.  He also has symptoms where sometimes she feels like her throat is closing up and she can breathe symptoms only last about 2 min mostly associated with exertion she was given inhaler but she handout.  She discussed is Cardiology and has a workup done so far which has been unremarkable.  Home blood pressure readings are okay  Also takes tramadol for chronic pain    ROS:  Review of Systems   Constitutional: Negative for activity change, chills, fatigue, fever and unexpected weight change.   HENT: Negative for congestion, ear discharge, ear pain, hearing loss, postnasal drip and rhinorrhea.    Eyes: Negative for pain and visual disturbance.   Respiratory: Negative for cough, chest tightness and shortness of breath.    Cardiovascular: Negative for chest pain and palpitations.   Gastrointestinal: Negative for abdominal pain, diarrhea and vomiting.   Endocrine: Negative for heat intolerance.   Genitourinary: Negative for dysuria, flank pain, frequency and hematuria.   Musculoskeletal: Negative for back pain, gait problem and neck pain.   Skin: Negative for color change and rash.   Neurological: Negative for dizziness, tremors, seizures, numbness and headaches.   Psychiatric/Behavioral: Negative for agitation, hallucinations, self-injury, sleep disturbance and suicidal ideas. The patient is not nervous/anxious.        Past Medical History:   Diagnosis Date    Anxiety     Arthritis     Asthma     Depression     Hyperlipidemia     Hypertension     RLS (restless legs syndrome)        Social History:  Social History     Socioeconomic History    Marital status:      Spouse name: Not on file    Number of  "children: 2    Years of education: Not on file    Highest education level: Not on file   Occupational History    Not on file   Social Needs    Financial resource strain: Not on file    Food insecurity     Worry: Not on file     Inability: Not on file    Transportation needs     Medical: Not on file     Non-medical: Not on file   Tobacco Use    Smoking status: Former Smoker     Quit date: 6/10/2012     Years since quittin.3    Smokeless tobacco: Never Used    Tobacco comment: Would smoke 1 cigarette every few months   Substance and Sexual Activity    Alcohol use: Yes     Alcohol/week: 1.0 standard drinks     Types: 1 Standard drinks or equivalent per week     Comment: social    Drug use: No    Sexual activity: Yes     Partners: Male     Birth control/protection: None   Lifestyle    Physical activity     Days per week: Not on file     Minutes per session: Not on file    Stress: Not on file   Relationships    Social connections     Talks on phone: Not on file     Gets together: Not on file     Attends [employer] service: Not on file     Active member of club or organization: Not on file     Attends meetings of clubs or organizations: Not on file     Relationship status: Not on file   Other Topics Concern    Not on file   Social History Narrative    Not on file       Family History:   family history includes Cancer in her maternal grandfather and maternal grandmother; Clotting disorder in her maternal grandfather, maternal grandmother, and mother; Hemochromatosis in her mother; Hypertension in her father and mother.    Health Maintenance   Topic Date Due    Mammogram  09/10/2020    Lipid Panel  2020    TETANUS VACCINE  2026    Hepatitis C Screening  Completed       Physical Exam:    Vital Signs  Temp: 98.2 °F (36.8 °C)  Temp src: Temporal  Pulse: 94  SpO2: 98 %  BP: 130/62  BP Location: Left arm  Patient Position: Sitting  Pain Score: 0-No pain  Height and Weight  Height: 5' 6" (167.6 " cm)  Weight: 82.5 kg (181 lb 12.3 oz)  BSA (Calculated - sq m): 1.96 sq meters  BMI (Calculated): 29.4  Weight in (lb) to have BMI = 25: 154.6]    Body mass index is 29.34 kg/m².    Physical Exam  Constitutional:       Appearance: She is well-developed.   Eyes:      Conjunctiva/sclera: Conjunctivae normal.      Pupils: Pupils are equal, round, and reactive to light.   Neck:      Musculoskeletal: Normal range of motion and neck supple.   Cardiovascular:      Rate and Rhythm: Normal rate and regular rhythm.      Heart sounds: Normal heart sounds. No murmur.   Pulmonary:      Effort: Pulmonary effort is normal. No respiratory distress.      Breath sounds: Normal breath sounds. No wheezing or rales.   Chest:      Chest wall: No tenderness.   Abdominal:      General: There is no distension.      Palpations: Abdomen is soft. There is no mass.      Tenderness: There is no abdominal tenderness. There is no guarding.   Musculoskeletal:         General: No tenderness.   Lymphadenopathy:      Cervical: No cervical adenopathy.   Skin:     General: Skin is warm and dry.   Neurological:      Mental Status: She is alert and oriented to person, place, and time.      Deep Tendon Reflexes: Reflexes are normal and symmetric.   Psychiatric:         Behavior: Behavior normal.         Thought Content: Thought content normal.         Judgment: Judgment normal.           Assessment:      ICD-10-CM ICD-9-CM   1. Acute stress reaction  F43.0 308.9   2. Anxiety  F41.9 300.00   3. Recurrent bronchospasm  J98.09 519.19         Plan:        certainly not the best time to get a psychological evaluation, will treat at with Xanax XR is a short-term option.  Discussed the implications of narcotic and benzodiazepine together and she understood  Prescription for albuterol inhaler tests spacer given, will also consult allergy to see if she be are dealing with a potential allergic reaction which could be threatening  Check labs as below  Hypertension  stable  Will check rheumatoid factor as she request as it was elevated in the past    Orders Placed This Encounter   Procedures    Ambulatory referral/consult to Allergy       Current Outpatient Medications   Medication Sig Dispense Refill    acetaminophen (TYLENOL EXTRA STRENGTH) 500 MG tablet Take 500 mg by mouth every 6 (six) hours as needed for Pain.      albuterol (VENTOLIN HFA) 90 mcg/actuation inhaler Inhale 2 puffs into the lungs every 6 (six) hours as needed for Wheezing. Rescue 18 g 5    ascorbic acid, vitamin C, (VITAMIN C) 1000 MG tablet Take 1,000 mg by mouth once daily.      buPROPion (WELLBUTRIN SR) 150 MG TBSR 12 hr tablet Take 1 tablet (150 mg total) by mouth 2 (two) times daily. 180 tablet 3    carvediloL (COREG) 6.25 MG tablet Take 1 tablet (6.25 mg total) by mouth 2 (two) times daily with meals. 180 tablet 4    cyclobenzaprine (FLEXERIL) 10 MG tablet Take 1 tablet (10 mg total) by mouth 2 (two) times daily as needed for Muscle spasms. 180 tablet 1    DULoxetine (CYMBALTA) 30 MG capsule Take 1 capsule (30 mg total) by mouth once daily. 90 capsule 3    gabapentin (NEURONTIN) 300 MG capsule Take 1 capsule (300 mg total) by mouth 2 (two) times daily. 180 capsule 3    ibuprofen (ADVIL,MOTRIN) 600 MG tablet       losartan-hydrochlorothiazide 100-12.5 mg (HYZAAR) 100-12.5 mg Tab Take 1 tablet by mouth once daily. 90 tablet 3    olopatadine (PATANOL) 0.1 % ophthalmic solution Place 1 drop into both eyes once daily.      pantoprazole (PROTONIX) 40 MG tablet Take 1 tablet (40 mg total) by mouth once daily. 90 tablet 3    potassium 99 mg Tab Take by mouth. 1 tablet Oral Every day      rOPINIRole (REQUIP) 1 MG tablet Take 1 tablet (1 mg total) by mouth 2 (two) times daily as needed. 180 tablet 3    traMADoL (ULTRAM) 50 mg tablet Take 1 tablet (50 mg total) by mouth every 12 (twelve) hours as needed for Pain. 60 tablet 3    verapamiL (VERELAN) 120 MG C24P Take 2 capsules (240 mg total) by  mouth once daily. 180 capsule 3    albuterol (PROVENTIL/VENTOLIN HFA) 90 mcg/actuation inhaler Inhale 2 puffs into the lungs every 4 (four) hours as needed for Wheezing. 18 g 2    ALPRAZolam (XANAX XR) 1 MG Tb24 Take 1 tablet (1 mg total) by mouth once daily. 30 tablet 0    EPINEPHrine (EPIPEN) 0.3 mg/0.3 mL AtIn Inject 0.3 mLs (0.3 mg total) into the muscle once. for 1 dose 0.6 mL 4    inhalat.spacing dev,large mask (BREATHERITE SPACER-MASK,ADULT) Spcr Use as directed for inhalation. 1 each 1     Current Facility-Administered Medications   Medication Dose Route Frequency Provider Last Rate Last Dose    albuterol inhaler 2 puff  2 puff Inhalation Q6H PRN Helena Jones MD        albuterol sulfate nebulizer solution 2.5 mg  2.5 mg Nebulization 1 time in Clinic/HOD Helena Jones MD         Facility-Administered Medications Ordered in Other Visits   Medication Dose Route Frequency Provider Last Rate Last Dose    lactated ringers infusion   Intravenous Continuous Vanda Mathis MD   Stopped at 10/01/18 1140       Medications Discontinued During This Encounter   Medication Reason    enoxaparin (LOVENOX) 120 mg/0.8 mL Syrg Patient no longer taking    hydrOXYzine pamoate (VISTARIL) 25 MG Cap Patient no longer taking    zolpidem (AMBIEN) 5 MG Tab Patient no longer taking    benzonatate (TESSALON) 100 MG capsule Patient no longer taking    DULoxetine (CYMBALTA) 30 MG capsule Reorder    rOPINIRole (REQUIP) 1 MG tablet Reorder    verapamiL (VERELAN) 120 MG C24P Reorder    traMADoL (ULTRAM) 50 mg tablet Reorder       No follow-ups on file.      Helena Jones MD

## 2020-10-16 LAB
ESTIMATED AVG GLUCOSE: 123 MG/DL (ref 68–131)
HBA1C MFR BLD HPLC: 5.9 % (ref 4–5.6)
RHEUMATOID FACT SERPL-ACNC: 27 IU/ML (ref 0–15)

## 2020-10-20 ENCOUNTER — HOSPITAL ENCOUNTER (OUTPATIENT)
Dept: RADIOLOGY | Facility: HOSPITAL | Age: 65
Discharge: HOME | End: 2020-10-20
Attending: FAMILY MEDICINE
Payer: COMMERCIAL

## 2020-10-20 VITALS — BODY MASS INDEX: 29.23 KG/M2 | HEIGHT: 66 IN | WEIGHT: 181.88 LBS

## 2020-10-20 DIAGNOSIS — Z12.39 BREAST CANCER SCREENING: ICD-10-CM

## 2020-10-20 PROCEDURE — 77063 BREAST TOMOSYNTHESIS BI: CPT | Mod: 26,,, | Performed by: RADIOLOGY

## 2020-10-20 PROCEDURE — 77067 SCR MAMMO BI INCL CAD: CPT | Mod: TC

## 2020-10-20 PROCEDURE — 77067 MAMMO DIGITAL SCREENING BILAT WITH TOMO: ICD-10-PCS | Mod: 26,,, | Performed by: RADIOLOGY

## 2020-10-20 PROCEDURE — 77067 SCR MAMMO BI INCL CAD: CPT | Mod: 26,,, | Performed by: RADIOLOGY

## 2020-10-20 PROCEDURE — 77063 MAMMO DIGITAL SCREENING BILAT WITH TOMO: ICD-10-PCS | Mod: 26,,, | Performed by: RADIOLOGY

## 2020-10-21 ENCOUNTER — CLINICAL SUPPORT (OUTPATIENT)
Dept: CARDIOLOGY | Facility: CLINIC | Age: 65
End: 2020-10-21
Payer: COMMERCIAL

## 2020-10-21 ENCOUNTER — OFFICE VISIT (OUTPATIENT)
Dept: CARDIOLOGY | Facility: CLINIC | Age: 65
End: 2020-10-21
Payer: COMMERCIAL

## 2020-10-21 VITALS
SYSTOLIC BLOOD PRESSURE: 126 MMHG | HEART RATE: 82 BPM | BODY MASS INDEX: 28.68 KG/M2 | OXYGEN SATURATION: 95 % | DIASTOLIC BLOOD PRESSURE: 84 MMHG | WEIGHT: 177.69 LBS

## 2020-10-21 DIAGNOSIS — I10 ESSENTIAL HYPERTENSION: Primary | ICD-10-CM

## 2020-10-21 DIAGNOSIS — I10 HYPERTENSION: ICD-10-CM

## 2020-10-21 DIAGNOSIS — I48.0 PAF (PAROXYSMAL ATRIAL FIBRILLATION): ICD-10-CM

## 2020-10-21 DIAGNOSIS — E78.2 MIXED HYPERLIPIDEMIA: ICD-10-CM

## 2020-10-21 DIAGNOSIS — I10 ESSENTIAL HYPERTENSION: ICD-10-CM

## 2020-10-21 PROCEDURE — 93000 ELECTROCARDIOGRAM COMPLETE: CPT | Mod: S$GLB,,, | Performed by: INTERNAL MEDICINE

## 2020-10-21 PROCEDURE — 99999 PR PBB SHADOW E&M-EST. PATIENT-LVL IV: ICD-10-PCS | Mod: PBBFAC,,, | Performed by: INTERNAL MEDICINE

## 2020-10-21 PROCEDURE — 3074F PR MOST RECENT SYSTOLIC BLOOD PRESSURE < 130 MM HG: ICD-10-PCS | Mod: CPTII,S$GLB,, | Performed by: INTERNAL MEDICINE

## 2020-10-21 PROCEDURE — 99214 OFFICE O/P EST MOD 30 MIN: CPT | Mod: S$GLB,,, | Performed by: INTERNAL MEDICINE

## 2020-10-21 PROCEDURE — 93000 EKG 12-LEAD: ICD-10-PCS | Mod: S$GLB,,, | Performed by: INTERNAL MEDICINE

## 2020-10-21 PROCEDURE — 3079F PR MOST RECENT DIASTOLIC BLOOD PRESSURE 80-89 MM HG: ICD-10-PCS | Mod: CPTII,S$GLB,, | Performed by: INTERNAL MEDICINE

## 2020-10-21 PROCEDURE — 3074F SYST BP LT 130 MM HG: CPT | Mod: CPTII,S$GLB,, | Performed by: INTERNAL MEDICINE

## 2020-10-21 PROCEDURE — 99999 PR PBB SHADOW E&M-EST. PATIENT-LVL IV: CPT | Mod: PBBFAC,,, | Performed by: INTERNAL MEDICINE

## 2020-10-21 PROCEDURE — 3079F DIAST BP 80-89 MM HG: CPT | Mod: CPTII,S$GLB,, | Performed by: INTERNAL MEDICINE

## 2020-10-21 PROCEDURE — 3008F PR BODY MASS INDEX (BMI) DOCUMENTED: ICD-10-PCS | Mod: CPTII,S$GLB,, | Performed by: INTERNAL MEDICINE

## 2020-10-21 PROCEDURE — 99214 PR OFFICE/OUTPT VISIT, EST, LEVL IV, 30-39 MIN: ICD-10-PCS | Mod: S$GLB,,, | Performed by: INTERNAL MEDICINE

## 2020-10-21 PROCEDURE — 3008F BODY MASS INDEX DOCD: CPT | Mod: CPTII,S$GLB,, | Performed by: INTERNAL MEDICINE

## 2020-10-21 RX ORDER — LOSARTAN POTASSIUM AND HYDROCHLOROTHIAZIDE 12.5; 5 MG/1; MG/1
1 TABLET ORAL DAILY
Qty: 30 TABLET | Refills: 11 | Status: SHIPPED | OUTPATIENT
Start: 2020-10-21 | End: 2021-04-22

## 2020-10-21 RX ORDER — GLUCOSAM/CHONDRO/HERB 149/HYAL 750-100 MG
2 TABLET ORAL DAILY
Start: 2020-10-21 | End: 2022-10-19

## 2020-10-21 RX ORDER — CARVEDILOL 12.5 MG/1
12.5 TABLET ORAL 2 TIMES DAILY WITH MEALS
Qty: 60 TABLET | Refills: 5 | Status: SHIPPED | OUTPATIENT
Start: 2020-10-21 | End: 2021-03-17 | Stop reason: SDUPTHER

## 2020-10-21 NOTE — PROGRESS NOTES
Subjective:   Patient ID:  Erin Soriano is a 65 y.o. female who presents for follow up of No chief complaint on file.      64 yo female routine f/u. Retired   PMH HTN, HLD, GERD, anxiety and PAF. Fibromyalgia. Negative sleep study before.   EGD and colonoscopy done on 10/01/2018 normal.  Pt was told having PAF during the EGD.  MPI and ECHO done in  normal EF and no stress induced ischemia  Her ZIOPATCH done in  showed 3% PAF and PSVT.  No smoking/drinking  Mother has afib. Father CHF  Taking excedrin for migraine    Episode of chest tightness, palpitation and dizziness for one month, lasted for 5 minutes and resolved spontaneously. Pt states that she had similar episodes very often last year and much less frequency since started Coreg and verapamil.   Occasional chest pain  No faint dizziness  Med compliance  Takes ASA for migraine                Past Medical History:   Diagnosis Date    Anxiety     Arthritis     Asthma     Depression     Hyperlipidemia     Hypertension     RLS (restless legs syndrome)        Past Surgical History:   Procedure Laterality Date    ADENOIDECTOMY      pt was 3 yrs old    carpel tunnel      2009     SECTION      2 different ones    COLONOSCOPY N/A 10/1/2018    Procedure: COLONOSCOPY;  Surgeon: Johnson Bacon MD;  Location: [facility];  Service: Endoscopy;  Laterality: N/A;    ESOPHAGOGASTRODUODENOSCOPY N/A 10/1/2018    Procedure: ESOPHAGOGASTRODUODENOSCOPY (EGD);  Surgeon: Johnson Bacon MD;  Location: [facility];  Service: Endoscopy;  Laterality: N/A;    golfers elbow          HYSTERECTOMY       total    OOPHORECTOMY      TONSILLECTOMY      pt was 3 yrs old at time       Social History     Tobacco Use    Smoking status: Former Smoker     Quit date: 6/10/2012     Years since quittin.3    Smokeless tobacco: Never Used    Tobacco comment: Would smoke 1 cigarette every few months   Substance Use Topics    Alcohol use: Yes      Alcohol/week: 1.0 standard drinks     Types: 1 Standard drinks or equivalent per week     Comment: social    Drug use: No       Family History   Problem Relation Age of Onset    Hypertension Mother     Clotting disorder Mother     Hemochromatosis Mother     Hypertension Father     Cancer Maternal Grandmother     Clotting disorder Maternal Grandmother     Cancer Maternal Grandfather         prostate    Clotting disorder Maternal Grandfather          Review of Systems   Constitution: Positive for malaise/fatigue. Negative for decreased appetite, diaphoresis, fever and night sweats.   HENT: Negative for nosebleeds.    Eyes: Negative for blurred vision and double vision.   Cardiovascular: Positive for chest pain and dyspnea on exertion. Negative for claudication, irregular heartbeat, leg swelling, orthopnea, palpitations, paroxysmal nocturnal dyspnea and syncope.   Respiratory: Negative for cough, shortness of breath, sleep disturbances due to breathing, snoring, sputum production and wheezing.    Endocrine: Negative for cold intolerance and polyuria.   Hematologic/Lymphatic: Does not bruise/bleed easily.   Skin: Negative for rash.   Musculoskeletal: Positive for arthritis and back pain. Negative for falls, joint pain, joint swelling and neck pain.   Gastrointestinal: Negative for abdominal pain, heartburn, nausea and vomiting.   Genitourinary: Negative for dysuria, frequency and hematuria.   Neurological: Negative for difficulty with concentration, dizziness, focal weakness, headaches, light-headedness, numbness, seizures and weakness.   Psychiatric/Behavioral: Negative for depression, memory loss and substance abuse. The patient does not have insomnia.    Allergic/Immunologic: Negative for HIV exposure and hives.       Objective:   Physical Exam   Constitutional: She is oriented to person, place, and time. She appears well-nourished.   HENT:   Head: Normocephalic.   Eyes: Pupils are equal, round, and reactive  to light.   Neck: Normal carotid pulses and no JVD present. Carotid bruit is not present. No thyromegaly present.   Cardiovascular: Normal rate, regular rhythm, normal heart sounds and normal pulses.  No extrasystoles are present. PMI is not displaced. Exam reveals no gallop and no S3.   No murmur heard.  Pulmonary/Chest: Breath sounds normal. No stridor. No respiratory distress.   Abdominal: Soft. Bowel sounds are normal. There is no abdominal tenderness. There is no rebound.   Musculoskeletal: Normal range of motion.   Neurological: She is alert and oriented to person, place, and time.   Skin: Skin is intact. No rash noted.   Psychiatric: Her behavior is normal.       Lab Results   Component Value Date    CHOL 207 (H) 10/15/2020    CHOL 217 (H) 11/07/2019    CHOL 189 02/12/2019     Lab Results   Component Value Date    HDL 36 (L) 10/15/2020    HDL 43 11/07/2019    HDL 40 02/12/2019     Lab Results   Component Value Date    LDLCALC 110.2 10/15/2020    LDLCALC 128.8 11/07/2019    LDLCALC 85.2 02/12/2019     Lab Results   Component Value Date    TRIG 304 (H) 10/15/2020    TRIG 226 (H) 11/07/2019    TRIG 319 (H) 02/12/2019     Lab Results   Component Value Date    CHOLHDL 17.4 (L) 10/15/2020    CHOLHDL 19.8 (L) 11/07/2019    CHOLHDL 21.2 02/12/2019       Chemistry        Component Value Date/Time     10/15/2020 1510    K 4.3 10/15/2020 1510     10/15/2020 1510    CO2 29 10/15/2020 1510    BUN 17 10/15/2020 1510    CREATININE 0.9 10/15/2020 1510     (H) 10/15/2020 1510        Component Value Date/Time    CALCIUM 9.9 10/15/2020 1510    ALKPHOS 141 (H) 10/15/2020 1510    AST 24 10/15/2020 1510    ALT 27 10/15/2020 1510    BILITOT 0.4 10/15/2020 1510    ESTGFRAFRICA >60.0 10/15/2020 1510    EGFRNONAA >60.0 10/15/2020 1510          Lab Results   Component Value Date    HGBA1C 5.9 (H) 10/15/2020     Lab Results   Component Value Date    TSH 1.222 05/16/2012     Lab Results   Component Value Date    INR  1.0 09/20/2010     Lab Results   Component Value Date    WBC 11.08 10/15/2020    HGB 13.5 10/15/2020    HCT 42.5 10/15/2020    MCV 84 10/15/2020     10/15/2020     BMP  Sodium   Date Value Ref Range Status   10/15/2020 143 136 - 145 mmol/L Final     Potassium   Date Value Ref Range Status   10/15/2020 4.3 3.5 - 5.1 mmol/L Final     Chloride   Date Value Ref Range Status   10/15/2020 105 95 - 110 mmol/L Final     CO2   Date Value Ref Range Status   10/15/2020 29 23 - 29 mmol/L Final     BUN, Bld   Date Value Ref Range Status   10/15/2020 17 8 - 23 mg/dL Final     Creatinine   Date Value Ref Range Status   10/15/2020 0.9 0.5 - 1.4 mg/dL Final     Calcium   Date Value Ref Range Status   10/15/2020 9.9 8.7 - 10.5 mg/dL Final     Anion Gap   Date Value Ref Range Status   10/15/2020 9 8 - 16 mmol/L Final     eGFR if    Date Value Ref Range Status   10/15/2020 >60.0 >60 mL/min/1.73 m^2 Final     eGFR if non    Date Value Ref Range Status   10/15/2020 >60.0 >60 mL/min/1.73 m^2 Final     Comment:     Calculation used to obtain the estimated glomerular filtration  rate (eGFR) is the CKD-EPI equation.        BNP  @LABRCNTIP(BNP,BNPTRIAGEBLO)@  @LABRCNTIP(troponini)@  Estimated Creatinine Clearance: 67.6 mL/min (based on SCr of 0.9 mg/dL).  No results found in the last 24 hours.  No results found in the last 24 hours.  No results found in the last 24 hours.    Assessment:      1. Essential hypertension    2. Mixed hyperlipidemia    3. PAF (paroxysmal atrial fibrillation)    4. Hypertension      PAF [id number] score 2 (HTN and gender). No NOAC after discussion     Plan:   Increase Coreg to 12.5 mg bid  Decrease Hyzaar to 50/12.5 mg daily  Continue cardizem 240 mg   Add Fish Oil for high TG  DM Rx per PCP  Counseled DASH  Check Lipid profile in 6 months  Recommend heart-healthy diet, weight control and regular exercise.  Keegan. Risk modification.   RTC in 3 months    I have reviewed all  pertinent labs and cardiac studies independently. Plans and recommendations have been formulated under my direct supervision. All questions answered and patient voiced understanding.   If symptoms persist go to the ED

## 2020-10-22 ENCOUNTER — TELEPHONE (OUTPATIENT)
Dept: FAMILY MEDICINE | Facility: CLINIC | Age: 65
End: 2020-10-22

## 2020-10-22 NOTE — TELEPHONE ENCOUNTER
----- Message from Helena Jones MD sent at 10/18/2020  4:37 PM CDT -----  Labs stable.  Still prediabetes present.

## 2021-02-20 ENCOUNTER — PATIENT MESSAGE (OUTPATIENT)
Dept: FAMILY MEDICINE | Facility: CLINIC | Age: 66
End: 2021-02-20

## 2021-02-22 RX ORDER — TRAMADOL HYDROCHLORIDE 50 MG/1
50 TABLET ORAL EVERY 12 HOURS PRN
Qty: 60 TABLET | Refills: 0 | Status: SHIPPED | OUTPATIENT
Start: 2021-02-22 | End: 2021-04-22 | Stop reason: SDUPTHER

## 2021-02-22 RX ORDER — CYCLOBENZAPRINE HCL 10 MG
10 TABLET ORAL 2 TIMES DAILY PRN
Qty: 180 TABLET | Refills: 1 | Status: SHIPPED | OUTPATIENT
Start: 2021-02-22 | End: 2021-04-22 | Stop reason: SDUPTHER

## 2021-03-17 ENCOUNTER — OFFICE VISIT (OUTPATIENT)
Dept: CARDIOLOGY | Facility: CLINIC | Age: 66
End: 2021-03-17
Payer: COMMERCIAL

## 2021-03-17 VITALS
DIASTOLIC BLOOD PRESSURE: 82 MMHG | HEART RATE: 100 BPM | BODY MASS INDEX: 29.39 KG/M2 | WEIGHT: 182.13 LBS | OXYGEN SATURATION: 95 % | SYSTOLIC BLOOD PRESSURE: 144 MMHG

## 2021-03-17 DIAGNOSIS — E78.2 MIXED HYPERLIPIDEMIA: ICD-10-CM

## 2021-03-17 DIAGNOSIS — I48.0 PAF (PAROXYSMAL ATRIAL FIBRILLATION): Primary | ICD-10-CM

## 2021-03-17 DIAGNOSIS — I10 ESSENTIAL HYPERTENSION: ICD-10-CM

## 2021-03-17 DIAGNOSIS — Z78.9 STATIN INTOLERANCE: ICD-10-CM

## 2021-03-17 PROCEDURE — 3077F PR MOST RECENT SYSTOLIC BLOOD PRESSURE >= 140 MM HG: ICD-10-PCS | Mod: CPTII,S$GLB,, | Performed by: INTERNAL MEDICINE

## 2021-03-17 PROCEDURE — 99999 PR PBB SHADOW E&M-EST. PATIENT-LVL IV: ICD-10-PCS | Mod: PBBFAC,,, | Performed by: INTERNAL MEDICINE

## 2021-03-17 PROCEDURE — 3008F PR BODY MASS INDEX (BMI) DOCUMENTED: ICD-10-PCS | Mod: CPTII,S$GLB,, | Performed by: INTERNAL MEDICINE

## 2021-03-17 PROCEDURE — 99999 PR PBB SHADOW E&M-EST. PATIENT-LVL IV: CPT | Mod: PBBFAC,,, | Performed by: INTERNAL MEDICINE

## 2021-03-17 PROCEDURE — 99214 PR OFFICE/OUTPT VISIT, EST, LEVL IV, 30-39 MIN: ICD-10-PCS | Mod: S$GLB,,, | Performed by: INTERNAL MEDICINE

## 2021-03-17 PROCEDURE — 3077F SYST BP >= 140 MM HG: CPT | Mod: CPTII,S$GLB,, | Performed by: INTERNAL MEDICINE

## 2021-03-17 PROCEDURE — 99214 OFFICE O/P EST MOD 30 MIN: CPT | Mod: S$GLB,,, | Performed by: INTERNAL MEDICINE

## 2021-03-17 PROCEDURE — 3079F DIAST BP 80-89 MM HG: CPT | Mod: CPTII,S$GLB,, | Performed by: INTERNAL MEDICINE

## 2021-03-17 PROCEDURE — 3008F BODY MASS INDEX DOCD: CPT | Mod: CPTII,S$GLB,, | Performed by: INTERNAL MEDICINE

## 2021-03-17 PROCEDURE — 3079F PR MOST RECENT DIASTOLIC BLOOD PRESSURE 80-89 MM HG: ICD-10-PCS | Mod: CPTII,S$GLB,, | Performed by: INTERNAL MEDICINE

## 2021-03-17 RX ORDER — FLUTICASONE PROPIONATE 50 MCG
2 SPRAY, SUSPENSION (ML) NASAL DAILY
COMMUNITY
Start: 2021-03-01 | End: 2021-07-08 | Stop reason: SDUPTHER

## 2021-03-17 RX ORDER — CARVEDILOL 25 MG/1
25 TABLET ORAL 2 TIMES DAILY WITH MEALS
Qty: 60 TABLET | Refills: 5 | Status: SHIPPED | OUTPATIENT
Start: 2021-03-17 | End: 2022-03-16 | Stop reason: SDUPTHER

## 2021-04-22 ENCOUNTER — PATIENT MESSAGE (OUTPATIENT)
Dept: FAMILY MEDICINE | Facility: CLINIC | Age: 66
End: 2021-04-22

## 2021-04-22 ENCOUNTER — OFFICE VISIT (OUTPATIENT)
Dept: FAMILY MEDICINE | Facility: CLINIC | Age: 66
End: 2021-04-22
Payer: COMMERCIAL

## 2021-04-22 ENCOUNTER — TELEPHONE (OUTPATIENT)
Dept: FAMILY MEDICINE | Facility: CLINIC | Age: 66
End: 2021-04-22

## 2021-04-22 VITALS
HEART RATE: 79 BPM | TEMPERATURE: 97 F | WEIGHT: 178.25 LBS | HEIGHT: 66 IN | DIASTOLIC BLOOD PRESSURE: 98 MMHG | BODY MASS INDEX: 28.65 KG/M2 | SYSTOLIC BLOOD PRESSURE: 142 MMHG | OXYGEN SATURATION: 98 %

## 2021-04-22 DIAGNOSIS — M79.7 FIBROMYALGIA: ICD-10-CM

## 2021-04-22 DIAGNOSIS — B96.89 ACUTE BACTERIAL SINUSITIS: Primary | ICD-10-CM

## 2021-04-22 DIAGNOSIS — J01.90 ACUTE BACTERIAL SINUSITIS: Primary | ICD-10-CM

## 2021-04-22 DIAGNOSIS — I10 ESSENTIAL HYPERTENSION: ICD-10-CM

## 2021-04-22 PROCEDURE — 1125F PR PAIN SEVERITY QUANTIFIED, PAIN PRESENT: ICD-10-PCS | Mod: S$GLB,,, | Performed by: FAMILY MEDICINE

## 2021-04-22 PROCEDURE — 96372 PR INJECTION,THERAP/PROPH/DIAG2ST, IM OR SUBCUT: ICD-10-PCS | Mod: S$GLB,,, | Performed by: FAMILY MEDICINE

## 2021-04-22 PROCEDURE — 3008F BODY MASS INDEX DOCD: CPT | Mod: CPTII,S$GLB,, | Performed by: FAMILY MEDICINE

## 2021-04-22 PROCEDURE — 99214 PR OFFICE/OUTPT VISIT, EST, LEVL IV, 30-39 MIN: ICD-10-PCS | Mod: 25,S$GLB,, | Performed by: FAMILY MEDICINE

## 2021-04-22 PROCEDURE — 3008F PR BODY MASS INDEX (BMI) DOCUMENTED: ICD-10-PCS | Mod: CPTII,S$GLB,, | Performed by: FAMILY MEDICINE

## 2021-04-22 PROCEDURE — 96372 THER/PROPH/DIAG INJ SC/IM: CPT | Mod: S$GLB,,, | Performed by: FAMILY MEDICINE

## 2021-04-22 PROCEDURE — 99999 PR PBB SHADOW E&M-EST. PATIENT-LVL IV: ICD-10-PCS | Mod: PBBFAC,,, | Performed by: FAMILY MEDICINE

## 2021-04-22 PROCEDURE — 1125F AMNT PAIN NOTED PAIN PRSNT: CPT | Mod: S$GLB,,, | Performed by: FAMILY MEDICINE

## 2021-04-22 PROCEDURE — 99214 OFFICE O/P EST MOD 30 MIN: CPT | Mod: 25,S$GLB,, | Performed by: FAMILY MEDICINE

## 2021-04-22 PROCEDURE — 99999 PR PBB SHADOW E&M-EST. PATIENT-LVL IV: CPT | Mod: PBBFAC,,, | Performed by: FAMILY MEDICINE

## 2021-04-22 RX ORDER — CYCLOBENZAPRINE HCL 10 MG
10 TABLET ORAL 2 TIMES DAILY PRN
Qty: 180 TABLET | Refills: 1 | Status: SHIPPED | OUTPATIENT
Start: 2021-04-22 | End: 2021-12-16 | Stop reason: SDUPTHER

## 2021-04-22 RX ORDER — LOSARTAN POTASSIUM AND HYDROCHLOROTHIAZIDE 25; 100 MG/1; MG/1
1 TABLET ORAL DAILY
Qty: 90 TABLET | Refills: 3 | Status: SHIPPED | OUTPATIENT
Start: 2021-04-22 | End: 2021-12-16 | Stop reason: SDUPTHER

## 2021-04-22 RX ORDER — EPINEPHRINE 0.3 MG/.3ML
1 INJECTION SUBCUTANEOUS ONCE
Qty: 0.6 ML | Refills: 4 | Status: SHIPPED | OUTPATIENT
Start: 2021-04-22 | End: 2022-10-19

## 2021-04-22 RX ORDER — AMOXICILLIN AND CLAVULANATE POTASSIUM 875; 125 MG/1; MG/1
1 TABLET, FILM COATED ORAL EVERY 12 HOURS
Qty: 14 TABLET | Refills: 0 | Status: SHIPPED | OUTPATIENT
Start: 2021-04-22 | End: 2021-04-22 | Stop reason: SDUPTHER

## 2021-04-22 RX ORDER — BUSPIRONE HYDROCHLORIDE 10 MG/1
10 TABLET ORAL 3 TIMES DAILY
Qty: 90 TABLET | Refills: 11 | Status: SHIPPED | OUTPATIENT
Start: 2021-04-22 | End: 2022-10-19

## 2021-04-22 RX ORDER — TRAMADOL HYDROCHLORIDE 50 MG/1
50 TABLET ORAL EVERY 12 HOURS PRN
Qty: 60 TABLET | Refills: 0 | Status: SHIPPED | OUTPATIENT
Start: 2021-04-22 | End: 2021-12-16 | Stop reason: SDUPTHER

## 2021-04-22 RX ORDER — ALPRAZOLAM 1 MG/1
1 TABLET, EXTENDED RELEASE ORAL DAILY
Qty: 30 TABLET | Refills: 0 | Status: CANCELLED | OUTPATIENT
Start: 2021-04-22 | End: 2021-05-22

## 2021-04-22 RX ORDER — PANTOPRAZOLE SODIUM 40 MG/1
40 TABLET, DELAYED RELEASE ORAL DAILY
Qty: 90 TABLET | Refills: 3 | Status: SHIPPED | OUTPATIENT
Start: 2021-04-22 | End: 2022-12-15 | Stop reason: SDUPTHER

## 2021-04-22 RX ORDER — DULOXETIN HYDROCHLORIDE 30 MG/1
30 CAPSULE, DELAYED RELEASE ORAL DAILY
Qty: 90 CAPSULE | Refills: 3 | Status: SHIPPED | OUTPATIENT
Start: 2021-04-22 | End: 2022-08-08 | Stop reason: SDUPTHER

## 2021-04-22 RX ORDER — AMOXICILLIN AND CLAVULANATE POTASSIUM 875; 125 MG/1; MG/1
1 TABLET, FILM COATED ORAL EVERY 12 HOURS
Qty: 14 TABLET | Refills: 0 | Status: SHIPPED | OUTPATIENT
Start: 2021-04-22 | End: 2021-04-29

## 2021-04-22 RX ORDER — GABAPENTIN 300 MG/1
300 CAPSULE ORAL 2 TIMES DAILY
Qty: 180 CAPSULE | Refills: 3 | Status: SHIPPED | OUTPATIENT
Start: 2021-04-22 | End: 2022-08-08 | Stop reason: SDUPTHER

## 2021-04-22 RX ORDER — METHYLPREDNISOLONE ACETATE 80 MG/ML
80 INJECTION, SUSPENSION INTRA-ARTICULAR; INTRALESIONAL; INTRAMUSCULAR; SOFT TISSUE
Status: COMPLETED | OUTPATIENT
Start: 2021-04-22 | End: 2021-04-22

## 2021-04-22 RX ORDER — BUPROPION HYDROCHLORIDE 150 MG/1
150 TABLET, EXTENDED RELEASE ORAL 2 TIMES DAILY
Qty: 180 TABLET | Refills: 3 | Status: SHIPPED | OUTPATIENT
Start: 2021-04-22 | End: 2022-08-08 | Stop reason: SDUPTHER

## 2021-04-22 RX ADMIN — METHYLPREDNISOLONE ACETATE 80 MG: 80 INJECTION, SUSPENSION INTRA-ARTICULAR; INTRALESIONAL; INTRAMUSCULAR; SOFT TISSUE at 12:04

## 2021-07-08 ENCOUNTER — OFFICE VISIT (OUTPATIENT)
Dept: FAMILY MEDICINE | Facility: CLINIC | Age: 66
End: 2021-07-08
Payer: MEDICARE

## 2021-07-08 VITALS
HEIGHT: 66 IN | TEMPERATURE: 98 F | DIASTOLIC BLOOD PRESSURE: 70 MMHG | OXYGEN SATURATION: 98 % | HEART RATE: 85 BPM | WEIGHT: 176.25 LBS | BODY MASS INDEX: 28.33 KG/M2 | SYSTOLIC BLOOD PRESSURE: 130 MMHG

## 2021-07-08 DIAGNOSIS — R42 VERTIGO: Primary | ICD-10-CM

## 2021-07-08 DIAGNOSIS — J01.90 ACUTE SINUSITIS, RECURRENCE NOT SPECIFIED, UNSPECIFIED LOCATION: ICD-10-CM

## 2021-07-08 PROCEDURE — 99999 PR PBB SHADOW E&M-EST. PATIENT-LVL V: CPT | Mod: PBBFAC,,, | Performed by: NURSE PRACTITIONER

## 2021-07-08 PROCEDURE — 99999 PR PBB SHADOW E&M-EST. PATIENT-LVL V: ICD-10-PCS | Mod: PBBFAC,,, | Performed by: NURSE PRACTITIONER

## 2021-07-08 PROCEDURE — 99215 OFFICE O/P EST HI 40 MIN: CPT | Mod: PBBFAC,PO | Performed by: NURSE PRACTITIONER

## 2021-07-08 PROCEDURE — 96372 THER/PROPH/DIAG INJ SC/IM: CPT | Mod: PBBFAC,PO

## 2021-07-08 PROCEDURE — 99214 OFFICE O/P EST MOD 30 MIN: CPT | Mod: S$PBB,,, | Performed by: NURSE PRACTITIONER

## 2021-07-08 PROCEDURE — 99214 PR OFFICE/OUTPT VISIT, EST, LEVL IV, 30-39 MIN: ICD-10-PCS | Mod: S$PBB,,, | Performed by: NURSE PRACTITIONER

## 2021-07-08 RX ORDER — METHYLPREDNISOLONE ACETATE 80 MG/ML
80 INJECTION, SUSPENSION INTRA-ARTICULAR; INTRALESIONAL; INTRAMUSCULAR; SOFT TISSUE
Status: COMPLETED | OUTPATIENT
Start: 2021-07-08 | End: 2021-07-08

## 2021-07-08 RX ORDER — FLUTICASONE PROPIONATE 50 MCG
2 SPRAY, SUSPENSION (ML) NASAL DAILY
Qty: 18.2 ML | Refills: 1 | Status: SHIPPED | OUTPATIENT
Start: 2021-07-08 | End: 2023-04-27

## 2021-07-08 RX ORDER — MUPIROCIN 20 MG/G
OINTMENT TOPICAL 3 TIMES DAILY
Qty: 30 G | Refills: 0 | Status: SHIPPED | OUTPATIENT
Start: 2021-07-08 | End: 2022-11-15

## 2021-07-08 RX ORDER — MECLIZINE HYDROCHLORIDE 25 MG/1
25 TABLET ORAL 3 TIMES DAILY PRN
Qty: 60 TABLET | Refills: 1 | Status: SHIPPED | OUTPATIENT
Start: 2021-07-08 | End: 2022-08-08 | Stop reason: SDUPTHER

## 2021-07-08 RX ADMIN — METHYLPREDNISOLONE ACETATE 80 MG: 80 INJECTION, SUSPENSION INTRALESIONAL; INTRAMUSCULAR; INTRASYNOVIAL; SOFT TISSUE at 03:07

## 2021-10-29 ENCOUNTER — TELEPHONE (OUTPATIENT)
Dept: FAMILY MEDICINE | Facility: CLINIC | Age: 66
End: 2021-10-29
Payer: MEDICARE

## 2021-10-29 DIAGNOSIS — G89.4 CHRONIC PAIN SYNDROME: Primary | ICD-10-CM

## 2021-11-01 ENCOUNTER — TELEPHONE (OUTPATIENT)
Dept: PAIN MEDICINE | Facility: CLINIC | Age: 66
End: 2021-11-01
Payer: MEDICARE

## 2021-11-02 ENCOUNTER — TELEPHONE (OUTPATIENT)
Dept: PAIN MEDICINE | Facility: CLINIC | Age: 66
End: 2021-11-02
Payer: MEDICARE

## 2021-11-24 ENCOUNTER — TELEPHONE (OUTPATIENT)
Dept: PAIN MEDICINE | Facility: CLINIC | Age: 66
End: 2021-11-24
Payer: MEDICARE

## 2021-11-30 ENCOUNTER — TELEPHONE (OUTPATIENT)
Dept: FAMILY MEDICINE | Facility: CLINIC | Age: 66
End: 2021-11-30
Payer: MEDICARE

## 2021-11-30 DIAGNOSIS — G89.4 CHRONIC PAIN SYNDROME: Primary | ICD-10-CM

## 2021-11-30 DIAGNOSIS — M79.7 FIBROMYALGIA: ICD-10-CM

## 2021-11-30 DIAGNOSIS — Z12.39 ENCOUNTER FOR SCREENING FOR MALIGNANT NEOPLASM OF BREAST, UNSPECIFIED SCREENING MODALITY: Primary | ICD-10-CM

## 2021-11-30 RX ORDER — CYCLOBENZAPRINE HCL 10 MG
10 TABLET ORAL 2 TIMES DAILY PRN
Qty: 60 TABLET | Refills: 0 | OUTPATIENT
Start: 2021-11-30 | End: 2021-12-30

## 2021-11-30 RX ORDER — TRAMADOL HYDROCHLORIDE 50 MG/1
50 TABLET ORAL EVERY 12 HOURS PRN
Qty: 60 TABLET | Refills: 0 | OUTPATIENT
Start: 2021-11-30

## 2021-12-16 ENCOUNTER — OFFICE VISIT (OUTPATIENT)
Dept: FAMILY MEDICINE | Facility: CLINIC | Age: 66
End: 2021-12-16
Payer: MEDICARE

## 2021-12-16 ENCOUNTER — LAB VISIT (OUTPATIENT)
Dept: LAB | Facility: HOSPITAL | Age: 66
End: 2021-12-16
Attending: FAMILY MEDICINE
Payer: MEDICARE

## 2021-12-16 VITALS
BODY MASS INDEX: 29.12 KG/M2 | TEMPERATURE: 99 F | OXYGEN SATURATION: 96 % | HEIGHT: 66 IN | DIASTOLIC BLOOD PRESSURE: 78 MMHG | WEIGHT: 181.19 LBS | HEART RATE: 90 BPM | SYSTOLIC BLOOD PRESSURE: 138 MMHG

## 2021-12-16 DIAGNOSIS — F51.04 CHRONIC INSOMNIA: ICD-10-CM

## 2021-12-16 DIAGNOSIS — Z72.821 POOR SLEEP HYGIENE: Primary | ICD-10-CM

## 2021-12-16 DIAGNOSIS — R73.03 PREDIABETES: ICD-10-CM

## 2021-12-16 DIAGNOSIS — E78.2 MIXED HYPERLIPIDEMIA: ICD-10-CM

## 2021-12-16 DIAGNOSIS — M79.7 FIBROMYALGIA: ICD-10-CM

## 2021-12-16 DIAGNOSIS — I10 ESSENTIAL HYPERTENSION: ICD-10-CM

## 2021-12-16 LAB
ALBUMIN SERPL BCP-MCNC: 3.9 G/DL (ref 3.5–5.2)
ALP SERPL-CCNC: 122 U/L (ref 55–135)
ALT SERPL W/O P-5'-P-CCNC: 29 U/L (ref 10–44)
ANION GAP SERPL CALC-SCNC: 10 MMOL/L (ref 8–16)
AST SERPL-CCNC: 22 U/L (ref 10–40)
BASOPHILS # BLD AUTO: 0.1 K/UL (ref 0–0.2)
BASOPHILS NFR BLD: 1 % (ref 0–1.9)
BILIRUB SERPL-MCNC: 0.3 MG/DL (ref 0.1–1)
BUN SERPL-MCNC: 15 MG/DL (ref 8–23)
CALCIUM SERPL-MCNC: 9.4 MG/DL (ref 8.7–10.5)
CHLORIDE SERPL-SCNC: 106 MMOL/L (ref 95–110)
CHOLEST SERPL-MCNC: 212 MG/DL (ref 120–199)
CHOLEST/HDLC SERPL: 5.9 {RATIO} (ref 2–5)
CO2 SERPL-SCNC: 25 MMOL/L (ref 23–29)
CREAT SERPL-MCNC: 0.8 MG/DL (ref 0.5–1.4)
DIFFERENTIAL METHOD: ABNORMAL
EOSINOPHIL # BLD AUTO: 0.4 K/UL (ref 0–0.5)
EOSINOPHIL NFR BLD: 4 % (ref 0–8)
ERYTHROCYTE [DISTWIDTH] IN BLOOD BY AUTOMATED COUNT: 13.2 % (ref 11.5–14.5)
EST. GFR  (AFRICAN AMERICAN): >60 ML/MIN/1.73 M^2
EST. GFR  (NON AFRICAN AMERICAN): >60 ML/MIN/1.73 M^2
ESTIMATED AVG GLUCOSE: 117 MG/DL (ref 68–131)
GLUCOSE SERPL-MCNC: 95 MG/DL (ref 70–110)
HBA1C MFR BLD: 5.7 % (ref 4–5.6)
HCT VFR BLD AUTO: 42.4 % (ref 37–48.5)
HDLC SERPL-MCNC: 36 MG/DL (ref 40–75)
HDLC SERPL: 17 % (ref 20–50)
HGB BLD-MCNC: 13.5 G/DL (ref 12–16)
IMM GRANULOCYTES # BLD AUTO: 0.02 K/UL (ref 0–0.04)
IMM GRANULOCYTES NFR BLD AUTO: 0.2 % (ref 0–0.5)
LDLC SERPL CALC-MCNC: 133.4 MG/DL (ref 63–159)
LYMPHOCYTES # BLD AUTO: 3.1 K/UL (ref 1–4.8)
LYMPHOCYTES NFR BLD: 31 % (ref 18–48)
MCH RBC QN AUTO: 26.6 PG (ref 27–31)
MCHC RBC AUTO-ENTMCNC: 31.8 G/DL (ref 32–36)
MCV RBC AUTO: 84 FL (ref 82–98)
MONOCYTES # BLD AUTO: 0.8 K/UL (ref 0.3–1)
MONOCYTES NFR BLD: 7.9 % (ref 4–15)
NEUTROPHILS # BLD AUTO: 5.5 K/UL (ref 1.8–7.7)
NEUTROPHILS NFR BLD: 55.9 % (ref 38–73)
NONHDLC SERPL-MCNC: 176 MG/DL
NRBC BLD-RTO: 0 /100 WBC
PLATELET # BLD AUTO: 225 K/UL (ref 150–450)
PMV BLD AUTO: 11.4 FL (ref 9.2–12.9)
POTASSIUM SERPL-SCNC: 4.8 MMOL/L (ref 3.5–5.1)
PROT SERPL-MCNC: 7.7 G/DL (ref 6–8.4)
RBC # BLD AUTO: 5.08 M/UL (ref 4–5.4)
SODIUM SERPL-SCNC: 141 MMOL/L (ref 136–145)
TRIGL SERPL-MCNC: 213 MG/DL (ref 30–150)
WBC # BLD AUTO: 9.92 K/UL (ref 3.9–12.7)

## 2021-12-16 PROCEDURE — 85025 COMPLETE CBC W/AUTO DIFF WBC: CPT | Performed by: FAMILY MEDICINE

## 2021-12-16 PROCEDURE — 80053 COMPREHEN METABOLIC PANEL: CPT | Performed by: FAMILY MEDICINE

## 2021-12-16 PROCEDURE — 99214 OFFICE O/P EST MOD 30 MIN: CPT | Mod: S$PBB,,, | Performed by: FAMILY MEDICINE

## 2021-12-16 PROCEDURE — 99215 OFFICE O/P EST HI 40 MIN: CPT | Mod: PBBFAC,PO,25 | Performed by: FAMILY MEDICINE

## 2021-12-16 PROCEDURE — 99999 PR PBB SHADOW E&M-EST. PATIENT-LVL V: ICD-10-PCS | Mod: PBBFAC,,, | Performed by: FAMILY MEDICINE

## 2021-12-16 PROCEDURE — 90694 VACC AIIV4 NO PRSRV 0.5ML IM: CPT | Mod: PBBFAC,PO

## 2021-12-16 PROCEDURE — 90670 PCV13 VACCINE IM: CPT | Mod: PBBFAC,PO

## 2021-12-16 PROCEDURE — 80061 LIPID PANEL: CPT | Performed by: FAMILY MEDICINE

## 2021-12-16 PROCEDURE — 83036 HEMOGLOBIN GLYCOSYLATED A1C: CPT | Performed by: FAMILY MEDICINE

## 2021-12-16 PROCEDURE — 36415 COLL VENOUS BLD VENIPUNCTURE: CPT | Mod: PO | Performed by: FAMILY MEDICINE

## 2021-12-16 PROCEDURE — 99214 PR OFFICE/OUTPT VISIT, EST, LEVL IV, 30-39 MIN: ICD-10-PCS | Mod: S$PBB,,, | Performed by: FAMILY MEDICINE

## 2021-12-16 PROCEDURE — 99999 PR PBB SHADOW E&M-EST. PATIENT-LVL V: CPT | Mod: PBBFAC,,, | Performed by: FAMILY MEDICINE

## 2021-12-16 PROCEDURE — G0008 ADMIN INFLUENZA VIRUS VAC: HCPCS | Mod: PBBFAC,PO

## 2021-12-16 RX ORDER — ALBUTEROL SULFATE 90 UG/1
2 AEROSOL, METERED RESPIRATORY (INHALATION) EVERY 4 HOURS PRN
Qty: 18 G | Refills: 2 | Status: SHIPPED | OUTPATIENT
Start: 2021-12-16 | End: 2023-04-27 | Stop reason: SDUPTHER

## 2021-12-16 RX ORDER — TRAMADOL HYDROCHLORIDE 50 MG/1
50 TABLET ORAL EVERY 12 HOURS PRN
Qty: 60 TABLET | Refills: 0 | Status: SHIPPED | OUTPATIENT
Start: 2021-12-16 | End: 2022-08-08 | Stop reason: SDUPTHER

## 2021-12-16 RX ORDER — CYCLOBENZAPRINE HCL 10 MG
10 TABLET ORAL 2 TIMES DAILY PRN
Qty: 180 TABLET | Refills: 1 | Status: SHIPPED | OUTPATIENT
Start: 2021-12-16 | End: 2023-08-03 | Stop reason: SDUPTHER

## 2021-12-16 RX ORDER — LOSARTAN POTASSIUM AND HYDROCHLOROTHIAZIDE 25; 100 MG/1; MG/1
1 TABLET ORAL DAILY
Qty: 90 TABLET | Refills: 3 | Status: SHIPPED | OUTPATIENT
Start: 2021-12-16 | End: 2022-12-15 | Stop reason: SDUPTHER

## 2021-12-20 DIAGNOSIS — I10 ESSENTIAL HYPERTENSION: Primary | ICD-10-CM

## 2021-12-21 ENCOUNTER — OFFICE VISIT (OUTPATIENT)
Dept: FAMILY MEDICINE | Facility: CLINIC | Age: 66
End: 2021-12-21
Payer: MEDICARE

## 2021-12-21 VITALS
BODY MASS INDEX: 29.46 KG/M2 | HEART RATE: 80 BPM | HEIGHT: 66 IN | OXYGEN SATURATION: 97 % | DIASTOLIC BLOOD PRESSURE: 78 MMHG | TEMPERATURE: 98 F | SYSTOLIC BLOOD PRESSURE: 140 MMHG | WEIGHT: 183.31 LBS

## 2021-12-21 DIAGNOSIS — J06.9 UPPER RESPIRATORY TRACT INFECTION, UNSPECIFIED TYPE: Primary | ICD-10-CM

## 2021-12-21 LAB
CTP QC/QA: YES
CTP QC/QA: YES
FLUAV AG NPH QL: NEGATIVE
FLUBV AG NPH QL: NEGATIVE
SARS-COV-2 RDRP RESP QL NAA+PROBE: NEGATIVE

## 2021-12-21 PROCEDURE — 87804 INFLUENZA ASSAY W/OPTIC: CPT | Mod: PBBFAC,PO | Performed by: FAMILY MEDICINE

## 2021-12-21 PROCEDURE — 96372 THER/PROPH/DIAG INJ SC/IM: CPT | Mod: PBBFAC,PO

## 2021-12-21 PROCEDURE — U0002 COVID-19 LAB TEST NON-CDC: HCPCS | Mod: PBBFAC,PO | Performed by: FAMILY MEDICINE

## 2021-12-21 PROCEDURE — 99214 OFFICE O/P EST MOD 30 MIN: CPT | Mod: PBBFAC,PO,25 | Performed by: FAMILY MEDICINE

## 2021-12-21 PROCEDURE — 99999 PR PBB SHADOW E&M-EST. PATIENT-LVL IV: CPT | Mod: PBBFAC,,, | Performed by: FAMILY MEDICINE

## 2021-12-21 PROCEDURE — 99213 PR OFFICE/OUTPT VISIT, EST, LEVL III, 20-29 MIN: ICD-10-PCS | Mod: S$PBB,,, | Performed by: FAMILY MEDICINE

## 2021-12-21 PROCEDURE — 99213 OFFICE O/P EST LOW 20 MIN: CPT | Mod: S$PBB,,, | Performed by: FAMILY MEDICINE

## 2021-12-21 PROCEDURE — 99999 PR PBB SHADOW E&M-EST. PATIENT-LVL IV: ICD-10-PCS | Mod: PBBFAC,,, | Performed by: FAMILY MEDICINE

## 2021-12-21 RX ORDER — BENZONATATE 200 MG/1
200 CAPSULE ORAL 3 TIMES DAILY PRN
Qty: 30 CAPSULE | Refills: 0 | Status: SHIPPED | OUTPATIENT
Start: 2021-12-21 | End: 2021-12-31

## 2021-12-21 RX ORDER — BETAMETHASONE SODIUM PHOSPHATE AND BETAMETHASONE ACETATE 3; 3 MG/ML; MG/ML
6 INJECTION, SUSPENSION INTRA-ARTICULAR; INTRALESIONAL; INTRAMUSCULAR; SOFT TISSUE
Status: COMPLETED | OUTPATIENT
Start: 2021-12-21 | End: 2021-12-21

## 2021-12-21 RX ADMIN — BETAMETHASONE ACETATE AND BETAMETHASONE SODIUM PHOSPHATE 6 MG: 3; 3 INJECTION, SUSPENSION INTRA-ARTICULAR; INTRALESIONAL; INTRAMUSCULAR; SOFT TISSUE at 04:12

## 2021-12-29 ENCOUNTER — PATIENT MESSAGE (OUTPATIENT)
Dept: PAIN MEDICINE | Facility: CLINIC | Age: 66
End: 2021-12-29
Payer: MEDICARE

## 2021-12-29 ENCOUNTER — TELEPHONE (OUTPATIENT)
Dept: PAIN MEDICINE | Facility: CLINIC | Age: 66
End: 2021-12-29
Payer: MEDICARE

## 2022-01-04 ENCOUNTER — HOSPITAL ENCOUNTER (OUTPATIENT)
Dept: RADIOLOGY | Facility: HOSPITAL | Age: 67
Discharge: HOME | End: 2022-01-04
Attending: FAMILY MEDICINE
Payer: MEDICARE

## 2022-01-04 ENCOUNTER — OFFICE VISIT (OUTPATIENT)
Dept: FAMILY MEDICINE | Facility: CLINIC | Age: 67
End: 2022-01-04
Payer: MEDICARE

## 2022-01-04 VITALS
BODY MASS INDEX: 29.35 KG/M2 | HEIGHT: 66 IN | HEART RATE: 82 BPM | WEIGHT: 182.63 LBS | SYSTOLIC BLOOD PRESSURE: 152 MMHG | TEMPERATURE: 98 F | DIASTOLIC BLOOD PRESSURE: 80 MMHG | OXYGEN SATURATION: 94 %

## 2022-01-04 DIAGNOSIS — J45.901 MODERATE ASTHMA WITH EXACERBATION, UNSPECIFIED WHETHER PERSISTENT: ICD-10-CM

## 2022-01-04 DIAGNOSIS — J01.90 ACUTE BACTERIAL SINUSITIS: Primary | ICD-10-CM

## 2022-01-04 DIAGNOSIS — I10 PRIMARY HYPERTENSION: ICD-10-CM

## 2022-01-04 DIAGNOSIS — B96.89 ACUTE BACTERIAL SINUSITIS: Primary | ICD-10-CM

## 2022-01-04 PROCEDURE — 71046 X-RAY EXAM CHEST 2 VIEWS: CPT | Mod: 26,,, | Performed by: RADIOLOGY

## 2022-01-04 PROCEDURE — 99999 PR PBB SHADOW E&M-EST. PATIENT-LVL V: CPT | Mod: PBBFAC,,, | Performed by: FAMILY MEDICINE

## 2022-01-04 PROCEDURE — 99213 PR OFFICE/OUTPT VISIT, EST, LEVL III, 20-29 MIN: ICD-10-PCS | Mod: S$PBB,,, | Performed by: FAMILY MEDICINE

## 2022-01-04 PROCEDURE — 99213 OFFICE O/P EST LOW 20 MIN: CPT | Mod: S$PBB,,, | Performed by: FAMILY MEDICINE

## 2022-01-04 PROCEDURE — 99999 PR PBB SHADOW E&M-EST. PATIENT-LVL V: ICD-10-PCS | Mod: PBBFAC,,, | Performed by: FAMILY MEDICINE

## 2022-01-04 PROCEDURE — 99215 OFFICE O/P EST HI 40 MIN: CPT | Mod: PBBFAC,25,PO | Performed by: FAMILY MEDICINE

## 2022-01-04 PROCEDURE — 71046 X-RAY EXAM CHEST 2 VIEWS: CPT | Mod: TC,FY,PO

## 2022-01-04 PROCEDURE — 71046 XR CHEST PA AND LATERAL: ICD-10-PCS | Mod: 26,,, | Performed by: RADIOLOGY

## 2022-01-04 RX ORDER — PREDNISONE 20 MG/1
20 TABLET ORAL SEE ADMIN INSTRUCTIONS
Qty: 45 TABLET | Refills: 0 | Status: SHIPPED | OUTPATIENT
Start: 2022-01-04 | End: 2022-04-26

## 2022-01-04 RX ORDER — AMOXICILLIN AND CLAVULANATE POTASSIUM 875; 125 MG/1; MG/1
1 TABLET, FILM COATED ORAL EVERY 12 HOURS
Qty: 14 TABLET | Refills: 0 | Status: SHIPPED | OUTPATIENT
Start: 2022-01-04 | End: 2022-01-11

## 2022-01-04 NOTE — PROGRESS NOTES
Chief Complaint:    Chief Complaint   Patient presents with    Asthma    Cough       History of Present Illness:  Patient presents today she says she still has symptoms of congestion cough wheezing and some sinus pressure.  She has been using the albuterol inhaler with some mild relief denies any fever.  Her COVID test was negative.    ROS:  Review of Systems   Constitutional: Negative for activity change, chills, fatigue, fever and unexpected weight change.   HENT: Positive for congestion and sinus pain. Negative for ear discharge, ear pain, hearing loss, postnasal drip and rhinorrhea.    Eyes: Negative for pain and visual disturbance.   Respiratory: Positive for cough. Negative for chest tightness and shortness of breath.    Cardiovascular: Negative for chest pain and palpitations.   Gastrointestinal: Negative for abdominal pain, diarrhea and vomiting.   Endocrine: Negative for heat intolerance.   Genitourinary: Negative for dysuria, flank pain, frequency and hematuria.   Musculoskeletal: Negative for back pain, gait problem and neck pain.   Skin: Negative for color change and rash.   Neurological: Positive for headaches. Negative for dizziness, tremors, seizures and numbness.   Psychiatric/Behavioral: Negative for agitation, hallucinations, self-injury, sleep disturbance and suicidal ideas. The patient is not nervous/anxious.        Past Medical History:   Diagnosis Date    Anxiety     Arthritis     Asthma     Depression     Hyperlipidemia     Hypertension     RLS (restless legs syndrome)        Social History:  Social History     Socioeconomic History    Marital status:     Number of children: 2   Tobacco Use    Smoking status: Former Smoker     Quit date: 6/10/2012     Years since quittin.5    Smokeless tobacco: Never Used    Tobacco comment: Would smoke 1 cigarette every few months   Substance and Sexual Activity    Alcohol use: Yes     Alcohol/week: 1.0 standard drink     Types: 1  "Standard drinks or equivalent per week     Comment: social    Drug use: No    Sexual activity: Yes     Partners: Male     Birth control/protection: None       Family History:   family history includes Cancer in her maternal grandfather and maternal grandmother; Clotting disorder in her maternal grandfather, maternal grandmother, and mother; Hemochromatosis in her mother; Hypertension in her father and mother.    Health Maintenance   Topic Date Due    DEXA SCAN  05/19/2018    Mammogram  10/20/2021    Lipid Panel  12/16/2022    TETANUS VACCINE  08/30/2029    Hepatitis C Screening  Completed       Physical Exam:    Vital Signs  Temp: 97.7 °F (36.5 °C)  Temp src: Temporal  Pulse: 82  SpO2: (!) 94 %  BP: (!) 152/80  Pain Score: 0-No pain  Height and Weight  Height: 5' 6" (167.6 cm)  Weight: 82.9 kg (182 lb 10.4 oz)  BSA (Calculated - sq m): 1.96 sq meters  BMI (Calculated): 29.5  Weight in (lb) to have BMI = 25: 154.6]    Body mass index is 29.48 kg/m².    Physical Exam  Constitutional:       Appearance: She is well-developed.   HENT:      Nose:      Right Sinus: Maxillary sinus tenderness and frontal sinus tenderness present.      Left Sinus: Maxillary sinus tenderness and frontal sinus tenderness present.   Eyes:      Conjunctiva/sclera: Conjunctivae normal.      Pupils: Pupils are equal, round, and reactive to light.   Cardiovascular:      Rate and Rhythm: Normal rate and regular rhythm.      Heart sounds: Normal heart sounds. No murmur heard.      Pulmonary:      Effort: Pulmonary effort is normal. No respiratory distress.      Breath sounds: Wheezing present. No rales.   Chest:      Chest wall: No tenderness.   Abdominal:      General: There is no distension.      Palpations: Abdomen is soft. There is no mass.      Tenderness: There is no abdominal tenderness. There is no guarding.   Musculoskeletal:         General: No tenderness.      Cervical back: Normal range of motion and neck supple.   Lymphadenopathy: "      Cervical: No cervical adenopathy.   Skin:     General: Skin is warm and dry.   Neurological:      Mental Status: She is alert and oriented to person, place, and time.      Deep Tendon Reflexes: Reflexes are normal and symmetric.   Psychiatric:         Behavior: Behavior normal.         Thought Content: Thought content normal.         Judgment: Judgment normal.           Assessment:      ICD-10-CM ICD-9-CM   1. Acute bacterial sinusitis  J01.90 461.9    B96.89    2. Moderate asthma with exacerbation, unspecified whether persistent  J45.901 493.92   3. Primary hypertension  I10 401.9         Plan:       Chest x-ray today  Start on Augmentin and prednisone taper, continue albuterol inhaler every 2 hours as needed for wheezing        Orders Placed This Encounter   Procedures    X-Ray Chest PA And Lateral       Current Outpatient Medications   Medication Sig Dispense Refill    acetaminophen (TYLENOL) 500 MG tablet Take 500 mg by mouth every 6 (six) hours as needed for Pain.      albuterol (PROVENTIL/VENTOLIN HFA) 90 mcg/actuation inhaler Inhale 2 puffs into the lungs every 4 (four) hours as needed for Wheezing. 18 g 2    ascorbic acid, vitamin C, (VITAMIN C) 1000 MG tablet Take 1,000 mg by mouth once daily.      buPROPion (WELLBUTRIN SR) 150 MG TBSR 12 hr tablet Take 1 tablet (150 mg total) by mouth 2 (two) times daily. 180 tablet 3    busPIRone (BUSPAR) 10 MG tablet Take 1 tablet (10 mg total) by mouth 3 (three) times daily. 90 tablet 11    carvediloL (COREG) 25 MG tablet Take 1 tablet (25 mg total) by mouth 2 (two) times daily with meals. (Patient taking differently: Take 50 mg by mouth 2 (two) times daily with meals.) 60 tablet 5    cyclobenzaprine (FLEXERIL) 10 MG tablet Take 1 tablet (10 mg total) by mouth 2 (two) times daily as needed for Muscle spasms. 180 tablet 1    DM/PE/acetaminophen/doxylamine (VICKS DAYQUIL-NYQUIL COLD-FLU ORAL) Take by mouth.      DULoxetine (CYMBALTA) 30 MG capsule Take 1  capsule (30 mg total) by mouth once daily. 90 capsule 3    fluticasone propionate (FLONASE) 50 mcg/actuation nasal spray 2 sprays (100 mcg total) by Each Nostril route once daily. 18.2 mL 1    gabapentin (NEURONTIN) 300 MG capsule Take 1 capsule (300 mg total) by mouth 2 (two) times daily. 180 capsule 3    ibuprofen (ADVIL,MOTRIN) 600 MG tablet       inhalat.spacing dev,large mask (BREATHERITE SPACER-MASK,ADULT) Spcr Use as directed for inhalation. 1 each 1    losartan-hydrochlorothiazide 100-25 mg (HYZAAR) 100-25 mg per tablet Take 1 tablet by mouth once daily. 90 tablet 3    meclizine (ANTIVERT) 25 mg tablet Take 1 tablet (25 mg total) by mouth 3 (three) times daily as needed for Dizziness (60). 60 tablet 1    mupirocin (BACTROBAN) 2 % ointment Apply topically 3 (three) times daily. 30 g 0    olopatadine (PATANOL) 0.1 % ophthalmic solution Place 1 drop into both eyes once daily.      pantoprazole (PROTONIX) 40 MG tablet Take 1 tablet (40 mg total) by mouth once daily. 90 tablet 3    potassium 99 mg Tab Take by mouth. 1 tablet Oral Every day      rOPINIRole (REQUIP) 1 MG tablet Take 1 tablet (1 mg total) by mouth 2 (two) times daily as needed. 180 tablet 3    traMADoL (ULTRAM) 50 mg tablet Take 1 tablet (50 mg total) by mouth every 12 (twelve) hours as needed for Pain. 60 tablet 0    amoxicillin-clavulanate 875-125mg (AUGMENTIN) 875-125 mg per tablet Take 1 tablet by mouth every 12 (twelve) hours. for 7 days 14 tablet 0    EPINEPHrine (EPIPEN) 0.3 mg/0.3 mL AtIn Inject 0.3 mLs (0.3 mg total) into the muscle once. for 1 dose 0.6 mL 4    omega 3-dha-epa-fish oil (FISH OIL) 1,000 mg (120 mg-180 mg) Cap Take 2 capsules by mouth once daily.      predniSONE (DELTASONE) 20 MG tablet Take 1 tablet (20 mg total) by mouth As instructed. Take 4 tabs x 3 day, then 3 tabs x 4 day, then 2 tabs x 4 days, then 1 tab x 5 days. 45 tablet 0    verapamiL (VERELAN) 120 MG C24P Take 2 capsules (240 mg total) by mouth  once daily. 180 capsule 3     No current facility-administered medications for this visit.     Facility-Administered Medications Ordered in Other Visits   Medication Dose Route Frequency Provider Last Rate Last Admin    lactated ringers infusion   Intravenous Continuous Vanda Mathis MD   Stopped at 10/01/18 1140       There are no discontinued medications.    No follow-ups on file.      Helena Jones MD    Scribe Attestation:   I, Renaldo Sandoval, am scribing for, and in the presence of, Dr.Arif Jones I performed the above scribed service and the documentation accurately describes the services I performed. I attest to the accuracy of the note.    I, Dr. Helena Jones, reviewed documentation as scribed above. I personally performed the services described in this documentation.  I agree that the record reflects my personal performance and is accurate and complete. Helena Jones MD.  10/04/202

## 2022-01-05 ENCOUNTER — PATIENT MESSAGE (OUTPATIENT)
Dept: FAMILY MEDICINE | Facility: CLINIC | Age: 67
End: 2022-01-05
Payer: MEDICARE

## 2022-01-05 ENCOUNTER — TELEPHONE (OUTPATIENT)
Dept: FAMILY MEDICINE | Facility: CLINIC | Age: 67
End: 2022-01-05
Payer: MEDICARE

## 2022-01-05 NOTE — TELEPHONE ENCOUNTER
----- Message from Sima Woodward sent at 1/5/2022 10:16 AM CST -----  Contact: [phone] [facility]  Pharmacy is calling to clarify an RX.  RX name:  predniSONE (DELTASONE) 20 MG tablet  What do they need to clarify:  qty and directions do not match   Comments:

## 2022-01-06 ENCOUNTER — PATIENT MESSAGE (OUTPATIENT)
Dept: FAMILY MEDICINE | Facility: CLINIC | Age: 67
End: 2022-01-06
Payer: MEDICARE

## 2022-01-10 ENCOUNTER — TELEPHONE (OUTPATIENT)
Dept: FAMILY MEDICINE | Facility: CLINIC | Age: 67
End: 2022-01-10
Payer: MEDICARE

## 2022-01-10 DIAGNOSIS — M15.9 OSTEOARTHROSIS, GENERALIZED, INVOLVING MULTIPLE SITES: ICD-10-CM

## 2022-01-10 DIAGNOSIS — M79.7 FIBROMYALGIA: Primary | ICD-10-CM

## 2022-01-10 NOTE — TELEPHONE ENCOUNTER
She sent a message that she wants you to prescribe 60 tramadol a month, so she can take 2 a day, INSTEAD of taking Tylenol or Ibuprofen

## 2022-01-10 NOTE — TELEPHONE ENCOUNTER
[address] [facility] [address] [facility]  P [phone]  F [phone]    Postoperative Office Note  Patient ID:  Name: Alla Godwin  MRM: [id number]  : 1967/52 y.o. Date: 10/9/2019    Diagnosis:     ICD-10-CM ICD-9-CM    1. 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 female who presents for followup postoperative care following X-lap, ileocecal resection, SIERRA, BSO, omentectomy, tumor debulking. Operative findings: On bimanual pelvic exam she was noted to have a rock hard cervix and uterus, worrisome for disease infiltration.  It was mobile.  No parametrial involvement.  The uterus was enlarged.  Bilateral adnexal masses.  The bladder was densely adherent the lower uterine segment and cervix.  Mildly dilated small bowel.  Rock hard appendix scarred to the underside of the cecum with tethering of the small bowel at that point.  Implants noted in the distal omentum. Small volume peritoneal disease scattered throughout the abdominal cavity involving the anterior abdominal wall, diaphragms, small and large bowel mesenteries.  Frozen section pathology of the ileocecal resection suggested that this was the primary site of disease.  There were some signet ring features noted.  Frozen section pathology of the uterus demonstrated infiltration of the myometrium with the same process. The patient's pathology revealed: FINAL PATHOLOGIC DIAGNOSIS   1. 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    SPECIMEN   Procedure: Ileocecectomy Please sign orders, thanks    TUMOR   Tumor Site: Diffusely involving appendix   Histologic Type: Mucinous adenocarcinoma   Histologic Type Comments: Extensive signet ring cell component   Histologic Grade: G3: Poorly differentiated   Tumor Size: 4.5 cm   Tumor Deposits: Present   Number of Deposits: 1   Tumor Extension: Tumor directly invades adjacent organs or structures: Uterus, cervix,   bilateral ovaries and fallopian tubes, and omentum   Lymphovascular Invasion: Not identified   Perineural Invasion: Present, extensive   MARGINS   Proximal, distal and radial margins uninvolved by invasive carcinoma. LYMPH NODES   Number of Lymph Nodes Involved: 11   Number of Lymph Nodes Examined: 11   PATHOLOGIC STAGE CLASSIFICATION (pTNM, AJCC 8th Edition)   Primary Tumor (pT): pT4b   Regional Lymph Nodes (pN): pN2   Distant Metastasis (pM): pM1c   Site(s): Uterus, bilateral fallopian tubes, and ovaries   2. Uterus, bilateral fallopian tubes and ovaries, supracervical hysterectomy bilateral salpingo-oophorectomy:   Serosa: Positive for metastatic mucinous adenocarcinoma involving fibrous adhesions. Myometrium: Extensive transmural involvement by metastatic mucinous adenocarcinoma. Adenomyosis. Endometrium: Inactive endometrium with metastatic mucinous adenocarcinoma. Bilateral fallopian tubes: Metastatic mucinous adenocarcinoma. Bilateral fallopian tubes: Metastatic mucinous adenocarcinoma. 3. Omentum, omentectomy:   Positive for metastatic mucinous adenocarcinoma. Splenule. 4. Cervix, completion total hysterectomy:   Positive for metastatic mucinous adenocarcinoma. IMMUNOHISTOCHEMISTRY - DNA Mismatch Repair Protein Expression   Results:   MLH-1 No loss of expression   Extent: 3+; Intensity: Strong   MSH-2 No loss of expression   Extent: 3+; Intensity: Strong   MSH-6 No loss of expression   Extent: 3+; Intensity: Strong   PMS2 No loss of expression   Extent: 2+;  Intensity: Moderate   All DNA mismatch repair proteins cited above are shown to be proficient by immunohistochemistry method. This is strong evidence against DNA mismatch repair defects as the molecular basis for this malignancy and provides strong evidence against Monroe syndrome. Currently she has no problems with eating, bowel movements, or voiding. Appetite is good. Eating a regular diet without difficulty. Bowel movements are regular. The patient is not having any pain. Medications:     Current Outpatient Medications on File Prior to Visit   Medication Sig Dispense Refill    ibuprofen (MOTRIN) 200 mg tablet Take 3 Tabs by mouth every eight (8) hours as needed for Pain. 50 Tab 0    senna-docusate (PERICOLACE) 8.6-50 mg per tablet Take 1 Tab by mouth daily. Hold for loose stools 30 Tab 1    prochlorperazine (COMPAZINE) 10 mg tablet Take 1 Tab by mouth every six (6) hours as needed for Nausea for up to 15 doses. Indications: Nausea and Vomiting 30 Tab 1    ZOVIA 1/35E, 28, 1-35 mg-mcg tab daily. 2    cetirizine (ZYRTEC) 10 mg tablet Take  by mouth daily as needed.  multivitamin (ONE A DAY) tablet Take 1 Tab by mouth daily.  [] oxyCODONE IR (ROXICODONE) 5 mg immediate release tablet Take 1 Tab by mouth every six (6) hours as needed for Pain for up to 5 days. Max Daily Amount: 20 mg. 25 Tab 0    enoxaparin (LOVENOX) 40 mg/0.4 mL 0.4 mL by SubCUTAneous route daily for 21 days. Avoid incisions, rotate sites. Indications: Deep Vein Thrombosis Prevention 21 Syringe 0     No current facility-administered medications on file prior to visit.       Allergies:   No Known Allergies  Past Medical History:   Diagnosis Date    Nausea & vomiting     Symptomatic cholelithiasis 2019     Past Surgical History:   Procedure Laterality Date    HX GYN  2003    CYST ON OVARY     Social History     Socioeconomic History    Marital status:      Spouse name: Not on file    Number of children: Not on file    Years of education: Not on file    Highest education level: Not on file   Occupational History    Not on file   Social Needs    Financial resource strain: Not on file    Food insecurity:     Worry: Not on file     Inability: Not on file    Transportation needs:     Medical: Not on file     Non-medical: Not on file   Tobacco Use    Smoking status: Never Smoker    Smokeless tobacco: Never Used   Substance and Sexual Activity    Alcohol use: Yes     Comment: once a month    Drug use: Not Currently    Sexual activity: Not on file   Lifestyle    Physical activity:     Days per week: Not on file     Minutes per session: Not on file    Stress: Not on file   Relationships    Social connections:     Talks on phone: Not on file     Gets together: Not on file     Attends Latter day service: Not on file     Active member of club or organization: Not on file     Attends meetings of clubs or organizations: Not on file     Relationship status: Not on file    Intimate partner violence:     Fear of current or ex partner: Not on file     Emotionally abused: Not on file     Physically abused: Not on file     Forced sexual activity: Not on file   Other Topics Concern    Not on file   Social History Narrative    Not on file       OBJECTIVE:    Vitals:   Vitals:    10/09/19 0848 10/09/19 0920   BP: 94/57 106/73   Pulse: 85 93   Weight: 149 lb 12.8 oz (67.9 kg)    Height: 5' 2.99\" (1.6 m)      Physical Examination:     General:  alert, cooperative, no distress   Lungs: lungs clear to auscultation, no accessory muscle use   Cardiac: Regular rate and rhythm   Abdomen: soft, bowel sounds active, non-tender  No CVA tenderness   Incision: healing well   Pelvic: Deferred   Rectal: Deferred   Extremity:   extremities normal, atraumatic, no cyanosis or edema     IMPRESSION:    Stevie Montoya is doing well postoperatively.   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 [address] C79.60    Mucinous adenocarcinoma of appendix ([address] C18.1    Small bowel obstruction ([address] K56.609       PLAN:    The operative findings, details of the procedure, and final pathology discussed and explained. I explained that she has a very aggressive malignancy and that she will need adjuvant chemotherapy. Since this is not a gynecologic malignancy, I am going to refer her to medical oncology, specifically Dr. Kevin Varma for consultation and treatment. I can place an IV port for them to facilitate treatment if they would like. We also discussed a second opinion at [facility], or possibly even MD Joelle Colvin. I will see the patient back in a couple more weeks for a pelvic exam, to make sure she is ok to proceed with chemotherapy. The patient is advised to call our office with any problems or concerns.     Stevie Christopher MD  10/9/2019/9:22 AM

## 2022-01-11 ENCOUNTER — PATIENT MESSAGE (OUTPATIENT)
Dept: FAMILY MEDICINE | Facility: CLINIC | Age: 67
End: 2022-01-11
Payer: MEDICARE

## 2022-01-11 DIAGNOSIS — J45.901 MODERATE ASTHMA WITH EXACERBATION, UNSPECIFIED WHETHER PERSISTENT: Primary | ICD-10-CM

## 2022-01-11 DIAGNOSIS — R05.9 COUGH IN ADULT: ICD-10-CM

## 2022-01-11 RX ORDER — IPRATROPIUM BROMIDE AND ALBUTEROL SULFATE 2.5; .5 MG/3ML; MG/3ML
3 SOLUTION RESPIRATORY (INHALATION) EVERY 4 HOURS PRN
Qty: 75 ML | Refills: 0 | Status: SHIPPED | OUTPATIENT
Start: 2022-01-11 | End: 2022-07-12 | Stop reason: SDUPTHER

## 2022-01-11 NOTE — TELEPHONE ENCOUNTER
No new care gaps identified.  Powered by [employer]. Reference number: [id number].   1/11/2022 3:39:52 PM CST

## 2022-03-16 RX ORDER — CARVEDILOL 25 MG/1
50 TABLET ORAL 2 TIMES DAILY WITH MEALS
Qty: 90 TABLET | Refills: 3 | Status: SHIPPED | OUTPATIENT
Start: 2022-03-16 | End: 2022-05-03

## 2022-04-26 ENCOUNTER — PATIENT MESSAGE (OUTPATIENT)
Dept: ADMINISTRATIVE | Facility: HOSPITAL | Age: 67
End: 2022-04-26
Payer: MEDICARE

## 2022-04-26 ENCOUNTER — OFFICE VISIT (OUTPATIENT)
Dept: FAMILY MEDICINE | Facility: CLINIC | Age: 67
End: 2022-04-26
Payer: MEDICARE

## 2022-04-26 VITALS
HEART RATE: 97 BPM | BODY MASS INDEX: 29.76 KG/M2 | TEMPERATURE: 98 F | DIASTOLIC BLOOD PRESSURE: 82 MMHG | HEIGHT: 66 IN | SYSTOLIC BLOOD PRESSURE: 138 MMHG | WEIGHT: 185.19 LBS | OXYGEN SATURATION: 95 %

## 2022-04-26 DIAGNOSIS — G25.81 RLS (RESTLESS LEGS SYNDROME): ICD-10-CM

## 2022-04-26 DIAGNOSIS — M79.7 FIBROMYALGIA: ICD-10-CM

## 2022-04-26 DIAGNOSIS — M35.01 SICCA SYNDROME WITH KERATOCONJUNCTIVITIS: ICD-10-CM

## 2022-04-26 DIAGNOSIS — J45.909 CHRONIC ASTHMA, UNSPECIFIED ASTHMA SEVERITY, UNSPECIFIED WHETHER COMPLICATED, UNSPECIFIED WHETHER PERSISTENT: ICD-10-CM

## 2022-04-26 DIAGNOSIS — R76.8 POSITIVE ANA (ANTINUCLEAR ANTIBODY): ICD-10-CM

## 2022-04-26 DIAGNOSIS — I48.0 PAROXYSMAL ATRIAL FIBRILLATION: ICD-10-CM

## 2022-04-26 DIAGNOSIS — Z01.818 PREOPERATIVE CLEARANCE: Primary | ICD-10-CM

## 2022-04-26 PROCEDURE — 99215 OFFICE O/P EST HI 40 MIN: CPT | Mod: PBBFAC,PO | Performed by: NURSE PRACTITIONER

## 2022-04-26 PROCEDURE — 99214 OFFICE O/P EST MOD 30 MIN: CPT | Mod: S$PBB,,, | Performed by: NURSE PRACTITIONER

## 2022-04-26 PROCEDURE — 99999 PR PBB SHADOW E&M-EST. PATIENT-LVL V: CPT | Mod: PBBFAC,,, | Performed by: NURSE PRACTITIONER

## 2022-04-26 PROCEDURE — 99214 PR OFFICE/OUTPT VISIT, EST, LEVL IV, 30-39 MIN: ICD-10-PCS | Mod: S$PBB,,, | Performed by: NURSE PRACTITIONER

## 2022-04-26 PROCEDURE — 99999 PR PBB SHADOW E&M-EST. PATIENT-LVL V: ICD-10-PCS | Mod: PBBFAC,,, | Performed by: NURSE PRACTITIONER

## 2022-04-26 NOTE — PROGRESS NOTES
Subjective:       Patient ID: Erin Soriano is a 66 y.o. female.    Chief Complaint: Pre-op Exam  Pt reports to clinic for pre op clearance.  Scheduled for cataract extraction.  No daily anticoagulant use.  No illicit drug use.  No ETOH use.  Denies adverse reaction to anesthesia with previous surgical procedure.    HPI  Review of Systems   Constitutional: Negative.    HENT: Negative.    Respiratory: Negative.    Cardiovascular: Negative.    Gastrointestinal: Negative.    Genitourinary: Negative.    Musculoskeletal: Negative.    Neurological: Negative.    Psychiatric/Behavioral: Negative.        Objective:      Physical Exam  Vitals reviewed.   Constitutional:       Appearance: She is well-developed.   HENT:      Head: Normocephalic.      Mouth/Throat:      Pharynx: No oropharyngeal exudate.   Eyes:      Pupils: Pupils are equal, round, and reactive to light.   Neck:      Vascular: No JVD.      Trachea: No tracheal deviation.   Cardiovascular:      Rate and Rhythm: Normal rate and regular rhythm.      Heart sounds: Normal heart sounds. No murmur heard.  Pulmonary:      Effort: Pulmonary effort is normal. No respiratory distress.      Breath sounds: Normal breath sounds.   Abdominal:      General: Bowel sounds are normal. There is no distension.      Palpations: Abdomen is soft.      Tenderness: There is no abdominal tenderness.   Musculoskeletal:         General: Normal range of motion.      Cervical back: Normal range of motion.   Skin:     General: Skin is warm and dry.   Neurological:      Mental Status: She is alert and oriented to person, place, and time.      Deep Tendon Reflexes: Reflexes are normal and symmetric.   Psychiatric:         Speech: Speech normal.         Behavior: Behavior normal.         Assessment:       1. Preoperative clearance    2. Paroxysmal atrial fibrillation    3. Fibromyalgia    4. Chronic asthma, unspecified asthma severity, unspecified whether complicated, unspecified whether  persistent    5. Positive KARLA (antinuclear antibody)    6. RLS (restless legs syndrome)    7. Sicca syndrome with keratoconjunctivitis        Plan:   Preoperative clearance  Providing both patient and surgeon accepts risk ok to proceed with procedure  Paroxysmal atrial fibrillation    Fibromyalgia    Chronic asthma, unspecified asthma severity, unspecified whether complicated, unspecified whether persistent    Positive KARLA (antinuclear antibody)    RLS (restless legs syndrome)    Sicca syndrome with keratoconjunctivitis    Stable continue treatment plan     No follow-ups on file.

## 2022-04-29 DIAGNOSIS — R07.89 OTHER CHEST PAIN: ICD-10-CM

## 2022-04-29 DIAGNOSIS — I10 ESSENTIAL HYPERTENSION: Primary | ICD-10-CM

## 2022-04-29 DIAGNOSIS — I48.0 PAF (PAROXYSMAL ATRIAL FIBRILLATION): ICD-10-CM

## 2022-05-02 ENCOUNTER — HOSPITAL ENCOUNTER (OUTPATIENT)
Dept: CARDIOLOGY | Facility: HOSPITAL | Age: 67
Discharge: HOME | End: 2022-05-02
Attending: INTERNAL MEDICINE
Payer: MEDICARE

## 2022-05-02 ENCOUNTER — OFFICE VISIT (OUTPATIENT)
Dept: CARDIOLOGY | Facility: CLINIC | Age: 67
End: 2022-05-02
Payer: MEDICARE

## 2022-05-02 VITALS
HEIGHT: 66 IN | WEIGHT: 184.75 LBS | HEART RATE: 86 BPM | BODY MASS INDEX: 29.69 KG/M2 | DIASTOLIC BLOOD PRESSURE: 70 MMHG | OXYGEN SATURATION: 97 % | SYSTOLIC BLOOD PRESSURE: 132 MMHG

## 2022-05-02 DIAGNOSIS — R07.89 OTHER CHEST PAIN: ICD-10-CM

## 2022-05-02 DIAGNOSIS — I48.0 PAF (PAROXYSMAL ATRIAL FIBRILLATION): Primary | ICD-10-CM

## 2022-05-02 DIAGNOSIS — M79.7 FIBROMYALGIA: ICD-10-CM

## 2022-05-02 DIAGNOSIS — E78.2 MIXED HYPERLIPIDEMIA: ICD-10-CM

## 2022-05-02 DIAGNOSIS — I48.0 PAF (PAROXYSMAL ATRIAL FIBRILLATION): ICD-10-CM

## 2022-05-02 DIAGNOSIS — I10 ESSENTIAL HYPERTENSION: ICD-10-CM

## 2022-05-02 DIAGNOSIS — Z78.9 STATIN INTOLERANCE: ICD-10-CM

## 2022-05-02 PROCEDURE — 93010 ELECTROCARDIOGRAM REPORT: CPT | Mod: ,,, | Performed by: INTERNAL MEDICINE

## 2022-05-02 PROCEDURE — 99215 PR OFFICE/OUTPT VISIT, EST, LEVL V, 40-54 MIN: ICD-10-PCS | Mod: S$PBB,,, | Performed by: INTERNAL MEDICINE

## 2022-05-02 PROCEDURE — 99999 PR PBB SHADOW E&M-EST. PATIENT-LVL IV: CPT | Mod: PBBFAC,,, | Performed by: INTERNAL MEDICINE

## 2022-05-02 PROCEDURE — 93010 EKG 12-LEAD: ICD-10-PCS | Mod: ,,, | Performed by: INTERNAL MEDICINE

## 2022-05-02 PROCEDURE — 99214 OFFICE O/P EST MOD 30 MIN: CPT | Mod: PBBFAC | Performed by: INTERNAL MEDICINE

## 2022-05-02 PROCEDURE — 93005 ELECTROCARDIOGRAM TRACING: CPT

## 2022-05-02 PROCEDURE — 99215 OFFICE O/P EST HI 40 MIN: CPT | Mod: S$PBB,,, | Performed by: INTERNAL MEDICINE

## 2022-05-02 PROCEDURE — 99999 PR PBB SHADOW E&M-EST. PATIENT-LVL IV: ICD-10-PCS | Mod: PBBFAC,,, | Performed by: INTERNAL MEDICINE

## 2022-05-02 NOTE — PROGRESS NOTES
Subjective:   Patient ID:  Erin Soriano is a 66 y.o. female who presents for follow up of No chief complaint on file.      65 yo female routine f/u. Retired   PMH HTN, HLD, GERD, anxiety and PAF. Fibromyalgia. Negative sleep study before.   EGD and colonoscopy done on 10/01/2018 normal.  Pt was told having PAF during the EGD.  MPI and ECHO done in  normal EF and no stress induced ischemia  Her ZIOPATCH done in  showed 3% PAF and PSVT.  No smoking/drinking  Mother has afib. Father CHF  Taking excedrin for migraine    10- visit, Episode of chest tightness, palpitation and dizziness for one month, lasted for 5 minutes and resolved spontaneously. Pt states that she had similar episodes very often last year and much less frequency since started Coreg and verapamil.   Occasional chest pain. No faint dizziness. Med compliance. Takes ASA for migraine     visit Occasional atypical CP. Improved the symptoms of palpitation.  Stressful due to the family pressure  Ok with the medications    Interval history  Will have the cataract procedure tomorrow  AFIB [id number] score of 3. Occasional palpitation. Dizziness due to vertigo. No aint  Occasional chest pain chronically and worse after exertion.  Some recurrent lip and ankle swelling.   EKG NSR today  Works at [employer] and lifts some heavy animals              Past Medical History:   Diagnosis Date    Anxiety     Arthritis     Asthma     Depression     Hyperlipidemia     Hypertension     RLS (restless legs syndrome)        Past Surgical History:   Procedure Laterality Date    ADENOIDECTOMY      pt was 3 yrs old    carpel tunnel      2009     SECTION      2 different ones    COLONOSCOPY N/A 10/1/2018    Procedure: COLONOSCOPY;  Surgeon: Johnson Bacon MD;  Location: [facility];  Service: Endoscopy;  Laterality: N/A;    ESOPHAGOGASTRODUODENOSCOPY N/A 10/1/2018    Procedure: ESOPHAGOGASTRODUODENOSCOPY (EGD);   Surgeon: Johnson Bacon MD;  Location: [facility];  Service: Endoscopy;  Laterality: N/A;    golfers elbow      2009    HYSTERECTOMY      1994 total    OOPHORECTOMY      TONSILLECTOMY      pt was 3 yrs old at time       Social History     Tobacco Use    Smoking status: Former Smoker     Quit date: 6/10/2012     Years since quittin.8    Smokeless tobacco: Never Used    Tobacco comment: Would smoke 1 cigarette every few months   Substance Use Topics    Alcohol use: Yes     Alcohol/week: 1.0 standard drink     Types: 1 Standard drinks or equivalent per week     Comment: social    Drug use: No       Family History   Problem Relation Age of Onset    Hypertension Mother     Clotting disorder Mother     Hemochromatosis Mother     Hypertension Father     Cancer Maternal Grandmother     Clotting disorder Maternal Grandmother     Cancer Maternal Grandfather         prostate    Clotting disorder Maternal Grandfather          Review of Systems   Constitutional: Positive for malaise/fatigue. Negative for decreased appetite, diaphoresis, fever and night sweats.   HENT: Negative for nosebleeds.    Eyes: Negative for blurred vision and double vision.   Cardiovascular: Positive for chest pain and dyspnea on exertion. Negative for claudication, irregular heartbeat, leg swelling, orthopnea, palpitations, paroxysmal nocturnal dyspnea and syncope.   Respiratory: Negative for cough, shortness of breath, sleep disturbances due to breathing, snoring, sputum production and wheezing.    Endocrine: Negative for cold intolerance and polyuria.   Hematologic/Lymphatic: Does not bruise/bleed easily.   Skin: Negative for rash.   Musculoskeletal: Positive for arthritis and back pain. Negative for falls, joint pain, joint swelling and neck pain.   Gastrointestinal: Negative for abdominal pain, heartburn, nausea and vomiting.   Genitourinary: Negative for dysuria, frequency and hematuria.   Neurological: Negative for difficulty  with concentration, dizziness, focal weakness, headaches, light-headedness, numbness, seizures and weakness.   Psychiatric/Behavioral: Negative for depression, memory loss and substance abuse. The patient does not have insomnia.    Allergic/Immunologic: Negative for HIV exposure and hives.       Objective:   Physical Exam  HENT:      Head: Normocephalic.   Eyes:      Pupils: Pupils are equal, round, and reactive to light.   Neck:      Thyroid: No thyromegaly.      Vascular: Normal carotid pulses. No carotid bruit or JVD.   Cardiovascular:      Rate and Rhythm: Normal rate and regular rhythm.  No extrasystoles are present.     Chest Wall: PMI is not displaced.      Pulses: Normal pulses.      Heart sounds: Normal heart sounds. No murmur heard.    No gallop. No S3 sounds.   Pulmonary:      Effort: No respiratory distress.      Breath sounds: Normal breath sounds. No stridor.   Abdominal:      General: Bowel sounds are normal.      Palpations: Abdomen is soft.      Tenderness: There is no abdominal tenderness. There is no rebound.   Musculoskeletal:         General: Normal range of motion.   Skin:     Findings: No rash.   Neurological:      Mental Status: She is alert and oriented to person, place, and time.   Psychiatric:         Behavior: Behavior normal.         Lab Results   Component Value Date    CHOL 212 (H) 12/16/2021    CHOL 207 (H) 10/15/2020    CHOL 217 (H) 11/07/2019     Lab Results   Component Value Date    HDL 36 (L) 12/16/2021    HDL 36 (L) 10/15/2020    HDL 43 11/07/2019     Lab Results   Component Value Date    LDLCALC 133.4 12/16/2021    LDLCALC 110.2 10/15/2020    LDLCALC 128.8 11/07/2019     Lab Results   Component Value Date    TRIG 213 (H) 12/16/2021    TRIG 304 (H) 10/15/2020    TRIG 226 (H) 11/07/2019     Lab Results   Component Value Date    CHOLHDL 17.0 (L) 12/16/2021    CHOLHDL 17.4 (L) 10/15/2020    CHOLHDL 19.8 (L) 11/07/2019       Chemistry        Component Value Date/Time      12/16/2021 1520    K 4.8 12/16/2021 1520     12/16/2021 1520    CO2 25 12/16/2021 1520    BUN 15 12/16/2021 1520    CREATININE 0.8 12/16/2021 1520    GLU 95 12/16/2021 1520        Component Value Date/Time    CALCIUM 9.4 12/16/2021 1520    ALKPHOS 122 12/16/2021 1520    AST 22 12/16/2021 1520    ALT 29 12/16/2021 1520    BILITOT 0.3 12/16/2021 1520    ESTGFRAFRICA >60.0 12/16/2021 1520    EGFRNONAA >60.0 12/16/2021 1520          Lab Results   Component Value Date    HGBA1C 5.7 (H) 12/16/2021     Lab Results   Component Value Date    TSH 1.222 05/16/2012     Lab Results   Component Value Date    INR 1.0 09/20/2010     Lab Results   Component Value Date    WBC 9.92 12/16/2021    HGB 13.5 12/16/2021    HCT 42.4 12/16/2021    MCV 84 12/16/2021     12/16/2021     BMP  Sodium   Date Value Ref Range Status   12/16/2021 141 136 - 145 mmol/L Final     Potassium   Date Value Ref Range Status   12/16/2021 4.8 3.5 - 5.1 mmol/L Final     Chloride   Date Value Ref Range Status   12/16/2021 106 95 - 110 mmol/L Final     CO2   Date Value Ref Range Status   12/16/2021 25 23 - 29 mmol/L Final     BUN   Date Value Ref Range Status   12/16/2021 15 8 - 23 mg/dL Final     Creatinine   Date Value Ref Range Status   12/16/2021 0.8 0.5 - 1.4 mg/dL Final     Calcium   Date Value Ref Range Status   12/16/2021 9.4 8.7 - 10.5 mg/dL Final     Anion Gap   Date Value Ref Range Status   12/16/2021 10 8 - 16 mmol/L Final     eGFR if    Date Value Ref Range Status   12/16/2021 >60.0 >60 mL/min/1.73 m^2 Final     eGFR if non    Date Value Ref Range Status   12/16/2021 >60.0 >60 mL/min/1.73 m^2 Final     Comment:     Calculation used to obtain the estimated glomerular filtration  rate (eGFR) is the CKD-EPI equation.        BNP  @LABRCNTIP(BNP,BNPTRIAGEBLO)@  @LABRCNTIP(troponini)@  CrCl cannot be calculated (Patient's most recent lab result is older than the maximum 7 days allowed.).  No results found in the  last 24 hours.  No results found in the last 24 hours.  No results found in the last 24 hours.    Assessment:      1. PAF (paroxysmal atrial fibrillation)    2. Essential hypertension    3. Mixed hyperlipidemia    4. Statin intolerance    5. Fibromyalgia    6. Other chest pain        Plan:   Add Eliquis 5 mg bid post both cataracts procedure for systemic thromboembolism prophylaxis.  Discussed the benefits and risks of NOAC.   Continue coreg to 25 mg bid for HTN and PAF  Continue verapamil Hyzaar and fish oil    Counseled DASH  Check Lipid profile in 6 months  Recommend heart-healthy diet, weight control and regular exercise.  Keegan. Risk modification.   I have reviewed all pertinent labs and cardiac studies independently. Plans and recommendations have been formulated under my direct supervision. All questions answered and patient voiced understanding.   If symptoms persist go to the ED  RTC in 3 months to reeval of chest pain

## 2022-05-03 RX ORDER — CARVEDILOL 25 MG/1
TABLET ORAL
Qty: 60 TABLET | Refills: 0 | Status: SHIPPED | OUTPATIENT
Start: 2022-05-03 | End: 2022-05-03 | Stop reason: SDUPTHER

## 2022-05-03 RX ORDER — CARVEDILOL 25 MG/1
25 TABLET ORAL 2 TIMES DAILY WITH MEALS
Qty: 60 TABLET | Refills: 9 | Status: SHIPPED | OUTPATIENT
Start: 2022-05-03 | End: 2023-02-14

## 2022-05-20 ENCOUNTER — TELEPHONE (OUTPATIENT)
Dept: FAMILY MEDICINE | Facility: CLINIC | Age: 67
End: 2022-05-20
Payer: MEDICARE

## 2022-05-20 ENCOUNTER — OFFICE VISIT (OUTPATIENT)
Dept: URGENT CARE | Facility: CLINIC | Age: 67
End: 2022-05-20
Payer: MEDICARE

## 2022-05-20 VITALS
TEMPERATURE: 98 F | DIASTOLIC BLOOD PRESSURE: 66 MMHG | HEART RATE: 75 BPM | RESPIRATION RATE: 18 BRPM | SYSTOLIC BLOOD PRESSURE: 136 MMHG | OXYGEN SATURATION: 97 % | WEIGHT: 180 LBS | BODY MASS INDEX: 28.93 KG/M2 | HEIGHT: 66 IN

## 2022-05-20 DIAGNOSIS — J31.0 RHINITIS, UNSPECIFIED TYPE: ICD-10-CM

## 2022-05-20 DIAGNOSIS — R09.81 SINUS CONGESTION: ICD-10-CM

## 2022-05-20 DIAGNOSIS — J06.9 VIRAL URI: Primary | ICD-10-CM

## 2022-05-20 PROBLEM — M50.30 DDD (DEGENERATIVE DISC DISEASE), CERVICAL: Status: ACTIVE | Noted: 2022-05-20

## 2022-05-20 PROBLEM — M47.816 LUMBAR FACET ARTHROPATHY: Status: ACTIVE | Noted: 2018-11-21

## 2022-05-20 PROBLEM — M85.80 OSTEOPENIA: Status: ACTIVE | Noted: 2022-05-20

## 2022-05-20 PROBLEM — G56.00 CTS (CARPAL TUNNEL SYNDROME): Status: ACTIVE | Noted: 2022-05-20

## 2022-05-20 PROBLEM — Z86.2 HISTORY OF COAGULOPATHY: Status: ACTIVE | Noted: 2018-12-17

## 2022-05-20 PROBLEM — F41.9 ANXIETY: Status: ACTIVE | Noted: 2022-05-20

## 2022-05-20 LAB
CTP QC/QA: YES
SARS-COV-2 RDRP RESP QL NAA+PROBE: NEGATIVE

## 2022-05-20 PROCEDURE — 99214 PR OFFICE/OUTPT VISIT, EST, LEVL IV, 30-39 MIN: ICD-10-PCS | Mod: S$GLB,,, | Performed by: EMERGENCY MEDICINE

## 2022-05-20 PROCEDURE — U0002: ICD-10-PCS | Mod: QW,CR,S$GLB, | Performed by: EMERGENCY MEDICINE

## 2022-05-20 PROCEDURE — U0002 COVID-19 LAB TEST NON-CDC: HCPCS | Mod: QW,CR,S$GLB, | Performed by: EMERGENCY MEDICINE

## 2022-05-20 PROCEDURE — 99214 OFFICE O/P EST MOD 30 MIN: CPT | Mod: S$GLB,,, | Performed by: EMERGENCY MEDICINE

## 2022-05-20 RX ORDER — LEVOCETIRIZINE DIHYDROCHLORIDE 5 MG/1
5 TABLET, FILM COATED ORAL NIGHTLY
Qty: 30 TABLET | Refills: 11 | Status: SHIPPED | OUTPATIENT
Start: 2022-05-20 | End: 2023-08-03

## 2022-05-20 NOTE — TELEPHONE ENCOUNTER
Returned call to pt, states she thinks she is having a sinus infection & would like to be seen today. Advised that dr. Matthews doesn't have any openings. Advised there is an opening for Monday with Dr. Leonardo. Pt states she will go to [facility] clinic today

## 2022-05-20 NOTE — TELEPHONE ENCOUNTER
----- Message from Raquel Hanna sent at 5/20/2022  9:24 AM CDT -----  Type:  Same Day Appointment Request    Caller is requesting a same day appointment.  Caller declined first available appointment listed below.    Name of Caller:patient  When is the first available appointment?5/23  Symptoms:sinus infection  Best Call Back Number:[phone]  Additional Information: na

## 2022-05-20 NOTE — PROGRESS NOTES
"Subjective:       Patient ID: Erin Soriano is a 67 y.o. female.    Vitals:  height is 5' 6" (1.676 m) and weight is 81.6 kg (180 lb). Her tympanic temperature is 97.5 °F (36.4 °C). Her blood pressure is 136/66 and her pulse is 75. Her respiration is 18 and oxygen saturation is 97%.     Chief Complaint: Sinus Problem    67-year-old female presents to urgent care patient of a 3 day episode severe pain and pressure in the middle of her face.  This started on Tuesday.  She has been very congested and having a postnasal drip cough.  Just started back taking Flonase today.  Has a history of nasal allergies.  No fever but reports a lot of fatigue weight    Sinus Problem  This is a new problem. The current episode started in the past 7 days. The problem has been gradually worsening since onset. There has been no fever. Her pain is at a severity of 8/10. The pain is moderate. Associated symptoms include congestion, coughing, headaches, sinus pressure and sneezing. Pertinent negatives include no chills, diaphoresis, ear pain, hoarse voice, neck pain, shortness of breath, sore throat or swollen glands. Past treatments include nothing.       Constitution: Negative for chills and sweating.   HENT: Positive for congestion and sinus pressure. Negative for ear pain and sore throat.    Neck: Negative for neck pain.   Respiratory: Positive for cough. Negative for shortness of breath.    Allergic/Immunologic: Positive for sneezing.   Neurological: Positive for headaches.       Objective:      Physical Exam   Constitutional: She is oriented to person, place, and time. She appears well-developed. She is cooperative.  Non-toxic appearance. She does not appear ill. No distress.   HENT:   Head: Normocephalic and atraumatic.   Ears:   Right Ear: Hearing and external ear normal.   Left Ear: Hearing and external ear normal.   Nose: Congestion present. No mucosal edema, rhinorrhea or nasal deformity. No epistaxis. Right sinus exhibits no " maxillary sinus tenderness and no frontal sinus tenderness. Left sinus exhibits no maxillary sinus tenderness and no frontal sinus tenderness.      Comments: Nasal mucosa is erythematous and congested.  No sinus tenderness to percussion.    Mouth/Throat: Uvula is midline and mucous membranes are normal. No trismus in the jaw. Normal dentition. No uvula swelling. Posterior oropharyngeal erythema present. No oropharyngeal exudate or posterior oropharyngeal edema.   Eyes: Conjunctivae and lids are normal. No scleral icterus. Extraocular movement intact   Neck: Trachea normal and phonation normal. Neck supple. No edema present. No erythema present. No neck rigidity present.   Cardiovascular: Normal rate, regular rhythm, normal heart sounds and normal pulses.   Pulmonary/Chest: Effort normal and breath sounds normal. No respiratory distress. She has no decreased breath sounds. She has no rhonchi.   Abdominal: Normal appearance.   Musculoskeletal: Normal range of motion.         General: No deformity. Normal range of motion.   Neurological: She is alert and oriented to person, place, and time. She exhibits normal muscle tone. Coordination normal.   Skin: Skin is warm, dry, intact, not diaphoretic and not pale.   Psychiatric: Her speech is normal and behavior is normal. Judgment and thought content normal.   Nursing note and vitals reviewed.        Assessment:       1. Rhinitis, unspecified type    2. Sinus congestion          Plan:         Rhinitis, unspecified type  -     levocetirizine (XYZAL) 5 MG tablet; Take 1 tablet (5 mg total) by mouth every evening.  Dispense: 30 tablet; Refill: 11    Sinus congestion  -     POCT COVID-19 Rapid Screening

## 2022-05-20 NOTE — PATIENT INSTRUCTIONS
Use  Xyzal daily.  Flonase: 2 sprays per nostril 1-3 times a day for nasal congestion  Nasal saline drops: 2-4 drops per nostril 10-15 times a day. Post-nasal drip    Tylenol or Motrin for fever or pain per label instructions.  Consider use of OTC cough medicine like Robitussin DM.  Warm saltwater gargles to 3 times a day.    Rest and drink plenty of fluids.  Avoid OTC decongestants if you have high blood pressure. This includes multi-cold symptom preparations.    Consider permissive fever to allow your immune system to work.  However, if fever is not tolerable or is disrupting sleep, use Tylenol or Motrin per label instructions.    You are considered contagious until your systemic, or whole body, symptoms have resolved fully for 24 hours.  This includes fever, body aches, fatigue.    Follow up in 2-3 days with PCP if no improvement or any worsening.       Viral Upper Respiratory Infection Discharge Instructions, Adult   About this topic   You have an upper respiratory infection or URI. A URI can affect your nose, throat, ears, and sinuses. A virus is the cause of almost all URIs and antibiotics will not help you feel better more quickly. The common cold is an example of a viral URI.  URIs are easy to spread from person to person, most often through coughing or sneezing. A URI will almost always get better in a week or two without any treatment.         What care is needed at home?   Ask your doctor what you need to do when you go home. Make sure you ask questions if you do not understand what the doctor says.  If you smoke, try to quit. Your doctor or nurse can help.  Drink lots of fluids like water, juice, or broth. This will help replace any fluids lost if you have a runny nose or fever. Warm tea or soup can help soothe a sore throat.  If the air in your home feels dry, use a cool mist humidifier. This can help a stuffy nose and make it easier to breathe.  You can also use saline nose drops to relieve  stuffiness.  If you decide to take over-the-counter cough or cold medicines, follow the directions on the label carefully. Be sure you do not take more than 1 medicine that contains acetaminophen. Also, if you have a heart problem or high blood pressure, check with your doctor before you take any of these medicines.  Wash your hands often. Cough or sneeze into a tissue or your elbow instead of your hands. This will help keep others healthy.  What follow-up care is needed?   Your doctor may ask you to make visits to the office to check on your progress. Be sure to keep these visits.  What drugs may be needed?   The doctor may order drugs to:  Open up the tubes of your lungs  Treat viral infection  Relieve or stop coughing  Help with pain from a sore throat  Relieve runny and stuffy nose  Provide oxygen  Will physical activity be limited?   You need to rest for a few days to let your body recover from the infection.  What changes to diet are needed?   Eat soft foods like soup if swallowing is too painful.  What problems could happen?   Asthma attack  Sinus infections  Lung problems like pneumonia and bronchitis  Severe fluid loss. This is dehydration.  What can be done to prevent this health problem?   Wash your hands often with soap and water for at least 20 seconds, especially after coughing or sneezing. Alcohol-based hand sanitizers also work to kill the virus.  If you are sick, cover your mouth and nose with tissue when you cough or sneeze. You can also cough into your elbow. Throw away tissues in the trash and wash your hands after touching used tissues.  Do not get too close (kissing, hugging) to people who are sick.  Do not share towels or hankies with anyone who is sick. Clean commonly handled things like door handles, remotes, toys, and phones. Wipe them with a disinfectant.  Stay away from crowded places.  Cover your nose and mouth when you sneeze or cough.  Take vitamin C to help build up your body's  ability to fight disease.  Get a flu shot each year.  When do I need to call the doctor?   You have trouble breathing when talking or sitting still.  You have a fever of 100.4°F (38°C) or higher for several days, chills, a very bad sore throat, or ear or sinus pain.  You develop a new fever after several days of feeling the same or improving.  You develop chest pain when you cough.  You have a cough that lasts more than 10 days.  You cough up blood, or the color of the mucus you cough up changes.  Teach Back: Helping You Understand   The Teach Back Method helps you understand the information we are giving you. After you talk with the staff, tell them in your own words what you learned. This helps to make sure the staff has described each thing clearly. It also helps to explain things that may have been confusing. Before going home, make sure you can do these:  I can tell you about my condition.  I can tell you what may help ease my signs.  I can tell you what I will do if I have a fever, chills, breathing very fast, or trouble breathing.  Where can I learn more?   American Lung Association  https://www.lung.org/blog/can-you-exercise-with-a-cold   American Lung Association  https://www.lung.org/lung-health-diseases/lung-disease-lookup/influenza/facts-about-the-common-cold   NHS Choices  https://www.nhs.uk/conditions/respiratory-tract-infection/   UpToDate  https://www.[employer].com/contents/the-common-cold-in-adults-beyond-the-basics   Last Reviewed Date   2021-06-08  Consumer Information Use and Disclaimer   This information is not specific medical advice and does not replace information you receive from your health care provider. This is only a brief summary of general information. It does NOT include all information about conditions, illnesses, injuries, tests, procedures, treatments, therapies, discharge instructions or life-style choices that may apply to you. You must talk with your health care provider for  complete information about your health and treatment options. This information should not be used to decide whether or not to accept your health care providers advice, instructions or recommendations. Only your health care provider has the knowledge and training to provide advice that is right for you.  Copyright   Copyright © 2021 [employer] and its affiliates and/or licensors. All rights reserved.

## 2022-05-23 ENCOUNTER — TELEPHONE (OUTPATIENT)
Dept: URGENT CARE | Facility: CLINIC | Age: 67
End: 2022-05-23
Payer: MEDICARE

## 2022-06-02 ENCOUNTER — PATIENT MESSAGE (OUTPATIENT)
Dept: FAMILY MEDICINE | Facility: CLINIC | Age: 67
End: 2022-06-02
Payer: MEDICARE

## 2022-07-12 ENCOUNTER — CLINICAL SUPPORT (OUTPATIENT)
Dept: FAMILY MEDICINE | Facility: CLINIC | Age: 67
End: 2022-07-12
Payer: MEDICARE

## 2022-07-12 ENCOUNTER — PATIENT MESSAGE (OUTPATIENT)
Dept: FAMILY MEDICINE | Facility: CLINIC | Age: 67
End: 2022-07-12

## 2022-07-12 ENCOUNTER — TELEPHONE (OUTPATIENT)
Dept: FAMILY MEDICINE | Facility: CLINIC | Age: 67
End: 2022-07-12
Payer: MEDICARE

## 2022-07-12 ENCOUNTER — TELEPHONE (OUTPATIENT)
Dept: FAMILY MEDICINE | Facility: CLINIC | Age: 67
End: 2022-07-12

## 2022-07-12 DIAGNOSIS — R05.9 COUGH: Primary | ICD-10-CM

## 2022-07-12 DIAGNOSIS — M79.10 MUSCLE ACHE: ICD-10-CM

## 2022-07-12 DIAGNOSIS — U07.1 COVID: ICD-10-CM

## 2022-07-12 DIAGNOSIS — U07.1 LAB TEST POSITIVE FOR DETECTION OF COVID-19 VIRUS: Primary | ICD-10-CM

## 2022-07-12 DIAGNOSIS — R05.9 COUGH IN ADULT: ICD-10-CM

## 2022-07-12 DIAGNOSIS — R50.9 FEVER, UNSPECIFIED FEVER CAUSE: ICD-10-CM

## 2022-07-12 DIAGNOSIS — J45.901 MODERATE ASTHMA WITH EXACERBATION, UNSPECIFIED WHETHER PERSISTENT: ICD-10-CM

## 2022-07-12 LAB
CTP QC/QA: YES
CTP QC/QA: YES
FLUAV AG NPH QL: NEGATIVE
FLUBV AG NPH QL: NEGATIVE
SARS-COV-2 RDRP RESP QL NAA+PROBE: POSITIVE

## 2022-07-12 PROCEDURE — 87804 INFLUENZA ASSAY W/OPTIC: CPT | Mod: PBBFAC,PO

## 2022-07-12 PROCEDURE — U0002 COVID-19 LAB TEST NON-CDC: HCPCS | Mod: PBBFAC,PO

## 2022-07-12 RX ORDER — IPRATROPIUM BROMIDE AND ALBUTEROL SULFATE 2.5; .5 MG/3ML; MG/3ML
3 SOLUTION RESPIRATORY (INHALATION) EVERY 4 HOURS PRN
Qty: 75 ML | Refills: 0 | Status: SHIPPED | OUTPATIENT
Start: 2022-07-12 | End: 2023-04-27 | Stop reason: SDUPTHER

## 2022-07-12 RX ORDER — BENZONATATE 200 MG/1
200 CAPSULE ORAL 3 TIMES DAILY PRN
Qty: 30 CAPSULE | Refills: 0 | Status: SHIPPED | OUTPATIENT
Start: 2022-07-12 | End: 2022-07-22

## 2022-07-12 NOTE — TELEPHONE ENCOUNTER
----- Message from Raquel Hanna sent at 7/12/2022  2:23 PM CDT -----  Please call pt @ [phone] regarding medication, pt states she test positive for Covid and meds was going to be called into [facility] in [place], states someone is there now and they have no meds, please advise.

## 2022-07-12 NOTE — TELEPHONE ENCOUNTER
----- Message from Zeina Augustin sent at 7/12/2022  8:21 AM CDT -----  Type:  Same Day Appointment Request    Caller is requesting a same day appointment.  Caller declined first available appointment listed below.    Name of Caller:pt   When is the first available appointment?Thursday  Symptoms:coughing/fever/body ache/headache  Best Call Back Number:[phone]  Additional Information: #    Thank you

## 2022-07-12 NOTE — TELEPHONE ENCOUNTER
No new care gaps identified.  [facility] Embedded Care Gaps. Reference number: [id number]. 7/12/2022   12:26:42 PM CDT

## 2022-07-12 NOTE — TELEPHONE ENCOUNTER
C/O cough , fever, body aches, day 2.  Positive for covid, negative for flu.   I pended cough med and nebulizer treatment refill  to RX request     Please advise if she can have paxlovid

## 2022-07-13 ENCOUNTER — INFUSION (OUTPATIENT)
Dept: INFECTIOUS DISEASES | Facility: HOSPITAL | Age: 67
End: 2022-07-13
Attending: FAMILY MEDICINE
Payer: MEDICARE

## 2022-07-13 ENCOUNTER — PATIENT MESSAGE (OUTPATIENT)
Dept: INFECTIOUS DISEASES | Facility: HOSPITAL | Age: 67
End: 2022-07-13

## 2022-07-13 VITALS
DIASTOLIC BLOOD PRESSURE: 68 MMHG | OXYGEN SATURATION: 98 % | TEMPERATURE: 98 F | SYSTOLIC BLOOD PRESSURE: 138 MMHG | RESPIRATION RATE: 16 BRPM | HEART RATE: 98 BPM

## 2022-07-13 DIAGNOSIS — U07.1 COVID-19: Primary | ICD-10-CM

## 2022-07-13 DIAGNOSIS — U07.1 LAB TEST POSITIVE FOR DETECTION OF COVID-19 VIRUS: ICD-10-CM

## 2022-07-13 PROCEDURE — M0222 HC IV INJECTION, BEBTELOVIMAB, INCL POST ADMIN MONIT: HCPCS | Performed by: INTERNAL MEDICINE

## 2022-07-13 PROCEDURE — 63600175 PHARM REV CODE 636 W HCPCS: Performed by: INTERNAL MEDICINE

## 2022-07-13 RX ORDER — BEBTELOVIMAB 87.5 MG/ML
175 INJECTION, SOLUTION INTRAVENOUS
Status: COMPLETED | OUTPATIENT
Start: 2022-07-13 | End: 2022-07-13

## 2022-07-13 RX ORDER — EPINEPHRINE 0.3 MG/.3ML
0.3 INJECTION SUBCUTANEOUS
Status: ACTIVE | OUTPATIENT
Start: 2022-07-13 | End: 2022-07-16

## 2022-07-13 RX ORDER — ONDANSETRON 4 MG/1
4 TABLET, ORALLY DISINTEGRATING ORAL
Status: ACTIVE | OUTPATIENT
Start: 2022-07-13 | End: 2022-07-14

## 2022-07-13 RX ORDER — ALBUTEROL SULFATE 90 UG/1
2 AEROSOL, METERED RESPIRATORY (INHALATION)
Status: ACTIVE | OUTPATIENT
Start: 2022-07-13 | End: 2022-07-16

## 2022-07-13 RX ORDER — ACETAMINOPHEN 325 MG/1
650 TABLET ORAL
Status: ACTIVE | OUTPATIENT
Start: 2022-07-13 | End: 2022-07-14

## 2022-07-13 RX ORDER — DIPHENHYDRAMINE HYDROCHLORIDE 50 MG/ML
25 INJECTION INTRAMUSCULAR; INTRAVENOUS
Status: ACTIVE | OUTPATIENT
Start: 2022-07-13 | End: 2022-07-14

## 2022-07-13 RX ADMIN — BEBTELOVIMAB 175 MG: 87.5 INJECTION, SOLUTION INTRAVENOUS at 02:07

## 2022-07-13 NOTE — TELEPHONE ENCOUNTER
covid positive, had infusion today. Throat feels like she swallowed tacks, I told her to do warm salt water gargles and Tylenol   She is blowing green and coughing up green mucous asking for antibiotic

## 2022-07-14 ENCOUNTER — PATIENT MESSAGE (OUTPATIENT)
Dept: FAMILY MEDICINE | Facility: CLINIC | Age: 67
End: 2022-07-14
Payer: MEDICARE

## 2022-07-14 RX ORDER — AZITHROMYCIN 250 MG/1
TABLET, FILM COATED ORAL
Qty: 6 TABLET | Refills: 0 | Status: SHIPPED | OUTPATIENT
Start: 2022-07-14 | End: 2022-07-19

## 2022-07-17 ENCOUNTER — NURSE TRIAGE (OUTPATIENT)
Dept: ADMINISTRATIVE | Facility: CLINIC | Age: 67
End: 2022-07-17
Payer: MEDICARE

## 2022-07-17 NOTE — TELEPHONE ENCOUNTER
Pt escalated in [facility] queue. Pt states she was previously having a productive cough but now she is not able to cough the mucus up. She is having tightness in her chest with little relief from her inhaler. Pt having SOB even at rest. Dispo is ED now for evaluation. Pt verbalized understanding and will have another adult drive her. Advised to call back with further concerns.    Reason for Disposition   MODERATE difficulty breathing (e.g., speaks in phrases, SOB even at rest, pulse 100-120)    Additional Information   Negative: SEVERE difficulty breathing (e.g., struggling for each breath, speaks in single words)   Negative: Difficult to awaken or acting confused (e.g., disoriented, slurred speech)   Negative: Bluish (or gray) lips or face now   Negative: Shock suspected (e.g., cold/pale/clammy skin, too weak to stand, low BP, rapid pulse)   Negative: Sounds like a life-threatening emergency to the triager   Negative: SEVERE or constant chest pain or pressure  (Exception: Mild central chest pain, present only when coughing.)    Protocols used: CORONAVIRUS (COVID-19) DIAGNOSED OR [id number]

## 2022-08-08 ENCOUNTER — HOSPITAL ENCOUNTER (OUTPATIENT)
Dept: RADIOLOGY | Facility: HOSPITAL | Age: 67
Discharge: HOME | End: 2022-08-08
Attending: FAMILY MEDICINE
Payer: MEDICARE

## 2022-08-08 ENCOUNTER — OFFICE VISIT (OUTPATIENT)
Dept: FAMILY MEDICINE | Facility: CLINIC | Age: 67
End: 2022-08-08
Payer: MEDICARE

## 2022-08-08 VITALS
HEIGHT: 66 IN | HEART RATE: 74 BPM | BODY MASS INDEX: 29.21 KG/M2 | TEMPERATURE: 96 F | WEIGHT: 181.75 LBS | OXYGEN SATURATION: 100 % | DIASTOLIC BLOOD PRESSURE: 60 MMHG | SYSTOLIC BLOOD PRESSURE: 126 MMHG

## 2022-08-08 DIAGNOSIS — I48.0 PAROXYSMAL ATRIAL FIBRILLATION: ICD-10-CM

## 2022-08-08 DIAGNOSIS — M79.7 FIBROMYALGIA: ICD-10-CM

## 2022-08-08 DIAGNOSIS — S99.921A INJURY OF RIGHT FOOT, INITIAL ENCOUNTER: ICD-10-CM

## 2022-08-08 DIAGNOSIS — S99.921A INJURY OF RIGHT FOOT, INITIAL ENCOUNTER: Primary | ICD-10-CM

## 2022-08-08 DIAGNOSIS — Z78.0 ASYMPTOMATIC MENOPAUSAL STATE: ICD-10-CM

## 2022-08-08 DIAGNOSIS — I10 ESSENTIAL HYPERTENSION: ICD-10-CM

## 2022-08-08 DIAGNOSIS — F51.04 CHRONIC INSOMNIA: ICD-10-CM

## 2022-08-08 PROCEDURE — 99999 PR PBB SHADOW E&M-EST. PATIENT-LVL V: CPT | Mod: PBBFAC,,, | Performed by: FAMILY MEDICINE

## 2022-08-08 PROCEDURE — 99215 OFFICE O/P EST HI 40 MIN: CPT | Mod: PBBFAC,PO | Performed by: FAMILY MEDICINE

## 2022-08-08 PROCEDURE — 73630 X-RAY EXAM OF FOOT: CPT | Mod: 26,RT,, | Performed by: RADIOLOGY

## 2022-08-08 PROCEDURE — 73630 X-RAY EXAM OF FOOT: CPT | Mod: TC,PO,RT

## 2022-08-08 PROCEDURE — 99214 PR OFFICE/OUTPT VISIT, EST, LEVL IV, 30-39 MIN: ICD-10-PCS | Mod: S$PBB,,, | Performed by: FAMILY MEDICINE

## 2022-08-08 PROCEDURE — 99214 OFFICE O/P EST MOD 30 MIN: CPT | Mod: S$PBB,,, | Performed by: FAMILY MEDICINE

## 2022-08-08 PROCEDURE — 99999 PR PBB SHADOW E&M-EST. PATIENT-LVL V: ICD-10-PCS | Mod: PBBFAC,,, | Performed by: FAMILY MEDICINE

## 2022-08-08 PROCEDURE — 73630 XR FOOT COMPLETE 3 VIEW RIGHT: ICD-10-PCS | Mod: 26,RT,, | Performed by: RADIOLOGY

## 2022-08-08 RX ORDER — TRAMADOL HYDROCHLORIDE 50 MG/1
50 TABLET ORAL EVERY 12 HOURS PRN
Qty: 60 TABLET | Refills: 5 | Status: SHIPPED | OUTPATIENT
Start: 2022-08-08 | End: 2022-12-15 | Stop reason: SDUPTHER

## 2022-08-08 RX ORDER — ROPINIROLE 1 MG/1
1 TABLET, FILM COATED ORAL 2 TIMES DAILY PRN
Qty: 180 TABLET | Refills: 3 | Status: SHIPPED | OUTPATIENT
Start: 2022-08-08 | End: 2023-08-03 | Stop reason: SDUPTHER

## 2022-08-08 RX ORDER — DULOXETIN HYDROCHLORIDE 30 MG/1
30 CAPSULE, DELAYED RELEASE ORAL DAILY
Qty: 90 CAPSULE | Refills: 3 | Status: SHIPPED | OUTPATIENT
Start: 2022-08-08 | End: 2022-12-15 | Stop reason: SDUPTHER

## 2022-08-08 RX ORDER — GABAPENTIN 300 MG/1
300 CAPSULE ORAL 2 TIMES DAILY
Qty: 180 CAPSULE | Refills: 3 | Status: SHIPPED | OUTPATIENT
Start: 2022-08-08 | End: 2023-08-03 | Stop reason: SDUPTHER

## 2022-08-08 RX ORDER — MECLIZINE HYDROCHLORIDE 25 MG/1
25 TABLET ORAL 3 TIMES DAILY PRN
Qty: 60 TABLET | Refills: 1 | Status: SHIPPED | OUTPATIENT
Start: 2022-08-08 | End: 2022-12-28

## 2022-08-08 RX ORDER — BUPROPION HYDROCHLORIDE 150 MG/1
150 TABLET, EXTENDED RELEASE ORAL 2 TIMES DAILY
Qty: 180 TABLET | Refills: 3 | Status: SHIPPED | OUTPATIENT
Start: 2022-08-08 | End: 2023-08-03 | Stop reason: SDUPTHER

## 2022-08-08 RX ORDER — VERAPAMIL HYDROCHLORIDE 120 MG/1
240 CAPSULE, EXTENDED RELEASE ORAL DAILY
Qty: 180 CAPSULE | Refills: 3 | Status: SHIPPED | OUTPATIENT
Start: 2022-08-08 | End: 2023-07-26

## 2022-08-08 NOTE — PROGRESS NOTES
Chief Complaint:    Chief Complaint   Patient presents with    Foot Injury     Right foot       History of Present Illness:  Patient with DDD, HLD, HTN presents today for a foot injury    Recently switched insurances and needs her medications refilled.   It's been one month since she's had COVID. She has mucus congestion and a cough and is taking tessalon.  Her foot was stepped on by her  one week ago. She thought the pain would go away but it has worsened. Reports that her R foot is bruised and swollen.   Had a MRI for her back. The pain radiates down her thighs. Follows with Dr. Ardon.   She's on a wait list for her mammogram.   Due for bone density scan and shingles vaccination.       ROS:  Review of Systems   Constitutional: Negative for appetite change, chills and fever.   HENT: Positive for congestion. Negative for ear pain, postnasal drip, rhinorrhea, sinus pressure and sinus pain.    Eyes: Negative for pain.   Respiratory: Positive for cough. Negative for chest tightness and shortness of breath.    Cardiovascular: Negative for chest pain and palpitations.   Gastrointestinal: Negative for abdominal pain, blood in stool, constipation, diarrhea and nausea.   Genitourinary: Negative for difficulty urinating, dysuria, flank pain and hematuria.   Musculoskeletal: Positive for arthralgias, back pain and myalgias.   Skin: Negative for pallor and wound.   Neurological: Negative for dizziness, tremors, speech difficulty, light-headedness and headaches.   Psychiatric/Behavioral: Negative for behavioral problems, dysphoric mood and sleep disturbance. The patient is not nervous/anxious.    All other systems reviewed and are negative.      Past Medical History:   Diagnosis Date    Anxiety     Arthritis     Asthma     Depression     Hyperlipidemia     Hypertension     RLS (restless legs syndrome)        Social History:  Social History     Socioeconomic History    Marital status:     Number of  "children: 2   Tobacco Use    Smoking status: Former Smoker     Quit date: 6/10/2012     Years since quitting: 10.1    Smokeless tobacco: Never Used    Tobacco comment: Would smoke 1 cigarette every few months   Substance and Sexual Activity    Alcohol use: Yes     Alcohol/week: 1.0 standard drink     Types: 1 Standard drinks or equivalent per week     Comment: social    Drug use: No    Sexual activity: Yes     Partners: Male     Birth control/protection: None       Family History:   family history includes Cancer in her maternal grandfather and maternal grandmother; Clotting disorder in her maternal grandfather, maternal grandmother, and mother; Hemochromatosis in her mother; Hypertension in her father and mother.    Health Maintenance   Topic Date Due    DEXA Scan  05/19/2018    Mammogram  10/20/2021    Lipid Panel  12/16/2022    TETANUS VACCINE  08/30/2029    Hepatitis C Screening  Completed       Physical Exam:    Vital Signs  Temp: 96.4 °F (35.8 °C)  Temp src: Temporal  Pulse: 74  SpO2: 100 %  BP: 126/60  BP Location: Left arm  Patient Position: Sitting  Height and Weight  Height: 5' 6" (167.6 cm)  Weight: 82.5 kg (181 lb 12.3 oz)  BSA (Calculated - sq m): 1.96 sq meters  BMI (Calculated): 29.4  Weight in (lb) to have BMI = 25: 154.6]    Body mass index is 29.34 kg/m².    Physical Exam  Vitals and nursing note reviewed.   Constitutional:       Appearance: Normal appearance. She is not toxic-appearing.   HENT:      Head: Normocephalic and atraumatic.      Right Ear: Tympanic membrane normal.      Left Ear: Tympanic membrane normal.   Eyes:      Extraocular Movements: Extraocular movements intact.      Pupils: Pupils are equal, round, and reactive to light.   Cardiovascular:      Rate and Rhythm: Normal rate and regular rhythm.      Heart sounds: Normal heart sounds.   Pulmonary:      Effort: Pulmonary effort is normal.      Breath sounds: Normal breath sounds. No wheezing, rhonchi or rales. "   Abdominal:      General: Bowel sounds are normal. There is no distension.      Palpations: Abdomen is soft.      Tenderness: There is no abdominal tenderness.   Musculoskeletal:         General: Normal range of motion.      Cervical back: Normal range of motion.      Right foot: Tenderness present.        Legs:    Skin:     General: Skin is warm and dry.      Capillary Refill: Capillary refill takes less than 2 seconds.   Neurological:      General: No focal deficit present.      Mental Status: She is alert and oriented to person, place, and time.   Psychiatric:         Mood and Affect: Mood normal.         Behavior: Behavior normal.         Judgment: Judgment normal.           Assessment:      ICD-10-CM ICD-9-CM   1. Injury of right foot, initial encounter  S99.921A 959.7   2. Fibromyalgia  M79.7 729.1   3. Essential hypertension  I10 401.9   4. Chronic insomnia  F51.04 780.52   5. Paroxysmal atrial fibrillation  I48.0 427.31   6. Asymptomatic menopausal state  Z78.0 V49.81     Plan:     Order X-Ray Foot Complete 3 view Right for injury of right foot. Will write prescription for boot.   Schedule a DXA bone density test.   checked other medical problems stable and medicines refill today.    Orders Placed This Encounter   Procedures    X-Ray Foot Complete 3 view Right    DXA Bone Density Spine And Hip     Current Outpatient Medications   Medication Sig Dispense Refill    acetaminophen (TYLENOL) 500 MG tablet Take 500 mg by mouth every 6 (six) hours as needed for Pain.      albuterol (PROVENTIL/VENTOLIN HFA) 90 mcg/actuation inhaler Inhale 2 puffs into the lungs every 4 (four) hours as needed for Wheezing. 18 g 2    albuterol-ipratropium (DUO-NEB) 2.5 mg-0.5 mg/3 mL nebulizer solution Take 3 mLs by nebulization every 4 (four) hours as needed for Wheezing. Rescue 75 mL 0    apixaban (ELIQUIS) 5 mg Tab Take 1 tablet (5 mg total) by mouth 2 (two) times daily. 60 tablet 5    ascorbic acid, vitamin C, (VITAMIN  C) 1000 MG tablet Take 1,000 mg by mouth once daily.      busPIRone (BUSPAR) 10 MG tablet Take 1 tablet (10 mg total) by mouth 3 (three) times daily. 90 tablet 11    carvediloL (COREG) 25 MG tablet Take 1 tablet (25 mg total) by mouth 2 (two) times daily with meals. 60 tablet 9    cyclobenzaprine (FLEXERIL) 10 MG tablet Take 1 tablet (10 mg total) by mouth 2 (two) times daily as needed for Muscle spasms. 180 tablet 1    EPINEPHrine (EPIPEN) 0.3 mg/0.3 mL AtIn Inject 0.3 mLs (0.3 mg total) into the muscle once. for 1 dose 0.6 mL 4    fluticasone propionate (FLONASE) 50 mcg/actuation nasal spray 2 sprays (100 mcg total) by Each Nostril route once daily. 18.2 mL 1    ibuprofen (ADVIL,MOTRIN) 600 MG tablet       inhalat.spacing dev,large mask (BREATHERITE SPACER-MASK,ADULT) Spcr Use as directed for inhalation. 1 each 1    levocetirizine (XYZAL) 5 MG tablet Take 1 tablet (5 mg total) by mouth every evening. 30 tablet 11    losartan-hydrochlorothiazide 100-25 mg (HYZAAR) 100-25 mg per tablet Take 1 tablet by mouth once daily. 90 tablet 3    mupirocin (BACTROBAN) 2 % ointment Apply topically 3 (three) times daily. 30 g 0    olopatadine (PATANOL) 0.1 % ophthalmic solution Place 1 drop into both eyes once daily.      omega 3-dha-epa-fish oil (FISH OIL) 1,000 mg (120 mg-180 mg) Cap Take 2 capsules by mouth once daily.      pantoprazole (PROTONIX) 40 MG tablet Take 1 tablet (40 mg total) by mouth once daily. 90 tablet 3    potassium 99 mg Tab Take by mouth. 1 tablet Oral Every day      buPROPion (WELLBUTRIN SR) 150 MG TBSR 12 hr tablet Take 1 tablet (150 mg total) by mouth 2 (two) times daily. 180 tablet 3    DULoxetine (CYMBALTA) 30 MG capsule Take 1 capsule (30 mg total) by mouth once daily. 90 capsule 3    gabapentin (NEURONTIN) 300 MG capsule Take 1 capsule (300 mg total) by mouth 2 (two) times daily. 180 capsule 3    meclizine (ANTIVERT) 25 mg tablet Take 1 tablet (25 mg total) by mouth 3 (three) times  daily as needed for Dizziness (60). 60 tablet 1    rOPINIRole (REQUIP) 1 MG tablet Take 1 tablet (1 mg total) by mouth 2 (two) times daily as needed. 180 tablet 3    traMADoL (ULTRAM) 50 mg tablet Take 1 tablet (50 mg total) by mouth every 12 (twelve) hours as needed for Pain. 60 tablet 5    verapamiL (VERELAN) 120 MG C24P Take 2 capsules (240 mg total) by mouth once daily. 180 capsule 3     No current facility-administered medications for this visit.     Facility-Administered Medications Ordered in Other Visits   Medication Dose Route Frequency Provider Last Rate Last Admin    lactated ringers infusion   Intravenous Continuous Vanda Mathis MD   Stopped at 10/01/18 1140       Medications Discontinued During This Encounter   Medication Reason    verapamiL (VERELAN) 120 MG C24P Reorder    rOPINIRole (REQUIP) 1 MG tablet Reorder    buPROPion (WELLBUTRIN SR) 150 MG TBSR 12 hr tablet Reorder    DULoxetine (CYMBALTA) 30 MG capsule Reorder    gabapentin (NEURONTIN) 300 MG capsule Reorder    meclizine (ANTIVERT) 25 mg tablet Reorder    traMADoL (ULTRAM) 50 mg tablet Reorder       No follow-ups on file.      Helena Jones MD  Scribe Attestation:   I, Shelly Villarreal, am scribing for, and in the presence of, Dr.Arif Jones I performed the above scribed service and the documentation accurately describes the services I performed. I attest to the accuracy of the note.    I, Dr. Helena Jones, reviewed documentation as scribed above. I performed the services described in this documentation.  I agree that the record reflects my personal performance and is accurate and complete. Helena Jones MD.  08/08/2022

## 2022-08-09 ENCOUNTER — PATIENT MESSAGE (OUTPATIENT)
Dept: FAMILY MEDICINE | Facility: CLINIC | Age: 67
End: 2022-08-09
Payer: MEDICARE

## 2022-08-11 ENCOUNTER — TELEPHONE (OUTPATIENT)
Dept: FAMILY MEDICINE | Facility: CLINIC | Age: 67
End: 2022-08-11
Payer: MEDICARE

## 2022-08-11 DIAGNOSIS — N28.1 RENAL CYST: Primary | ICD-10-CM

## 2022-08-11 NOTE — TELEPHONE ENCOUNTER
She had an MRI l-spine that showed a renal cyst.  Report in your box.  She wants to know what to do about it , should we get a better look at it or just watch it?

## 2022-08-12 ENCOUNTER — PATIENT MESSAGE (OUTPATIENT)
Dept: FAMILY MEDICINE | Facility: CLINIC | Age: 67
End: 2022-08-12
Payer: MEDICARE

## 2022-08-24 ENCOUNTER — PATIENT MESSAGE (OUTPATIENT)
Dept: FAMILY MEDICINE | Facility: CLINIC | Age: 67
End: 2022-08-24
Payer: MEDICARE

## 2022-09-01 ENCOUNTER — APPOINTMENT (OUTPATIENT)
Dept: RADIOLOGY | Facility: HOSPITAL | Age: 67
End: 2022-09-01
Attending: FAMILY MEDICINE
Payer: MEDICARE

## 2022-09-01 DIAGNOSIS — N28.1 RENAL CYST: ICD-10-CM

## 2022-09-01 PROCEDURE — 76770 US RETROPERITONEAL COMPLETE: ICD-10-PCS | Mod: 26,,, | Performed by: RADIOLOGY

## 2022-09-01 PROCEDURE — 76770 US EXAM ABDO BACK WALL COMP: CPT | Mod: 26,,, | Performed by: RADIOLOGY

## 2022-09-01 PROCEDURE — 76770 US EXAM ABDO BACK WALL COMP: CPT | Mod: TC,PO

## 2022-09-02 ENCOUNTER — TELEPHONE (OUTPATIENT)
Dept: FAMILY MEDICINE | Facility: CLINIC | Age: 67
End: 2022-09-02
Payer: MEDICARE

## 2022-09-02 ENCOUNTER — PATIENT MESSAGE (OUTPATIENT)
Dept: FAMILY MEDICINE | Facility: CLINIC | Age: 67
End: 2022-09-02
Payer: MEDICARE

## 2022-09-02 DIAGNOSIS — N28.1 COMPLEX RENAL CYST: Primary | ICD-10-CM

## 2022-09-06 ENCOUNTER — OFFICE VISIT (OUTPATIENT)
Dept: FAMILY MEDICINE | Facility: CLINIC | Age: 67
End: 2022-09-06
Payer: MEDICARE

## 2022-09-06 VITALS
SYSTOLIC BLOOD PRESSURE: 116 MMHG | WEIGHT: 176.5 LBS | DIASTOLIC BLOOD PRESSURE: 64 MMHG | HEIGHT: 66 IN | HEART RATE: 68 BPM | TEMPERATURE: 97 F | OXYGEN SATURATION: 92 % | BODY MASS INDEX: 28.37 KG/M2

## 2022-09-06 DIAGNOSIS — N28.1 RENAL CYST: Primary | ICD-10-CM

## 2022-09-06 DIAGNOSIS — S99.921D INJURY OF RIGHT FOOT, SUBSEQUENT ENCOUNTER: ICD-10-CM

## 2022-09-06 PROCEDURE — 99214 OFFICE O/P EST MOD 30 MIN: CPT | Mod: S$PBB,,, | Performed by: FAMILY MEDICINE

## 2022-09-06 PROCEDURE — 99215 OFFICE O/P EST HI 40 MIN: CPT | Mod: PBBFAC,PO | Performed by: FAMILY MEDICINE

## 2022-09-06 PROCEDURE — 99999 PR PBB SHADOW E&M-EST. PATIENT-LVL V: ICD-10-PCS | Mod: PBBFAC,,, | Performed by: FAMILY MEDICINE

## 2022-09-06 PROCEDURE — 99999 PR PBB SHADOW E&M-EST. PATIENT-LVL V: CPT | Mod: PBBFAC,,, | Performed by: FAMILY MEDICINE

## 2022-09-06 PROCEDURE — 99214 PR OFFICE/OUTPT VISIT, EST, LEVL IV, 30-39 MIN: ICD-10-PCS | Mod: S$PBB,,, | Performed by: FAMILY MEDICINE

## 2022-09-06 NOTE — PROGRESS NOTES
Chief Complaint:    Chief Complaint   Patient presents with    Follow-up       History of Present Illness:  Patient with DDD, HLD, HTN presents today to discuss ultrasound results    US kidney showed minimally complex cyst.   Wearing a R leg boot and has been using it consistently. Her foot was stepped on by her pony about one month ago. The swelling and redness hasn't improved. It was stepped on by a pony.     ROS:  Review of Systems   Constitutional:  Negative for appetite change, chills and fever.   HENT:  Negative for congestion, ear pain, postnasal drip, rhinorrhea, sinus pressure and sinus pain.    Eyes:  Negative for pain.   Respiratory:  Negative for cough, chest tightness and shortness of breath.    Cardiovascular:  Positive for leg swelling. Negative for chest pain.   Gastrointestinal:  Negative for abdominal pain, blood in stool, constipation, diarrhea and nausea.   Genitourinary:  Negative for difficulty urinating, dysuria, flank pain and hematuria.   Musculoskeletal:  Positive for arthralgias and myalgias.   Skin:  Negative for pallor and wound.   Neurological:  Negative for dizziness, tremors, speech difficulty, light-headedness and headaches.   Psychiatric/Behavioral:  Negative for behavioral problems, dysphoric mood and sleep disturbance. The patient is not nervous/anxious.    All other systems reviewed and are negative.    Past Medical History:   Diagnosis Date    Anxiety     Arthritis     Asthma     Depression     Hyperlipidemia     Hypertension     RLS (restless legs syndrome)        Social History:  Social History     Socioeconomic History    Marital status:     Number of children: 2   Tobacco Use    Smoking status: Former     Types: Cigarettes     Quit date: 6/10/2012     Years since quitting: 10.2    Smokeless tobacco: Never    Tobacco comments:     Would smoke 1 cigarette every few months   Substance and Sexual Activity    Alcohol use: Yes     Alcohol/week: 1.0 standard drink      "Types: 1 Standard drinks or equivalent per week     Comment: social    Drug use: No    Sexual activity: Yes     Partners: Male     Birth control/protection: None       Family History:   family history includes Cancer in her maternal grandfather and maternal grandmother; Clotting disorder in her maternal grandfather, maternal grandmother, and mother; Hemochromatosis in her mother; Hypertension in her father and mother.    Health Maintenance   Topic Date Due    DEXA Scan  05/19/2018    Mammogram  10/20/2021    Lipid Panel  12/16/2022    TETANUS VACCINE  08/30/2029    Hepatitis C Screening  Completed       Physical Exam:    Vital Signs  Temp: 97.2 °F (36.2 °C)  Temp src: Temporal  Pulse: 68  SpO2: (!) 92 %  BP: 116/64  BP Location: Left arm  Patient Position: Sitting  Height and Weight  Height: 5' 6" (167.6 cm)  Weight: 80 kg (176 lb 7.7 oz)  BSA (Calculated - sq m): 1.93 sq meters  BMI (Calculated): 28.5  Weight in (lb) to have BMI = 25: 154.6]    Body mass index is 28.48 kg/m².    Physical Exam  Vitals and nursing note reviewed.   Constitutional:       Appearance: Normal appearance. She is not toxic-appearing.   HENT:      Head: Normocephalic and atraumatic.      Right Ear: Tympanic membrane normal.      Left Ear: Tympanic membrane normal.   Eyes:      Extraocular Movements: Extraocular movements intact.      Pupils: Pupils are equal, round, and reactive to light.   Cardiovascular:      Rate and Rhythm: Normal rate and regular rhythm.      Heart sounds: Normal heart sounds.   Pulmonary:      Effort: Pulmonary effort is normal.      Breath sounds: Normal breath sounds. No wheezing, rhonchi or rales.   Abdominal:      General: Bowel sounds are normal. There is no distension.      Palpations: Abdomen is soft.      Tenderness: There is no abdominal tenderness.   Musculoskeletal:         General: Normal range of motion.      Cervical back: Normal range of motion.      Right foot: Swelling and tenderness present.        " Feet:       Comments: Wearing R leg boot   Feet:      Right foot:      Skin integrity: Erythema present.   Skin:     General: Skin is warm and dry.      Capillary Refill: Capillary refill takes less than 2 seconds.   Neurological:      General: No focal deficit present.      Mental Status: She is alert and oriented to person, place, and time.   Psychiatric:         Mood and Affect: Mood normal.         Behavior: Behavior normal.         Judgment: Judgment normal.         Assessment:      ICD-10-CM ICD-9-CM   1. Renal cyst  N28.1 753.10   2. Injury of right foot, subsequent encounter  S99.921D V58.89     959.7     Plan:     Reviewed and discussed results of US kidney. Order US Retroperitoneal Complete for renal cyst. Declines referral to urology.   Order MRI Foot (Forefoot) Right With Contrast for injury of right foot.   Orders Placed This Encounter   Procedures    US Retroperitoneal Complete (Kidney and    MRI Foot (Forefoot)Right With Contrast     Current Outpatient Medications   Medication Sig Dispense Refill    acetaminophen (TYLENOL) 500 MG tablet Take 500 mg by mouth every 6 (six) hours as needed for Pain.      albuterol (PROVENTIL/VENTOLIN HFA) 90 mcg/actuation inhaler Inhale 2 puffs into the lungs every 4 (four) hours as needed for Wheezing. 18 g 2    albuterol-ipratropium (DUO-NEB) 2.5 mg-0.5 mg/3 mL nebulizer solution Take 3 mLs by nebulization every 4 (four) hours as needed for Wheezing. Rescue 75 mL 0    apixaban (ELIQUIS) 5 mg Tab Take 1 tablet (5 mg total) by mouth 2 (two) times daily. 60 tablet 5    ascorbic acid, vitamin C, (VITAMIN C) 1000 MG tablet Take 1,000 mg by mouth once daily.      buPROPion (WELLBUTRIN SR) 150 MG TBSR 12 hr tablet Take 1 tablet (150 mg total) by mouth 2 (two) times daily. 180 tablet 3    busPIRone (BUSPAR) 10 MG tablet Take 1 tablet (10 mg total) by mouth 3 (three) times daily. 90 tablet 11    carvediloL (COREG) 25 MG tablet Take 1 tablet (25 mg total) by mouth 2 (two) times  daily with meals. 60 tablet 9    cyclobenzaprine (FLEXERIL) 10 MG tablet Take 1 tablet (10 mg total) by mouth 2 (two) times daily as needed for Muscle spasms. 180 tablet 1    DULoxetine (CYMBALTA) 30 MG capsule Take 1 capsule (30 mg total) by mouth once daily. 90 capsule 3    EPINEPHrine (EPIPEN) 0.3 mg/0.3 mL AtIn Inject 0.3 mLs (0.3 mg total) into the muscle once. for 1 dose 0.6 mL 4    fluticasone propionate (FLONASE) 50 mcg/actuation nasal spray 2 sprays (100 mcg total) by Each Nostril route once daily. 18.2 mL 1    gabapentin (NEURONTIN) 300 MG capsule Take 1 capsule (300 mg total) by mouth 2 (two) times daily. 180 capsule 3    ibuprofen (ADVIL,MOTRIN) 600 MG tablet       inhalat.spacing dev,large mask (BREATHERITE SPACER-MASK,ADULT) Spcr Use as directed for inhalation. 1 each 1    levocetirizine (XYZAL) 5 MG tablet Take 1 tablet (5 mg total) by mouth every evening. 30 tablet 11    losartan-hydrochlorothiazide 100-25 mg (HYZAAR) 100-25 mg per tablet Take 1 tablet by mouth once daily. 90 tablet 3    meclizine (ANTIVERT) 25 mg tablet Take 1 tablet (25 mg total) by mouth 3 (three) times daily as needed for Dizziness (60). 60 tablet 1    mupirocin (BACTROBAN) 2 % ointment Apply topically 3 (three) times daily. 30 g 0    olopatadine (PATANOL) 0.1 % ophthalmic solution Place 1 drop into both eyes once daily.      omega 3-dha-epa-fish oil (FISH OIL) 1,000 mg (120 mg-180 mg) Cap Take 2 capsules by mouth once daily.      pantoprazole (PROTONIX) 40 MG tablet Take 1 tablet (40 mg total) by mouth once daily. 90 tablet 3    potassium 99 mg Tab Take by mouth. 1 tablet Oral Every day      rOPINIRole (REQUIP) 1 MG tablet Take 1 tablet (1 mg total) by mouth 2 (two) times daily as needed. 180 tablet 3    traMADoL (ULTRAM) 50 mg tablet Take 1 tablet (50 mg total) by mouth every 12 (twelve) hours as needed for Pain. 60 tablet 5    verapamiL (VERELAN) 120 MG C24P Take 2 capsules (240 mg total) by mouth once daily. 180 capsule 3      No current facility-administered medications for this visit.     Facility-Administered Medications Ordered in Other Visits   Medication Dose Route Frequency Provider Last Rate Last Admin    lactated ringers infusion   Intravenous Continuous Vanda Mathis MD   Stopped at 10/01/18 1140       There are no discontinued medications.      No follow-ups on file.      Helena Jones MD  Scribe Attestation:   I, Shelly Villarreal, am scribing for, and in the presence of, Dr.Arif Jones I performed the above scribed service and the documentation accurately describes the services I performed. I attest to the accuracy of the note.    I, Dr. Helena Jones, reviewed documentation as scribed above. I performed the services described in this documentation.  I agree that the record reflects my personal performance and is accurate and complete. Helena Jones MD.  09/06/2022

## 2022-09-08 ENCOUNTER — PATIENT MESSAGE (OUTPATIENT)
Dept: ADMINISTRATIVE | Facility: OTHER | Age: 67
End: 2022-09-08
Payer: MEDICARE

## 2022-09-24 DIAGNOSIS — S99.921D INJURY OF RIGHT FOOT, SUBSEQUENT ENCOUNTER: Primary | ICD-10-CM

## 2022-09-26 ENCOUNTER — HOSPITAL ENCOUNTER (OUTPATIENT)
Dept: RADIOLOGY | Facility: HOSPITAL | Age: 67
Discharge: HOME | End: 2022-09-26
Attending: FAMILY MEDICINE
Payer: MEDICARE

## 2022-09-26 DIAGNOSIS — S99.921D INJURY OF RIGHT FOOT, SUBSEQUENT ENCOUNTER: ICD-10-CM

## 2022-09-26 PROCEDURE — A9585 GADOBUTROL INJECTION: HCPCS | Performed by: FAMILY MEDICINE

## 2022-09-26 PROCEDURE — 25500020 PHARM REV CODE 255: Performed by: FAMILY MEDICINE

## 2022-09-26 PROCEDURE — 73720 MRI LWR EXTREMITY W/O&W/DYE: CPT | Mod: TC,RT

## 2022-09-26 RX ORDER — GADOBUTROL 604.72 MG/ML
10 INJECTION INTRAVENOUS
Status: COMPLETED | OUTPATIENT
Start: 2022-09-26 | End: 2022-09-26

## 2022-09-26 RX ADMIN — GADOBUTROL 8 ML: 604.72 INJECTION INTRAVENOUS at 05:09

## 2022-10-03 ENCOUNTER — PATIENT MESSAGE (OUTPATIENT)
Dept: FAMILY MEDICINE | Facility: CLINIC | Age: 67
End: 2022-10-03
Payer: MEDICARE

## 2022-10-04 ENCOUNTER — PATIENT MESSAGE (OUTPATIENT)
Dept: ADMINISTRATIVE | Facility: HOSPITAL | Age: 67
End: 2022-10-04
Payer: MEDICARE

## 2022-10-05 ENCOUNTER — PATIENT MESSAGE (OUTPATIENT)
Dept: FAMILY MEDICINE | Facility: CLINIC | Age: 67
End: 2022-10-05
Payer: MEDICARE

## 2022-10-05 ENCOUNTER — TELEPHONE (OUTPATIENT)
Dept: FAMILY MEDICINE | Facility: CLINIC | Age: 67
End: 2022-10-05
Payer: MEDICARE

## 2022-10-05 DIAGNOSIS — S99.921D INJURY OF RIGHT FOOT, SUBSEQUENT ENCOUNTER: Primary | ICD-10-CM

## 2022-10-05 DIAGNOSIS — R93.89 ABNORMAL MRI: ICD-10-CM

## 2022-10-06 ENCOUNTER — PATIENT MESSAGE (OUTPATIENT)
Dept: FAMILY MEDICINE | Facility: CLINIC | Age: 67
End: 2022-10-06
Payer: MEDICARE

## 2022-10-12 ENCOUNTER — OFFICE VISIT (OUTPATIENT)
Dept: UROLOGY | Facility: CLINIC | Age: 67
End: 2022-10-12
Payer: MEDICARE

## 2022-10-12 ENCOUNTER — PATIENT MESSAGE (OUTPATIENT)
Dept: FAMILY MEDICINE | Facility: CLINIC | Age: 67
End: 2022-10-12
Payer: MEDICARE

## 2022-10-12 VITALS
WEIGHT: 176 LBS | TEMPERATURE: 98 F | HEIGHT: 66 IN | DIASTOLIC BLOOD PRESSURE: 82 MMHG | SYSTOLIC BLOOD PRESSURE: 146 MMHG | HEART RATE: 74 BPM | BODY MASS INDEX: 28.28 KG/M2

## 2022-10-12 DIAGNOSIS — N28.1 COMPLEX RENAL CYST: ICD-10-CM

## 2022-10-12 PROCEDURE — 99203 OFFICE O/P NEW LOW 30 MIN: CPT | Mod: S$PBB,,, | Performed by: UROLOGY

## 2022-10-12 PROCEDURE — 99215 OFFICE O/P EST HI 40 MIN: CPT | Mod: PBBFAC | Performed by: UROLOGY

## 2022-10-12 PROCEDURE — 99999 PR PBB SHADOW E&M-EST. PATIENT-LVL V: ICD-10-PCS | Mod: PBBFAC,,, | Performed by: UROLOGY

## 2022-10-12 PROCEDURE — 99999 PR PBB SHADOW E&M-EST. PATIENT-LVL V: CPT | Mod: PBBFAC,,, | Performed by: UROLOGY

## 2022-10-12 PROCEDURE — 99203 PR OFFICE/OUTPT VISIT, NEW, LEVL III, 30-44 MIN: ICD-10-PCS | Mod: S$PBB,,, | Performed by: UROLOGY

## 2022-10-12 NOTE — PROGRESS NOTES
Chief Complaint:   Encounter Diagnosis   Name Primary?    Complex renal cyst        HPI:  67-year-old female who comes in right complex renal cyst.  This was incidentally found off of an MRI, confirmed on ultrasound.  No gross hematuria, has a history of smoking.  No lower inner tract symptoms, mild stress incontinence but not to the point the needs for treatment.  No dysuria.  She had remote history of UTIs, after hysterectomy this went away though.  No other urological history or gynecological history, except for as stated total abdominal hysterectomy with bilateral salpingo oophorectomy.  No slings, occasional constipation.  She currently treats this well though.  She thinks that her grandfather had stones, no other family history of urological cancers or stones though.    Allergies:  Amlodipine, Benazepril, Strawberry, and Chlorhexidine    Medications:  has a current medication list which includes the following prescription(s): acetaminophen, albuterol, albuterol-ipratropium, apixaban, ascorbic acid (vitamin c), bupropion, carvedilol, cyclobenzaprine, duloxetine, fluticasone propionate, gabapentin, ibuprofen, breatherite spacer-mask,adult, levocetirizine, losartan-hydrochlorothiazide 100-25 mg, meclizine, mupirocin, olopatadine, pantoprazole, potassium, ropinirole, tramadol, verapamil, buspirone, epinephrine, and omega 3-dha-epa-fish oil, and the following Facility-Administered Medications: lactated ringers.    Review of Systems:  General: No fever, chills, fatigability, or weight loss.  Skin: No rashes, itching, or changes in color or texture of skin.  Chest: Denies QUIÑONEZ, cyanosis, wheezing, cough, and sputum production.  Abdomen: Appetite fine. No weight loss. Denies diarrhea, abdominal pain, hematemesis, or blood in stool.  Musculoskeletal: No joint stiffness or swelling. Denies back pain.  : As above.  All other review of systems negative.    PMH:   has a past medical history of Anxiety, Arthritis, Asthma,  Depression, Hyperlipidemia, Hypertension, and RLS (restless legs syndrome).    PSH:   has a past surgical history that includes Hysterectomy; Tonsillectomy; Adenoidectomy;  section; carpel tunnel; golfers elbow; Oophorectomy; Colonoscopy (N/A, 10/01/2018); Esophagogastroduodenoscopy (N/A, 10/01/2018); and Cataract extraction.    FamHx: family history includes Cancer in her maternal grandfather and maternal grandmother; Clotting disorder in her maternal grandfather, maternal grandmother, and mother; Hemochromatosis in her mother; Hypertension in her father and mother.    SocHx:  reports that she quit smoking about 10 years ago. She has never used smokeless tobacco. She reports current alcohol use of about 1.0 standard drink per week. She reports that she does not use drugs.      Physical Exam:  Vitals:    10/12/22 1425   BP: (!) 146/82   Pulse: 74   Temp: 98.1 °F (36.7 °C)     General: A&Ox3, no apparent distress, no deformities  Neck: No masses, normal ROM  Lungs: normal inspiration, no use of accessory muscles  Heart: normal pulse, no arrhythmias  Abdomen: Soft, NT, ND, no masses, no hernias, no hepatosplenomegaly  Skin: The skin is warm and dry. No jaundice.  Ext: No c/c/e.  : No pelvic floor prolapse.  Normal introitus, no urethral abnomralities. No Perineal abnormalities.    Labs/Studies:   DORA right complex renal cyst    Creatinine 1.1     Impression/Plan:     Complex renal cyst- obtain a CT renal protocol, pursue as appropriate from there.

## 2022-10-17 ENCOUNTER — PATIENT MESSAGE (OUTPATIENT)
Dept: ADMINISTRATIVE | Facility: HOSPITAL | Age: 67
End: 2022-10-17
Payer: MEDICARE

## 2022-10-17 ENCOUNTER — PATIENT MESSAGE (OUTPATIENT)
Dept: UROLOGY | Facility: CLINIC | Age: 67
End: 2022-10-17
Payer: MEDICARE

## 2022-10-17 ENCOUNTER — OFFICE VISIT (OUTPATIENT)
Dept: FAMILY MEDICINE | Facility: CLINIC | Age: 67
End: 2022-10-17
Payer: MEDICARE

## 2022-10-17 VITALS
DIASTOLIC BLOOD PRESSURE: 72 MMHG | BODY MASS INDEX: 28.42 KG/M2 | HEART RATE: 76 BPM | HEIGHT: 66 IN | TEMPERATURE: 98 F | OXYGEN SATURATION: 95 % | SYSTOLIC BLOOD PRESSURE: 126 MMHG | WEIGHT: 176.81 LBS

## 2022-10-17 DIAGNOSIS — N30.01 ACUTE CYSTITIS WITH HEMATURIA: Primary | ICD-10-CM

## 2022-10-17 DIAGNOSIS — I10 ESSENTIAL HYPERTENSION: ICD-10-CM

## 2022-10-17 DIAGNOSIS — N28.1 COMPLEX RENAL CYST: ICD-10-CM

## 2022-10-17 DIAGNOSIS — I48.0 PAROXYSMAL ATRIAL FIBRILLATION: ICD-10-CM

## 2022-10-17 LAB
BILIRUB SERPL-MCNC: ABNORMAL MG/DL
BLOOD URINE, POC: ABNORMAL
CLARITY, POC UA: ABNORMAL
COLOR, POC UA: YELLOW
GLUCOSE UR QL STRIP: NORMAL
KETONES UR QL STRIP: ABNORMAL
LEUKOCYTE ESTERASE URINE, POC: ABNORMAL
NITRITE, POC UA: ABNORMAL
PH, POC UA: 6
PROTEIN, POC: ABNORMAL
SPECIFIC GRAVITY, POC UA: 1.01
UROBILINOGEN, POC UA: NORMAL

## 2022-10-17 PROCEDURE — 87086 URINE CULTURE/COLONY COUNT: CPT | Performed by: FAMILY MEDICINE

## 2022-10-17 PROCEDURE — 99214 OFFICE O/P EST MOD 30 MIN: CPT | Mod: S$PBB,,, | Performed by: FAMILY MEDICINE

## 2022-10-17 PROCEDURE — 99999 PR PBB SHADOW E&M-EST. PATIENT-LVL IV: CPT | Mod: PBBFAC,,, | Performed by: FAMILY MEDICINE

## 2022-10-17 PROCEDURE — 87077 CULTURE AEROBIC IDENTIFY: CPT | Performed by: FAMILY MEDICINE

## 2022-10-17 PROCEDURE — 99214 PR OFFICE/OUTPT VISIT, EST, LEVL IV, 30-39 MIN: ICD-10-PCS | Mod: S$PBB,,, | Performed by: FAMILY MEDICINE

## 2022-10-17 PROCEDURE — 99214 OFFICE O/P EST MOD 30 MIN: CPT | Mod: PBBFAC,PO | Performed by: FAMILY MEDICINE

## 2022-10-17 PROCEDURE — 87186 SC STD MICRODIL/AGAR DIL: CPT | Performed by: FAMILY MEDICINE

## 2022-10-17 PROCEDURE — 99999 PR PBB SHADOW E&M-EST. PATIENT-LVL IV: ICD-10-PCS | Mod: PBBFAC,,, | Performed by: FAMILY MEDICINE

## 2022-10-17 PROCEDURE — 87088 URINE BACTERIA CULTURE: CPT | Performed by: FAMILY MEDICINE

## 2022-10-17 PROCEDURE — 81002 URINALYSIS NONAUTO W/O SCOPE: CPT | Mod: PBBFAC,PO | Performed by: FAMILY MEDICINE

## 2022-10-17 RX ORDER — NITROFURANTOIN 25; 75 MG/1; MG/1
100 CAPSULE ORAL 2 TIMES DAILY
Qty: 14 CAPSULE | Refills: 0 | Status: SHIPPED | OUTPATIENT
Start: 2022-10-17 | End: 2023-03-13

## 2022-10-17 RX ORDER — PHENAZOPYRIDINE HYDROCHLORIDE 200 MG/1
200 TABLET, FILM COATED ORAL 3 TIMES DAILY PRN
Qty: 6 TABLET | Refills: 0 | Status: SHIPPED | OUTPATIENT
Start: 2022-10-17 | End: 2022-10-27

## 2022-10-17 NOTE — PROGRESS NOTES
Subjective:       Patient ID: Erin Soriano is a 67 y.o. female.    Chief Complaint: Urinary Tract Infection      HPI Comments:       Current Outpatient Medications:     acetaminophen (TYLENOL) 500 MG tablet, Take 500 mg by mouth every 6 (six) hours as needed for Pain., Disp: , Rfl:     albuterol (PROVENTIL/VENTOLIN HFA) 90 mcg/actuation inhaler, Inhale 2 puffs into the lungs every 4 (four) hours as needed for Wheezing., Disp: 18 g, Rfl: 2    albuterol-ipratropium (DUO-NEB) 2.5 mg-0.5 mg/3 mL nebulizer solution, Take 3 mLs by nebulization every 4 (four) hours as needed for Wheezing. Rescue, Disp: 75 mL, Rfl: 0    apixaban (ELIQUIS) 5 mg Tab, Take 1 tablet (5 mg total) by mouth 2 (two) times daily., Disp: 60 tablet, Rfl: 5    ascorbic acid, vitamin C, (VITAMIN C) 1000 MG tablet, Take 1,000 mg by mouth once daily., Disp: , Rfl:     buPROPion (WELLBUTRIN SR) 150 MG TBSR 12 hr tablet, Take 1 tablet (150 mg total) by mouth 2 (two) times daily., Disp: 180 tablet, Rfl: 3    busPIRone (BUSPAR) 10 MG tablet, Take 1 tablet (10 mg total) by mouth 3 (three) times daily., Disp: 90 tablet, Rfl: 11    carvediloL (COREG) 25 MG tablet, Take 1 tablet (25 mg total) by mouth 2 (two) times daily with meals., Disp: 60 tablet, Rfl: 9    cyclobenzaprine (FLEXERIL) 10 MG tablet, Take 1 tablet (10 mg total) by mouth 2 (two) times daily as needed for Muscle spasms., Disp: 180 tablet, Rfl: 1    DULoxetine (CYMBALTA) 30 MG capsule, Take 1 capsule (30 mg total) by mouth once daily., Disp: 90 capsule, Rfl: 3    EPINEPHrine (EPIPEN) 0.3 mg/0.3 mL AtIn, Inject 0.3 mLs (0.3 mg total) into the muscle once. for 1 dose, Disp: 0.6 mL, Rfl: 4    fluticasone propionate (FLONASE) 50 mcg/actuation nasal spray, 2 sprays (100 mcg total) by Each Nostril route once daily., Disp: 18.2 mL, Rfl: 1    gabapentin (NEURONTIN) 300 MG capsule, Take 1 capsule (300 mg total) by mouth 2 (two) times daily., Disp: 180 capsule, Rfl: 3    ibuprofen (ADVIL,MOTRIN) 600 MG  tablet, , Disp: , Rfl:     inhalat.spacing dev,large mask (BREATHERITE SPACER-MASK,ADULT) Spcr, Use as directed for inhalation., Disp: 1 each, Rfl: 1    levocetirizine (XYZAL) 5 MG tablet, Take 1 tablet (5 mg total) by mouth every evening., Disp: 30 tablet, Rfl: 11    losartan-hydrochlorothiazide 100-25 mg (HYZAAR) 100-25 mg per tablet, Take 1 tablet by mouth once daily., Disp: 90 tablet, Rfl: 3    meclizine (ANTIVERT) 25 mg tablet, Take 1 tablet (25 mg total) by mouth 3 (three) times daily as needed for Dizziness (60)., Disp: 60 tablet, Rfl: 1    mupirocin (BACTROBAN) 2 % ointment, Apply topically 3 (three) times daily., Disp: 30 g, Rfl: 0    nitrofurantoin, macrocrystal-monohydrate, (MACROBID) 100 MG capsule, Take 1 capsule (100 mg total) by mouth 2 (two) times daily., Disp: 14 capsule, Rfl: 0    olopatadine (PATANOL) 0.1 % ophthalmic solution, Place 1 drop into both eyes once daily., Disp: , Rfl:     omega 3-dha-epa-fish oil (FISH OIL) 1,000 mg (120 mg-180 mg) Cap, Take 2 capsules by mouth once daily., Disp: , Rfl:     pantoprazole (PROTONIX) 40 MG tablet, Take 1 tablet (40 mg total) by mouth once daily., Disp: 90 tablet, Rfl: 3    phenazopyridine (PYRIDIUM) 200 MG tablet, Take 1 tablet (200 mg total) by mouth 3 (three) times daily as needed for Pain., Disp: 6 tablet, Rfl: 0    potassium 99 mg Tab, Take by mouth. 1 tablet Oral Every day, Disp: , Rfl:     rOPINIRole (REQUIP) 1 MG tablet, Take 1 tablet (1 mg total) by mouth 2 (two) times daily as needed., Disp: 180 tablet, Rfl: 3    traMADoL (ULTRAM) 50 mg tablet, Take 1 tablet (50 mg total) by mouth every 12 (twelve) hours as needed for Pain., Disp: 60 tablet, Rfl: 5    verapamiL (VERELAN) 120 MG C24P, Take 2 capsules (240 mg total) by mouth once daily., Disp: 180 capsule, Rfl: 3  No current facility-administered medications for this visit.    Facility-Administered Medications Ordered in Other Visits:     lactated ringers infusion, , Intravenous, Continuous, Vanda L  "MD Del, Stopped at 10/01/18 1140  This for 1st time seeing this patient.  She has a history of hypertension    Presents with a one-day history of dysuria and bladder spasms.  Fatigue, urinary frequency and urgency.  Decreased void volume.  Denies hematuria, fever or chills, nausea or vomiting, back pain. not taking anything over-the-counter for her symptoms so far.  Does not get frequent urinary tract infections    Review of Systems   Constitutional:  Positive for fatigue. Negative for activity change, appetite change, chills and fever.   HENT:  Negative for sore throat.    Respiratory:  Negative for cough and shortness of breath.    Cardiovascular:  Negative for chest pain.   Gastrointestinal:  Negative for abdominal pain, diarrhea and nausea.   Genitourinary:  Positive for decreased urine volume, dysuria, frequency and urgency. Negative for difficulty urinating, flank pain and hematuria.   Musculoskeletal:  Negative for arthralgias and myalgias.   Neurological:  Negative for dizziness and headaches.     Objective:      Vitals:    10/17/22 1553   BP: 126/72   Pulse: 76   Temp: 97.8 °F (36.6 °C)   SpO2: 95%   Weight: 80.2 kg (176 lb 12.9 oz)   Height: 5' 6" (1.676 m)   PainSc: 0-No pain     Physical Exam  Vitals and nursing note reviewed.   Constitutional:       General: She is not in acute distress.     Appearance: She is well-developed. She is not ill-appearing or diaphoretic.   HENT:      Head: Normocephalic.      Mouth/Throat:      Pharynx: No oropharyngeal exudate.   Neck:      Thyroid: No thyromegaly.   Cardiovascular:      Rate and Rhythm: Normal rate and regular rhythm.      Heart sounds: Normal heart sounds. No murmur heard.  Pulmonary:      Effort: Pulmonary effort is normal.      Breath sounds: Normal breath sounds. No wheezing or rales.   Abdominal:      General: There is no distension.      Palpations: Abdomen is soft. There is no hepatomegaly, splenomegaly or mass.      Tenderness: There is no " abdominal tenderness. There is no right CVA tenderness or left CVA tenderness.   Musculoskeletal:      Cervical back: Neck supple.   Lymphadenopathy:      Cervical: No cervical adenopathy.   Skin:     General: Skin is warm and dry.   Neurological:      Mental Status: She is alert and oriented to person, place, and time.   Psychiatric:         Behavior: Behavior normal.         Thought Content: Thought content normal.         Judgment: Judgment normal.       Assessment:       1. Acute cystitis with hematuria    2. Complex renal cyst    3. Essential hypertension    4. Paroxysmal atrial fibrillation          Plan:   Acute cystitis with hematuria  Comments:  Urine culture sent.  Macrobid x7 days.  Pyridium as needed.  Fluids and rest  Orders:  -     Urine culture; Future; Expected date: 10/17/2022  -     POCT urine dipstick without microscope  -     nitrofurantoin, macrocrystal-monohydrate, (MACROBID) 100 MG capsule; Take 1 capsule (100 mg total) by mouth 2 (two) times daily.  Dispense: 14 capsule; Refill: 0  -     phenazopyridine (PYRIDIUM) 200 MG tablet; Take 1 tablet (200 mg total) by mouth 3 (three) times daily as needed for Pain.  Dispense: 6 tablet; Refill: 0    Complex renal cyst  Comments:  Currently being evaluated by Urology    Essential hypertension  Comments:  Controlled    Paroxysmal atrial fibrillation  Comments:  On Eliquis

## 2022-10-18 ENCOUNTER — TELEPHONE (OUTPATIENT)
Dept: UROLOGY | Facility: CLINIC | Age: 67
End: 2022-10-18

## 2022-10-18 DIAGNOSIS — N28.1 COMPLEX RENAL CYST: Primary | ICD-10-CM

## 2022-10-19 ENCOUNTER — OFFICE VISIT (OUTPATIENT)
Dept: PODIATRY | Facility: CLINIC | Age: 67
End: 2022-10-19
Payer: MEDICARE

## 2022-10-19 VITALS — WEIGHT: 176.81 LBS | BODY MASS INDEX: 28.42 KG/M2 | HEIGHT: 66 IN

## 2022-10-19 DIAGNOSIS — S99.921D INJURY OF RIGHT FOOT, SUBSEQUENT ENCOUNTER: Primary | ICD-10-CM

## 2022-10-19 DIAGNOSIS — R93.89 ABNORMAL MRI: ICD-10-CM

## 2022-10-19 DIAGNOSIS — M84.30XA STRESS REACTION OF BONE: ICD-10-CM

## 2022-10-19 LAB — BACTERIA UR CULT: ABNORMAL

## 2022-10-19 PROCEDURE — 99213 OFFICE O/P EST LOW 20 MIN: CPT | Mod: PBBFAC | Performed by: PODIATRIST

## 2022-10-19 PROCEDURE — 99203 PR OFFICE/OUTPT VISIT, NEW, LEVL III, 30-44 MIN: ICD-10-PCS | Mod: S$PBB,,, | Performed by: PODIATRIST

## 2022-10-19 PROCEDURE — 99999 PR PBB SHADOW E&M-EST. PATIENT-LVL III: CPT | Mod: PBBFAC,,, | Performed by: PODIATRIST

## 2022-10-19 PROCEDURE — 99999 PR PBB SHADOW E&M-EST. PATIENT-LVL III: ICD-10-PCS | Mod: PBBFAC,,, | Performed by: PODIATRIST

## 2022-10-19 PROCEDURE — 99203 OFFICE O/P NEW LOW 30 MIN: CPT | Mod: S$PBB,,, | Performed by: PODIATRIST

## 2022-10-19 RX ORDER — ERGOCALCIFEROL 1.25 MG/1
50000 CAPSULE ORAL
Qty: 8 CAPSULE | Refills: 0 | Status: SHIPPED | OUTPATIENT
Start: 2022-10-19 | End: 2022-12-08

## 2022-10-19 NOTE — PROGRESS NOTES
Subjective:       Patient ID: Erin Soriano is a 67 y.o. female.    Chief Complaint: Foot Injury (C/o foot injury from months ago and swelling, pt also explains she has bilateral ingrown toenails, rates pain 5/10, nondiabetic, wearing socks and shoes, last seen PCP Dr. Jones on 09/06/2022.)      HPI:  Erin Soriano presents to the office today at the referral of Dr. Jones after patient sustained injury to the dorsal aspect the right foot a few months ago.  States that she did have a small [place] step on the dorsal aspect of the foot.  Relates this was painful at the time of injury.  Was placed in a Cam boot initially after x-rays showed no fracture dislocation.  Once she continues have increased swelling, an MRI was then ordered.  The MRI does show no signs of fracture but does show some increased intensity to the 2nd metatarsal head with evidence of sesamoiditis as well.  Review of patient's allergies indicates:   Allergen Reactions    Amlodipine     Benazepril Edema     angioedema    Strawberry Hives    Chlorhexidine Rash       Past Medical History:   Diagnosis Date    Anxiety     Arthritis     Asthma     Depression     Hyperlipidemia     Hypertension     RLS (restless legs syndrome)        Family History   Problem Relation Age of Onset    Hypertension Mother     Clotting disorder Mother     Hemochromatosis Mother     Hypertension Father     Cancer Maternal Grandmother     Clotting disorder Maternal Grandmother     Cancer Maternal Grandfather         prostate    Clotting disorder Maternal Grandfather        Social History     Socioeconomic History    Marital status:     Number of children: 2   Tobacco Use    Smoking status: Former     Types: Cigarettes     Quit date: 6/10/2012     Years since quitting: 10.3    Smokeless tobacco: Never    Tobacco comments:     Would smoke 1 cigarette every few months   Substance and Sexual Activity    Alcohol use: Yes     Alcohol/week: 1.0 standard drink      "Types: 1 Standard drinks or equivalent per week     Comment: social    Drug use: No    Sexual activity: Yes     Partners: Male     Birth control/protection: None       Past Surgical History:   Procedure Laterality Date    ADENOIDECTOMY      pt was 3 yrs old    carpel tunnel          CATARACT EXTRACTION       SECTION      2 different ones    COLONOSCOPY N/A 10/01/2018    Procedure: COLONOSCOPY;  Surgeon: Johnson Bacon MD;  Location: [facility] ENDO;  Service: Endoscopy;  Laterality: N/A;    ESOPHAGOGASTRODUODENOSCOPY N/A 10/01/2018    Procedure: ESOPHAGOGASTRODUODENOSCOPY (EGD);  Surgeon: Johnson Bacon MD;  Location: [facility] ENDO;  Service: Endoscopy;  Laterality: N/A;    golfers elbow          HYSTERECTOMY      1994 total    OOPHORECTOMY      TONSILLECTOMY      pt was 3 yrs old at time       Review of Systems       Objective:   Ht 5' 6" (1.676 m)   Wt 80.2 kg (176 lb 12.9 oz)   BMI 28.54 kg/m²     MRI Foot (Forefoot) Right W W/O Contrast  Narrative: EXAM: MRI FOOT (FOREFOOT) RIGHT W W/O CONTRAST    CLINICAL HISTORY: For fracture.  Right foot pain.    TECHNIQUE: Multiplanar, multisequence MR imaging of the right foot before and after the administration of intravenous gadolinium contrast.    FINDINGS:    Osseous/articular: Considerable osseous edema involving the tibial great toe sesamoid, mild osseous edema involving the fibular great toe sesamoid.  Associated postcontrast enhancement.  No definite fracture.  Questionable mild osseous edema and periosteal reaction at the second digit proximal phalanx.  No associated fracture.  Remaining osseous structures demonstrate no fracture or suspicious osseous lesion.  Anatomical alignment of the joints.  Mild/moderate joint space narrowing and irregularity at the first and second MTP joints with mild subchondral edema and mild marginal osteophytosis at the great toe MTP joint.  No significant effusions.    Musculotendinous: No significant " abnormality.    Ligamentous: No significant abnormality.    Miscellaneous: Moderate edema and enhancement in the soft tissues surrounding the second digit proximal phalanx.  No focal fluid collection.  Remaining superficial soft tissue structures demonstrate no significant abnormality.  Impression: 1.  Findings concerning for grade 2 sesamoiditis, greater at the tibial sesamoid.  2.  Questionable low-grade stress injury at the second digit proximal phalanx.  No associated fracture.  Considerable adjacent soft tissue edema is likely reactive.  Alternatively, if there is significant clinical suspicion for infectious etiology cellulitis/early osteomyelitis is not excluded.  3.  Mild/moderate degenerative changes at the great toe MTP joint and second MTP joint.    Finalized on: 9/27/2022 10:15 AM By:  Timo Giron III, MD  [facility]# [id number]      2022-09-27 10:29:50.006    [facility]       Physical Exam  LOWER EXTREMITY PHYSICAL EXAMINATION    VASCULAR: The right DP pulse is 2/4 and the left DP is 2/4. The right PT pulse is 2/4 and the left PT pulse is 2/4. Proximal to distal, warm to warm. No dependent rubor or elevation palor is noted. Capillary refill time is less than 3 seconds. Hair growth is appreciated to the dorsal foot and digits.    NEUROLOGY: Proprioception is intact. Sensation to light touch is intact. Protective sensation is intact via 5.07 [place] monofilament. Straight leg raise is negative. Negative Tinel's Sign and negative Valleix sign. No neurological sensations with compression of the area of Landrum's Nerve in the area of the Abductor Hallucis muscle belly. Upon palpation of the interspaces, there are no neurological sensations stated that radiate proximal or distal. Upon compression of the metatarsal heads from medial to lateral, no neurological sensations or symptoms are stated.    DERMATOLOGY: Skin is supple, dry and intact. No ecchymosis is noted. No hypertrophic skin formation. No erythema or  cellulitis is noted.     ORTHOPEDIC:  Pain on palpation of the dorsal aspect of the right foot most notably at the 2nd metatarsal head and 2nd metatarsophalangeal joint.  There is arthropathy present to the 1st metatarsophalangeal joint, however this is asymptomatic and patient having no pain.  Dorsal spurring is present with decreased range of motion dorsiflexion plantar flexion.  Patient does have a slight hammertoe deformity secondary increased discomfort to the 2nd metatarsal head.      Assessment:     1. Injury of right foot, subsequent encounter    2. Abnormal MRI    3. Stress reaction of bone        Plan:     Injury of right foot, subsequent encounter  -     Ambulatory referral/consult to Podiatry    Abnormal MRI  -     Ambulatory referral/consult to Podiatry    Stress reaction of bone    Other orders  -     ergocalciferol (ERGOCALCIFEROL) 50,000 unit Cap; Take 1 capsule (50,000 Units total) by mouth every 7 days. for 8 doses  Dispense: 8 capsule; Refill: 0        Thorough discussion is had with the patient today, concerning the diagnosis, its etiology, and the treatment algorithm at present.    X-rays and MRI reviewed by myself patient room.  There appears show some increased signal intensity present to the 2nd metatarsal head.  Based on patient's history of injury after a horse stepped on the dorsal aspect of her foot, in concerned about possible stress reaction versus stress fracture.  Given that this injury is nondisplaced and appears to be in the appropriate position, would recommend vitamin-D supplementation.  Patient does have history of osteopenia is not currently on vitamin-D supplementation.  We did discuss swelling in nature.  As she is able to ambulate in regular shoe gear at this time, I feel the patient is okay and safe to continue to ambulate in regular shoes without protective weight-bearing.      Future Appointments   Date Time Provider Department Center   10/28/2022 11:30 AM [facility] CT1 LIMIT  [address] [facility] CT SCAN [place]   11/9/2022  1:00 PM Jose L Will MD [facility] UROLOGY [facility]   11/18/2022  3:00 PM [facility] MAMMO2 [facility] MAMMO [place]   3/6/2023 11:45 AM [facility] US 3 [facility] ULSOUND [facility]

## 2022-10-28 ENCOUNTER — HOSPITAL ENCOUNTER (OUTPATIENT)
Dept: RADIOLOGY | Facility: HOSPITAL | Age: 67
Discharge: HOME | End: 2022-10-28
Attending: UROLOGY
Payer: MEDICARE

## 2022-10-28 DIAGNOSIS — N28.1 COMPLEX RENAL CYST: ICD-10-CM

## 2022-10-28 PROCEDURE — 25500020 PHARM REV CODE 255: Performed by: UROLOGY

## 2022-10-28 PROCEDURE — 74170 CT ABD WO CNTRST FLWD CNTRST: CPT | Mod: TC

## 2022-10-28 RX ADMIN — IOHEXOL 100 ML: 350 INJECTION, SOLUTION INTRAVENOUS at 11:10

## 2022-11-03 ENCOUNTER — PATIENT MESSAGE (OUTPATIENT)
Dept: FAMILY MEDICINE | Facility: CLINIC | Age: 67
End: 2022-11-03
Payer: MEDICARE

## 2022-11-07 ENCOUNTER — TELEPHONE (OUTPATIENT)
Dept: FAMILY MEDICINE | Facility: CLINIC | Age: 67
End: 2022-11-07
Payer: MEDICARE

## 2022-11-07 DIAGNOSIS — Z13.820 SCREENING FOR OSTEOPOROSIS: Primary | ICD-10-CM

## 2022-11-09 ENCOUNTER — OFFICE VISIT (OUTPATIENT)
Dept: UROLOGY | Facility: CLINIC | Age: 67
End: 2022-11-09
Payer: MEDICARE

## 2022-11-09 VITALS
DIASTOLIC BLOOD PRESSURE: 76 MMHG | HEART RATE: 75 BPM | BODY MASS INDEX: 28.19 KG/M2 | RESPIRATION RATE: 18 BRPM | TEMPERATURE: 98 F | HEIGHT: 66 IN | SYSTOLIC BLOOD PRESSURE: 148 MMHG | WEIGHT: 175.38 LBS

## 2022-11-09 DIAGNOSIS — N28.1 COMPLEX RENAL CYST: Primary | ICD-10-CM

## 2022-11-09 PROCEDURE — 99999 PR PBB SHADOW E&M-EST. PATIENT-LVL V: CPT | Mod: PBBFAC,,, | Performed by: UROLOGY

## 2022-11-09 PROCEDURE — 99215 OFFICE O/P EST HI 40 MIN: CPT | Mod: PBBFAC | Performed by: UROLOGY

## 2022-11-09 PROCEDURE — 99999 PR PBB SHADOW E&M-EST. PATIENT-LVL V: ICD-10-PCS | Mod: PBBFAC,,, | Performed by: UROLOGY

## 2022-11-09 PROCEDURE — 99213 OFFICE O/P EST LOW 20 MIN: CPT | Mod: S$PBB,,, | Performed by: UROLOGY

## 2022-11-09 PROCEDURE — 99213 PR OFFICE/OUTPT VISIT, EST, LEVL III, 20-29 MIN: ICD-10-PCS | Mod: S$PBB,,, | Performed by: UROLOGY

## 2022-11-09 NOTE — PROGRESS NOTES
Chief Complaint:   Encounter Diagnosis   Name Primary?    Complex renal cyst Yes       HPI:    11/9/22- patient returns today to discuss her CT scan, no symptoms.  67-year-old female who comes in right complex renal cyst.  This was incidentally found off of an MRI, confirmed on ultrasound.  No gross hematuria, has a history of smoking.  No lower inner tract symptoms, mild stress incontinence but not to the point the needs for treatment.  No dysuria.  She had remote history of UTIs, after hysterectomy this went away though.  No other urological history or gynecological history, except for as stated total abdominal hysterectomy with bilateral salpingo oophorectomy.  No slings, occasional constipation.  She currently treats this well though.  She thinks that her grandfather had stones, no other family history of urological cancers or stones though.    Allergies:  Amlodipine, Benazepril, Strawberry, and Chlorhexidine    Medications:  has a current medication list which includes the following prescription(s): acetaminophen, albuterol, albuterol-ipratropium, apixaban, ascorbic acid (vitamin c), bupropion, carvedilol, cyclobenzaprine, duloxetine, ergocalciferol, fluticasone propionate, gabapentin, ibuprofen, breatherite spacer-mask,adult, levocetirizine, losartan-hydrochlorothiazide 100-25 mg, meclizine, mupirocin, nitrofurantoin (macrocrystal-monohydrate), olopatadine, pantoprazole, potassium, ropinirole, tramadol, and verapamil, and the following Facility-Administered Medications: lactated ringers.    Review of Systems:  General: No fever, chills, fatigability, or weight loss.  Skin: No rashes, itching, or changes in color or texture of skin.  Chest: Denies QUIÑONEZ, cyanosis, wheezing, cough, and sputum production.  Abdomen: Appetite fine. No weight loss. Denies diarrhea, abdominal pain, hematemesis, or blood in stool.  Musculoskeletal: No joint stiffness or swelling. Denies back pain.  : As above.  All other review of  systems negative.    PMH:   has a past medical history of Anxiety, Arthritis, Asthma, Depression, Hyperlipidemia, Hypertension, and RLS (restless legs syndrome).    PSH:   has a past surgical history that includes Hysterectomy; Tonsillectomy; Adenoidectomy;  section; carpel tunnel; golfers elbow; Oophorectomy; Colonoscopy (N/A, 10/01/2018); Esophagogastroduodenoscopy (N/A, 10/01/2018); and Cataract extraction.    FamHx: family history includes Cancer in her maternal grandfather and maternal grandmother; Clotting disorder in her maternal grandfather, maternal grandmother, and mother; Hemochromatosis in her mother; Hypertension in her father and mother.    SocHx:  reports that she quit smoking about 10 years ago. She has never used smokeless tobacco. She reports current alcohol use of about 1.0 standard drink per week. She reports that she does not use drugs.      Physical Exam:  There were no vitals filed for this visit.    General: A&Ox3, no apparent distress, no deformities  Neck: No masses, normal ROM  Lungs: normal inspiration, no use of accessory muscles  Heart: normal pulse, no arrhythmias  Abdomen: Soft, NT, ND, no masses, no hernias, no hepatosplenomegaly  Skin: The skin is warm and dry. No jaundice.  Ext: No c/c/e.    Labs/Studies:   CT renal protocol right Bosniak 2 renal cyst 10/22  DORA right complex renal cyst    Creatinine 0.8 10/22    Impression/Plan:     Complex renal cyst- Bosniak 2 renal cyst, no reason to follow further as this is a benign lesion.  Follow-up on an as-needed basis.

## 2022-11-14 ENCOUNTER — PATIENT MESSAGE (OUTPATIENT)
Dept: FAMILY MEDICINE | Facility: CLINIC | Age: 67
End: 2022-11-14
Payer: MEDICARE

## 2022-11-15 ENCOUNTER — TELEPHONE (OUTPATIENT)
Dept: FAMILY MEDICINE | Facility: CLINIC | Age: 67
End: 2022-11-15
Payer: MEDICARE

## 2022-11-15 DIAGNOSIS — M85.80 OSTEOPENIA, UNSPECIFIED LOCATION: Primary | ICD-10-CM

## 2022-11-16 ENCOUNTER — OFFICE VISIT (OUTPATIENT)
Dept: CARDIOLOGY | Facility: CLINIC | Age: 67
End: 2022-11-16
Payer: MEDICARE

## 2022-11-16 VITALS
DIASTOLIC BLOOD PRESSURE: 74 MMHG | WEIGHT: 178.44 LBS | SYSTOLIC BLOOD PRESSURE: 120 MMHG | BODY MASS INDEX: 28.8 KG/M2 | OXYGEN SATURATION: 99 % | HEART RATE: 72 BPM

## 2022-11-16 DIAGNOSIS — E78.2 MIXED HYPERLIPIDEMIA: ICD-10-CM

## 2022-11-16 DIAGNOSIS — I10 ESSENTIAL HYPERTENSION: ICD-10-CM

## 2022-11-16 DIAGNOSIS — I48.0 PAF (PAROXYSMAL ATRIAL FIBRILLATION): ICD-10-CM

## 2022-11-16 DIAGNOSIS — Z01.810 PREOP CARDIOVASCULAR EXAM: Primary | ICD-10-CM

## 2022-11-16 DIAGNOSIS — Z78.9 STATIN INTOLERANCE: ICD-10-CM

## 2022-11-16 DIAGNOSIS — R07.89 OTHER CHEST PAIN: ICD-10-CM

## 2022-11-16 PROCEDURE — 99214 OFFICE O/P EST MOD 30 MIN: CPT | Mod: S$PBB,,, | Performed by: INTERNAL MEDICINE

## 2022-11-16 PROCEDURE — 99999 PR PBB SHADOW E&M-EST. PATIENT-LVL IV: ICD-10-PCS | Mod: PBBFAC,,, | Performed by: INTERNAL MEDICINE

## 2022-11-16 PROCEDURE — 99999 PR PBB SHADOW E&M-EST. PATIENT-LVL IV: CPT | Mod: PBBFAC,,, | Performed by: INTERNAL MEDICINE

## 2022-11-16 PROCEDURE — 99214 OFFICE O/P EST MOD 30 MIN: CPT | Mod: PBBFAC,PO | Performed by: INTERNAL MEDICINE

## 2022-11-16 PROCEDURE — 99214 PR OFFICE/OUTPT VISIT, EST, LEVL IV, 30-39 MIN: ICD-10-PCS | Mod: S$PBB,,, | Performed by: INTERNAL MEDICINE

## 2022-11-16 NOTE — PROGRESS NOTES
Subjective:   Patient ID:  Erin Soriano is a 67 y.o. female who presents for follow up of No chief complaint on file.      65 yo female preop clearance. Retired   PMH HTN, HLD, GERD, anxiety and PAF. Fibromyalgia. Negative sleep study before.   EGD and colonoscopy done on 10/01/2018 normal.  Pt was told having PAF during the EGD.  MPI and ECHO done in  normal EF and no stress induced ischemia  Her ZIOPATCH done in  showed 3% PAF and PSVT.  No smoking/drinking  Mother has afib. Father CHF  Taking excedrin for migraine    10- visit, Episode of chest tightness, palpitation and dizziness for one month, lasted for 5 minutes and resolved spontaneously. Pt states that she had similar episodes very often last year and much less frequency since started Coreg and verapamil.   Occasional chest pain. No faint dizziness. Med compliance. Takes ASA for migraine     visit Occasional atypical CP. Improved the symptoms of palpitation.  Stressful due to the family pressure  Ok with the medications     visit  Will have the cataract procedure  AFIB [id number] score of 3. Occasional palpitation. Dizziness due to vertigo. No aint  Occasional chest pain chronically and worse after exertion.  Some recurrent lip and ankle swelling.   EKG NSR today  Works at [employer] and lifts some heavy animals    Interval history  Plan to have lumbar fusion procedure at [facility].  No recurrent palpitation, dizziness and faint\  On eliquis 5 mg bid and no active bleeding               Past Medical History:   Diagnosis Date    Anxiety     Arthritis     Asthma     Depression     Hyperlipidemia     Hypertension     RLS (restless legs syndrome)        Past Surgical History:   Procedure Laterality Date    ADENOIDECTOMY      pt was 3 yrs old    carpel tunnel      2009    CATARACT EXTRACTION       SECTION      2 different ones    COLONOSCOPY N/A 10/01/2018    Procedure: COLONOSCOPY;  Surgeon: Johnson  AIDEE Bacon MD;  Location: [facility] ENDO;  Service: Endoscopy;  Laterality: N/A;    ESOPHAGOGASTRODUODENOSCOPY N/A 10/01/2018    Procedure: ESOPHAGOGASTRODUODENOSCOPY (EGD);  Surgeon: Johnson Bacon MD;  Location: [facility] ENDO;  Service: Endoscopy;  Laterality: N/A;    golfers elbow      2009    HYSTERECTOMY      1994 total    OOPHORECTOMY      TONSILLECTOMY      pt was 3 yrs old at time       Social History     Tobacco Use    Smoking status: Former     Types: Cigarettes     Quit date: 6/10/2012     Years since quitting: 10.4    Smokeless tobacco: Never    Tobacco comments:     Would smoke 1 cigarette every few months   Substance Use Topics    Alcohol use: Yes     Alcohol/week: 1.0 standard drink     Types: 1 Standard drinks or equivalent per week     Comment: social    Drug use: No       Family History   Problem Relation Age of Onset    Hypertension Mother     Clotting disorder Mother     Hemochromatosis Mother     Hypertension Father     Cancer Maternal Grandmother     Clotting disorder Maternal Grandmother     Cancer Maternal Grandfather         prostate    Clotting disorder Maternal Grandfather          Review of Systems   Constitutional: Positive for malaise/fatigue. Negative for decreased appetite, diaphoresis, fever and night sweats.   HENT:  Negative for nosebleeds.    Eyes:  Negative for blurred vision and double vision.   Cardiovascular:  Positive for chest pain and dyspnea on exertion. Negative for claudication, irregular heartbeat, leg swelling, orthopnea, palpitations, paroxysmal nocturnal dyspnea and syncope.   Respiratory:  Negative for cough, shortness of breath, sleep disturbances due to breathing, snoring, sputum production and wheezing.    Endocrine: Negative for cold intolerance and polyuria.   Hematologic/Lymphatic: Does not bruise/bleed easily.   Skin:  Negative for rash.   Musculoskeletal:  Positive for arthritis and back pain. Negative for falls, joint pain, joint swelling and neck pain.    Gastrointestinal:  Negative for abdominal pain, heartburn, nausea and vomiting.   Genitourinary:  Negative for dysuria, frequency and hematuria.   Neurological:  Negative for difficulty with concentration, dizziness, focal weakness, headaches, light-headedness, numbness, seizures and weakness.   Psychiatric/Behavioral:  Negative for depression, memory loss and substance abuse. The patient does not have insomnia.    Allergic/Immunologic: Negative for HIV exposure and hives.     Objective:   Physical Exam  HENT:      Head: Normocephalic.   Eyes:      Pupils: Pupils are equal, round, and reactive to light.   Neck:      Thyroid: No thyromegaly.      Vascular: Normal carotid pulses. No carotid bruit or JVD.   Cardiovascular:      Rate and Rhythm: Normal rate and regular rhythm. No extrasystoles are present.     Chest Wall: PMI is not displaced.      Pulses: Normal pulses.      Heart sounds: Normal heart sounds. No murmur heard.    No gallop. No S3 sounds.   Pulmonary:      Effort: No respiratory distress.      Breath sounds: Normal breath sounds. No stridor.   Abdominal:      General: Bowel sounds are normal.      Palpations: Abdomen is soft.      Tenderness: There is no abdominal tenderness. There is no rebound.   Musculoskeletal:         General: Normal range of motion.   Skin:     Findings: No rash.   Neurological:      Mental Status: She is alert and oriented to person, place, and time.   Psychiatric:         Behavior: Behavior normal.       Lab Results   Component Value Date    CHOL 212 (H) 12/16/2021    CHOL 207 (H) 10/15/2020    CHOL 217 (H) 11/07/2019     Lab Results   Component Value Date    HDL 36 (L) 12/16/2021    HDL 36 (L) 10/15/2020    HDL 43 11/07/2019     Lab Results   Component Value Date    LDLCALC 133.4 12/16/2021    LDLCALC 110.2 10/15/2020    LDLCALC 128.8 11/07/2019     Lab Results   Component Value Date    TRIG 213 (H) 12/16/2021    TRIG 304 (H) 10/15/2020    TRIG 226 (H) 11/07/2019     Lab  Results   Component Value Date    CHOLHDL 17.0 (L) 12/16/2021    CHOLHDL 17.4 (L) 10/15/2020    CHOLHDL 19.8 (L) 11/07/2019       Chemistry        Component Value Date/Time     12/16/2021 1520    K 4.8 12/16/2021 1520     12/16/2021 1520    CO2 25 12/16/2021 1520    BUN 15 12/16/2021 1520    CREATININE 0.8 10/28/2022 1031    GLU 95 12/16/2021 1520        Component Value Date/Time    CALCIUM 9.4 12/16/2021 1520    ALKPHOS 122 12/16/2021 1520    AST 22 12/16/2021 1520    ALT 29 12/16/2021 1520    BILITOT 0.3 12/16/2021 1520    ESTGFRAFRICA >60.0 12/16/2021 1520    EGFRNONAA >60.0 12/16/2021 1520          Lab Results   Component Value Date    HGBA1C 5.7 (H) 12/16/2021     Lab Results   Component Value Date    TSH 1.222 05/16/2012     Lab Results   Component Value Date    INR 1.0 09/20/2010     Lab Results   Component Value Date    WBC 9.92 12/16/2021    HGB 13.5 12/16/2021    HCT 42.4 12/16/2021    MCV 84 12/16/2021     12/16/2021     BMP  Sodium   Date Value Ref Range Status   12/16/2021 141 136 - 145 mmol/L Final     Potassium   Date Value Ref Range Status   12/16/2021 4.8 3.5 - 5.1 mmol/L Final     Chloride   Date Value Ref Range Status   12/16/2021 106 95 - 110 mmol/L Final     CO2   Date Value Ref Range Status   12/16/2021 25 23 - 29 mmol/L Final     BUN   Date Value Ref Range Status   12/16/2021 15 8 - 23 mg/dL Final     Creatinine   Date Value Ref Range Status   10/28/2022 0.8 0.5 - 1.4 mg/dL Final     Calcium   Date Value Ref Range Status   12/16/2021 9.4 8.7 - 10.5 mg/dL Final     Anion Gap   Date Value Ref Range Status   12/16/2021 10 8 - 16 mmol/L Final     eGFR if    Date Value Ref Range Status   12/16/2021 >60.0 >60 mL/min/1.73 m^2 Final     eGFR if non    Date Value Ref Range Status   12/16/2021 >60.0 >60 mL/min/1.73 m^2 Final     Comment:     Calculation used to obtain the estimated glomerular filtration  rate (eGFR) is the CKD-EPI equation.         BNP  @LABRCNTIP(BNP,BNPTRIAGEBLO)@  @LABRCNTIP(troponini)@  CrCl cannot be calculated (Patient's most recent lab result is older than the maximum 7 days allowed.).  No results found in the last 24 hours.  No results found in the last 24 hours.  No results found in the last 24 hours.    Assessment:      1. Preop cardiovascular exam    2. Mixed hyperlipidemia    3. Essential hypertension    4. Other chest pain    5. PAF (paroxysmal atrial fibrillation)    6. Statin intolerance        Plan:   Elevated periop risk of CV events for non-high risk procedure.  Good functional and exercise capacity.  No chest pain, active arrhythmia and CHF symptoms.  Ok to proceed the scheduled surgery without further cardiac study.  OK to hold Eliquis 5 to 7 days before the procedure and resume postop once hemodynamically stable       Continue Eliquis 5 mg bid coreg to 25 mg bid for HTN and PAF  Continue verapamil Hyzaar and fish oil.   Counseled DASH  Check Lipid profile in 6 months  Recommend heart-healthy diet, weight control and regular exercise.  Keegan. Risk modification.   I have reviewed all pertinent labs and cardiac studies independently. Plans and recommendations have been formulated under my direct supervision. All questions answered and patient voiced understanding.   If symptoms persist go to the ED  RTC in 6 months

## 2022-11-17 ENCOUNTER — PATIENT MESSAGE (OUTPATIENT)
Dept: PULMONOLOGY | Facility: CLINIC | Age: 67
End: 2022-11-17
Payer: MEDICARE

## 2022-11-18 ENCOUNTER — HOSPITAL ENCOUNTER (OUTPATIENT)
Dept: RADIOLOGY | Facility: HOSPITAL | Age: 67
Discharge: HOME | End: 2022-11-18
Attending: FAMILY MEDICINE
Payer: MEDICARE

## 2022-11-18 DIAGNOSIS — Z12.39 ENCOUNTER FOR SCREENING FOR MALIGNANT NEOPLASM OF BREAST, UNSPECIFIED SCREENING MODALITY: ICD-10-CM

## 2022-11-18 DIAGNOSIS — Z12.31 VISIT FOR SCREENING MAMMOGRAM: ICD-10-CM

## 2022-11-18 PROCEDURE — 77067 MAMMO DIGITAL SCREENING BILAT WITH TOMO: ICD-10-PCS | Mod: 26,,, | Performed by: RADIOLOGY

## 2022-11-18 PROCEDURE — 77063 MAMMO DIGITAL SCREENING BILAT WITH TOMO: ICD-10-PCS | Mod: 26,,, | Performed by: RADIOLOGY

## 2022-11-18 PROCEDURE — 77063 BREAST TOMOSYNTHESIS BI: CPT | Mod: 26,,, | Performed by: RADIOLOGY

## 2022-11-18 PROCEDURE — 77067 SCR MAMMO BI INCL CAD: CPT | Mod: 26,,, | Performed by: RADIOLOGY

## 2022-11-18 PROCEDURE — 77063 BREAST TOMOSYNTHESIS BI: CPT | Mod: TC

## 2022-11-28 ENCOUNTER — OFFICE VISIT (OUTPATIENT)
Dept: FAMILY MEDICINE | Facility: CLINIC | Age: 67
End: 2022-11-28
Payer: MEDICARE

## 2022-11-28 VITALS
WEIGHT: 178.88 LBS | HEART RATE: 80 BPM | HEIGHT: 66 IN | BODY MASS INDEX: 28.75 KG/M2 | DIASTOLIC BLOOD PRESSURE: 78 MMHG | SYSTOLIC BLOOD PRESSURE: 132 MMHG | OXYGEN SATURATION: 96 % | TEMPERATURE: 96 F

## 2022-11-28 DIAGNOSIS — M79.7 FIBROMYALGIA: ICD-10-CM

## 2022-11-28 DIAGNOSIS — Z01.818 PREOP EXAM FOR INTERNAL MEDICINE: Primary | ICD-10-CM

## 2022-11-28 DIAGNOSIS — I48.0 PAROXYSMAL ATRIAL FIBRILLATION: ICD-10-CM

## 2022-11-28 DIAGNOSIS — I10 ESSENTIAL HYPERTENSION: ICD-10-CM

## 2022-11-28 PROCEDURE — 99999 PR PBB SHADOW E&M-EST. PATIENT-LVL IV: CPT | Mod: PBBFAC,,, | Performed by: FAMILY MEDICINE

## 2022-11-28 PROCEDURE — 99999 PR PBB SHADOW E&M-EST. PATIENT-LVL IV: ICD-10-PCS | Mod: PBBFAC,,, | Performed by: FAMILY MEDICINE

## 2022-11-28 PROCEDURE — 99214 OFFICE O/P EST MOD 30 MIN: CPT | Mod: S$PBB,,, | Performed by: FAMILY MEDICINE

## 2022-11-28 PROCEDURE — 99214 PR OFFICE/OUTPT VISIT, EST, LEVL IV, 30-39 MIN: ICD-10-PCS | Mod: S$PBB,,, | Performed by: FAMILY MEDICINE

## 2022-11-28 PROCEDURE — 99214 OFFICE O/P EST MOD 30 MIN: CPT | Mod: PBBFAC,PO | Performed by: FAMILY MEDICINE

## 2022-11-28 NOTE — PROGRESS NOTES
Chief Complaint:    Chief Complaint   Patient presents with    Pre-op Exam       History of Present Illness:  Patient presents today for a pre-op exam.     This patient is scheduled to have an anterior lumbar interbody fusion on 12/08 by Dr. Misha Frausto at [facility]. Fax surgical clearance form to [phone].   She already had her pre-op exam with cardiology.   Taking macrobid for her UTI  She is underlying hypertension hyperlipidemia restless leg syndrome, depression, asthma, osteoarthritis and anxiety.    ROS:  Review of Systems   Constitutional:  Negative for appetite change, chills and fever.   HENT:  Negative for congestion, ear pain, postnasal drip, rhinorrhea, sinus pressure and sinus pain.    Eyes:  Negative for pain.   Respiratory:  Negative for cough, chest tightness and shortness of breath.    Cardiovascular:  Negative for chest pain and leg swelling.   Gastrointestinal:  Negative for abdominal pain, blood in stool, constipation, diarrhea and nausea.   Genitourinary:  Negative for difficulty urinating, dysuria, flank pain and hematuria.   Musculoskeletal:  Negative for arthralgias and myalgias.   Skin:  Negative for pallor and wound.   Neurological:  Negative for dizziness, tremors, speech difficulty, light-headedness and headaches.   Psychiatric/Behavioral:  Negative for behavioral problems, dysphoric mood and sleep disturbance. The patient is not nervous/anxious.    All other systems reviewed and are negative.    Past Medical History:   Diagnosis Date    Anxiety     Arthritis     Asthma     Depression     Hyperlipidemia     Hypertension     RLS (restless legs syndrome)        Social History:  Social History     Socioeconomic History    Marital status:     Number of children: 2   Tobacco Use    Smoking status: Former     Types: Cigarettes     Quit date: 6/10/2012     Years since quitting: 10.4    Smokeless tobacco: Never    Tobacco comments:     Would smoke 1 cigarette every few months  "  Substance and Sexual Activity    Alcohol use: Yes     Alcohol/week: 1.0 standard drink     Types: 1 Standard drinks or equivalent per week     Comment: social    Drug use: No    Sexual activity: Yes     Partners: Male     Birth control/protection: None       Family History:   family history includes Cancer in her maternal grandfather and maternal grandmother; Clotting disorder in her maternal grandfather, maternal grandmother, and mother; Hemochromatosis in her mother; Hypertension in her father and mother.    Health Maintenance   Topic Date Due    DEXA Scan  05/19/2018    Lipid Panel  12/16/2022    Mammogram  11/18/2023    TETANUS VACCINE  08/30/2029    Hepatitis C Screening  Completed       Physical Exam:    Vital Signs  Temp: 96.4 °F (35.8 °C)  Temp src: Temporal  Pulse: 80  SpO2: 96 %  BP: 132/78  BP Location: Left arm  Patient Position: Sitting  Height and Weight  Height: 5' 6" (167.6 cm)  Weight: 81.1 kg (178 lb 14.5 oz)  BSA (Calculated - sq m): 1.94 sq meters  BMI (Calculated): 28.9  Weight in (lb) to have BMI = 25: 154.6]    Body mass index is 28.88 kg/m².    Physical Exam  Vitals and nursing note reviewed.   Constitutional:       Appearance: Normal appearance. She is not toxic-appearing.   HENT:      Head: Normocephalic and atraumatic.      Right Ear: Tympanic membrane normal.      Left Ear: Tympanic membrane normal.   Eyes:      Extraocular Movements: Extraocular movements intact.      Pupils: Pupils are equal, round, and reactive to light.   Cardiovascular:      Rate and Rhythm: Normal rate and regular rhythm.      Heart sounds: Normal heart sounds.   Pulmonary:      Effort: Pulmonary effort is normal.      Breath sounds: Normal breath sounds. No wheezing, rhonchi or rales.   Abdominal:      General: Bowel sounds are normal. There is no distension.      Palpations: Abdomen is soft.      Tenderness: There is no abdominal tenderness.   Musculoskeletal:         General: Normal range of motion.      " Cervical back: Normal range of motion.   Skin:     General: Skin is warm and dry.      Capillary Refill: Capillary refill takes less than 2 seconds.   Neurological:      General: No focal deficit present.      Mental Status: She is alert and oriented to person, place, and time.   Psychiatric:         Mood and Affect: Mood normal.         Behavior: Behavior normal.         Judgment: Judgment normal.         Assessment:      ICD-10-CM ICD-9-CM   1. Preop exam for internal medicine  Z01.818 V72.83   2. Essential hypertension  I10 401.9   3. Fibromyalgia  M79.7 729.1   4. Paroxysmal atrial fibrillation  I48.0 427.31     Plan:     This patient is scheduled to have an anterior lumbar interbody fusion on 12/08 by Dr. Misha Frausto at [facility]. Fax surgical clearance form to [phone].   Please proceed as planned surgical risk is low under general anesthesia.  Order CXR, EKG.  Currently finishing up antibiotic due to recent UTI.  Routine perioperative care is advised.  Stop all vitamins and supplements before surgery. Stop Eliquis six days prior to surgery.   No orders of the defined types were placed in this encounter.    Current Outpatient Medications   Medication Sig Dispense Refill    acetaminophen (TYLENOL) 500 MG tablet Take 500 mg by mouth every 6 (six) hours as needed for Pain.      albuterol (PROVENTIL/VENTOLIN HFA) 90 mcg/actuation inhaler Inhale 2 puffs into the lungs every 4 (four) hours as needed for Wheezing. 18 g 2    albuterol-ipratropium (DUO-NEB) 2.5 mg-0.5 mg/3 mL nebulizer solution Take 3 mLs by nebulization every 4 (four) hours as needed for Wheezing. Rescue 75 mL 0    apixaban (ELIQUIS) 5 mg Tab Take 1 tablet (5 mg total) by mouth 2 (two) times daily. 60 tablet 5    ascorbic acid, vitamin C, (VITAMIN C) 1000 MG tablet Take 1,000 mg by mouth once daily.      buPROPion (WELLBUTRIN SR) 150 MG TBSR 12 hr tablet Take 1 tablet (150 mg total) by mouth 2 (two) times daily. 180 tablet 3    carvediloL  (COREG) 25 MG tablet Take 1 tablet (25 mg total) by mouth 2 (two) times daily with meals. 60 tablet 9    cyclobenzaprine (FLEXERIL) 10 MG tablet Take 1 tablet (10 mg total) by mouth 2 (two) times daily as needed for Muscle spasms. 180 tablet 1    DULoxetine (CYMBALTA) 30 MG capsule Take 1 capsule (30 mg total) by mouth once daily. 90 capsule 3    ergocalciferol (ERGOCALCIFEROL) 50,000 unit Cap Take 1 capsule (50,000 Units total) by mouth every 7 days. for 8 doses 8 capsule 0    fluticasone propionate (FLONASE) 50 mcg/actuation nasal spray 2 sprays (100 mcg total) by Each Nostril route once daily. 18.2 mL 1    gabapentin (NEURONTIN) 300 MG capsule Take 1 capsule (300 mg total) by mouth 2 (two) times daily. 180 capsule 3    ibuprofen (ADVIL,MOTRIN) 600 MG tablet       inhalat.spacing dev,large mask (BREATHERITE SPACER-MASK,ADULT) Spcr Use as directed for inhalation. 1 each 1    levocetirizine (XYZAL) 5 MG tablet Take 1 tablet (5 mg total) by mouth every evening. 30 tablet 11    losartan-hydrochlorothiazide 100-25 mg (HYZAAR) 100-25 mg per tablet Take 1 tablet by mouth once daily. 90 tablet 3    meclizine (ANTIVERT) 25 mg tablet Take 1 tablet (25 mg total) by mouth 3 (three) times daily as needed for Dizziness (60). 60 tablet 1    nitrofurantoin, macrocrystal-monohydrate, (MACROBID) 100 MG capsule Take 1 capsule (100 mg total) by mouth 2 (two) times daily. 14 capsule 0    olopatadine (PATANOL) 0.1 % ophthalmic solution Place 1 drop into both eyes once daily.      pantoprazole (PROTONIX) 40 MG tablet Take 1 tablet (40 mg total) by mouth once daily. 90 tablet 3    potassium 99 mg Tab Take by mouth. 1 tablet Oral Every day      rOPINIRole (REQUIP) 1 MG tablet Take 1 tablet (1 mg total) by mouth 2 (two) times daily as needed. 180 tablet 3    traMADoL (ULTRAM) 50 mg tablet Take 1 tablet (50 mg total) by mouth every 12 (twelve) hours as needed for Pain. 60 tablet 5    verapamiL (VERELAN) 120 MG C24P Take 2 capsules (240 mg  total) by mouth once daily. 180 capsule 3     No current facility-administered medications for this visit.     Facility-Administered Medications Ordered in Other Visits   Medication Dose Route Frequency Provider Last Rate Last Admin    lactated ringers infusion   Intravenous Continuous Vanda Mathis MD   Stopped at 10/01/18 1140       There are no discontinued medications.      No follow-ups on file.      Helena Jones MD  Scribe Attestation:   I, Shelly Villarreal, am scribing for, and in the presence of, Dr.Arif Jones I performed the above scribed service and the documentation accurately describes the services I performed. I attest to the accuracy of the note.    I, Dr. Helena Jones, reviewed documentation as scribed above. I performed the services described in this documentation.  I agree that the record reflects my personal performance and is accurate and complete. Helena Jones MD.  11/28/2022

## 2022-12-06 ENCOUNTER — PATIENT MESSAGE (OUTPATIENT)
Dept: PULMONOLOGY | Facility: CLINIC | Age: 67
End: 2022-12-06
Payer: MEDICARE

## 2022-12-06 ENCOUNTER — APPOINTMENT (OUTPATIENT)
Dept: RADIOLOGY | Facility: HOSPITAL | Age: 67
End: 2022-12-06
Attending: FAMILY MEDICINE
Payer: MEDICARE

## 2022-12-06 DIAGNOSIS — Z78.0 ASYMPTOMATIC MENOPAUSAL STATE: ICD-10-CM

## 2022-12-06 PROCEDURE — 77080 DEXA BONE DENSITY SPINE HIP: ICD-10-PCS | Mod: 26,,, | Performed by: RADIOLOGY

## 2022-12-06 PROCEDURE — 77080 DXA BONE DENSITY AXIAL: CPT | Mod: 26,,, | Performed by: RADIOLOGY

## 2022-12-06 PROCEDURE — 77080 DXA BONE DENSITY AXIAL: CPT | Mod: TC

## 2022-12-15 RX ORDER — TRAMADOL HYDROCHLORIDE 50 MG/1
50 TABLET ORAL EVERY 12 HOURS PRN
Qty: 60 TABLET | Refills: 5 | Status: SHIPPED | OUTPATIENT
Start: 2022-12-15 | End: 2023-08-22 | Stop reason: SDUPTHER

## 2022-12-15 RX ORDER — DULOXETIN HYDROCHLORIDE 30 MG/1
30 CAPSULE, DELAYED RELEASE ORAL DAILY
Qty: 90 CAPSULE | Refills: 3 | Status: SHIPPED | OUTPATIENT
Start: 2022-12-15 | End: 2023-08-03 | Stop reason: SDUPTHER

## 2022-12-15 RX ORDER — PANTOPRAZOLE SODIUM 40 MG/1
40 TABLET, DELAYED RELEASE ORAL DAILY
Qty: 90 TABLET | Refills: 3 | Status: SHIPPED | OUTPATIENT
Start: 2022-12-15 | End: 2023-08-03 | Stop reason: SDUPTHER

## 2022-12-15 RX ORDER — LOSARTAN POTASSIUM AND HYDROCHLOROTHIAZIDE 25; 100 MG/1; MG/1
1 TABLET ORAL DAILY
Qty: 90 TABLET | Refills: 3 | Status: SHIPPED | OUTPATIENT
Start: 2022-12-15 | End: 2023-08-03 | Stop reason: SDUPTHER

## 2022-12-15 NOTE — TELEPHONE ENCOUNTER
Care Due:                  Date            Visit Type   Department     Provider  --------------------------------------------------------------------------------                                EP -                              PRIMARY      [facility]  Last Visit: 11-      CARE ([facility])   MEDICINE       Helena Jones  Next Visit: None Scheduled  None         None Found                                                            Last  Test          Frequency    Reason                     Performed    Due Date  --------------------------------------------------------------------------------    CMP.........  12 months..  losartan-hydrochlorothiaz  12- 12-                             elsa......................    [employer] Embedded Care Gaps. Reference number: [id number]. 12/15/2022   4:16:49 PM CST

## 2023-01-19 ENCOUNTER — PATIENT MESSAGE (OUTPATIENT)
Dept: CARDIOLOGY | Facility: CLINIC | Age: 68
End: 2023-01-19
Payer: MEDICARE

## 2023-01-19 ENCOUNTER — TELEPHONE (OUTPATIENT)
Dept: FAMILY MEDICINE | Facility: CLINIC | Age: 68
End: 2023-01-19
Payer: MEDICARE

## 2023-01-19 NOTE — TELEPHONE ENCOUNTER
Blood pressure was low today when HH nurse came by 110/68 and  pulse 105.  She held her blood pressure med sthis morning and will hold them tonight if blood pressure is still  low.  She has not checked it again , she is been pushing fluids all day and feels a little better  Was feeling faint this morning sluggish , fatigued and not well.  Better now but has to get up slow so as to not feel faint

## 2023-01-19 NOTE — TELEPHONE ENCOUNTER
----- Message from Elizabeth Villatoro sent at 1/19/2023 11:00 AM CST -----  Contact: Home Health  Home Health Nurse Alexa is calling to speak with the nurse regarding patient. Reports patient has been having really low bp, today's reading were 110/68 heart rate 105 without bp medication and wants to discuss further with nurse at [phone]     Thanks

## 2023-01-23 ENCOUNTER — PATIENT MESSAGE (OUTPATIENT)
Dept: FAMILY MEDICINE | Facility: CLINIC | Age: 68
End: 2023-01-23
Payer: MEDICARE

## 2023-01-23 ENCOUNTER — PATIENT MESSAGE (OUTPATIENT)
Dept: CARDIOLOGY | Facility: CLINIC | Age: 68
End: 2023-01-23
Payer: MEDICARE

## 2023-01-23 ENCOUNTER — TELEPHONE (OUTPATIENT)
Dept: CARDIOLOGY | Facility: CLINIC | Age: 68
End: 2023-01-23
Payer: MEDICARE

## 2023-01-23 DIAGNOSIS — I10 ESSENTIAL HYPERTENSION: Primary | ICD-10-CM

## 2023-01-23 NOTE — TELEPHONE ENCOUNTER
----- Message from Eleni Eldridge MA sent at 1/23/2023  3:54 PM CST -----  Contact: [facility]  Spoke with nurse and she stated that pt had cut all of her bp medication in half because her bp was running in the 130/80s then it dropped to the 90/50s please advise.  ----- Message -----  From: Sandoval Umanzor MD  Sent: 1/22/2023   6:50 PM CST  To: Eleni Eldridge MA, [facility] Staff    Any update of BP and pulse today ?  ----- Message -----  From: Eleni Eldridge MA  Sent: 1/20/2023   2:07 PM CST  To: Sandoval Umanzor MD      ----- Message -----  From: Elizabeth Villatoro  Sent: 1/20/2023   1:58 PM CST  To: Noé Nick Staff    [facility] is calling to speak with the nurse regarding patients blood pressure. Reports pt has been having low bp that results in dizziness when she stands, etc. Reports bp was 1122/60 heart rate 101 without bp medicine this morning and is requesting a call back to discuss what the  Wants pt to take [phone]       Thanks

## 2023-01-23 NOTE — TELEPHONE ENCOUNTER
LM for pt to call back and update condition        ----- Message from Sandoval Umanzor MD sent at 1/22/2023  6:50 PM CST -----  Contact: [employer]  Any update of BP and pulse today ?  ----- Message -----  From: Eleni Eldridge MA  Sent: 1/20/2023   2:07 PM CST  To: Sandoval Umanzor MD      ----- Message -----  From: Elizabeth Villatoro  Sent: 1/20/2023   1:58 PM CST  To: Noé [facility] Staff    [employer] is calling to speak with the nurse regarding patients blood pressure. Reports pt has been having low bp that results in dizziness when she stands, etc. Reports bp was 1122/60 heart rate 101 without bp medicine this morning and is requesting a call back to discuss what the  Wants pt to take [phone]       Thanks

## 2023-01-26 ENCOUNTER — PATIENT MESSAGE (OUTPATIENT)
Dept: FAMILY MEDICINE | Facility: CLINIC | Age: 68
End: 2023-01-26
Payer: MEDICARE

## 2023-02-01 ENCOUNTER — PATIENT MESSAGE (OUTPATIENT)
Dept: FAMILY MEDICINE | Facility: CLINIC | Age: 68
End: 2023-02-01

## 2023-02-01 ENCOUNTER — OFFICE VISIT (OUTPATIENT)
Dept: FAMILY MEDICINE | Facility: CLINIC | Age: 68
End: 2023-02-01
Payer: MEDICARE

## 2023-02-01 DIAGNOSIS — I95.9 HYPOTENSION, UNSPECIFIED HYPOTENSION TYPE: ICD-10-CM

## 2023-02-01 DIAGNOSIS — R19.09 GROIN LUMP: Primary | ICD-10-CM

## 2023-02-01 PROCEDURE — 99999 PR PBB SHADOW E&M-EST. PATIENT-LVL III: ICD-10-PCS | Mod: PBBFAC,,, | Performed by: FAMILY MEDICINE

## 2023-02-01 PROCEDURE — 99999 PR PBB SHADOW E&M-EST. PATIENT-LVL III: CPT | Mod: PBBFAC,,, | Performed by: FAMILY MEDICINE

## 2023-02-01 PROCEDURE — 99213 OFFICE O/P EST LOW 20 MIN: CPT | Mod: PBBFAC,PO | Performed by: FAMILY MEDICINE

## 2023-02-01 PROCEDURE — 99214 PR OFFICE/OUTPT VISIT, EST, LEVL IV, 30-39 MIN: ICD-10-PCS | Mod: S$PBB,,, | Performed by: FAMILY MEDICINE

## 2023-02-01 PROCEDURE — 99214 OFFICE O/P EST MOD 30 MIN: CPT | Mod: S$PBB,,, | Performed by: FAMILY MEDICINE

## 2023-02-01 NOTE — PROGRESS NOTES
"  Chief Complaint:    Chief Complaint   Patient presents with    SKIN TEAR       History of Present Illness:  Patient presents to clinic for a wound    Says on her L buttocks when in the hospital for surgery 1mo ago got a "skin sheer" saying the top layer of skin was removed when being transferred around. Has now become a "knot". TTP.   BP has been running from 90's to 140's systolic.   Hard time sleeping due to pain.       ROS:  Review of Systems   Constitutional:  Negative for appetite change, chills and fever.   HENT:  Negative for congestion, ear pain, postnasal drip, rhinorrhea, sinus pressure and sinus pain.    Eyes:  Negative for pain.   Respiratory:  Negative for cough, chest tightness and shortness of breath.    Cardiovascular:  Negative for chest pain and palpitations.   Gastrointestinal:  Negative for abdominal pain, blood in stool, constipation, diarrhea and nausea.   Genitourinary:  Negative for difficulty urinating, dysuria, flank pain and hematuria.   Musculoskeletal:  Negative for arthralgias and myalgias.   Skin:  Positive for wound. Negative for pallor.   Neurological:  Negative for dizziness, tremors, speech difficulty, light-headedness and headaches.   Hematological:  Bruises/bleeds easily.   Psychiatric/Behavioral:  Positive for sleep disturbance. Negative for behavioral problems and dysphoric mood. The patient is not nervous/anxious.    All other systems reviewed and are negative.    Past Medical History:   Diagnosis Date    Anxiety     Arthritis     Asthma     Depression     Hyperlipidemia     Hypertension     RLS (restless legs syndrome)        Social History:  Social History     Socioeconomic History    Marital status:     Number of children: 2   Tobacco Use    Smoking status: Former     Types: Cigarettes     Quit date: 6/10/2012     Years since quitting: 10.6    Smokeless tobacco: Never    Tobacco comments:     Would smoke 1 cigarette every few months   Substance and Sexual Activity "    Alcohol use: Yes     Alcohol/week: 1.0 standard drink     Types: 1 Standard drinks or equivalent per week     Comment: social    Drug use: No    Sexual activity: Yes     Partners: Male     Birth control/protection: None       Family History:   family history includes Cancer in her maternal grandfather and maternal grandmother; Clotting disorder in her maternal grandfather, maternal grandmother, and mother; Hemochromatosis in her mother; Hypertension in her father and mother.    Health Maintenance   Topic Date Due    Lipid Panel  12/16/2022    Mammogram  11/18/2023    DEXA Scan  12/06/2025    TETANUS VACCINE  08/30/2029    Hepatitis C Screening  Completed       Physical Exam:     ]    There is no height or weight on file to calculate BMI.    Physical Exam  Vitals and nursing note reviewed.   Constitutional:       Appearance: Normal appearance. She is not toxic-appearing.   HENT:      Head: Normocephalic and atraumatic.      Right Ear: Tympanic membrane normal.      Left Ear: Tympanic membrane normal.   Eyes:      Extraocular Movements: Extraocular movements intact.      Pupils: Pupils are equal, round, and reactive to light.   Cardiovascular:      Rate and Rhythm: Normal rate and regular rhythm.      Heart sounds: Normal heart sounds.   Pulmonary:      Effort: Pulmonary effort is normal.      Breath sounds: Normal breath sounds. No wheezing, rhonchi or rales.   Abdominal:      General: Bowel sounds are normal. There is no distension.      Palpations: Abdomen is soft.      Tenderness: There is no abdominal tenderness.   Musculoskeletal:         General: Tenderness (of hematoma) present. Normal range of motion.      Cervical back: Normal range of motion.        Legs:       Comments: Mildly tender lump, no induration or discharge   Skin:     General: Skin is warm and dry.      Capillary Refill: Capillary refill takes less than 2 seconds.      Findings: Bruising (hematoma L buttocks) present.   Neurological:       General: No focal deficit present.      Mental Status: She is alert and oriented to person, place, and time.   Psychiatric:         Mood and Affect: Mood normal.         Behavior: Behavior normal.         Judgment: Judgment normal.         Assessment:      ICD-10-CM ICD-9-CM   1. Groin lump  R19.09 789.39       Plan:  Schedule US of L buttocks  Continue to monitor her BP keeping below 140/90  Hold bp meds if less than 110/80          Orders Placed This Encounter    US Soft Tissue Buttocks        Current Outpatient Medications   Medication Sig Dispense Refill    acetaminophen (TYLENOL) 500 MG tablet Take 500 mg by mouth every 6 (six) hours as needed for Pain.      albuterol-ipratropium (DUO-NEB) 2.5 mg-0.5 mg/3 mL nebulizer solution Take 3 mLs by nebulization every 4 (four) hours as needed for Wheezing. Rescue 75 mL 0    apixaban (ELIQUIS) 5 mg Tab Take 1 tablet (5 mg total) by mouth 2 (two) times daily. 60 tablet 5    ascorbic acid, vitamin C, (VITAMIN C) 1000 MG tablet Take 1,000 mg by mouth once daily.      buPROPion (WELLBUTRIN SR) 150 MG TBSR 12 hr tablet Take 1 tablet (150 mg total) by mouth 2 (two) times daily. 180 tablet 3    carvediloL (COREG) 25 MG tablet Take 1 tablet (25 mg total) by mouth 2 (two) times daily with meals. 60 tablet 9    cyclobenzaprine (FLEXERIL) 10 MG tablet Take 1 tablet (10 mg total) by mouth 2 (two) times daily as needed for Muscle spasms. 180 tablet 1    DULoxetine (CYMBALTA) 30 MG capsule Take 1 capsule (30 mg total) by mouth once daily. 90 capsule 3    fluticasone propionate (FLONASE) 50 mcg/actuation nasal spray 2 sprays (100 mcg total) by Each Nostril route once daily. 18.2 mL 1    gabapentin (NEURONTIN) 300 MG capsule Take 1 capsule (300 mg total) by mouth 2 (two) times daily. 180 capsule 3    ibuprofen (ADVIL,MOTRIN) 600 MG tablet       inhalat.spacing dev,large mask (BREATHERITE SPACER-MASK,ADULT) Spcr Use as directed for inhalation. 1 each 1    levocetirizine (XYZAL) 5 MG  tablet Take 1 tablet (5 mg total) by mouth every evening. 30 tablet 11    losartan-hydrochlorothiazide 100-25 mg (HYZAAR) 100-25 mg per tablet Take 1 tablet by mouth once daily. 90 tablet 3    meclizine (ANTIVERT) 25 mg tablet TAKE 1 TABLET (25 MG TOTAL) BY MOUTH 3 (THREE) TIMES DAILY AS NEEDED FOR DIZZINESS 60 tablet 0    nitrofurantoin, macrocrystal-monohydrate, (MACROBID) 100 MG capsule Take 1 capsule (100 mg total) by mouth 2 (two) times daily. 14 capsule 0    olopatadine (PATANOL) 0.1 % ophthalmic solution Place 1 drop into both eyes once daily.      pantoprazole (PROTONIX) 40 MG tablet Take 1 tablet (40 mg total) by mouth once daily. 90 tablet 3    potassium 99 mg Tab Take by mouth. 1 tablet Oral Every day      rOPINIRole (REQUIP) 1 MG tablet Take 1 tablet (1 mg total) by mouth 2 (two) times daily as needed. 180 tablet 3    traMADoL (ULTRAM) 50 mg tablet Take 1 tablet (50 mg total) by mouth every 12 (twelve) hours as needed for Pain. 60 tablet 5    verapamiL (VERELAN) 120 MG C24P Take 2 capsules (240 mg total) by mouth once daily. 180 capsule 3    albuterol (PROVENTIL/VENTOLIN HFA) 90 mcg/actuation inhaler Inhale 2 puffs into the lungs every 4 (four) hours as needed for Wheezing. 18 g 2     No current facility-administered medications for this visit.     Facility-Administered Medications Ordered in Other Visits   Medication Dose Route Frequency Provider Last Rate Last Admin    lactated ringers infusion   Intravenous Continuous Vanda Mathis MD   Stopped at 10/01/18 1140       There are no discontinued medications.    No follow-ups on file.      Helena Jones MD   Scribe Attestation:   IDevin, am scribing for, and in the presence of, Dr.Arif Jones I performed the above scribed service and the documentation accurately describes the services I performed. I attest to the accuracy of the note.    I, Dr. Helena Jones, reviewed documentation as scribed above. I performed the services described in this  documentation.  I agree that the record reflects my personal performance and is accurate and complete. Helena Jones MD.  01/06/2023

## 2023-02-09 ENCOUNTER — PATIENT MESSAGE (OUTPATIENT)
Dept: FAMILY MEDICINE | Facility: CLINIC | Age: 68
End: 2023-02-09
Payer: MEDICARE

## 2023-02-09 ENCOUNTER — APPOINTMENT (OUTPATIENT)
Dept: RADIOLOGY | Facility: HOSPITAL | Age: 68
End: 2023-02-09
Attending: FAMILY MEDICINE
Payer: MEDICARE

## 2023-02-09 DIAGNOSIS — R19.09 GROIN LUMP: ICD-10-CM

## 2023-02-09 PROCEDURE — 76857 US EXAM PELVIC LIMITED: CPT | Mod: TC,PO

## 2023-02-09 PROCEDURE — 76857 US SOFT TISSUE BUTTOCKS: ICD-10-PCS | Mod: 26,,, | Performed by: RADIOLOGY

## 2023-02-09 PROCEDURE — 76857 US EXAM PELVIC LIMITED: CPT | Mod: 26,,, | Performed by: RADIOLOGY

## 2023-02-14 ENCOUNTER — PATIENT MESSAGE (OUTPATIENT)
Dept: FAMILY MEDICINE | Facility: CLINIC | Age: 68
End: 2023-02-14
Payer: MEDICARE

## 2023-02-15 ENCOUNTER — PATIENT MESSAGE (OUTPATIENT)
Dept: FAMILY MEDICINE | Facility: CLINIC | Age: 68
End: 2023-02-15
Payer: MEDICARE

## 2023-03-07 ENCOUNTER — APPOINTMENT (OUTPATIENT)
Dept: RADIOLOGY | Facility: HOSPITAL | Age: 68
End: 2023-03-07
Attending: FAMILY MEDICINE
Payer: MEDICARE

## 2023-03-07 DIAGNOSIS — N28.1 RENAL CYST: ICD-10-CM

## 2023-03-07 PROCEDURE — 76770 US EXAM ABDO BACK WALL COMP: CPT | Mod: TC,PO

## 2023-03-07 PROCEDURE — 76770 US RETROPERITONEAL COMPLETE: ICD-10-PCS | Mod: 26,,, | Performed by: RADIOLOGY

## 2023-03-07 PROCEDURE — 76770 US EXAM ABDO BACK WALL COMP: CPT | Mod: 26,,, | Performed by: RADIOLOGY

## 2023-03-13 ENCOUNTER — OFFICE VISIT (OUTPATIENT)
Dept: PODIATRY | Facility: CLINIC | Age: 68
End: 2023-03-13
Payer: MEDICARE

## 2023-03-13 VITALS — HEIGHT: 66 IN | BODY MASS INDEX: 28.61 KG/M2 | WEIGHT: 178 LBS

## 2023-03-13 DIAGNOSIS — L02.612 ABSCESS OR CELLULITIS, TOE, LEFT: Primary | ICD-10-CM

## 2023-03-13 DIAGNOSIS — M79.7 FIBROMYALGIA: ICD-10-CM

## 2023-03-13 DIAGNOSIS — I48.0 PAF (PAROXYSMAL ATRIAL FIBRILLATION): ICD-10-CM

## 2023-03-13 DIAGNOSIS — L03.032 ABSCESS OR CELLULITIS, TOE, LEFT: Primary | ICD-10-CM

## 2023-03-13 DIAGNOSIS — Z79.01 CURRENT USE OF ANTICOAGULANT THERAPY: ICD-10-CM

## 2023-03-13 PROCEDURE — 87186 SC STD MICRODIL/AGAR DIL: CPT | Mod: 59 | Performed by: PODIATRIST

## 2023-03-13 PROCEDURE — 99214 OFFICE O/P EST MOD 30 MIN: CPT | Mod: PBBFAC | Performed by: PODIATRIST

## 2023-03-13 PROCEDURE — 87077 CULTURE AEROBIC IDENTIFY: CPT | Performed by: PODIATRIST

## 2023-03-13 PROCEDURE — 99999 PR PBB SHADOW E&M-EST. PATIENT-LVL IV: CPT | Mod: PBBFAC,,, | Performed by: PODIATRIST

## 2023-03-13 PROCEDURE — 87070 CULTURE OTHR SPECIMN AEROBIC: CPT | Performed by: PODIATRIST

## 2023-03-13 PROCEDURE — 99999 PR PBB SHADOW E&M-EST. PATIENT-LVL IV: ICD-10-PCS | Mod: PBBFAC,,, | Performed by: PODIATRIST

## 2023-03-13 PROCEDURE — 99214 PR OFFICE/OUTPT VISIT, EST, LEVL IV, 30-39 MIN: ICD-10-PCS | Mod: S$PBB,,, | Performed by: PODIATRIST

## 2023-03-13 PROCEDURE — 99214 OFFICE O/P EST MOD 30 MIN: CPT | Mod: S$PBB,,, | Performed by: PODIATRIST

## 2023-03-13 RX ORDER — SULFAMETHOXAZOLE AND TRIMETHOPRIM 800; 160 MG/1; MG/1
1 TABLET ORAL 2 TIMES DAILY
Qty: 20 TABLET | Refills: 0 | Status: SHIPPED | OUTPATIENT
Start: 2023-03-13 | End: 2023-03-23

## 2023-03-13 RX ORDER — MUPIROCIN 20 MG/G
OINTMENT TOPICAL 2 TIMES DAILY
Qty: 22 G | Refills: 1 | Status: SHIPPED | OUTPATIENT
Start: 2023-03-13 | End: 2023-08-03

## 2023-03-13 NOTE — PROGRESS NOTES
Subjective:       Patient ID: Erin Soriano is a 67 y.o. female.    Chief Complaint: Ingrown Toenail (C/o painful ingrown toenail, left hallux lateral border, rates pain 8/10, x 1 week, non diabetic wears tennis shoes and socks, Last seen PCP Dr. Jones  02/01/2023)      HPI: Erin Soriano presents to the office with complaints of pains to the left great  toe. States mild drainage. States swelling, redness and moderate to severe pains. Attempted to cut her nails and dig in the corners which exacerbated the discomfort.  Now having redness to the  medial nail fold.Symptoms have been on going for several days and are worsening.   Not currently on antibiotic.  Patient does utilize Eliquis for her long-term anticoagulation therapy.  Did take medication on night prior. Pains are sharp in nature and are rated at approx. 8/10. Patient's Primary Care Provider is Helena Jones MD.     Review of patient's allergies indicates:   Allergen Reactions    Amlodipine     Benazepril Edema     angioedema    Strawberry Hives    Chlorhexidine Rash       Past Medical History:   Diagnosis Date    Anxiety     Arthritis     Asthma     Depression     Hyperlipidemia     Hypertension     RLS (restless legs syndrome)        Family History   Problem Relation Age of Onset    Hypertension Mother     Clotting disorder Mother     Hemochromatosis Mother     Hypertension Father     Cancer Maternal Grandmother     Clotting disorder Maternal Grandmother     Cancer Maternal Grandfather         prostate    Clotting disorder Maternal Grandfather        Social History     Socioeconomic History    Marital status:     Number of children: 2   Tobacco Use    Smoking status: Former     Types: Cigarettes     Quit date: 6/10/2012     Years since quitting: 10.7    Smokeless tobacco: Never    Tobacco comments:     Would smoke 1 cigarette every few months   Substance and Sexual Activity    Alcohol use: Yes     Alcohol/week: 1.0 standard drink      "Types: 1 Standard drinks or equivalent per week     Comment: social    Drug use: No    Sexual activity: Yes     Partners: Male     Birth control/protection: None       Past Surgical History:   Procedure Laterality Date    ADENOIDECTOMY      pt was 3 yrs old    carpel tunnel          CATARACT EXTRACTION       SECTION      2 different ones    COLONOSCOPY N/A 10/01/2018    Procedure: COLONOSCOPY;  Surgeon: Johnson Bacon MD;  Location: [facility];  Service: Endoscopy;  Laterality: N/A;    ESOPHAGOGASTRODUODENOSCOPY N/A 10/01/2018    Procedure: ESOPHAGOGASTRODUODENOSCOPY (EGD);  Surgeon: Johnson Bacon MD;  Location: [facility];  Service: Endoscopy;  Laterality: N/A;    golfers elbow          HYSTERECTOMY       total    OOPHORECTOMY      TONSILLECTOMY      pt was 3 yrs old at time       Review of Systems      Objective:   Ht 5' 6" (1.676 m)   Wt 80.7 kg (178 lb)   BMI 28.73 kg/m²     Physical Exam  LOWER EXTREMITY PHYSICAL EXAMINATION    NEUROLOGY: Sensation to light touch is intact. Proprioception is intact.     VASCULAR: On the left foot, the dorsalis pedis pulse is 2/4 and the posterior tibial pulse is 2/4. Capillary refill time is less than 3 seconds. Hair growth is present on the dorsum of the foot and at the digits. Proximal to distal temperature is warm to warm.    DERMATOLOGY:   Cellulitis present to the left great toenail specifically at the medial border.  Small evidence of drainage was expressed.  Purulence was cultured.  Appears have ingrowing toenail present to the medial nail fold with discoloration laterally.    ORTHOPEDIC: Manual Muscle Testing is 5/5 in all planes on the left, without pains, with and without resistance. Gait pattern is slightly antalgic.    Assessment:     1. Abscess or cellulitis, toe, left    2. Current use of anticoagulant therapy    3. PAF (paroxysmal atrial fibrillation)    4. Fibromyalgia        Plan:     Abscess or cellulitis, toe, left  -     Aerobic culture " (Specify Source)    Current use of anticoagulant therapy    PAF (paroxysmal atrial fibrillation)    Fibromyalgia    Other orders  -     sulfamethoxazole-trimethoprim 800-160mg (BACTRIM DS) 800-160 mg Tab; Take 1 tablet by mouth 2 (two) times daily. for 10 days  Dispense: 20 tablet; Refill: 0  -     mupirocin (BACTROBAN) 2 % ointment; Apply topically 2 (two) times daily.  Dispense: 22 g; Refill: 1        Did discuss the treatment options regarding the left great toenail with the patient. As she is currently on long-term anticoagulation therapy, would recommend patient consider partial nail avulsion of the affected border causing increased pain discomfort.  Unfortunately, would recommend that she discontinue her use of Eliquis for 2 days prior to the procedure to decrease bleeding risk.  We discussed the procedure in detail with the patient.  Will plan to have patient start oral antibiotics due to the cellulitis in conjunction with applying antibiotic cream and a Band-Aid to the area twice daily.  A culture was taken of the abscess to left great toe to follow for sensitivities.  Will have patient follow-up in 2 days for planned a nail avulsion     Discussed the importance of not trimming and filing toenails herself secondary to the use of long-term anticoagulation therapy which can increase the risk of complications, infections, and can result in amputations.      Future Appointments   Date Time Provider Department Center   3/15/2023 10:45 AM Ivanna Alvarado DPM [facility]   5/17/2023  1:20 PM Sandoval Umanzor MD [facility]

## 2023-03-15 ENCOUNTER — OFFICE VISIT (OUTPATIENT)
Dept: PODIATRY | Facility: CLINIC | Age: 68
End: 2023-03-15
Payer: MEDICARE

## 2023-03-15 DIAGNOSIS — Z79.01 CURRENT USE OF ANTICOAGULANT THERAPY: ICD-10-CM

## 2023-03-15 DIAGNOSIS — L60.0 INGROWN NAIL: Primary | ICD-10-CM

## 2023-03-15 DIAGNOSIS — I48.0 PAF (PAROXYSMAL ATRIAL FIBRILLATION): ICD-10-CM

## 2023-03-15 PROCEDURE — 99999 PR PBB SHADOW E&M-EST. PATIENT-LVL III: CPT | Mod: PBBFAC,,, | Performed by: PODIATRIST

## 2023-03-15 PROCEDURE — 99999 PR PBB SHADOW E&M-EST. PATIENT-LVL III: ICD-10-PCS | Mod: PBBFAC,,, | Performed by: PODIATRIST

## 2023-03-15 PROCEDURE — 11750 NAIL REMOVAL: ICD-10-PCS | Mod: TA,S$PBB,, | Performed by: PODIATRIST

## 2023-03-15 PROCEDURE — 11750 EXCISION NAIL&NAIL MATRIX: CPT | Mod: PBBFAC | Performed by: PODIATRIST

## 2023-03-15 PROCEDURE — 99214 PR OFFICE/OUTPT VISIT, EST, LEVL IV, 30-39 MIN: ICD-10-PCS | Mod: 25,S$PBB,, | Performed by: PODIATRIST

## 2023-03-15 PROCEDURE — 99213 OFFICE O/P EST LOW 20 MIN: CPT | Mod: PBBFAC,25 | Performed by: PODIATRIST

## 2023-03-15 PROCEDURE — 99214 OFFICE O/P EST MOD 30 MIN: CPT | Mod: 25,S$PBB,, | Performed by: PODIATRIST

## 2023-03-15 NOTE — PROGRESS NOTES
Subjective:       Patient ID: Erin Soriano is a 67 y.o. female.    Chief Complaint: Ingrown Toenail (Patient reports last dose of eliquis on 3.12.23. Patient complains of 4/10 pain to left hallux borders. Redness, swelling and drainage noted at this time. )      HPI: Erin Soriano presents to the office with complaints of pains to the left great  toe, due to ingrowing. Was started on antibiotics.  Has been tolerating these well.  She states 4/10 pain. States swelling, redness and moderate to severe pains. Symptoms have been on going for several days and are worsening.   Recently stopped her Eliquis for planned procedure.Patient's Primary Care Provider is Helena Jones MD.     Review of patient's allergies indicates:   Allergen Reactions    Amlodipine     Benazepril Edema     angioedema    Strawberry Hives    Chlorhexidine Rash       Past Medical History:   Diagnosis Date    Anxiety     Arthritis     Asthma     Depression     Hyperlipidemia     Hypertension     RLS (restless legs syndrome)        Family History   Problem Relation Age of Onset    Hypertension Mother     Clotting disorder Mother     Hemochromatosis Mother     Hypertension Father     Cancer Maternal Grandmother     Clotting disorder Maternal Grandmother     Cancer Maternal Grandfather         prostate    Clotting disorder Maternal Grandfather        Social History     Socioeconomic History    Marital status:     Number of children: 2   Tobacco Use    Smoking status: Former     Types: Cigarettes     Quit date: 6/10/2012     Years since quitting: 10.7    Smokeless tobacco: Never    Tobacco comments:     Would smoke 1 cigarette every few months   Substance and Sexual Activity    Alcohol use: Yes     Alcohol/week: 1.0 standard drink     Types: 1 Standard drinks or equivalent per week     Comment: social    Drug use: No    Sexual activity: Yes     Partners: Male     Birth control/protection: None       Past Surgical History:   Procedure  Laterality Date    ADENOIDECTOMY      pt was 3 yrs old    carpel tunnel      2009    CATARACT EXTRACTION       SECTION      2 different ones    COLONOSCOPY N/A 10/01/2018    Procedure: COLONOSCOPY;  Surgeon: Johnson Bacon MD;  Location: [facility];  Service: Endoscopy;  Laterality: N/A;    ESOPHAGOGASTRODUODENOSCOPY N/A 10/01/2018    Procedure: ESOPHAGOGASTRODUODENOSCOPY (EGD);  Surgeon: Johnson Bacon MD;  Location: [facility] END[facility];  Service: Endoscopy;  Laterality: N/A;    golfers elbow      2009    HYSTERECTOMY      1994 total    OOPHORECTOMY      TONSILLECTOMY      pt was 3 yrs old at time       Review of Systems      Objective:   There were no vitals taken for this visit.    Physical Exam  LOWER EXTREMITY PHYSICAL EXAMINATION    NEUROLOGY: Sensation to light touch is intact. Proprioception is intact.     VASCULAR: On the left foot, the dorsalis pedis pulse is 2/4 and the posterior tibial pulse is 2/4. Capillary refill time is less than 3 seconds. Hair growth is present on the dorsum of the foot and at the digits. Proximal to distal temperature is warm to warm.    DERMATOLOGY: Ingrowing of the left foot medial and lateral nail border of the great toe. The nail is incurvated into the skin of the affected border, causing pains, which are moderate in nature. There is moderate to severe edema. There is mild cellulitis noted. There is no drainage. No fluctuance. Granuloma formation is absent.    ORTHOPEDIC: Manual Muscle Testing is 5/5 in all planes on the left, without pains, with and without resistance. Gait pattern is slightly antalgic.    Assessment:     1. Ingrown nail    2. Current use of anticoagulant therapy    3. PAF (paroxysmal atrial fibrillation)        Plan:     Ingrown nail    Current use of anticoagulant therapy    PAF (paroxysmal atrial fibrillation)    We discussed patient's options for treating the ingrown toenail.  We discussed slant back procedure to remove the distal offending edge, we  discussed temporary partial avulsion, temporary complete avulsion, and attempted permanent matricectomy.  Patient would like to proceed with attempted permanent matricectomy of the medial lateral borders of left hallux.  Discussed the risk and benefits of the procedure.  Discussed risk of recurrence.  Patient did give written consent to proceed with procedure.    Patient tolerated procedure well without complications.  Large spicule was removed without complications.  History patient will start soaking in warm water and Epson salt twice daily.  Apply antibiotic cream and a Band-Aid to the affected borders following soaking and showering.  Patient will call if there is any acute signs of infection associated with increased redness, swelling, abnormal drainage, increased pain.    Continue antibiotics.  Based on previous cultures and sensitivities, Bactrim, should cover both bacteria.  Cultures growing Staphylococcus aureus and Gram-negative rods.  Final sensitivities pending    Okay to restart anticoagulation therapy tonight.    Future Appointments   Date Time Provider Department Center   5/17/2023  1:20 PM Sandoval Umanzor MD [facility]

## 2023-03-15 NOTE — PROCEDURES
Nail Removal    Date/Time: 3/15/2023 10:45 AM  Performed by: Ivanna Alvarado DPM  Authorized by: Ivanna Alvarado DPM     Consent Done?:  Yes (Written)  Time out: Immediately prior to the procedure a time out was called    Prep: patient was prepped and draped in usual sterile fashion    Location:     Location:  Left foot    Location detail:  Left big toe  Anesthesia:     Anesthesia:  Digital block    Local anesthetic:  Lidocaine 1% without epinephrine    Anesthetic total (ml):  3  Procedure Details:     Preparation:  Skin prepped with alcohol and skin prepped with Betadine    Side:  Bilateral    Wedge excision of skin of nail fold: No      Nail bed sutured?: No      Nail matrix removed:  Partial    Removal method:  Phenol and alcohol    Removed nail replaced and anchored: No      Dressing applied:  4x4, antibiotic ointment and dressing applied    Patient tolerance:  Patient tolerated the procedure well with no immediate complications

## 2023-03-16 LAB
BACTERIA SPEC AEROBE CULT: ABNORMAL
BACTERIA SPEC AEROBE CULT: ABNORMAL

## 2023-03-29 ENCOUNTER — PATIENT MESSAGE (OUTPATIENT)
Dept: FAMILY MEDICINE | Facility: CLINIC | Age: 68
End: 2023-03-29
Payer: MEDICARE

## 2023-03-29 NOTE — TELEPHONE ENCOUNTER
Sending message that she has pedal edema , asking waht could be the cause??   the last time she had this we stopped lotensin-hctz and started losartan-hctz   Do you want to see her for this so we can do lab?

## 2023-04-19 ENCOUNTER — OFFICE VISIT (OUTPATIENT)
Dept: PODIATRY | Facility: CLINIC | Age: 68
End: 2023-04-19
Payer: MEDICARE

## 2023-04-19 VITALS — BODY MASS INDEX: 28.6 KG/M2 | HEIGHT: 66 IN | WEIGHT: 177.94 LBS

## 2023-04-19 DIAGNOSIS — I48.0 PAF (PAROXYSMAL ATRIAL FIBRILLATION): ICD-10-CM

## 2023-04-19 DIAGNOSIS — L60.0 INGROWN NAIL: Primary | ICD-10-CM

## 2023-04-19 DIAGNOSIS — Z79.01 CURRENT USE OF ANTICOAGULANT THERAPY: ICD-10-CM

## 2023-04-19 PROCEDURE — 11750 NAIL REMOVAL: ICD-10-PCS | Mod: T5,S$PBB,, | Performed by: PODIATRIST

## 2023-04-19 PROCEDURE — 99213 OFFICE O/P EST LOW 20 MIN: CPT | Mod: PBBFAC,25 | Performed by: PODIATRIST

## 2023-04-19 PROCEDURE — 99499 NO LOS: ICD-10-PCS | Mod: S$PBB,,, | Performed by: PODIATRIST

## 2023-04-19 PROCEDURE — 11750 EXCISION NAIL&NAIL MATRIX: CPT | Mod: PBBFAC | Performed by: PODIATRIST

## 2023-04-19 PROCEDURE — 99999 PR PBB SHADOW E&M-EST. PATIENT-LVL III: CPT | Mod: PBBFAC,,, | Performed by: PODIATRIST

## 2023-04-19 PROCEDURE — 99499 UNLISTED E&M SERVICE: CPT | Mod: S$PBB,,, | Performed by: PODIATRIST

## 2023-04-19 PROCEDURE — 99999 PR PBB SHADOW E&M-EST. PATIENT-LVL III: ICD-10-PCS | Mod: PBBFAC,,, | Performed by: PODIATRIST

## 2023-04-19 NOTE — PROGRESS NOTES
Subjective:       Patient ID: Erin Soriano is a 67 y.o. female.    Chief Complaint: Nail Problem (Pt here for for infection of right big toe. Pain 7/10 when walking. Pt is not a diabetic. Pt is on blood thinners but has held them for 5 days. LOV with PCP Dr. Helena Jones Feb 2023.)      HPI: Erin Soriano presents to the office with complaints of pains to the right great  toe, due to ingrowing.  Does take long-term anticoagulation therapy but has held them for 5 days for potential procedure.  States 7/10 pain. States swelling, redness and moderate to severe pains. Symptoms have been on going for several days and are worsening.   .Patient's Primary Care Provider is Helena Jones MD.     Review of patient's allergies indicates:   Allergen Reactions    Amlodipine     Benazepril Edema     angioedema    Strawberry Hives    Chlorhexidine Rash       Past Medical History:   Diagnosis Date    Anxiety     Arthritis     Asthma     Depression     Hyperlipidemia     Hypertension     RLS (restless legs syndrome)        Family History   Problem Relation Age of Onset    Hypertension Mother     Clotting disorder Mother     Hemochromatosis Mother     Hypertension Father     Cancer Maternal Grandmother     Clotting disorder Maternal Grandmother     Cancer Maternal Grandfather         prostate    Clotting disorder Maternal Grandfather        Social History     Socioeconomic History    Marital status:     Number of children: 2   Tobacco Use    Smoking status: Former     Types: Cigarettes     Quit date: 6/10/2012     Years since quitting: 10.8    Smokeless tobacco: Never    Tobacco comments:     Would smoke 1 cigarette every few months   Substance and Sexual Activity    Alcohol use: Yes     Alcohol/week: 1.0 standard drink     Types: 1 Standard drinks or equivalent per week     Comment: social    Drug use: No    Sexual activity: Yes     Partners: Male     Birth control/protection: None       Past Surgical History:  "  Procedure Laterality Date    ADENOIDECTOMY      pt was 3 yrs old    carpel tunnel      2009    CATARACT EXTRACTION       SECTION      2 different ones    COLONOSCOPY N/A 10/01/2018    Procedure: COLONOSCOPY;  Surgeon: Johnson Bacon MD;  Location: [facility] ENDO;  Service: Endoscopy;  Laterality: N/A;    ESOPHAGOGASTRODUODENOSCOPY N/A 10/01/2018    Procedure: ESOPHAGOGASTRODUODENOSCOPY (EGD);  Surgeon: Johnson Bacon MD;  Location: [facility] ENDO;  Service: Endoscopy;  Laterality: N/A;    golfers elbow      2009    HYSTERECTOMY      1994 total    OOPHORECTOMY      TONSILLECTOMY      pt was 3 yrs old at time       Review of Systems      Objective:   Ht 5' 6" (1.676 m)   Wt 80.7 kg (177 lb 14.6 oz)   BMI 28.72 kg/m²     Physical Exam  LOWER EXTREMITY PHYSICAL EXAMINATION    NEUROLOGY: Sensation to light touch is intact. Proprioception is intact.     VASCULAR: On the right foot, the dorsalis pedis pulse is 2/4 and the posterior tibial pulse is 2/4. Capillary refill time is less than 3 seconds. Hair growth is present on the dorsum of the foot and at the digits. Proximal to distal temperature is warm to warm.    DERMATOLOGY: Ingrowing of the right foot medial and lateral nail border of the great toe. The nail is incurvated into the skin of the affected border, causing pains, which are moderate in nature. There is moderate to severe edema. There is mild cellulitis noted. There is no drainage. No fluctuance. Granuloma formation is absent.    ORTHOPEDIC: Manual Muscle Testing is 5/5 in all planes on the left, without pains, with and without resistance. Gait pattern is slightly antalgic.    Assessment:     1. Ingrown nail - Right Foot    2. Current use of anticoagulant therapy    3. PAF (paroxysmal atrial fibrillation)        Plan:     Ingrown nail - Right Foot    Current use of anticoagulant therapy    PAF (paroxysmal atrial fibrillation)    We discussed patient's options for treating the ingrown toenail.  We discussed " slant back procedure to remove the distal offending edge, we discussed temporary partial avulsion, temporary complete avulsion, and attempted permanent matricectomy.  Patient would like to proceed with attempted permanent matricectomy of the medial and lateral borders of right hallux.  Discussed the risk and benefits of the procedure.  Discussed risk of recurrence.  Patient did give written consent to proceed with procedure.    Patient tolerated procedure well without complications.  Large spicules were removed without complications.  History patient will start soaking in warm water and Epson salt twice daily.  Apply antibiotic cream and a Band-Aid to the affected borders following soaking and showering.  Patient will call if there is any acute signs of infection associated with increased redness, swelling, abnormal drainage, increased pain.    Okay to restart anticoagulation therapy tonight.    Future Appointments   Date Time Provider Department Center   5/17/2023  1:20 PM Sandoval Umanzor MD [facility]

## 2023-04-19 NOTE — PROCEDURES
Nail Removal    Date/Time: 4/19/2023 1:45 PM  Performed by: Ivanna Alvarado DPM  Authorized by: Ivanna Alvarado DPM     Consent Done?:  Yes (Written)  Time out: Immediately prior to the procedure a time out was called    Prep: patient was prepped and draped in usual sterile fashion    Location:     Location:  Right foot    Location detail:  Right big toe  Anesthesia:     Anesthesia:  Digital block    Local anesthetic:  Lidocaine 1% without epinephrine (0.75% Marcaine plain)    Anesthetic total (ml):  3  Procedure Details:     Preparation:  Skin prepped with alcohol and skin prepped with Betadine    Side:  Bilateral    Wedge excision of skin of nail fold: No      Nail bed sutured?: No      Nail matrix removed:  Partial    Removal method:  Phenol and alcohol    Removed nail replaced and anchored: No      Dressing applied:  4x4, antibiotic ointment and dressing applied    Patient tolerance:  Patient tolerated the procedure well with no immediate complications

## 2023-04-27 ENCOUNTER — OFFICE VISIT (OUTPATIENT)
Dept: URGENT CARE | Facility: CLINIC | Age: 68
End: 2023-04-27
Payer: MEDICARE

## 2023-04-27 VITALS
RESPIRATION RATE: 20 BRPM | HEIGHT: 66 IN | TEMPERATURE: 99 F | DIASTOLIC BLOOD PRESSURE: 72 MMHG | BODY MASS INDEX: 28.45 KG/M2 | HEART RATE: 83 BPM | WEIGHT: 177 LBS | OXYGEN SATURATION: 96 % | SYSTOLIC BLOOD PRESSURE: 129 MMHG

## 2023-04-27 DIAGNOSIS — R05.9 COUGH IN ADULT: ICD-10-CM

## 2023-04-27 DIAGNOSIS — J45.901 MODERATE ASTHMA WITH EXACERBATION, UNSPECIFIED WHETHER PERSISTENT: ICD-10-CM

## 2023-04-27 DIAGNOSIS — R09.81 NASAL CONGESTION: ICD-10-CM

## 2023-04-27 DIAGNOSIS — J06.9 VIRAL URI WITH COUGH: Primary | ICD-10-CM

## 2023-04-27 DIAGNOSIS — R09.89 CHEST CONGESTION: ICD-10-CM

## 2023-04-27 DIAGNOSIS — R09.82 POST-NASAL DRIP: ICD-10-CM

## 2023-04-27 DIAGNOSIS — J02.9 PHARYNGITIS, UNSPECIFIED ETIOLOGY: ICD-10-CM

## 2023-04-27 LAB
CTP QC/QA: YES
CTP QC/QA: YES
MOLECULAR STREP A: NEGATIVE
SARS-COV-2 AG RESP QL IA.RAPID: NEGATIVE

## 2023-04-27 PROCEDURE — 87811 SARS CORONAVIRUS 2 ANTIGEN POCT, MANUAL READ: ICD-10-PCS | Mod: QW,CR,S$GLB, | Performed by: NURSE PRACTITIONER

## 2023-04-27 PROCEDURE — 87811 SARS-COV-2 COVID19 W/OPTIC: CPT | Mod: QW,CR,S$GLB, | Performed by: NURSE PRACTITIONER

## 2023-04-27 PROCEDURE — 99214 OFFICE O/P EST MOD 30 MIN: CPT | Mod: CR,S$GLB,, | Performed by: NURSE PRACTITIONER

## 2023-04-27 PROCEDURE — 99214 PR OFFICE/OUTPT VISIT, EST, LEVL IV, 30-39 MIN: ICD-10-PCS | Mod: CR,S$GLB,, | Performed by: NURSE PRACTITIONER

## 2023-04-27 PROCEDURE — 87651 POCT STREP A MOLECULAR: ICD-10-PCS | Mod: QW,S$GLB,, | Performed by: NURSE PRACTITIONER

## 2023-04-27 PROCEDURE — 87651 STREP A DNA AMP PROBE: CPT | Mod: QW,S$GLB,, | Performed by: NURSE PRACTITIONER

## 2023-04-27 RX ORDER — PREDNISONE 20 MG/1
40 TABLET ORAL DAILY
Qty: 8 TABLET | Refills: 0 | Status: SHIPPED | OUTPATIENT
Start: 2023-04-27 | End: 2023-05-01

## 2023-04-27 RX ORDER — FLUTICASONE PROPIONATE 50 MCG
1 SPRAY, SUSPENSION (ML) NASAL DAILY
Qty: 16 ML | Refills: 0 | Status: SHIPPED | OUTPATIENT
Start: 2023-04-27 | End: 2023-04-27

## 2023-04-27 RX ORDER — IPRATROPIUM BROMIDE AND ALBUTEROL SULFATE 2.5; .5 MG/3ML; MG/3ML
3 SOLUTION RESPIRATORY (INHALATION) EVERY 4 HOURS PRN
Qty: 75 ML | Refills: 0 | Status: SHIPPED | OUTPATIENT
Start: 2023-04-27 | End: 2023-04-27

## 2023-04-27 RX ORDER — IPRATROPIUM BROMIDE AND ALBUTEROL SULFATE 2.5; .5 MG/3ML; MG/3ML
3 SOLUTION RESPIRATORY (INHALATION) EVERY 4 HOURS PRN
Qty: 75 ML | Refills: 0 | Status: SHIPPED | OUTPATIENT
Start: 2023-04-27 | End: 2024-04-26

## 2023-04-27 RX ORDER — ALBUTEROL SULFATE 90 UG/1
2 AEROSOL, METERED RESPIRATORY (INHALATION) EVERY 4 HOURS PRN
Qty: 18 G | Refills: 2 | Status: SHIPPED | OUTPATIENT
Start: 2023-04-27 | End: 2024-02-21 | Stop reason: SDUPTHER

## 2023-04-27 RX ORDER — BENZONATATE 100 MG/1
100 CAPSULE ORAL 3 TIMES DAILY PRN
Qty: 30 CAPSULE | Refills: 0 | Status: SHIPPED | OUTPATIENT
Start: 2023-04-27 | End: 2023-05-07

## 2023-04-27 RX ORDER — PREDNISONE 20 MG/1
40 TABLET ORAL DAILY
Qty: 8 TABLET | Refills: 0 | Status: SHIPPED | OUTPATIENT
Start: 2023-04-27 | End: 2023-04-27

## 2023-04-27 RX ORDER — FLUTICASONE PROPIONATE 50 MCG
1 SPRAY, SUSPENSION (ML) NASAL DAILY
Qty: 16 ML | Refills: 0 | Status: SHIPPED | OUTPATIENT
Start: 2023-04-27 | End: 2023-08-03 | Stop reason: SDUPTHER

## 2023-04-27 RX ORDER — BENZONATATE 100 MG/1
100 CAPSULE ORAL 3 TIMES DAILY PRN
Qty: 30 CAPSULE | Refills: 0 | Status: SHIPPED | OUTPATIENT
Start: 2023-04-27 | End: 2023-04-27

## 2023-04-27 RX ORDER — ALBUTEROL SULFATE 90 UG/1
2 AEROSOL, METERED RESPIRATORY (INHALATION) EVERY 4 HOURS PRN
Qty: 18 G | Refills: 2 | Status: SHIPPED | OUTPATIENT
Start: 2023-04-27 | End: 2023-04-27

## 2023-04-27 NOTE — PROGRESS NOTES
"Subjective:      Patient ID: Erin Soriano is a 67 y.o. female.    Vitals:  height is 5' 6" (1.676 m) and weight is 80.3 kg (177 lb). Her oral temperature is 98.6 °F (37 °C). Her blood pressure is 129/72 and her pulse is 83. Her respiration is 20 and oxygen saturation is 96%.     Chief Complaint: Sore Throat and Cough    Erin Soriano is a 67 year old female whom presents to urgent care with complaints of sore throat, cough, nasal and chest congestion. Patient reports symptoms have been present for the last 3-4 days. Patient denies any known sick contacts or any individuals in the household with similar symptoms. Patient reports taking ibuprofen, OTC allergy medication and afrin.     Sore Throat   Associated symptoms include congestion (Chest, & nasal), coughing, headaches, a plugged ear sensation, shortness of breath (DX with Asthma) and trouble swallowing. Pertinent negatives include no abdominal pain, diarrhea, drooling, ear discharge, hoarse voice, neck pain, stridor, swollen glands or vomiting. She has had no exposure to strep or mono. She has tried cool liquids for the symptoms. The treatment provided no relief.   Cough  This is a new problem. The current episode started in the past 7 days. The problem has been rapidly worsening. The cough is Productive of purulent sputum. Associated symptoms include ear congestion, headaches, nasal congestion, a sore throat and shortness of breath (DX with Asthma). Pertinent negatives include no chest pain, chills, fever, heartburn, hemoptysis, myalgias, postnasal drip, rash, rhinorrhea, sweats, weight loss or wheezing. The symptoms are aggravated by lying down. She has tried OTC cough suppressant for the symptoms. The treatment provided no relief. Her past medical history is significant for asthma. There is no history of bronchiectasis, bronchitis, COPD, emphysema, environmental allergies or pneumonia.     Constitution: Negative for chills and fever.   HENT:  " Positive for congestion (Chest, & nasal), sore throat and trouble swallowing. Negative for ear discharge, drooling and postnasal drip.    Neck: Negative for neck pain.   Cardiovascular:  Negative for chest pain.   Respiratory:  Positive for cough, shortness of breath (DX with Asthma) and asthma. Negative for chest tightness, bloody sputum, stridor and wheezing.    Gastrointestinal:  Negative for abdominal pain, vomiting, diarrhea and heartburn.   Musculoskeletal:  Negative for muscle ache.   Skin:  Negative for rash.   Allergic/Immunologic: Positive for asthma. Negative for environmental allergies.   Neurological:  Positive for headaches.    Objective:     Physical Exam   Constitutional: She is oriented to person, place, and time. She appears well-developed. She is cooperative.  Non-toxic appearance. She does not appear ill. No distress.   HENT:   Head: Normocephalic and atraumatic.   Ears:   Right Ear: Hearing, tympanic membrane, external ear and ear canal normal.   Left Ear: Hearing, tympanic membrane, external ear and ear canal normal.   Nose: No mucosal edema, rhinorrhea or nasal deformity. No epistaxis. Right sinus exhibits maxillary sinus tenderness and frontal sinus tenderness. Left sinus exhibits maxillary sinus tenderness and frontal sinus tenderness.   Mouth/Throat: Uvula is midline, oropharynx is clear and moist and mucous membranes are normal. Mucous membranes are moist. No trismus in the jaw. Normal dentition. No uvula swelling. No posterior oropharyngeal erythema. Oropharynx is clear.      Comments: +PND  Eyes: Conjunctivae and lids are normal. Pupils are equal, round, and reactive to light. Right eye exhibits no discharge. Left eye exhibits no discharge. No scleral icterus.   Neck: Trachea normal and phonation normal. Neck supple.   Cardiovascular: Normal rate, regular rhythm, normal heart sounds and normal pulses.   Pulmonary/Chest: Effort normal and breath sounds normal. No respiratory distress. She  has no wheezes. She exhibits no tenderness.   Abdominal: Normal appearance and bowel sounds are normal. She exhibits no distension and no mass. Soft. There is no abdominal tenderness.   Musculoskeletal: Normal range of motion.         General: No deformity. Normal range of motion.   Neurological: She is alert and oriented to person, place, and time. She exhibits normal muscle tone. Coordination normal.   Skin: Skin is warm, dry, intact, not diaphoretic and not pale.   Psychiatric: Her speech is normal and behavior is normal. Judgment and thought content normal.   Nursing note and vitals reviewed.  Results for orders placed or performed in visit on 04/27/23   SARS Coronavirus 2 Antigen, POCT Manual Read   Result Value Ref Range    SARS Coronavirus 2 Antigen Negative Negative     Acceptable Yes    POCT Strep A, Molecular   Result Value Ref Range    Molecular Strep A, POC Negative Negative     Acceptable Yes        Assessment:     1. Viral URI with cough    2. Pharyngitis, unspecified etiology    3. Chest congestion    4. Nasal congestion    5. Post-nasal drip    6. Moderate asthma with exacerbation, unspecified whether persistent    7. Cough in adult        Plan:       Viral URI with cough    Pharyngitis, unspecified etiology  -     SARS Coronavirus 2 Antigen, POCT Manual Read  -     POCT Strep A, Molecular    Chest congestion  -     SARS Coronavirus 2 Antigen, POCT Manual Read    Nasal congestion    Post-nasal drip  -     Discontinue: fluticasone propionate (FLONASE) 50 mcg/actuation nasal spray; 1 spray (50 mcg total) by Each Nostril route once daily.  Dispense: 16 mL; Refill: 0  -     fluticasone propionate (FLONASE) 50 mcg/actuation nasal spray; 1 spray (50 mcg total) by Each Nostril route once daily.  Dispense: 16 mL; Refill: 0    Moderate asthma with exacerbation, unspecified whether persistent  -     Discontinue: predniSONE (DELTASONE) 20 MG tablet; Take 2 tablets (40 mg total) by  mouth once daily. for 4 days  Dispense: 8 tablet; Refill: 0  -     Discontinue: albuterol-ipratropium (DUO-NEB) 2.5 mg-0.5 mg/3 mL nebulizer solution; Take 3 mLs by nebulization every 4 (four) hours as needed for Wheezing. Rescue  Dispense: 75 mL; Refill: 0  -     Discontinue: albuterol (PROVENTIL/VENTOLIN HFA) 90 mcg/actuation inhaler; Inhale 2 puffs into the lungs every 4 (four) hours as needed for Wheezing.  Dispense: 18 g; Refill: 2  -     albuterol (PROVENTIL/VENTOLIN HFA) 90 mcg/actuation inhaler; Inhale 2 puffs into the lungs every 4 (four) hours as needed for Wheezing.  Dispense: 18 g; Refill: 2  -     albuterol-ipratropium (DUO-NEB) 2.5 mg-0.5 mg/3 mL nebulizer solution; Take 3 mLs by nebulization every 4 (four) hours as needed for Wheezing. Rescue  Dispense: 75 mL; Refill: 0  -     predniSONE (DELTASONE) 20 MG tablet; Take 2 tablets (40 mg total) by mouth once daily. for 4 days  Dispense: 8 tablet; Refill: 0    Cough in adult  -     Discontinue: benzonatate (TESSALON) 100 MG capsule; Take 1 capsule (100 mg total) by mouth 3 (three) times daily as needed for Cough (Do not take more than 6 capsules in a 24 hour period.).  Dispense: 30 capsule; Refill: 0  -     Discontinue: albuterol-ipratropium (DUO-NEB) 2.5 mg-0.5 mg/3 mL nebulizer solution; Take 3 mLs by nebulization every 4 (four) hours as needed for Wheezing. Rescue  Dispense: 75 mL; Refill: 0  -     albuterol-ipratropium (DUO-NEB) 2.5 mg-0.5 mg/3 mL nebulizer solution; Take 3 mLs by nebulization every 4 (four) hours as needed for Wheezing. Rescue  Dispense: 75 mL; Refill: 0  -     benzonatate (TESSALON) 100 MG capsule; Take 1 capsule (100 mg total) by mouth 3 (three) times daily as needed for Cough (Do not take more than 6 capsules in a 24 hour period.).  Dispense: 30 capsule; Refill: 0          Medical Decision Making:   Differential Diagnosis:   Bronchitis, COPD/Asthma exacerbation, Viral upper respiratory infection, Bacterial upper respiratory  infection, Pneumonia, covid19, influenza,bacterial vs viral sinusitis.     Clinical Tests:   Lab Tests: Ordered and Reviewed       <> Summary of Lab: Covid and sTrep negative  Urgent Care Management:  Previous encounters and labs were independently reviewed. Discussed with patient  all pertinent information and results. X-ray not available in clinic today. Recommend follow up for x-ray if symptoms worsen or persist. Discussed afrin should not be used greater than three days in a row due to increased risk of rebound congestion. Discussed patient diagnosis and plan of treatment. Asthma medications were refilled.  Additional plan of care as outlined above. Patient  was given all follow up and return instructions. All questions and concerns were addressed at this time. Patient  expresses understanding of information and instructions, and is comfortable with plan.    Patient was instructed to follow up with his Primary Care Provider if no improvement in symptoms in 5 DAYS: d or go to ED immediately for any worsening or change in current symptoms. Patient verbalized understanding and agrees with the discussed plan of care. Patient remained stable throughout the visit and exited the exam room in NAD.        Patient Instructions   PLEASE READ YOUR DISCHARGE INSTRUCTIONS ENTIRELY AS IT CONTAINS IMPORTANT INFORMATION.    Please drink plenty of fluids.    Please get plenty of rest.    Please return here or go to the Emergency Department for any concerns or worsening of condition.    Please take an over the counter antihistamine medication (Allegra/Claritin/Zyrtec/xyzal) of your choice as directed for allergy symptoms and/or runny nose and postnasal drip.    Try an over the counter decongestant for sinus pressure/ear pressure, congestion symptoms like Mucinex D or Sudafed or Phenylephrine. You buy this behind the pharmacy counter.    If you do have Hypertension or palpitations, it is safe to take Coricidin HBP for relief of  sinus symptoms.    Certain OTC cough and cold medications may raise your blood pressure. To keep your blood pressure regulated avoid over-the-counter decongestants and multisymptom cold remedies that contain decongestants -- such as pseudoephedrine, ephedrine, phenylephrine, naphazoline and oxymetazoline. Also, check the label for high sodium content, which can also raise blood pressure.       Tylenol or ibuprofen can also be used as directed for pain and fever unless you have an allergy to them or medical condition such as stomach ulcers, kidney or liver disease or blood thinners etc for which you should not be taking these type of medications.     SORE THROAT:    You may gargle with hot salt water 4 times a day for the next 2 days and then you may also gargle diluted hydrogen peroxide once to twice daily to alleviate some of your throat discomfort.  Drink plenty of fluids, recommend warm tea with honey.     YOU MAY USE OVER-THE-COUNTER CEPACOL FOR SOOTHING OF YOUR THROAT.  You may wish to avoid spicy food, citrus fruits, and red sauces- as this may irritate the throat more.        Use over the counter Flonase or Nasocort: one spray each nostril twice daily OR two sprays each nostril once daily until nares dry out, unless you have Glaucoma.   Can also supplement with nasal saline rinse.    Sinus rinses DO NOT USE TAP WATER, if you must, water must be at a rolling boil for 1 minute, let it cool, then use.  May use distilled water, or over the counter nasal saline rinses.  Eduardo's vapor rub in shower to help open nasal passages.  May use nasal gel to keep passages moisturized.  May use nasal saline sprays during the day for added relief of congestion.   For those who go to the gym, please do not use the sauna or steam room now to clear sinuses.    Cough     Rest and fluids are important  Can use honey with jose to soothe your throat    Robitussin or Delsyum for cough suppressant for dry cough.    Mucinex DM or  products containing Guaifenesin or Dextromethorphan for expectorant (wet cough).    Take prescription cough meds (pills) as prescribed; Please follow up with your primary care doctor or specialist in the next 48-72hrs as needed and if no improvement    If you smoke, please stop smoking.    Please return or see your primary care doctor if you develop new or worsening symptoms.     Lastly, good hand washing and cough hygiene (cough into your elbow) will help prevent the spread of the illness. A general rule is that you are no longer contagious once you have been without a fever for over 24 hours without requiring fever reducing medications.     Please arrange follow up with your primary medical clinic as soon as possible. You must understand that you've received an Urgent Care treatment only and that you may be released before all of your medical problems are known or treated. You, the patient, will arrange for follow up as instructed. If your symptoms worsen or fail to improve you should go to the Emergency Room.

## 2023-04-27 NOTE — PATIENT INSTRUCTIONS
PLEASE READ YOUR DISCHARGE INSTRUCTIONS ENTIRELY AS IT CONTAINS IMPORTANT INFORMATION.    Please drink plenty of fluids.    Please get plenty of rest.    Please return here or go to the Emergency Department for any concerns or worsening of condition.    Please take an over the counter antihistamine medication (Allegra/Claritin/Zyrtec/xyzal) of your choice as directed for allergy symptoms and/or runny nose and postnasal drip.    Try an over the counter decongestant for sinus pressure/ear pressure, congestion symptoms like Mucinex D or Sudafed or Phenylephrine. You buy this behind the pharmacy counter.    If you do have Hypertension or palpitations, it is safe to take Coricidin HBP for relief of sinus symptoms.    Certain OTC cough and cold medications may raise your blood pressure. To keep your blood pressure regulated avoid over-the-counter decongestants and multisymptom cold remedies that contain decongestants -- such as pseudoephedrine, ephedrine, phenylephrine, naphazoline and oxymetazoline. Also, check the label for high sodium content, which can also raise blood pressure.       Tylenol or ibuprofen can also be used as directed for pain and fever unless you have an allergy to them or medical condition such as stomach ulcers, kidney or liver disease or blood thinners etc for which you should not be taking these type of medications.     SORE THROAT:    You may gargle with hot salt water 4 times a day for the next 2 days and then you may also gargle diluted hydrogen peroxide once to twice daily to alleviate some of your throat discomfort.  Drink plenty of fluids, recommend warm tea with honey.     YOU MAY USE OVER-THE-COUNTER CEPACOL FOR SOOTHING OF YOUR THROAT.  You may wish to avoid spicy food, citrus fruits, and red sauces- as this may irritate the throat more.        Use over the counter Flonase or Nasocort: one spray each nostril twice daily OR two sprays each nostril once daily until nares dry out, unless you  have Glaucoma.   Can also supplement with nasal saline rinse.    Sinus rinses DO NOT USE TAP WATER, if you must, water must be at a rolling boil for 1 minute, let it cool, then use.  May use distilled water, or over the counter nasal saline rinses.  Eduardo's vapor rub in shower to help open nasal passages.  May use nasal gel to keep passages moisturized.  May use nasal saline sprays during the day for added relief of congestion.   For those who go to the gym, please do not use the sauna or steam room now to clear sinuses.    Cough     Rest and fluids are important  Can use honey with jose to soothe your throat    Robitussin or Delsyum for cough suppressant for dry cough.    Mucinex DM or products containing Guaifenesin or Dextromethorphan for expectorant (wet cough).    Take prescription cough meds (pills) as prescribed; Please follow up with your primary care doctor or specialist in the next 48-72hrs as needed and if no improvement    If you smoke, please stop smoking.    Please return or see your primary care doctor if you develop new or worsening symptoms.     Lastly, good hand washing and cough hygiene (cough into your elbow) will help prevent the spread of the illness. A general rule is that you are no longer contagious once you have been without a fever for over 24 hours without requiring fever reducing medications.     Please arrange follow up with your primary medical clinic as soon as possible. You must understand that you've received an Urgent Care treatment only and that you may be released before all of your medical problems are known or treated. You, the patient, will arrange for follow up as instructed. If your symptoms worsen or fail to improve you should go to the Emergency Room.

## 2023-05-10 DIAGNOSIS — I48.0 PAROXYSMAL ATRIAL FIBRILLATION: ICD-10-CM

## 2023-05-10 DIAGNOSIS — I48.0 PAF (PAROXYSMAL ATRIAL FIBRILLATION): Primary | ICD-10-CM

## 2023-05-17 ENCOUNTER — CLINICAL SUPPORT (OUTPATIENT)
Dept: CARDIOLOGY | Facility: CLINIC | Age: 68
End: 2023-05-17
Payer: MEDICARE

## 2023-05-17 ENCOUNTER — OFFICE VISIT (OUTPATIENT)
Dept: CARDIOLOGY | Facility: CLINIC | Age: 68
End: 2023-05-17
Payer: MEDICARE

## 2023-05-17 VITALS
HEART RATE: 73 BPM | WEIGHT: 190.25 LBS | BODY MASS INDEX: 30.58 KG/M2 | SYSTOLIC BLOOD PRESSURE: 124 MMHG | HEIGHT: 66 IN | OXYGEN SATURATION: 98 % | DIASTOLIC BLOOD PRESSURE: 70 MMHG

## 2023-05-17 DIAGNOSIS — I10 ESSENTIAL HYPERTENSION: ICD-10-CM

## 2023-05-17 DIAGNOSIS — I73.9 PVD (PERIPHERAL VASCULAR DISEASE): ICD-10-CM

## 2023-05-17 DIAGNOSIS — I48.0 PAF (PAROXYSMAL ATRIAL FIBRILLATION): ICD-10-CM

## 2023-05-17 DIAGNOSIS — R60.0 LOCALIZED EDEMA: ICD-10-CM

## 2023-05-17 DIAGNOSIS — E78.2 MIXED HYPERLIPIDEMIA: Primary | ICD-10-CM

## 2023-05-17 DIAGNOSIS — I48.0 PAROXYSMAL ATRIAL FIBRILLATION: ICD-10-CM

## 2023-05-17 PROCEDURE — 99999 PR PBB SHADOW E&M-EST. PATIENT-LVL V: ICD-10-PCS | Mod: PBBFAC,,, | Performed by: INTERNAL MEDICINE

## 2023-05-17 PROCEDURE — 93010 EKG 12-LEAD: ICD-10-PCS | Mod: S$PBB,,, | Performed by: INTERNAL MEDICINE

## 2023-05-17 PROCEDURE — 93010 ELECTROCARDIOGRAM REPORT: CPT | Mod: S$PBB,,, | Performed by: INTERNAL MEDICINE

## 2023-05-17 PROCEDURE — 93005 ELECTROCARDIOGRAM TRACING: CPT | Mod: PBBFAC,PO | Performed by: INTERNAL MEDICINE

## 2023-05-17 PROCEDURE — 99214 PR OFFICE/OUTPT VISIT, EST, LEVL IV, 30-39 MIN: ICD-10-PCS | Mod: S$PBB,,, | Performed by: INTERNAL MEDICINE

## 2023-05-17 PROCEDURE — 99214 OFFICE O/P EST MOD 30 MIN: CPT | Mod: S$PBB,,, | Performed by: INTERNAL MEDICINE

## 2023-05-17 PROCEDURE — 99999 PR PBB SHADOW E&M-EST. PATIENT-LVL V: CPT | Mod: PBBFAC,,, | Performed by: INTERNAL MEDICINE

## 2023-05-17 PROCEDURE — 99215 OFFICE O/P EST HI 40 MIN: CPT | Mod: PBBFAC,PO | Performed by: INTERNAL MEDICINE

## 2023-05-17 NOTE — PROGRESS NOTES
Subjective:   Patient ID:  Erin Soriano is a 68 y.o. female who presents for follow up of Leg Swelling      67 yo female 6 months f/u Retired   PMH HTN, HLD, GERD, anxiety and PAF. Fibromyalgia. Negative sleep study before.   EGD and colonoscopy done on 10/01/2018 normal.  Pt was told having PAF during the EGD.  MPI and ECHO done in  normal EF and no stress induced ischemia  Her ZIOPATCH done in  showed 3% PAF and PSVT.  No smoking/drinking  Mother has afib. Father CHF  Taking excedrin for migraine    10- visit, Episode of chest tightness, palpitation and dizziness for one month, lasted for 5 minutes and resolved spontaneously. Pt states that she had similar episodes very often last year and much less frequency since started Coreg and verapamil.   Occasional chest pain. No faint dizziness. Med compliance. Takes ASA for migraine     visit Occasional atypical CP. Improved the symptoms of palpitation.  Stressful due to the family pressure  Ok with the medications     visit  Will have the cataract procedure  AFIB [id number] score of 3. Occasional palpitation. Dizziness due to vertigo. No aint  Occasional chest pain chronically and worse after exertion.  Some recurrent lip and ankle swelling.   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  No recurrent palpitation, dizziness and faint\  On eliquis 5 mg bid and no active bleeding     Interval history  S/p lumbar procedure. Remains pain.   Ankle swelling after the procedure. No leg weakness  Ekg nSR            Past Medical History:   Diagnosis Date    Anxiety     Arthritis     Asthma     Depression     Hyperlipidemia     Hypertension     RLS (restless legs syndrome)        Past Surgical History:   Procedure Laterality Date    ADENOIDECTOMY      pt was 3 yrs old    carpel tunnel      2009    CATARACT EXTRACTION       SECTION       2 different ones    COLONOSCOPY N/A 10/01/2018    Procedure: COLONOSCOPY;  Surgeon: Johnson Bacon MD;  Location: [facility] ENDO;  Service: Endoscopy;  Laterality: N/A;    ESOPHAGOGASTRODUODENOSCOPY N/A 10/01/2018    Procedure: ESOPHAGOGASTRODUODENOSCOPY (EGD);  Surgeon: Johnson Bacon MD;  Location: [facility] ENDO;  Service: Endoscopy;  Laterality: N/A;    golfers elbow      2009    HYSTERECTOMY      1994 total    OOPHORECTOMY      TONSILLECTOMY      pt was 3 yrs old at time       Social History     Tobacco Use    Smoking status: Former     Types: Cigarettes     Quit date: 6/10/2012     Years since quitting: 10.9     Passive exposure: Past    Smokeless tobacco: Never    Tobacco comments:     Would smoke 1 cigarette every few months   Substance Use Topics    Alcohol use: Yes     Alcohol/week: 1.0 standard drink     Types: 1 Standard drinks or equivalent per week     Comment: social    Drug use: No       Family History   Problem Relation Age of Onset    Hypertension Mother     Clotting disorder Mother     Hemochromatosis Mother     Hypertension Father     Cancer Maternal Grandmother     Clotting disorder Maternal Grandmother     Cancer Maternal Grandfather         prostate    Clotting disorder Maternal Grandfather          ROS    Objective:   Physical Exam  HENT:      Head: Normocephalic.   Eyes:      Pupils: Pupils are equal, round, and reactive to light.   Neck:      Thyroid: No thyromegaly.      Vascular: Normal carotid pulses. No carotid bruit or JVD.   Cardiovascular:      Rate and Rhythm: Normal rate and regular rhythm. No extrasystoles are present.     Chest Wall: PMI is not displaced.      Pulses: Normal pulses.      Heart sounds: Normal heart sounds. No murmur heard.    No gallop. No S3 sounds.   Pulmonary:      Effort: No respiratory distress.      Breath sounds: Normal breath sounds. No stridor.   Abdominal:      General: Bowel sounds are normal.      Palpations: Abdomen is soft.      Tenderness: There is no  abdominal tenderness. There is no rebound.   Skin:     Findings: No rash.   Neurological:      Mental Status: She is alert and oriented to person, place, and time.   Psychiatric:         Behavior: Behavior normal.       Lab Results   Component Value Date    CHOL 212 (H) 12/16/2021    CHOL 207 (H) 10/15/2020    CHOL 217 (H) 11/07/2019     Lab Results   Component Value Date    HDL 36 (L) 12/16/2021    HDL 36 (L) 10/15/2020    HDL 43 11/07/2019     Lab Results   Component Value Date    LDLCALC 133.4 12/16/2021    LDLCALC 110.2 10/15/2020    LDLCALC 128.8 11/07/2019     Lab Results   Component Value Date    TRIG 213 (H) 12/16/2021    TRIG 304 (H) 10/15/2020    TRIG 226 (H) 11/07/2019     Lab Results   Component Value Date    CHOLHDL 17.0 (L) 12/16/2021    CHOLHDL 17.4 (L) 10/15/2020    CHOLHDL 19.8 (L) 11/07/2019       Chemistry        Component Value Date/Time     12/16/2021 1520    K 4.8 12/16/2021 1520     12/16/2021 1520    CO2 25 12/16/2021 1520    BUN 15 12/16/2021 1520    CREATININE 0.8 10/28/2022 1031    GLU 95 12/16/2021 1520        Component Value Date/Time    CALCIUM 9.4 12/16/2021 1520    ALKPHOS 122 12/16/2021 1520    AST 22 12/16/2021 1520    ALT 29 12/16/2021 1520    BILITOT 0.3 12/16/2021 1520    ESTGFRAFRICA >60.0 12/16/2021 1520    EGFRNONAA >60.0 12/16/2021 1520          Lab Results   Component Value Date    HGBA1C 5.7 (H) 12/16/2021     Lab Results   Component Value Date    TSH 1.222 05/16/2012     Lab Results   Component Value Date    INR 1.0 09/20/2010     Lab Results   Component Value Date    WBC 9.92 12/16/2021    HGB 13.5 12/16/2021    HCT 42.4 12/16/2021    MCV 84 12/16/2021     12/16/2021     BMP  Sodium   Date Value Ref Range Status   12/16/2021 141 136 - 145 mmol/L Final     Potassium   Date Value Ref Range Status   12/16/2021 4.8 3.5 - 5.1 mmol/L Final     Chloride   Date Value Ref Range Status   12/16/2021 106 95 - 110 mmol/L Final     CO2   Date Value Ref Range Status    12/16/2021 25 23 - 29 mmol/L Final     BUN   Date Value Ref Range Status   12/16/2021 15 8 - 23 mg/dL Final     Creatinine   Date Value Ref Range Status   10/28/2022 0.8 0.5 - 1.4 mg/dL Final     Calcium   Date Value Ref Range Status   12/16/2021 9.4 8.7 - 10.5 mg/dL Final     Anion Gap   Date Value Ref Range Status   12/16/2021 10 8 - 16 mmol/L Final     eGFR if    Date Value Ref Range Status   12/16/2021 >60.0 >60 mL/min/1.73 m^2 Final     eGFR if non    Date Value Ref Range Status   12/16/2021 >60.0 >60 mL/min/1.73 m^2 Final     Comment:     Calculation used to obtain the estimated glomerular filtration  rate (eGFR) is the CKD-EPI equation.        BNP  @LABRCNTIP(BNP,BNPTRIAGEBLO)@  @LABRCNTIP(troponini)@  CrCl cannot be calculated (Patient's most recent lab result is older than the maximum 7 days allowed.).  No results found in the last 24 hours.  No results found in the last 24 hours.  No results found in the last 24 hours.    Assessment:      1. Mixed hyperlipidemia    2. Essential hypertension    3. PAF (paroxysmal atrial fibrillation)        Plan:   Continue Eliquis 5 mg bid coreg to 25 mg bid for HTN and PAF  Continue verapamil Hyzaar and fish oil.   LE venous US to r/o DVT  DASH  RTC in 6 months

## 2023-05-24 ENCOUNTER — HOSPITAL ENCOUNTER (OUTPATIENT)
Dept: CARDIOLOGY | Facility: HOSPITAL | Age: 68
Discharge: HOME | End: 2023-05-24
Attending: INTERNAL MEDICINE
Payer: MEDICARE

## 2023-05-24 VITALS — WEIGHT: 190 LBS | HEIGHT: 66 IN | BODY MASS INDEX: 30.53 KG/M2

## 2023-05-24 DIAGNOSIS — R60.0 LOCALIZED EDEMA: ICD-10-CM

## 2023-05-24 DIAGNOSIS — I73.9 PVD (PERIPHERAL VASCULAR DISEASE): ICD-10-CM

## 2023-05-24 PROCEDURE — 93970 EXTREMITY STUDY: CPT

## 2023-05-24 PROCEDURE — 93970 EXTREMITY STUDY: CPT | Mod: 26,,, | Performed by: INTERNAL MEDICINE

## 2023-05-24 PROCEDURE — 93970 CV US DOPPLER VENOUS LEGS BILATERAL (CUPID ONLY): ICD-10-PCS | Mod: 26,,, | Performed by: INTERNAL MEDICINE

## 2023-05-25 ENCOUNTER — TELEPHONE (OUTPATIENT)
Dept: CARDIOLOGY | Facility: CLINIC | Age: 68
End: 2023-05-25
Payer: MEDICARE

## 2023-05-25 NOTE — TELEPHONE ENCOUNTER
Lvm for pt to call back in regards to test results.                 ----- Message from Sandoval Umanzor MD sent at 5/25/2023  1:19 PM CDT -----  Leg venous US showed no DVt

## 2023-05-31 ENCOUNTER — PATIENT MESSAGE (OUTPATIENT)
Dept: RESEARCH | Facility: HOSPITAL | Age: 68
End: 2023-05-31
Payer: MEDICARE

## 2023-06-13 ENCOUNTER — TELEPHONE (OUTPATIENT)
Dept: ADMINISTRATIVE | Facility: HOSPITAL | Age: 68
End: 2023-06-13
Payer: COMMERCIAL

## 2023-06-26 ENCOUNTER — TELEPHONE (OUTPATIENT)
Dept: CARDIOLOGY | Facility: CLINIC | Age: 68
End: 2023-06-26
Payer: COMMERCIAL

## 2023-06-26 NOTE — TELEPHONE ENCOUNTER
Patient contacted and was advised that Dr. Umanzor can see her next week. The patient accepted the appointment and confirmed the location and time.      The patient would like to be seen for leg edema.     ----- Message from Jonna Butterfield sent at 6/26/2023  4:08 PM CDT -----  Name of Who is Calling:Patient           What is the request in detail:Patient is scheduled for Aug 2nd with wait list added but is requesting a sooner appt. Patient is experiencing leg and foot swelling. Patient mentioned that the legs are not hot but are very painful.           Can the clinic reply by [facility]:no           What Number to Call Back if not in [facility]: [phone]

## 2023-06-27 ENCOUNTER — TELEPHONE (OUTPATIENT)
Dept: FAMILY MEDICINE | Facility: CLINIC | Age: 68
End: 2023-06-27
Payer: COMMERCIAL

## 2023-07-26 ENCOUNTER — OFFICE VISIT (OUTPATIENT)
Dept: CARDIOLOGY | Facility: CLINIC | Age: 68
End: 2023-07-26
Payer: COMMERCIAL

## 2023-07-26 VITALS
SYSTOLIC BLOOD PRESSURE: 142 MMHG | HEART RATE: 71 BPM | HEIGHT: 66 IN | DIASTOLIC BLOOD PRESSURE: 78 MMHG | BODY MASS INDEX: 29.78 KG/M2 | WEIGHT: 185.31 LBS | OXYGEN SATURATION: 98 %

## 2023-07-26 DIAGNOSIS — E78.2 MIXED HYPERLIPIDEMIA: ICD-10-CM

## 2023-07-26 DIAGNOSIS — I73.9 PVD (PERIPHERAL VASCULAR DISEASE): Primary | ICD-10-CM

## 2023-07-26 DIAGNOSIS — I48.0 PAF (PAROXYSMAL ATRIAL FIBRILLATION): ICD-10-CM

## 2023-07-26 DIAGNOSIS — I10 ESSENTIAL HYPERTENSION: ICD-10-CM

## 2023-07-26 PROCEDURE — 1159F PR MEDICATION LIST DOCUMENTED IN MEDICAL RECORD: ICD-10-PCS | Mod: CPTII,S$GLB,, | Performed by: INTERNAL MEDICINE

## 2023-07-26 PROCEDURE — 3078F PR MOST RECENT DIASTOLIC BLOOD PRESSURE < 80 MM HG: ICD-10-PCS | Mod: CPTII,S$GLB,, | Performed by: INTERNAL MEDICINE

## 2023-07-26 PROCEDURE — 1160F RVW MEDS BY RX/DR IN RCRD: CPT | Mod: CPTII,S$GLB,, | Performed by: INTERNAL MEDICINE

## 2023-07-26 PROCEDURE — 3078F DIAST BP <80 MM HG: CPT | Mod: CPTII,S$GLB,, | Performed by: INTERNAL MEDICINE

## 2023-07-26 PROCEDURE — 99214 PR OFFICE/OUTPT VISIT, EST, LEVL IV, 30-39 MIN: ICD-10-PCS | Mod: S$GLB,,, | Performed by: INTERNAL MEDICINE

## 2023-07-26 PROCEDURE — 3288F FALL RISK ASSESSMENT DOCD: CPT | Mod: CPTII,S$GLB,, | Performed by: INTERNAL MEDICINE

## 2023-07-26 PROCEDURE — 3077F SYST BP >= 140 MM HG: CPT | Mod: CPTII,S$GLB,, | Performed by: INTERNAL MEDICINE

## 2023-07-26 PROCEDURE — 3008F BODY MASS INDEX DOCD: CPT | Mod: CPTII,S$GLB,, | Performed by: INTERNAL MEDICINE

## 2023-07-26 PROCEDURE — 3077F PR MOST RECENT SYSTOLIC BLOOD PRESSURE >= 140 MM HG: ICD-10-PCS | Mod: CPTII,S$GLB,, | Performed by: INTERNAL MEDICINE

## 2023-07-26 PROCEDURE — 99214 OFFICE O/P EST MOD 30 MIN: CPT | Mod: S$GLB,,, | Performed by: INTERNAL MEDICINE

## 2023-07-26 PROCEDURE — 1160F PR REVIEW ALL MEDS BY PRESCRIBER/CLIN PHARMACIST DOCUMENTED: ICD-10-PCS | Mod: CPTII,S$GLB,, | Performed by: INTERNAL MEDICINE

## 2023-07-26 PROCEDURE — 1159F MED LIST DOCD IN RCRD: CPT | Mod: CPTII,S$GLB,, | Performed by: INTERNAL MEDICINE

## 2023-07-26 PROCEDURE — 1100F PR PT FALLS ASSESS DOC 2+ FALLS/FALL W/INJURY/YR: ICD-10-PCS | Mod: CPTII,S$GLB,, | Performed by: INTERNAL MEDICINE

## 2023-07-26 PROCEDURE — 99999 PR PBB SHADOW E&M-EST. PATIENT-LVL IV: ICD-10-PCS | Mod: PBBFAC,,, | Performed by: INTERNAL MEDICINE

## 2023-07-26 PROCEDURE — 99999 PR PBB SHADOW E&M-EST. PATIENT-LVL IV: CPT | Mod: PBBFAC,,, | Performed by: INTERNAL MEDICINE

## 2023-07-26 PROCEDURE — 3008F PR BODY MASS INDEX (BMI) DOCUMENTED: ICD-10-PCS | Mod: CPTII,S$GLB,, | Performed by: INTERNAL MEDICINE

## 2023-07-26 PROCEDURE — 3288F PR FALLS RISK ASSESSMENT DOCUMENTED: ICD-10-PCS | Mod: CPTII,S$GLB,, | Performed by: INTERNAL MEDICINE

## 2023-07-26 PROCEDURE — 1100F PTFALLS ASSESS-DOCD GE2>/YR: CPT | Mod: CPTII,S$GLB,, | Performed by: INTERNAL MEDICINE

## 2023-07-26 RX ORDER — HYDRALAZINE HYDROCHLORIDE 50 MG/1
50 TABLET, FILM COATED ORAL 3 TIMES DAILY
Qty: 90 TABLET | Refills: 11 | Status: SHIPPED | OUTPATIENT
Start: 2023-07-26 | End: 2023-08-03 | Stop reason: SDUPTHER

## 2023-07-26 NOTE — PROGRESS NOTES
Subjective:   Patient ID:  Erin Soriano is a 68 y.o. female who presents for follow up of No chief complaint on file.      67 yo female 6 months f/u Retired   PMH PVD, HTN, HLD, GERD, anxiety and PAF. Fibromyalgia. Negative sleep study before.   EGD and colonoscopy done on 10/01/2018 normal.  Pt was told having PAF during the EGD.  MPI and ECHO done in  normal EF and no stress induced ischemia  Her ZIOPATCH done in  showed 3% PAF and PSVT.  No smoking/drinking  Mother has afib. Father CHF  Taking excedrin for migraine    10-21/2020 visit, Episode of chest tightness, palpitation and dizziness for one month, lasted for 5 minutes and resolved spontaneously. Pt states that she had similar episodes very often last year and much less frequency since started Coreg and verapamil.   Occasional chest pain. No faint dizziness. Med compliance. Takes ASA for migraine     visit Occasional atypical CP. Improved the symptoms of palpitation.  Stressful due to the family pressure  Ok with the medications     visit  Will have the cataract procedure  AFIB [id number] score of 3. Occasional palpitation. Dizziness due to vertigo. No aint  Occasional chest pain chronically and worse after exertion.  Some recurrent lip and ankle swelling.   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  No recurrent palpitation, dizziness and faint\  On eliquis 5 mg bid and no active bleeding     05/23 visit  S/p lumbar procedure. Remains pain.   Ankle swelling after the procedure. No leg weakness  Ekg nSR    Interval history  Remains leg swelling worse in PM.             Past Medical History:   Diagnosis Date    Anxiety     Arthritis     Asthma     Depression     Hyperlipidemia     Hypertension     RLS (restless legs syndrome)        Past Surgical History:   Procedure Laterality Date    ADENOIDECTOMY      pt was 3 yrs old     carpel tunnel      2009    CATARACT EXTRACTION       SECTION      2 different ones    COLONOSCOPY N/A 10/01/2018    Procedure: COLONOSCOPY;  Surgeon: Johnson Bacon MD;  Location: [facility] ENDO;  Service: Endoscopy;  Laterality: N/A;    ESOPHAGOGASTRODUODENOSCOPY N/A 10/01/2018    Procedure: ESOPHAGOGASTRODUODENOSCOPY (EGD);  Surgeon: Johnson Bacon MD;  Location: [facility] ENDO;  Service: Endoscopy;  Laterality: N/A;    golfers elbow      2009    HYSTERECTOMY      1994 total    OOPHORECTOMY      TONSILLECTOMY      pt was 3 yrs old at time       Social History     Tobacco Use    Smoking status: Former     Types: Cigarettes     Quit date: 6/10/2012     Years since quittin.1     Passive exposure: Past    Smokeless tobacco: Never    Tobacco comments:     Would smoke 1 cigarette every few months   Substance Use Topics    Alcohol use: Yes     Alcohol/week: 1.0 standard drink     Types: 1 Standard drinks or equivalent per week     Comment: social    Drug use: No       Family History   Problem Relation Age of Onset    Hypertension Mother     Clotting disorder Mother     Hemochromatosis Mother     Hypertension Father     Cancer Maternal Grandmother     Clotting disorder Maternal Grandmother     Cancer Maternal Grandfather         prostate    Clotting disorder Maternal Grandfather          ROS    Objective:   Physical Exam  HENT:      Head: Normocephalic.   Eyes:      Pupils: Pupils are equal, round, and reactive to light.   Neck:      Thyroid: No thyromegaly.      Vascular: Normal carotid pulses. No carotid bruit or JVD.   Cardiovascular:      Rate and Rhythm: Normal rate and regular rhythm. No extrasystoles are present.     Chest Wall: PMI is not displaced.      Pulses: Normal pulses.      Heart sounds: Normal heart sounds. No murmur heard.    No gallop. No S3 sounds.   Pulmonary:      Effort: No respiratory distress.      Breath sounds: Normal breath sounds. No stridor.   Abdominal:      General: Bowel sounds are  normal.      Palpations: Abdomen is soft.      Tenderness: There is no abdominal tenderness. There is no rebound.   Skin:     Findings: No rash.   Neurological:      Mental Status: She is alert and oriented to person, place, and time.   Psychiatric:         Behavior: Behavior normal.       Lab Results   Component Value Date    CHOL 212 (H) 12/16/2021    CHOL 207 (H) 10/15/2020    CHOL 217 (H) 11/07/2019     Lab Results   Component Value Date    HDL 36 (L) 12/16/2021    HDL 36 (L) 10/15/2020    HDL 43 11/07/2019     Lab Results   Component Value Date    LDLCALC 133.4 12/16/2021    LDLCALC 110.2 10/15/2020    LDLCALC 128.8 11/07/2019     Lab Results   Component Value Date    TRIG 213 (H) 12/16/2021    TRIG 304 (H) 10/15/2020    TRIG 226 (H) 11/07/2019     Lab Results   Component Value Date    CHOLHDL 17.0 (L) 12/16/2021    CHOLHDL 17.4 (L) 10/15/2020    CHOLHDL 19.8 (L) 11/07/2019       Chemistry        Component Value Date/Time     12/16/2021 1520    K 4.8 12/16/2021 1520     12/16/2021 1520    CO2 25 12/16/2021 1520    BUN 15 12/16/2021 1520    CREATININE 0.8 10/28/2022 1031    GLU 95 12/16/2021 1520        Component Value Date/Time    CALCIUM 9.4 12/16/2021 1520    ALKPHOS 122 12/16/2021 1520    AST 22 12/16/2021 1520    ALT 29 12/16/2021 1520    BILITOT 0.3 12/16/2021 1520    ESTGFRAFRICA >60.0 12/16/2021 1520    EGFRNONAA >60.0 12/16/2021 1520          Lab Results   Component Value Date    HGBA1C 5.7 (H) 12/16/2021     Lab Results   Component Value Date    TSH 1.222 05/16/2012     Lab Results   Component Value Date    INR 1.0 09/20/2010     Lab Results   Component Value Date    WBC 9.92 12/16/2021    HGB 6.9 (LL) 01/04/2023    HCT 42.4 12/16/2021    MCV 84 12/16/2021     12/16/2021     BMP  Sodium   Date Value Ref Range Status   12/16/2021 141 136 - 145 mmol/L Final     Potassium   Date Value Ref Range Status   12/16/2021 4.8 3.5 - 5.1 mmol/L Final     Chloride   Date Value Ref Range Status    12/16/2021 106 95 - 110 mmol/L Final     CO2   Date Value Ref Range Status   12/16/2021 25 23 - 29 mmol/L Final     BUN   Date Value Ref Range Status   12/16/2021 15 8 - 23 mg/dL Final     Creatinine   Date Value Ref Range Status   10/28/2022 0.8 0.5 - 1.4 mg/dL Final     Calcium   Date Value Ref Range Status   12/16/2021 9.4 8.7 - 10.5 mg/dL Final     Anion Gap   Date Value Ref Range Status   12/16/2021 10 8 - 16 mmol/L Final     eGFR if    Date Value Ref Range Status   12/16/2021 >60.0 >60 mL/min/1.73 m^2 Final     eGFR if non    Date Value Ref Range Status   12/16/2021 >60.0 >60 mL/min/1.73 m^2 Final     Comment:     Calculation used to obtain the estimated glomerular filtration  rate (eGFR) is the CKD-EPI equation.        BNP  @LABRCNTIP(BNP,BNPTRIAGEBLO)@  @LABRCNTIP(troponini)@  CrCl cannot be calculated (Patient's most recent lab result is older than the maximum 7 days allowed.).  No results found in the last 24 hours.  No results found in the last 24 hours.  No results found in the last 24 hours.    Assessment:      1. PVD (peripheral vascular disease)    2. Essential hypertension    3. Mixed hyperlipidemia    4. PAF (paroxysmal atrial fibrillation)        Plan:   D/c verapamil due to leg swelling  Add hydralazine 50 mg tid for HTN   Continue Coreg losartan HCTZ   Avoid Aldactone due to recent K 4.8  Advise compression sock and DASH  Rtc on 3 M

## 2023-08-03 ENCOUNTER — TELEPHONE (OUTPATIENT)
Dept: CARDIOTHORACIC SURGERY | Facility: CLINIC | Age: 68
End: 2023-08-03
Payer: COMMERCIAL

## 2023-08-03 DIAGNOSIS — R09.82 POST-NASAL DRIP: ICD-10-CM

## 2023-08-03 RX ORDER — CYCLOBENZAPRINE HCL 10 MG
10 TABLET ORAL 2 TIMES DAILY PRN
Qty: 180 TABLET | Refills: 1 | Status: SHIPPED | OUTPATIENT
Start: 2023-08-03 | End: 2023-08-18 | Stop reason: SDUPTHER

## 2023-08-03 RX ORDER — BUPROPION HYDROCHLORIDE 150 MG/1
150 TABLET, EXTENDED RELEASE ORAL 2 TIMES DAILY
Qty: 180 TABLET | Refills: 3 | Status: SHIPPED | OUTPATIENT
Start: 2023-08-03 | End: 2023-08-25 | Stop reason: SDUPTHER

## 2023-08-03 RX ORDER — ROPINIROLE 1 MG/1
1 TABLET, FILM COATED ORAL 2 TIMES DAILY PRN
Qty: 180 TABLET | Refills: 3 | Status: SHIPPED | OUTPATIENT
Start: 2023-08-03 | End: 2023-08-18 | Stop reason: SDUPTHER

## 2023-08-03 RX ORDER — GABAPENTIN 300 MG/1
300 CAPSULE ORAL 2 TIMES DAILY
Qty: 180 CAPSULE | Refills: 3 | Status: SHIPPED | OUTPATIENT
Start: 2023-08-03 | End: 2023-08-07 | Stop reason: SDUPTHER

## 2023-08-03 RX ORDER — LOSARTAN POTASSIUM AND HYDROCHLOROTHIAZIDE 25; 100 MG/1; MG/1
1 TABLET ORAL DAILY
Qty: 90 TABLET | Refills: 3 | Status: SHIPPED | OUTPATIENT
Start: 2023-08-03 | End: 2023-08-25 | Stop reason: SDUPTHER

## 2023-08-03 RX ORDER — PANTOPRAZOLE SODIUM 40 MG/1
40 TABLET, DELAYED RELEASE ORAL DAILY
Qty: 90 TABLET | Refills: 3 | Status: SHIPPED | OUTPATIENT
Start: 2023-08-03 | End: 2023-08-25 | Stop reason: SDUPTHER

## 2023-08-03 RX ORDER — CARVEDILOL 25 MG/1
25 TABLET ORAL 2 TIMES DAILY WITH MEALS
Qty: 180 TABLET | Refills: 3 | Status: SHIPPED | OUTPATIENT
Start: 2023-08-03 | End: 2023-08-25 | Stop reason: SDUPTHER

## 2023-08-03 RX ORDER — DULOXETIN HYDROCHLORIDE 30 MG/1
30 CAPSULE, DELAYED RELEASE ORAL DAILY
Qty: 90 CAPSULE | Refills: 3 | Status: SHIPPED | OUTPATIENT
Start: 2023-08-03 | End: 2023-08-25 | Stop reason: SDUPTHER

## 2023-08-03 RX ORDER — FLUTICASONE PROPIONATE 50 MCG
1 SPRAY, SUSPENSION (ML) NASAL DAILY
Qty: 16 ML | Refills: 0 | Status: SHIPPED | OUTPATIENT
Start: 2023-08-03 | End: 2023-08-25 | Stop reason: SDUPTHER

## 2023-08-03 RX ORDER — HYDRALAZINE HYDROCHLORIDE 50 MG/1
50 TABLET, FILM COATED ORAL 3 TIMES DAILY
Qty: 90 TABLET | Refills: 11 | Status: SHIPPED | OUTPATIENT
Start: 2023-08-03 | End: 2023-08-25 | Stop reason: SDUPTHER

## 2023-08-03 NOTE — TELEPHONE ENCOUNTER
----- Message from Tania Galeano sent at 8/3/2023  1:02 PM CDT -----  Contact: Clifton [phone]  Pt states that the pharmacy been request all medication,but has not received them yet. She would like a call back to see what's going on? Please call to advise.

## 2023-08-03 NOTE — TELEPHONE ENCOUNTER
Called and spoke with pt regarding pharmacy and medications. Pt states she no longer wants to use the [facility] pharmacy, she prefers to use the mail ordered company through her insurance ([facility]).   Ph: [phone]   Fax: [phone]  Pt states the pharmacy was supposed to fax over a Rx request form. I gave pt fax # to make sure the request was sent to the correct fax. Pt states she will call pharmacy to ask them to re-fax the Rx request. I also informed pt to contact her PCP to have them send her medications as well being that we can only send cardio medications.     ----- Message from Tania Galeano sent at 8/3/2023  1:03 PM CDT -----  Contact: Clifton [phone]  Pt states that the pharmacy been request all medication,but has not received them yet. She would like a call back to see what's going on? Please call to advise.

## 2023-08-03 NOTE — TELEPHONE ENCOUNTER
Care Due:                  Date            Visit Type   Department     Provider  --------------------------------------------------------------------------------                                EP -                              PRIMARY      [facility]  Last Visit: 02-      CARE ([facility])   MEDICINE       Helena Jones  Next Visit: None Scheduled  None         None Found                                                            Last  Test          Frequency    Reason                     Performed    Due Date  --------------------------------------------------------------------------------    CMP.........  12 months..  DULoxetine,                12- 12-                             losartan-hydrochlorothiaz                             elsa......................    [employer] Embedded Care Due Messages. Reference number: [id number].   8/03/2023 1:43:25 PM CDT

## 2023-08-07 ENCOUNTER — OFFICE VISIT (OUTPATIENT)
Dept: FAMILY MEDICINE | Facility: CLINIC | Age: 68
End: 2023-08-07
Payer: COMMERCIAL

## 2023-08-07 ENCOUNTER — LAB VISIT (OUTPATIENT)
Dept: LAB | Facility: HOSPITAL | Age: 68
End: 2023-08-07
Attending: FAMILY MEDICINE
Payer: COMMERCIAL

## 2023-08-07 VITALS
HEART RATE: 103 BPM | HEIGHT: 66 IN | WEIGHT: 188.69 LBS | RESPIRATION RATE: 18 BRPM | BODY MASS INDEX: 30.33 KG/M2 | DIASTOLIC BLOOD PRESSURE: 74 MMHG | SYSTOLIC BLOOD PRESSURE: 132 MMHG | OXYGEN SATURATION: 96 %

## 2023-08-07 DIAGNOSIS — M19.90 ARTHRITIS: ICD-10-CM

## 2023-08-07 DIAGNOSIS — M79.10 MYALGIA DUE TO HMG COA REDUCTASE INHIBITOR: Primary | ICD-10-CM

## 2023-08-07 DIAGNOSIS — E78.2 MIXED HYPERLIPIDEMIA: ICD-10-CM

## 2023-08-07 DIAGNOSIS — G25.81 RESTLESS LEGS: ICD-10-CM

## 2023-08-07 DIAGNOSIS — K13.0 ANGULAR CHEILOSIS: ICD-10-CM

## 2023-08-07 DIAGNOSIS — J45.909 CHRONIC ASTHMA, UNSPECIFIED ASTHMA SEVERITY, UNSPECIFIED WHETHER COMPLICATED, UNSPECIFIED WHETHER PERSISTENT: ICD-10-CM

## 2023-08-07 DIAGNOSIS — Z12.11 COLON CANCER SCREENING: ICD-10-CM

## 2023-08-07 DIAGNOSIS — T46.6X5A MYALGIA DUE TO HMG COA REDUCTASE INHIBITOR: Primary | ICD-10-CM

## 2023-08-07 LAB — ERYTHROCYTE [SEDIMENTATION RATE] IN BLOOD BY PHOTOMETRIC METHOD: 15 MM/HR (ref 0–36)

## 2023-08-07 PROCEDURE — 99999 PR PBB SHADOW E&M-EST. PATIENT-LVL V: ICD-10-PCS | Mod: PBBFAC,,, | Performed by: FAMILY MEDICINE

## 2023-08-07 PROCEDURE — 3288F FALL RISK ASSESSMENT DOCD: CPT | Mod: CPTII,S$GLB,, | Performed by: FAMILY MEDICINE

## 2023-08-07 PROCEDURE — 99999 PR PBB SHADOW E&M-EST. PATIENT-LVL V: CPT | Mod: PBBFAC,,, | Performed by: FAMILY MEDICINE

## 2023-08-07 PROCEDURE — 3075F PR MOST RECENT SYSTOLIC BLOOD PRESS GE 130-139MM HG: ICD-10-PCS | Mod: CPTII,S$GLB,, | Performed by: FAMILY MEDICINE

## 2023-08-07 PROCEDURE — 1159F MED LIST DOCD IN RCRD: CPT | Mod: CPTII,S$GLB,, | Performed by: FAMILY MEDICINE

## 2023-08-07 PROCEDURE — 3078F PR MOST RECENT DIASTOLIC BLOOD PRESSURE < 80 MM HG: ICD-10-PCS | Mod: CPTII,S$GLB,, | Performed by: FAMILY MEDICINE

## 2023-08-07 PROCEDURE — 1126F AMNT PAIN NOTED NONE PRSNT: CPT | Mod: CPTII,S$GLB,, | Performed by: FAMILY MEDICINE

## 2023-08-07 PROCEDURE — 86431 RHEUMATOID FACTOR QUANT: CPT | Performed by: FAMILY MEDICINE

## 2023-08-07 PROCEDURE — 85652 RBC SED RATE AUTOMATED: CPT | Performed by: FAMILY MEDICINE

## 2023-08-07 PROCEDURE — 1101F PR PT FALLS ASSESS DOC 0-1 FALLS W/OUT INJ PAST YR: ICD-10-PCS | Mod: CPTII,S$GLB,, | Performed by: FAMILY MEDICINE

## 2023-08-07 PROCEDURE — 99214 OFFICE O/P EST MOD 30 MIN: CPT | Mod: S$GLB,,, | Performed by: FAMILY MEDICINE

## 2023-08-07 PROCEDURE — 84466 ASSAY OF TRANSFERRIN: CPT | Performed by: FAMILY MEDICINE

## 2023-08-07 PROCEDURE — 1101F PT FALLS ASSESS-DOCD LE1/YR: CPT | Mod: CPTII,S$GLB,, | Performed by: FAMILY MEDICINE

## 2023-08-07 PROCEDURE — 99214 PR OFFICE/OUTPT VISIT, EST, LEVL IV, 30-39 MIN: ICD-10-PCS | Mod: S$GLB,,, | Performed by: FAMILY MEDICINE

## 2023-08-07 PROCEDURE — 36415 COLL VENOUS BLD VENIPUNCTURE: CPT | Mod: PO | Performed by: FAMILY MEDICINE

## 2023-08-07 PROCEDURE — 3075F SYST BP GE 130 - 139MM HG: CPT | Mod: CPTII,S$GLB,, | Performed by: FAMILY MEDICINE

## 2023-08-07 PROCEDURE — 1160F PR REVIEW ALL MEDS BY PRESCRIBER/CLIN PHARMACIST DOCUMENTED: ICD-10-PCS | Mod: CPTII,S$GLB,, | Performed by: FAMILY MEDICINE

## 2023-08-07 PROCEDURE — 3288F PR FALLS RISK ASSESSMENT DOCUMENTED: ICD-10-PCS | Mod: CPTII,S$GLB,, | Performed by: FAMILY MEDICINE

## 2023-08-07 PROCEDURE — 3008F BODY MASS INDEX DOCD: CPT | Mod: CPTII,S$GLB,, | Performed by: FAMILY MEDICINE

## 2023-08-07 PROCEDURE — 1159F PR MEDICATION LIST DOCUMENTED IN MEDICAL RECORD: ICD-10-PCS | Mod: CPTII,S$GLB,, | Performed by: FAMILY MEDICINE

## 2023-08-07 PROCEDURE — 1126F PR PAIN SEVERITY QUANTIFIED, NO PAIN PRESENT: ICD-10-PCS | Mod: CPTII,S$GLB,, | Performed by: FAMILY MEDICINE

## 2023-08-07 PROCEDURE — 83540 ASSAY OF IRON: CPT | Performed by: FAMILY MEDICINE

## 2023-08-07 PROCEDURE — 1160F RVW MEDS BY RX/DR IN RCRD: CPT | Mod: CPTII,S$GLB,, | Performed by: FAMILY MEDICINE

## 2023-08-07 PROCEDURE — 3008F PR BODY MASS INDEX (BMI) DOCUMENTED: ICD-10-PCS | Mod: CPTII,S$GLB,, | Performed by: FAMILY MEDICINE

## 2023-08-07 PROCEDURE — 3078F DIAST BP <80 MM HG: CPT | Mod: CPTII,S$GLB,, | Performed by: FAMILY MEDICINE

## 2023-08-07 RX ORDER — GABAPENTIN 300 MG/1
300 CAPSULE ORAL 2 TIMES DAILY
Qty: 180 CAPSULE | Refills: 3 | Status: ACTIVE | OUTPATIENT
Start: 2023-08-07 | End: 2023-08-25 | Stop reason: SDUPTHER

## 2023-08-07 RX ORDER — EVOLOCUMAB 140 MG/ML
140 INJECTION, SOLUTION SUBCUTANEOUS
Qty: 2 ML | Refills: 2 | Status: ACTIVE | OUTPATIENT
Start: 2023-08-07 | End: 2023-11-21 | Stop reason: SDUPTHER

## 2023-08-07 RX ORDER — CLOTRIMAZOLE 1 %
CREAM (GRAM) TOPICAL 2 TIMES DAILY
Qty: 120 G | Refills: 1 | Status: ACTIVE | OUTPATIENT
Start: 2023-08-07

## 2023-08-07 NOTE — PROGRESS NOTES
"  Chief Complaint:    No chief complaint on file.      History of Present Illness:  8/7/23:  Patient with PMHx of aneity, asthma, depression, HLD, and HTN presents today for follow-up    Pt says that she has a rash appearing on the sides of her lip; it is worse on the L side. She says that she does drool at night.     Pt says that her statin has been making her legs hurt.  She has tried several statin drugs in the past  She is taking requip for RLS. She says that her RLS is mostly controlled, but some nights she will not be able to control them.    Pt says that she wants to check her RA factor. She went to a rheumatologist and all tests were negative, so they ruled out lupus, rheumatoid, and sjogrens.     Blood pressure stable today    Colonoscopy due in October.       2/1/23:  Patient presents to clinic for a wound    Says on her L buttocks when in the hospital for surgery 1mo ago got a "skin sheer" saying the top layer of skin was removed when being transferred around. Has now become a "knot". TTP.   BP has been running from 90's to 140's systolic.   Hard time sleeping due to pain.       ROS:  Review of Systems   Constitutional:  Negative for appetite change, chills and fever.   HENT:  Negative for congestion, ear pain, postnasal drip, rhinorrhea, sinus pressure and sinus pain.    Eyes:  Negative for pain.   Respiratory:  Negative for cough, chest tightness and shortness of breath.    Cardiovascular:  Negative for chest pain and palpitations.   Gastrointestinal:  Negative for abdominal pain, blood in stool, constipation, diarrhea and nausea.   Genitourinary:  Negative for difficulty urinating, dysuria, flank pain and hematuria.   Musculoskeletal:  Positive for myalgias (Bilateral generalized leg pain). Negative for arthralgias.   Skin:  Positive for rash (sides of her lip). Negative for pallor.   Neurological:  Negative for dizziness, tremors, speech difficulty, light-headedness and headaches. " "  Psychiatric/Behavioral:  Negative for behavioral problems and dysphoric mood. The patient is not nervous/anxious.    All other systems reviewed and are negative.      Past Medical History:   Diagnosis Date    Anxiety     Arthritis     Asthma     Depression     Hyperlipidemia     Hypertension     RLS (restless legs syndrome)        Social History:  Social History     Socioeconomic History    Marital status:     Number of children: 2   Tobacco Use    Smoking status: Former     Current packs/day: 0.00     Types: Cigarettes     Quit date: 6/10/2012     Years since quittin.1     Passive exposure: Past    Smokeless tobacco: Never    Tobacco comments:     Would smoke 1 cigarette every few months   Substance and Sexual Activity    Alcohol use: Yes     Alcohol/week: 1.0 standard drink of alcohol     Types: 1 Standard drinks or equivalent per week     Comment: social    Drug use: No    Sexual activity: Yes     Partners: Male     Birth control/protection: None       Family History:   family history includes Cancer in her maternal grandfather and maternal grandmother; Clotting disorder in her maternal grandfather, maternal grandmother, and mother; Hemochromatosis in her mother; Hypertension in her father and mother.    Health Maintenance   Topic Date Due    Lipid Panel  2022    Mammogram  2023    DEXA Scan  2025    TETANUS VACCINE  2029    Hepatitis C Screening  Completed       Physical Exam:    Vital Signs  Pulse: 103  Resp: 18  SpO2: 96 %  BP: 132/74  Pain Score: 0-No pain  Height and Weight  Height: 5' 6" (167.6 cm)  Weight: 85.6 kg (188 lb 11.4 oz)  BSA (Calculated - sq m): 2 sq meters  BMI (Calculated): 30.5  Weight in (lb) to have BMI = 25: 154.6]    Body mass index is 30.46 kg/m².    Physical Exam  Constitutional:       Appearance: She is well-developed.   HENT:      Head: Normocephalic and atraumatic.      Right Ear: External ear normal.      Left Ear: External ear normal.      " Nose: Nose normal.   Eyes:      Conjunctiva/sclera: Conjunctivae normal.      Pupils: Pupils are equal, round, and reactive to light.   Neck:      Thyroid: No thyromegaly.      Vascular: No JVD.      Trachea: No tracheal deviation.   Cardiovascular:      Rate and Rhythm: Normal rate and regular rhythm.      Heart sounds: Normal heart sounds. No murmur heard.     No gallop.   Pulmonary:      Effort: Pulmonary effort is normal. No respiratory distress.      Breath sounds: Normal breath sounds. No stridor. No wheezing or rales.   Chest:      Chest wall: No tenderness.   Abdominal:      General: There is no distension.      Palpations: Abdomen is soft. There is no mass.      Tenderness: There is no abdominal tenderness. There is no guarding or rebound.   Genitourinary:     Vagina: Normal.   Musculoskeletal:         General: No tenderness. Normal range of motion.      Cervical back: Normal range of motion and neck supple.   Lymphadenopathy:      Cervical: No cervical adenopathy.   Skin:     General: Skin is warm and dry.   Neurological:      Mental Status: She is alert and oriented to person, place, and time.      Deep Tendon Reflexes: Reflexes are normal and symmetric.   Psychiatric:         Speech: Speech normal.         Behavior: Behavior normal.         Thought Content: Thought content normal.         Judgment: Judgment normal.           Assessment:      ICD-10-CM ICD-9-CM   1. 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 Mixed hyperlipidemia  E78.2 272.2   3. Chronic asthma, unspecified asthma severity, unspecified whether complicated, unspecified whether persistent  J45.909 493.90   4. Arthritis  M19.90 716.90   5. Colon cancer screening  Z12.11 V76.51   6. Angular cheilosis  K13.0 528.5   7. Restless legs  G25.81 333.94         Plan:  Recommend trial of Repatha due to underlying statin causing myalgia     Advised to take magnesium and tonic water to help with RLS.    Continue to  monitor her BP keeping below 140/90    Start lotrimin cream to help with angular cheilosis.  And recommend supplementing with B complex vitamin  Informed that drooling will make her rash worse.  Advised to drink more water so that her saliva is less irritating to the skin and to also start sleeping on her back.    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  -     Lipid Panel; Future  -     Comprehensive Metabolic Panel; Future    Mixed hyperlipidemia  -     evolocumab (REPATHA SURECLICK) 140 mg/mL PnIj; Inject 1 mL (140 mg total) into the skin every 14 (fourteen) days.  -     Lipid Panel; Future  -     Comprehensive Metabolic Panel; Future    Chronic asthma, unspecified asthma severity, unspecified whether complicated, unspecified whether persistent    Arthritis  -     Rheumatoid Factor; Future  -     Sedimentation rate; Future    Colon cancer screening  -     Ambulatory referral/consult to Endo Procedure ; Future    Angular cheilosis    Restless legs  -     Iron and TIBC; Future    Other orders  -     gabapentin (NEURONTIN) 300 MG capsule; Take 1 capsule (300 mg total) by mouth 2 (two) times daily.  -     clotrimazole (LOTRIMIN) 1 % cream; Apply topically 2 (two) times daily.            Orders Placed This Encounter    Lipid Panel    Comprehensive Metabolic Panel    Rheumatoid Factor    Sedimentation rate    Iron and TIBC    Ambulatory referral/consult to Endo Procedure     evolocumab (REPATHA SURECLICK) 140 mg/mL PnIj    gabapentin (NEURONTIN) 300 MG capsule    clotrimazole (LOTRIMIN) 1 % cream          Current Outpatient Medications   Medication Sig Dispense Refill    acetaminophen (TYLENOL) 500 MG tablet Take 500 mg by mouth every 6 (six) hours  as needed for Pain.      albuterol (PROVENTIL/VENTOLIN HFA) 90 mcg/actuation inhaler Inhale 2 puffs into the lungs every 4 (four) hours as needed for Wheezing. 18 g 2    albuterol-ipratropium (DUO-NEB) 2.5 mg-0.5 mg/3 mL nebulizer solution Take 3 mLs by nebulization every 4 (four) hours as needed for Wheezing. Rescue 75 mL 0    apixaban (ELIQUIS) 5 mg Tab Take 1 tablet (5 mg total) by mouth 2 (two) times daily. 60 tablet 10    ascorbic acid, vitamin C, (VITAMIN C) 1000 MG tablet Take 1,000 mg by mouth once daily.      buPROPion (WELLBUTRIN SR) 150 MG TBSR 12 hr tablet Take 1 tablet (150 mg total) by mouth 2 (two) times daily. 180 tablet 3    carvediloL (COREG) 25 MG tablet Take 1 tablet (25 mg total) by mouth 2 (two) times daily with meals. 180 tablet 3    cyclobenzaprine (FLEXERIL) 10 MG tablet Take 1 tablet (10 mg total) by mouth 2 (two) times daily as needed for Muscle spasms. 180 tablet 1    DULoxetine (CYMBALTA) 30 MG capsule Take 1 capsule (30 mg total) by mouth once daily. 90 capsule 3    fluticasone propionate (FLONASE) 50 mcg/actuation nasal spray 1 spray (50 mcg total) by Each Nostril route once daily. 16 mL 0    hydrALAZINE (APRESOLINE) 50 MG tablet Take 1 tablet (50 mg total) by mouth 3 (three) times daily. 90 tablet 11    ibuprofen (ADVIL,MOTRIN) 600 MG tablet       inhalat.spacing dev,large mask (BREATHERITE SPACER-MASK,ADULT) Spcr Use as directed for inhalation. 1 each 1    losartan-hydrochlorothiazide 100-25 mg (HYZAAR) 100-25 mg per tablet Take 1 tablet by mouth once daily. 90 tablet 3    meclizine (ANTIVERT) 25 mg tablet TAKE 1 TABLET (25 MG TOTAL) BY MOUTH 3 (THREE) TIMES DAILY AS NEEDED FOR DIZZINESS 60 tablet 0    olopatadine (PATANOL) 0.1 % ophthalmic solution Place 1 drop into both eyes once daily.      pantoprazole (PROTONIX) 40 MG tablet Take 1 tablet (40 mg total) by mouth once daily. 90 tablet 3    potassium 99 mg Tab Take by mouth. 1 tablet Oral Every day      rOPINIRole (REQUIP) 1 MG  tablet Take 1 tablet (1 mg total) by mouth 2 (two) times daily as needed (restless leg). 180 tablet 3    traMADoL (ULTRAM) 50 mg tablet Take 1 tablet (50 mg total) by mouth every 12 (twelve) hours as needed for Pain. 60 tablet 5    clotrimazole (LOTRIMIN) 1 % cream Apply topically 2 (two) times daily. 113 g 1    evolocumab (REPATHA SURECLICK) 140 mg/mL PnIj Inject 1 mL (140 mg total) into the skin every 14 (fourteen) days. 2 mL 2    gabapentin (NEURONTIN) 300 MG capsule Take 1 capsule (300 mg total) by mouth 2 (two) times daily. 180 capsule 3     No current facility-administered medications for this visit.     Facility-Administered Medications Ordered in Other Visits   Medication Dose Route Frequency Provider Last Rate Last Admin    lactated ringers infusion   Intravenous Continuous Vanda Hickman MD   Stopped at 10/01/18 1140       Medications Discontinued During This Encounter   Medication Reason    gabapentin (NEURONTIN) 300 MG capsule Reorder       No follow-ups on file.      Helena Jones MD   Scribe Attestation:   I, Chantelle Kim, am scribing for, and in the presence of, Dr.Arif Jones I performed the above scribed service and the documentation accurately describes the services I performed. I attest to the accuracy of the note.    I, Dr. Helena Jones, reviewed documentation as scribed above. I performed the services described in this documentation.  I agree that the record reflects my personal performance and is accurate and complete. Helena Jones MD.  01/06/2023

## 2023-08-08 ENCOUNTER — TELEPHONE (OUTPATIENT)
Dept: PHARMACY | Facility: CLINIC | Age: 68
End: 2023-08-08
Payer: COMMERCIAL

## 2023-08-08 ENCOUNTER — SPECIALTY PHARMACY (OUTPATIENT)
Dept: PHARMACY | Facility: CLINIC | Age: 68
End: 2023-08-08
Payer: COMMERCIAL

## 2023-08-08 LAB
IRON SERPL-MCNC: 40 UG/DL (ref 30–160)
RHEUMATOID FACT SERPL-ACNC: 35 IU/ML (ref 0–15)
SATURATED IRON: 9 % (ref 20–50)
TOTAL IRON BINDING CAPACITY: 423 UG/DL (ref 250–450)
TRANSFERRIN SERPL-MCNC: 286 MG/DL (ref 200–375)

## 2023-08-08 NOTE — TELEPHONE ENCOUNTER
Javan, this is Hina Leon, clinical pharmacist with [facility] that is part of your care team.  We have begun working on your prescription that your doctor has sent us. Our next steps include:     Working with your insurance company to obtain approval for your medication  Working with you to ensure your medication is affordable     We will be calling you along the way with updates on your medication but if you have any concerns or receive information that you would like to discuss please reach us at [phone].    Welcome call outcome: Patient/caregiver reached

## 2023-08-09 ENCOUNTER — SPECIALTY PHARMACY (OUTPATIENT)
Dept: PHARMACY | Facility: CLINIC | Age: 68
End: 2023-08-09
Payer: COMMERCIAL

## 2023-08-09 ENCOUNTER — HOSPITAL ENCOUNTER (OUTPATIENT)
Dept: PREADMISSION TESTING | Facility: HOSPITAL | Age: 68
Discharge: HOME | End: 2023-08-09
Attending: INTERNAL MEDICINE
Payer: COMMERCIAL

## 2023-08-09 DIAGNOSIS — Z12.11 COLON CANCER SCREENING: Primary | ICD-10-CM

## 2023-08-09 RX ORDER — SODIUM, POTASSIUM,MAG SULFATES 17.5-3.13G
1 SOLUTION, RECONSTITUTED, ORAL ORAL DAILY
Qty: 1 KIT | Refills: 0 | Status: SHIPPED | OUTPATIENT
Start: 2023-08-09 | End: 2023-08-11

## 2023-08-09 NOTE — TELEPHONE ENCOUNTER
Specialty Pharmacy - Initial Clinical Assessment    Specialty Medication Orders Linked to Encounter      Flowsheet Row Most Recent Value   Medication #1 evolocumab (REPATHA SURECLICK) 140 mg/mL PnIj (Order#[id number], Rx#[id number])          Patient Diagnosis   E78.5 - Hyperlipidemia  Z78.9 - Statin intolerance    Subjective    Erin Soriano is a 68 y.o. female, who is followed by the specialty pharmacy service for management and education.    Recent Encounters       Date Type Provider Description    08/09/2023 Specialty Pharmacy Leah Belcher, CristaD Initial Clinical Assessment    08/08/2023 Specialty Pharmacy Hina Leon PharmD Referral Authorization            Current Outpatient Medications   Medication Sig    acetaminophen (TYLENOL) 500 MG tablet Take 500 mg by mouth every 6 (six) hours as needed for Pain.    albuterol (PROVENTIL/VENTOLIN HFA) 90 mcg/actuation inhaler Inhale 2 puffs into the lungs every 4 (four) hours as needed for Wheezing.    albuterol-ipratropium (DUO-NEB) 2.5 mg-0.5 mg/3 mL nebulizer solution Take 3 mLs by nebulization every 4 (four) hours as needed for Wheezing. Rescue    apixaban (ELIQUIS) 5 mg Tab Take 1 tablet (5 mg total) by mouth 2 (two) times daily.    ascorbic acid, vitamin C, (VITAMIN C) 1000 MG tablet Take 1,000 mg by mouth once daily.    buPROPion (WELLBUTRIN SR) 150 MG TBSR 12 hr tablet Take 1 tablet (150 mg total) by mouth 2 (two) times daily.    carvediloL (COREG) 25 MG tablet Take 1 tablet (25 mg total) by mouth 2 (two) times daily with meals.    clotrimazole (LOTRIMIN) 1 % cream Apply topically 2 (two) times daily.    cyclobenzaprine (FLEXERIL) 10 MG tablet Take 1 tablet (10 mg total) by mouth 2 (two) times daily as needed for Muscle spasms.    DULoxetine (CYMBALTA) 30 MG capsule Take 1 capsule (30 mg total) by mouth once daily.    evolocumab (REPATHA SURECLICK) 140 mg/mL PnIj Inject 1 mL (140 mg total) into the skin every 14 (fourteen) days.    fluticasone  propionate (FLONASE) 50 mcg/actuation nasal spray 1 spray (50 mcg total) by Each Nostril route once daily.    gabapentin (NEURONTIN) 300 MG capsule Take 1 capsule (300 mg total) by mouth 2 (two) times daily.    hydrALAZINE (APRESOLINE) 50 MG tablet Take 1 tablet (50 mg total) by mouth 3 (three) times daily.    ibuprofen (ADVIL,MOTRIN) 600 MG tablet     inhalat.spacing dev,large mask (BREATHERITE SPACER-MASK,ADULT) Spcr Use as directed for inhalation.    losartan-hydrochlorothiazide 100-25 mg (HYZAAR) 100-25 mg per tablet Take 1 tablet by mouth once daily.    meclizine (ANTIVERT) 25 mg tablet TAKE 1 TABLET (25 MG TOTAL) BY MOUTH 3 (THREE) TIMES DAILY AS NEEDED FOR DIZZINESS    olopatadine (PATANOL) 0.1 % ophthalmic solution Place 1 drop into both eyes once daily.    pantoprazole (PROTONIX) 40 MG tablet Take 1 tablet (40 mg total) by mouth once daily.    potassium 99 mg Tab Take by mouth. 1 tablet Oral Every day    rOPINIRole (REQUIP) 1 MG tablet Take 1 tablet (1 mg total) by mouth 2 (two) times daily as needed (restless leg).    sodium,potassium,mag sulfates (SUPREP BOWEL PREP KIT) 17.5-3.13-1.6 gram SolR Take 177 mLs by mouth once daily. for 2 days    traMADoL (ULTRAM) 50 mg tablet Take 1 tablet (50 mg total) by mouth every 12 (twelve) hours as needed for Pain.   Last reviewed on 8/7/2023  3:40 PM by Helena Jones MD    Review of patient's allergies indicates:   Allergen Reactions    Amlodipine     Benazepril Edema     angioedema    Strawberry Hives    Chlorhexidine Rash   Last reviewed on  8/7/2023 3:40 PM by Helena Jones          Assessment Questions - Documented Responses      Flowsheet Row Most Recent Value   Assessment    Medication Reconciliation completed for patient No   During the past 4 weeks, has patient missed any activities due to condition or medication? No   During the past 4 weeks, did patient have any of the following urgent care visits? None   Goals of Therapy Status Discussed (new start)   Status  "of the patients ability to self-administer: Caregiver to administer   All education points have been covered with patient? No, patient declined- printed education provided   Welcome packet contents reviewed and discussed with patient? Yes   Assesment completed? Yes   Plan Therapy being initiated   Do you need to open a clinical intervention (i-vent)? No   Do you want to schedule first shipment? Yes   Medication #1 Assessment Info    Patient status New medication, New to [facility]   Is this medication appropriate for the patient? Yes   Is this medication effective? Not yet started          Refill Questions - Documented Responses      Flowsheet Row Most Recent Value   Patient Availability and HIPAA Verification    Does patient want to proceed with activity? Yes   HIPAA/medical authority confirmed? Yes   Relationship to patient of person spoken to? Self   Refill Screening Questions    When does the patient need to receive the medication? 08/10/23   Refill Delivery Questions    How will the patient receive the medication? [facility]   When does the patient need to receive the medication? 08/10/23   Shipping Address Home   Address in [facility] confirmed and updated if neccessary? Yes   Expected Copay ($) 0   Is the patient able to afford the medication copay? Yes   Payment Method zero copay   Days supply of Refill 28   Supplies needed? No supplies needed   Refill activity completed? Yes   Refill activity plan Refill scheduled   Shipment/Pickup Date: 08/09/23            Objective    She has a past medical history of Anxiety, Arthritis, Asthma, Depression, Hyperlipidemia, Hypertension, and RLS (restless legs syndrome).    Tried/failed medications:     BP Readings from Last 4 Encounters:   08/07/23 132/74   07/26/23 (!) 142/78   05/17/23 124/70   04/27/23 129/72     Ht Readings from Last 4 Encounters:   08/07/23 5' 6" (1.676 m)   07/26/23 5' 6" (1.676 m)   05/24/23 5' 6" (1.676 m)   05/17/23 5' 6" (1.676 m)     Wt Readings " from Last 4 Encounters:   08/07/23 85.6 kg (188 lb 11.4 oz)   07/26/23 84.1 kg (185 lb 4.8 oz)   05/24/23 86.2 kg (190 lb)   05/17/23 86.3 kg (190 lb 4.1 oz)       The goals of prescribed drug therapy management include:  Supporting patient to meet the prescriber's medical treatment objectives  Improving or maintaining quality of life  Maintaining optimal therapy adherence  Minimizing and managing side effects      Goals of Therapy Status: Discussed (new start)    Assessment/Plan  Patient plans to start therapy on 08/10/23      Indication, dosage, appropriateness, effectiveness, safety and convenience of her specialty medication(s) were reviewed today.     Patient Education   Pharmacist offer to  patient was declined. Printed educational materials will be provided with medication.  Patient did accept verbal education on the following topics:  · Expectations and possible outcomes of therapy  · Proper use, timely administration, and missed dose management  · Duration of therapy  · Side effects, including prevention, minimization, and management  · Storage, safe handling, and disposal      Tasks added this encounter   No tasks added.   Tasks due within next 3 months   No tasks due.     Leah Belcher, PharmD  [facility]  [address] [address]  Phone: [phone]  Fax: [phone]

## 2023-08-17 ENCOUNTER — TELEPHONE (OUTPATIENT)
Dept: FAMILY MEDICINE | Facility: CLINIC | Age: 68
End: 2023-08-17
Payer: COMMERCIAL

## 2023-08-17 DIAGNOSIS — D64.9 ANEMIA, UNSPECIFIED TYPE: Primary | ICD-10-CM

## 2023-08-18 ENCOUNTER — TELEPHONE (OUTPATIENT)
Dept: FAMILY MEDICINE | Facility: CLINIC | Age: 68
End: 2023-08-18
Payer: COMMERCIAL

## 2023-08-18 ENCOUNTER — LAB VISIT (OUTPATIENT)
Dept: LAB | Facility: HOSPITAL | Age: 68
End: 2023-08-18
Attending: FAMILY MEDICINE
Payer: COMMERCIAL

## 2023-08-18 DIAGNOSIS — M79.10 MUSCLE ACHE: Primary | ICD-10-CM

## 2023-08-18 DIAGNOSIS — M79.10 MYALGIA DUE TO HMG COA REDUCTASE INHIBITOR: ICD-10-CM

## 2023-08-18 DIAGNOSIS — G25.81 RESTLESS LEGS: Primary | ICD-10-CM

## 2023-08-18 DIAGNOSIS — E78.2 MIXED HYPERLIPIDEMIA: ICD-10-CM

## 2023-08-18 DIAGNOSIS — T46.6X5A MYALGIA DUE TO HMG COA REDUCTASE INHIBITOR: ICD-10-CM

## 2023-08-18 DIAGNOSIS — D64.9 ANEMIA, UNSPECIFIED TYPE: ICD-10-CM

## 2023-08-18 LAB
ALBUMIN SERPL BCP-MCNC: 3.5 G/DL (ref 3.5–5.2)
ALP SERPL-CCNC: 153 U/L (ref 55–135)
ALT SERPL W/O P-5'-P-CCNC: 41 U/L (ref 10–44)
ANION GAP SERPL CALC-SCNC: 10 MMOL/L (ref 8–16)
AST SERPL-CCNC: 31 U/L (ref 10–40)
BASOPHILS # BLD AUTO: 0.08 K/UL (ref 0–0.2)
BASOPHILS NFR BLD: 1 % (ref 0–1.9)
BILIRUB SERPL-MCNC: 0.3 MG/DL (ref 0.1–1)
BUN SERPL-MCNC: 10 MG/DL (ref 8–23)
CALCIUM SERPL-MCNC: 8.6 MG/DL (ref 8.7–10.5)
CHLORIDE SERPL-SCNC: 107 MMOL/L (ref 95–110)
CHOLEST SERPL-MCNC: 194 MG/DL (ref 120–199)
CHOLEST/HDLC SERPL: 5.5 {RATIO} (ref 2–5)
CO2 SERPL-SCNC: 26 MMOL/L (ref 23–29)
CREAT SERPL-MCNC: 0.8 MG/DL (ref 0.5–1.4)
DIFFERENTIAL METHOD: ABNORMAL
EOSINOPHIL # BLD AUTO: 0.3 K/UL (ref 0–0.5)
EOSINOPHIL NFR BLD: 3.8 % (ref 0–8)
ERYTHROCYTE [DISTWIDTH] IN BLOOD BY AUTOMATED COUNT: 15.4 % (ref 11.5–14.5)
EST. GFR  (NO RACE VARIABLE): >60 ML/MIN/1.73 M^2
GLUCOSE SERPL-MCNC: 109 MG/DL (ref 70–110)
HCT VFR BLD AUTO: 36.9 % (ref 37–48.5)
HDLC SERPL-MCNC: 35 MG/DL (ref 40–75)
HDLC SERPL: 18 % (ref 20–50)
HGB BLD-MCNC: 11.6 G/DL (ref 12–16)
IMM GRANULOCYTES # BLD AUTO: 0.05 K/UL (ref 0–0.04)
IMM GRANULOCYTES NFR BLD AUTO: 0.6 % (ref 0–0.5)
LDLC SERPL CALC-MCNC: 128.4 MG/DL (ref 63–159)
LYMPHOCYTES # BLD AUTO: 2.8 K/UL (ref 1–4.8)
LYMPHOCYTES NFR BLD: 33.7 % (ref 18–48)
MCH RBC QN AUTO: 24.3 PG (ref 27–31)
MCHC RBC AUTO-ENTMCNC: 31.4 G/DL (ref 32–36)
MCV RBC AUTO: 77 FL (ref 82–98)
MONOCYTES # BLD AUTO: 0.6 K/UL (ref 0.3–1)
MONOCYTES NFR BLD: 7.3 % (ref 4–15)
NEUTROPHILS # BLD AUTO: 4.4 K/UL (ref 1.8–7.7)
NEUTROPHILS NFR BLD: 53.6 % (ref 38–73)
NONHDLC SERPL-MCNC: 159 MG/DL
NRBC BLD-RTO: 0 /100 WBC
PLATELET # BLD AUTO: 234 K/UL (ref 150–450)
PMV BLD AUTO: 11.5 FL (ref 9.2–12.9)
POTASSIUM SERPL-SCNC: 4.3 MMOL/L (ref 3.5–5.1)
PROT SERPL-MCNC: 7.1 G/DL (ref 6–8.4)
RBC # BLD AUTO: 4.78 M/UL (ref 4–5.4)
SODIUM SERPL-SCNC: 143 MMOL/L (ref 136–145)
TRIGL SERPL-MCNC: 153 MG/DL (ref 30–150)
WBC # BLD AUTO: 8.21 K/UL (ref 3.9–12.7)

## 2023-08-18 PROCEDURE — 80061 LIPID PANEL: CPT | Performed by: FAMILY MEDICINE

## 2023-08-18 PROCEDURE — 80053 COMPREHEN METABOLIC PANEL: CPT | Performed by: FAMILY MEDICINE

## 2023-08-18 PROCEDURE — 36415 COLL VENOUS BLD VENIPUNCTURE: CPT | Mod: PO | Performed by: FAMILY MEDICINE

## 2023-08-18 PROCEDURE — 85025 COMPLETE CBC W/AUTO DIFF WBC: CPT | Performed by: FAMILY MEDICINE

## 2023-08-18 RX ORDER — CYCLOBENZAPRINE HCL 10 MG
10 TABLET ORAL 2 TIMES DAILY PRN
Qty: 180 TABLET | Refills: 1 | Status: SHIPPED | OUTPATIENT
Start: 2023-08-18 | End: 2023-08-25 | Stop reason: SDUPTHER

## 2023-08-18 RX ORDER — ROPINIROLE 1 MG/1
1 TABLET, FILM COATED ORAL 2 TIMES DAILY PRN
Qty: 180 TABLET | Refills: 3 | Status: SHIPPED | OUTPATIENT
Start: 2023-08-18 | End: 2023-08-25 | Stop reason: SDUPTHER

## 2023-08-18 NOTE — TELEPHONE ENCOUNTER
No care due was identified.  [facility] Embedded Care Due Messages. Reference number: [id number].   8/18/2023 6:58:42 AM CDT

## 2023-08-19 DIAGNOSIS — R74.8 ELEVATED ALKALINE PHOSPHATASE LEVEL: Primary | ICD-10-CM

## 2023-08-19 DIAGNOSIS — D50.0 IRON DEFICIENCY ANEMIA DUE TO CHRONIC BLOOD LOSS: ICD-10-CM

## 2023-08-19 RX ORDER — FERROUS GLUCONATE 324(38)MG
324 TABLET ORAL
Qty: 60 TABLET | Refills: 6 | Status: SHIPPED | OUTPATIENT
Start: 2023-08-19 | End: 2023-08-25 | Stop reason: SDUPTHER

## 2023-08-21 ENCOUNTER — PATIENT MESSAGE (OUTPATIENT)
Dept: FAMILY MEDICINE | Facility: CLINIC | Age: 68
End: 2023-08-21
Payer: COMMERCIAL

## 2023-08-21 ENCOUNTER — PES CALL (OUTPATIENT)
Dept: ADMINISTRATIVE | Facility: CLINIC | Age: 68
End: 2023-08-21
Payer: COMMERCIAL

## 2023-08-21 ENCOUNTER — TELEPHONE (OUTPATIENT)
Dept: FAMILY MEDICINE | Facility: CLINIC | Age: 68
End: 2023-08-21
Payer: COMMERCIAL

## 2023-08-21 NOTE — TELEPHONE ENCOUNTER
She is asking for you to put in order for EGD so she can have it the same time she is having her colonoscopy

## 2023-08-22 ENCOUNTER — OFFICE VISIT (OUTPATIENT)
Dept: GASTROENTEROLOGY | Facility: CLINIC | Age: 68
End: 2023-08-22
Payer: COMMERCIAL

## 2023-08-22 ENCOUNTER — TELEPHONE (OUTPATIENT)
Dept: GASTROENTEROLOGY | Facility: CLINIC | Age: 68
End: 2023-08-22

## 2023-08-22 VITALS
WEIGHT: 194.69 LBS | BODY MASS INDEX: 31.29 KG/M2 | SYSTOLIC BLOOD PRESSURE: 146 MMHG | HEART RATE: 73 BPM | HEIGHT: 66 IN | DIASTOLIC BLOOD PRESSURE: 84 MMHG

## 2023-08-22 DIAGNOSIS — Z79.01 CURRENT USE OF LONG TERM ANTICOAGULATION: ICD-10-CM

## 2023-08-22 DIAGNOSIS — D50.9 IRON DEFICIENCY ANEMIA, UNSPECIFIED IRON DEFICIENCY ANEMIA TYPE: Primary | ICD-10-CM

## 2023-08-22 DIAGNOSIS — I48.0 PAROXYSMAL ATRIAL FIBRILLATION: ICD-10-CM

## 2023-08-22 PROCEDURE — 1159F PR MEDICATION LIST DOCUMENTED IN MEDICAL RECORD: ICD-10-PCS | Mod: CPTII,S$GLB,, | Performed by: NURSE PRACTITIONER

## 2023-08-22 PROCEDURE — 3008F BODY MASS INDEX DOCD: CPT | Mod: CPTII,S$GLB,, | Performed by: NURSE PRACTITIONER

## 2023-08-22 PROCEDURE — 3008F PR BODY MASS INDEX (BMI) DOCUMENTED: ICD-10-PCS | Mod: CPTII,S$GLB,, | Performed by: NURSE PRACTITIONER

## 2023-08-22 PROCEDURE — 1125F AMNT PAIN NOTED PAIN PRSNT: CPT | Mod: CPTII,S$GLB,, | Performed by: NURSE PRACTITIONER

## 2023-08-22 PROCEDURE — 3288F FALL RISK ASSESSMENT DOCD: CPT | Mod: CPTII,S$GLB,, | Performed by: NURSE PRACTITIONER

## 2023-08-22 PROCEDURE — 1160F RVW MEDS BY RX/DR IN RCRD: CPT | Mod: CPTII,S$GLB,, | Performed by: NURSE PRACTITIONER

## 2023-08-22 PROCEDURE — 99204 PR OFFICE/OUTPT VISIT, NEW, LEVL IV, 45-59 MIN: ICD-10-PCS | Mod: S$GLB,,, | Performed by: NURSE PRACTITIONER

## 2023-08-22 PROCEDURE — 99204 OFFICE O/P NEW MOD 45 MIN: CPT | Mod: S$GLB,,, | Performed by: NURSE PRACTITIONER

## 2023-08-22 PROCEDURE — 99999 PR PBB SHADOW E&M-EST. PATIENT-LVL V: ICD-10-PCS | Mod: PBBFAC,,, | Performed by: NURSE PRACTITIONER

## 2023-08-22 PROCEDURE — 3079F PR MOST RECENT DIASTOLIC BLOOD PRESSURE 80-89 MM HG: ICD-10-PCS | Mod: CPTII,S$GLB,, | Performed by: NURSE PRACTITIONER

## 2023-08-22 PROCEDURE — 1101F PT FALLS ASSESS-DOCD LE1/YR: CPT | Mod: CPTII,S$GLB,, | Performed by: NURSE PRACTITIONER

## 2023-08-22 PROCEDURE — 3288F PR FALLS RISK ASSESSMENT DOCUMENTED: ICD-10-PCS | Mod: CPTII,S$GLB,, | Performed by: NURSE PRACTITIONER

## 2023-08-22 PROCEDURE — 1101F PR PT FALLS ASSESS DOC 0-1 FALLS W/OUT INJ PAST YR: ICD-10-PCS | Mod: CPTII,S$GLB,, | Performed by: NURSE PRACTITIONER

## 2023-08-22 PROCEDURE — 99999 PR PBB SHADOW E&M-EST. PATIENT-LVL V: CPT | Mod: PBBFAC,,, | Performed by: NURSE PRACTITIONER

## 2023-08-22 PROCEDURE — 1160F PR REVIEW ALL MEDS BY PRESCRIBER/CLIN PHARMACIST DOCUMENTED: ICD-10-PCS | Mod: CPTII,S$GLB,, | Performed by: NURSE PRACTITIONER

## 2023-08-22 PROCEDURE — 1125F PR PAIN SEVERITY QUANTIFIED, PAIN PRESENT: ICD-10-PCS | Mod: CPTII,S$GLB,, | Performed by: NURSE PRACTITIONER

## 2023-08-22 PROCEDURE — 3077F SYST BP >= 140 MM HG: CPT | Mod: CPTII,S$GLB,, | Performed by: NURSE PRACTITIONER

## 2023-08-22 PROCEDURE — 3077F PR MOST RECENT SYSTOLIC BLOOD PRESSURE >= 140 MM HG: ICD-10-PCS | Mod: CPTII,S$GLB,, | Performed by: NURSE PRACTITIONER

## 2023-08-22 PROCEDURE — 3079F DIAST BP 80-89 MM HG: CPT | Mod: CPTII,S$GLB,, | Performed by: NURSE PRACTITIONER

## 2023-08-22 PROCEDURE — 1159F MED LIST DOCD IN RCRD: CPT | Mod: CPTII,S$GLB,, | Performed by: NURSE PRACTITIONER

## 2023-08-22 RX ORDER — BACLOFEN 5 MG/1
TABLET ORAL
COMMUNITY
Start: 2023-07-07 | End: 2023-08-22 | Stop reason: SDUPTHER

## 2023-08-22 RX ORDER — SODIUM, POTASSIUM,MAG SULFATES 17.5-3.13G
SOLUTION, RECONSTITUTED, ORAL ORAL
COMMUNITY
Start: 2023-08-18 | End: 2023-08-22 | Stop reason: SDUPTHER

## 2023-08-22 RX ORDER — BENAZEPRIL HYDROCHLORIDE AND HYDROCHLOROTHIAZIDE 20; 12.5 MG/1; MG/1
TABLET ORAL
COMMUNITY
End: 2023-08-22 | Stop reason: SDUPTHER

## 2023-08-22 RX ORDER — AMLODIPINE BESYLATE 2.5 MG/1
TABLET ORAL
COMMUNITY
End: 2023-08-22 | Stop reason: SDUPTHER

## 2023-08-22 RX ORDER — VILAZODONE HYDROCHLORIDE 40 MG/1
TABLET ORAL
COMMUNITY
End: 2023-08-22 | Stop reason: SDUPTHER

## 2023-08-22 RX ORDER — OXYCODONE HCL 10 MG/1
TABLET, FILM COATED, EXTENDED RELEASE ORAL
COMMUNITY
End: 2023-09-11

## 2023-08-22 RX ORDER — POTASSIUM CHLORIDE 1500 MG/1
20 TABLET, EXTENDED RELEASE ORAL
COMMUNITY
Start: 2023-01-07 | End: 2023-08-25 | Stop reason: SDUPTHER

## 2023-08-22 RX ORDER — MUPIROCIN 20 MG/G
1 OINTMENT TOPICAL 2 TIMES DAILY
COMMUNITY
Start: 2023-03-13 | End: 2024-01-10

## 2023-08-22 RX ORDER — METHOCARBAMOL 750 MG/1
1 TABLET, FILM COATED ORAL 3 TIMES DAILY
COMMUNITY
Start: 2023-01-13 | End: 2023-08-22 | Stop reason: SDUPTHER

## 2023-08-22 RX ORDER — DIAZEPAM 5 MG/1
TABLET ORAL
COMMUNITY
Start: 2023-04-04 | End: 2023-09-11

## 2023-08-22 RX ORDER — LOSARTAN POTASSIUM 50 MG/1
TABLET ORAL
COMMUNITY
End: 2023-08-22 | Stop reason: SDUPTHER

## 2023-08-22 RX ORDER — VERAPAMIL HYDROCHLORIDE 120 MG/1
CAPSULE, EXTENDED RELEASE ORAL
COMMUNITY
Start: 2023-05-20 | End: 2023-08-25 | Stop reason: SDUPTHER

## 2023-08-22 RX ORDER — HYDROCODONE BITARTRATE AND ACETAMINOPHEN 10; 325 MG/1; MG/1
1 TABLET ORAL EVERY 6 HOURS PRN
COMMUNITY
Start: 2023-01-18 | End: 2023-08-22 | Stop reason: SDUPTHER

## 2023-08-22 RX ORDER — AMOXICILLIN 250 MG
1 CAPSULE ORAL 2 TIMES DAILY
COMMUNITY
Start: 2023-01-06 | End: 2023-08-22 | Stop reason: SDUPTHER

## 2023-08-22 NOTE — TELEPHONE ENCOUNTER
----- Message from Yajaira Hopper sent at 8/22/2023  8:50 AM CDT -----  Regarding: Late Arrival  Who Called: Patient    What is the request in detail: Requesting call back to discuss arriving late (9:40AM) due to traffic. Patient is asking if she will need to reschedule. Please advise.      Can the clinic reply by [facility]? No    Best Call Back Number: [phone]      Additional Information:

## 2023-08-22 NOTE — PROGRESS NOTES
Clinic Consult:  [facility] Consultation Note    Reason for Consult:  The primary encounter diagnosis was Iron deficiency anemia, unspecified iron deficiency anemia type. Diagnoses of Paroxysmal atrial fibrillation and Current use of long term anticoagulation were also pertinent to this visit.    PCP: Helena Jones   [id number] [id number] / [facility] [id number]    HPI:  This is a 68 y.o. female here for evaluation of anemia.   Onset: 6 months  Type of anemia: iron deficiency anemia  Last Hgb: 11.6  Iron studies: iron saturation (9)  Blood thinners:yes- Eliquis   Any overt GI bleeding: no  EGD/Colonoscopy done: 2018- was not for JEET. No bleeding at that time.     The patient has co-morbidities that should be taken into consideration when scheduling endoscopy procedures. These include:  -- on Eliquis for A. Fib.  Followed by [facility].   - will need to hold for procedure.     Review of Systems   Constitutional:  Negative for fever and weight loss.   HENT:  Negative for sore throat.    Respiratory:  Negative for cough, shortness of breath and wheezing.    Cardiovascular:  Negative for chest pain and palpitations.   Gastrointestinal:  Negative for abdominal pain, blood in stool, constipation, diarrhea, heartburn, melena, nausea and vomiting.   Genitourinary:  Negative for dysuria and frequency.   Skin:  Negative for itching and rash.   Neurological:  Negative for dizziness, speech change, seizures, loss of consciousness and headaches.       Medical History:  has a past medical history of Anxiety, Arthritis, Asthma, Depression, Hyperlipidemia, Hypertension, and RLS (restless legs syndrome).    Surgical History:  has a past surgical history that includes Hysterectomy; Tonsillectomy; Adenoidectomy;  section; carpel tunnel; golfers elbow; Oophorectomy; Colonoscopy (N/A, 10/01/2018); Esophagogastroduodenoscopy (N/A, 10/01/2018); and Cataract extraction.    Family History: family history  includes Cancer in her maternal grandfather and maternal grandmother; Clotting disorder in her maternal grandfather, maternal grandmother, and mother; Hemochromatosis in her mother; Hypertension in her father and mother..     Social History:  reports that she quit smoking about 11 years ago. Her smoking use included cigarettes. She has been exposed to tobacco smoke. She has never used smokeless tobacco. She reports current alcohol use of about 1.0 standard drink of alcohol per week. She reports that she does not use drugs.    Allergies: Reviewed    Home Medications:   Current Outpatient Medications on File Prior to Visit   Medication Sig Dispense Refill    acetaminophen (TYLENOL) 500 MG tablet Take 500 mg by mouth every 6 (six) hours as needed for Pain.      albuterol (PROVENTIL/VENTOLIN HFA) 90 mcg/actuation inhaler Inhale 2 puffs into the lungs every 4 (four) hours as needed for Wheezing. 18 g 2    albuterol-ipratropium (DUO-NEB) 2.5 mg-0.5 mg/3 mL nebulizer solution Take 3 mLs by nebulization every 4 (four) hours as needed for Wheezing. Rescue 75 mL 0    apixaban (ELIQUIS) 5 mg Tab Take 1 tablet (5 mg total) by mouth 2 (two) times daily. 60 tablet 10    ascorbic acid, vitamin C, (VITAMIN C) 1000 MG tablet Take 1,000 mg by mouth once daily.      buPROPion (WELLBUTRIN SR) 150 MG TBSR 12 hr tablet Take 1 tablet (150 mg total) by mouth 2 (two) times daily. 180 tablet 3    carvediloL (COREG) 25 MG tablet Take 1 tablet (25 mg total) by mouth 2 (two) times daily with meals. 180 tablet 3    clonazepam (KLONOPIN ORAL) Klonopin Take No date recorded No form recorded No frequency recorded No route recorded No set duration recorded No set duration amount recorded suspended No dosage strength recorded No dosage strength units of measure recorded      clotrimazole (LOTRIMIN) 1 % cream Apply topically 2 (two) times daily. 120 g 1    cyclobenzaprine (FLEXERIL) 10 MG tablet Take 1 tablet (10 mg total) by mouth 2 (two) times daily  as needed for Muscle spasms. 180 tablet 1    diazePAM (VALIUM) 5 MG tablet TAKE 1 TAB BY MOUTH ONE HOUR PRIOR TO PROCEDURE AND REPEAT PRIOR TO PROCEDURE IF NEEDED. DO NOT DRIVE IF YOU TAKE THIS MED      DULoxetine (CYMBALTA) 30 MG capsule Take 1 capsule (30 mg total) by mouth once daily. 90 capsule 3    evolocumab (REPATHA SURECLICK) 140 mg/mL PnIj Inject 1 mL (140 mg total) into the skin every 14 (fourteen) days. 2 mL 2    ferrous gluconate (FERGON) 324 MG tablet Take 1 tablet (324 mg total) by mouth daily with breakfast. 60 tablet 6    fluticasone propionate (FLONASE) 50 mcg/actuation nasal spray 1 spray (50 mcg total) by Each Nostril route once daily. 16 mL 0    gabapentin (NEURONTIN) 300 MG capsule Take 1 capsule (300 mg total) by mouth 2 (two) times daily. 180 capsule 3    hydrALAZINE (APRESOLINE) 50 MG tablet Take 1 tablet (50 mg total) by mouth 3 (three) times daily. 90 tablet 11    ibuprofen (ADVIL,MOTRIN) 600 MG tablet       inhalat.spacing dev,large mask (BREATHERITE SPACER-MASK,ADULT) Spcr Use as directed for inhalation. 1 each 1    losartan-hydrochlorothiazide 100-25 mg (HYZAAR) 100-25 mg per tablet Take 1 tablet by mouth once daily. 90 tablet 3    meclizine (ANTIVERT) 25 mg tablet TAKE 1 TABLET (25 MG TOTAL) BY MOUTH 3 (THREE) TIMES DAILY AS NEEDED FOR DIZZINESS 60 tablet 0    montelukast sodium (SINGULAIR ORAL) Singulair Take No date recorded No form recorded No frequency recorded No route recorded No set duration recorded No set duration amount recorded suspended No dosage strength recorded No dosage strength units of measure recorded      mupirocin (BACTROBAN) 2 % ointment Apply 1 application  topically 2 (two) times daily.      olopatadine (PATANOL) 0.1 % ophthalmic solution Place 1 drop into both eyes once daily.      oxyCODONE (OXYCONTIN) 10 mg 12 hr tablet oxycodone Take every 6 hours as needed No date recorded tablet No frequency recorded No route recorded No set duration recorded No set  duration amount recorded active 10 mg      pantoprazole (PROTONIX) 40 MG tablet Take 1 tablet (40 mg total) by mouth once daily. 90 tablet 3    potassium chloride (K-TAB) 20 mEq Take 20 mEq by mouth.      pravastatin sodium (PRAVASTATIN ORAL) pravastatin Take No date recorded No form recorded No frequency recorded No route recorded No set duration recorded No set duration amount recorded suspended No dosage strength recorded No dosage strength units of measure recorded      rOPINIRole (REQUIP) 1 MG tablet Take 1 tablet (1 mg total) by mouth 2 (two) times daily as needed (restless leg). 180 tablet 3    verapamiL (VERELAN) 120 MG C24P       [DISCONTINUED] baclofen (LIORESAL) 5 mg Tab tablet baclofen Take 1 tablet (oral) 3 times per day for 3 days [id number] tablet 3 times per day oral 3 days active 5 mg      [DISCONTINUED] HYDROcodone-acetaminophen (NORCO)  mg per tablet Take 1 tablet by mouth every 6 (six) hours as needed.      [DISCONTINUED] methocarbamoL (ROBAXIN) 750 MG Tab Take 1 tablet by mouth 3 (three) times daily.      [DISCONTINUED] senna-docusate 8.6-50 mg (PERICOLACE) 8.6-50 mg per tablet Take 1 tablet by mouth 2 (two) times daily.      [DISCONTINUED] amLODIPine (NORVASC) 2.5 MG tablet amlodipine Take No date recorded No form recorded No frequency recorded No route recorded No set duration recorded No set duration amount recorded active No dosage strength recorded No dosage strength units of measure recorded      [DISCONTINUED] benazepril-hydrochlorthiazide (LOTENSIN HCT) 20-12.5 mg per tablet benazepril-hydrochlorothiazide Take once daily No date recorded tablet No frequency recorded No route recorded No set duration recorded No set duration amount recorded active 20-12.5 mg      [DISCONTINUED] buPROPion HBr 348 mg Tb24 bupropion HBr Take No date recorded No form recorded No frequency recorded No route recorded No set duration recorded No set duration amount recorded active No dosage strength recorded  "No dosage strength units of measure recorded      [DISCONTINUED] losartan (COZAAR) 50 MG tablet losartan Take No date recorded No form recorded No frequency recorded No route recorded No set duration recorded No set duration amount recorded active No dosage strength recorded No dosage strength units of measure recorded      [DISCONTINUED] potassium 99 mg Tab Take by mouth. 1 tablet Oral Every day      [DISCONTINUED] sodium,potassium,mag sulfates (SUPREP BOWEL PREP KIT) 17.5-3.13-1.6 gram SolR SMARTSI Milliliter(s) By Mouth Daily      [DISCONTINUED] traMADoL (ULTRAM) 50 mg tablet Take 1 tablet (50 mg total) by mouth every 12 (twelve) hours as needed for Pain. 60 tablet 5    [DISCONTINUED] vilazodone (VIIBRYD) 40 mg Tab tablet Viibryd Take No date recorded No form recorded No frequency recorded No route recorded No set duration recorded No set duration amount recorded active No dosage strength recorded No dosage strength units of measure recorded       Current Facility-Administered Medications on File Prior to Visit   Medication Dose Route Frequency Provider Last Rate Last Admin    lactated ringers infusion   Intravenous Continuous Vanda Hickman MD   Stopped at 10/01/18 1140       Physical Exam:  BP (!) 146/84 (BP Location: Left arm, Patient Position: Sitting, BP Method: Medium (Manual))   Pulse 73   Ht 5' 6" (1.676 m)   Wt 88.3 kg (194 lb 10.7 oz)   BMI 31.42 kg/m²   Body mass index is 31.42 kg/m².  Physical Exam  Constitutional:       General: She is not in acute distress.  HENT:      Head: Normocephalic and atraumatic.   Cardiovascular:      Rate and Rhythm: Normal rate and regular rhythm.      Heart sounds: Normal heart sounds.   Pulmonary:      Effort: Pulmonary effort is normal. No respiratory distress.      Breath sounds: Normal breath sounds.   Neurological:      Mental Status: She is alert.   Psychiatric:         Mood and Affect: Mood normal.         Behavior: Behavior normal.         Labs: Pertinent " labs reviewed.  CRC Screening: due     Assessment:  1. Iron deficiency anemia, unspecified iron deficiency anemia type -- needs EGD and colonoscopy for JEET. She is actually scheduled for colonoscopy only.    2. Paroxysmal atrial fibrillation - on anticoagulation for this. Needs approval to hold for procedure-- likely has approval already   3. Current use of long term anticoagulation - see above      Recommendations:   - EGD and colonoscopy  - A referral has been placed for endoscopy procedure scheduling and pre-admission testing (PAT) appointment has been scheduled.    - PAT to get approval to hold Eliquis if not done so already.   - if endoscopies are unrevealing, will need visualization of the small bowel with video capsule endoscopy.      Iron deficiency anemia, unspecified iron deficiency anemia type  -     Ambulatory referral/consult to Gastroenterology  -     Ambulatory referral/consult to Endo Procedure ; Future; Expected date: 08/23/2023  -     Case Request Endoscopy: EGD (ESOPHAGOGASTRODUODENOSCOPY), COLONOSCOPY    Paroxysmal atrial fibrillation    Current use of long term anticoagulation    Follow up to be determined after results/ procedure(s).    Thank you so much for allowing me to participate in the care of LAINE Cheng

## 2023-08-23 ENCOUNTER — HOSPITAL ENCOUNTER (OUTPATIENT)
Dept: PREADMISSION TESTING | Facility: HOSPITAL | Age: 68
Discharge: HOME | End: 2023-08-23
Attending: INTERNAL MEDICINE
Payer: COMMERCIAL

## 2023-08-23 DIAGNOSIS — D50.9 IRON DEFICIENCY ANEMIA, UNSPECIFIED IRON DEFICIENCY ANEMIA TYPE: ICD-10-CM

## 2023-08-24 ENCOUNTER — PATIENT MESSAGE (OUTPATIENT)
Dept: FAMILY MEDICINE | Facility: CLINIC | Age: 68
End: 2023-08-24
Payer: COMMERCIAL

## 2023-08-24 DIAGNOSIS — G25.81 RESTLESS LEGS: ICD-10-CM

## 2023-08-24 DIAGNOSIS — R09.82 POST-NASAL DRIP: ICD-10-CM

## 2023-08-24 DIAGNOSIS — M79.10 MUSCLE ACHE: ICD-10-CM

## 2023-08-24 DIAGNOSIS — D50.0 IRON DEFICIENCY ANEMIA DUE TO CHRONIC BLOOD LOSS: ICD-10-CM

## 2023-08-25 ENCOUNTER — PATIENT MESSAGE (OUTPATIENT)
Dept: CARDIOLOGY | Facility: CLINIC | Age: 68
End: 2023-08-25
Payer: COMMERCIAL

## 2023-08-25 ENCOUNTER — E-CONSULT (OUTPATIENT)
Dept: CARDIOLOGY | Facility: CLINIC | Age: 68
End: 2023-08-25
Payer: COMMERCIAL

## 2023-08-25 DIAGNOSIS — Z01.810 PREOP CARDIOVASCULAR EXAM: Primary | ICD-10-CM

## 2023-08-25 PROCEDURE — 99451 NTRPROF PH1/NTRNET/EHR 5/>: CPT | Mod: S$GLB,,, | Performed by: INTERNAL MEDICINE

## 2023-08-25 PROCEDURE — 99451 PR INTERPROF, PHONE/INTERNET/EHR, CONSULT, >= 5MINS: ICD-10-PCS | Mod: S$GLB,,, | Performed by: INTERNAL MEDICINE

## 2023-08-25 RX ORDER — OLOPATADINE HYDROCHLORIDE 1 MG/ML
1 SOLUTION/ DROPS OPHTHALMIC DAILY
Qty: 5 ML | Refills: 3 | Status: SHIPPED | OUTPATIENT
Start: 2023-08-25

## 2023-08-25 RX ORDER — BUPROPION HYDROCHLORIDE 150 MG/1
150 TABLET, EXTENDED RELEASE ORAL 2 TIMES DAILY
Qty: 180 TABLET | Refills: 3 | Status: SHIPPED | OUTPATIENT
Start: 2023-08-25 | End: 2024-02-21 | Stop reason: SDUPTHER

## 2023-08-25 RX ORDER — GABAPENTIN 300 MG/1
300 CAPSULE ORAL 2 TIMES DAILY
Qty: 180 CAPSULE | Refills: 3 | Status: SHIPPED | OUTPATIENT
Start: 2023-08-25 | End: 2024-02-21 | Stop reason: SDUPTHER

## 2023-08-25 RX ORDER — DULOXETIN HYDROCHLORIDE 30 MG/1
30 CAPSULE, DELAYED RELEASE ORAL DAILY
Qty: 90 CAPSULE | Refills: 3 | Status: SHIPPED | OUTPATIENT
Start: 2023-08-25 | End: 2024-02-21 | Stop reason: SDUPTHER

## 2023-08-25 RX ORDER — CARVEDILOL 25 MG/1
25 TABLET ORAL 2 TIMES DAILY WITH MEALS
Qty: 180 TABLET | Refills: 3 | Status: SHIPPED | OUTPATIENT
Start: 2023-08-25 | End: 2024-02-21 | Stop reason: SDUPTHER

## 2023-08-25 RX ORDER — CYCLOBENZAPRINE HCL 10 MG
10 TABLET ORAL 2 TIMES DAILY PRN
Qty: 180 TABLET | Refills: 1 | Status: SHIPPED | OUTPATIENT
Start: 2023-08-25 | End: 2024-02-21 | Stop reason: SDUPTHER

## 2023-08-25 RX ORDER — ROPINIROLE 1 MG/1
1 TABLET, FILM COATED ORAL 2 TIMES DAILY PRN
Qty: 180 TABLET | Refills: 3 | Status: SHIPPED | OUTPATIENT
Start: 2023-08-25 | End: 2023-11-10 | Stop reason: SDUPTHER

## 2023-08-25 RX ORDER — FLUTICASONE PROPIONATE 50 MCG
1 SPRAY, SUSPENSION (ML) NASAL DAILY
Qty: 16 ML | Refills: 3 | Status: SHIPPED | OUTPATIENT
Start: 2023-08-25 | End: 2023-08-31 | Stop reason: SDUPTHER

## 2023-08-25 RX ORDER — LOSARTAN POTASSIUM AND HYDROCHLOROTHIAZIDE 25; 100 MG/1; MG/1
1 TABLET ORAL DAILY
Qty: 90 TABLET | Refills: 3 | Status: SHIPPED | OUTPATIENT
Start: 2023-08-25 | End: 2024-01-17

## 2023-08-25 RX ORDER — POTASSIUM CHLORIDE 1500 MG/1
20 TABLET, EXTENDED RELEASE ORAL DAILY
Qty: 90 TABLET | Refills: 3 | Status: SHIPPED | OUTPATIENT
Start: 2023-08-25

## 2023-08-25 RX ORDER — FERROUS GLUCONATE 324(38)MG
324 TABLET ORAL
Qty: 180 TABLET | Refills: 3 | Status: SHIPPED | OUTPATIENT
Start: 2023-08-25

## 2023-08-25 RX ORDER — VERAPAMIL HYDROCHLORIDE 120 MG/1
120 CAPSULE, EXTENDED RELEASE ORAL DAILY
Qty: 90 CAPSULE | Refills: 3 | Status: SHIPPED | OUTPATIENT
Start: 2023-08-25 | End: 2023-09-11

## 2023-08-25 RX ORDER — HYDRALAZINE HYDROCHLORIDE 50 MG/1
50 TABLET, FILM COATED ORAL 3 TIMES DAILY
Qty: 270 TABLET | Refills: 3 | Status: SHIPPED | OUTPATIENT
Start: 2023-08-25 | End: 2023-09-11

## 2023-08-25 RX ORDER — PANTOPRAZOLE SODIUM 40 MG/1
40 TABLET, DELAYED RELEASE ORAL DAILY
Qty: 90 TABLET | Refills: 3 | Status: SHIPPED | OUTPATIENT
Start: 2023-08-25 | End: 2024-02-21 | Stop reason: SDUPTHER

## 2023-08-25 RX ORDER — FUROSEMIDE 20 MG/1
20 TABLET ORAL DAILY PRN
Qty: 30 TABLET | Refills: 11 | Status: SHIPPED | OUTPATIENT
Start: 2023-08-25 | End: 2024-08-24

## 2023-08-25 NOTE — TELEPHONE ENCOUNTER
No care due was identified.  [facility] Embedded Care Due Messages. Reference number: [id number].   8/25/2023 8:36:20 AM CDT

## 2023-08-27 ENCOUNTER — PATIENT MESSAGE (OUTPATIENT)
Dept: CARDIOLOGY | Facility: CLINIC | Age: 68
End: 2023-08-27
Payer: COMMERCIAL

## 2023-08-28 ENCOUNTER — HOSPITAL ENCOUNTER (OUTPATIENT)
Facility: HOSPITAL | Age: 68
Discharge: HOME | End: 2023-08-28
Attending: INTERNAL MEDICINE | Admitting: INTERNAL MEDICINE
Payer: COMMERCIAL

## 2023-08-28 ENCOUNTER — ANESTHESIA EVENT (OUTPATIENT)
Dept: ENDOSCOPY | Facility: HOSPITAL | Age: 68
End: 2023-08-28
Payer: COMMERCIAL

## 2023-08-28 ENCOUNTER — ANESTHESIA (OUTPATIENT)
Dept: ENDOSCOPY | Facility: HOSPITAL | Age: 68
End: 2023-08-28
Payer: COMMERCIAL

## 2023-08-28 DIAGNOSIS — D64.9 ANEMIA: ICD-10-CM

## 2023-08-28 PROBLEM — D62 ACUTE BLOOD LOSS ANEMIA: Status: ACTIVE | Noted: 2023-08-28

## 2023-08-28 PROCEDURE — 43239 PR EGD, FLEX, W/BIOPSY, SGL/MULTI: ICD-10-PCS | Mod: 51,,, | Performed by: INTERNAL MEDICINE

## 2023-08-28 PROCEDURE — 88342 IMHCHEM/IMCYTCHM 1ST ANTB: CPT | Performed by: STUDENT IN AN ORGANIZED HEALTH CARE EDUCATION/TRAINING PROGRAM

## 2023-08-28 PROCEDURE — 25000003 PHARM REV CODE 250: Performed by: NURSE ANESTHETIST, CERTIFIED REGISTERED

## 2023-08-28 PROCEDURE — 37000009 HC ANESTHESIA EA ADD 15 MINS: Performed by: INTERNAL MEDICINE

## 2023-08-28 PROCEDURE — 63600175 PHARM REV CODE 636 W HCPCS: Performed by: NURSE ANESTHETIST, CERTIFIED REGISTERED

## 2023-08-28 PROCEDURE — 37000008 HC ANESTHESIA 1ST 15 MINUTES: Performed by: INTERNAL MEDICINE

## 2023-08-28 PROCEDURE — 45380 COLONOSCOPY AND BIOPSY: CPT | Performed by: INTERNAL MEDICINE

## 2023-08-28 PROCEDURE — 43239 EGD BIOPSY SINGLE/MULTIPLE: CPT | Mod: 51,,, | Performed by: INTERNAL MEDICINE

## 2023-08-28 PROCEDURE — 88305 TISSUE EXAM BY PATHOLOGIST: CPT | Mod: 59 | Performed by: STUDENT IN AN ORGANIZED HEALTH CARE EDUCATION/TRAINING PROGRAM

## 2023-08-28 PROCEDURE — 45380 PR COLONOSCOPY,BIOPSY: ICD-10-PCS | Mod: ,,, | Performed by: INTERNAL MEDICINE

## 2023-08-28 PROCEDURE — 88305 TISSUE EXAM BY PATHOLOGIST: ICD-10-PCS | Mod: 26,,, | Performed by: STUDENT IN AN ORGANIZED HEALTH CARE EDUCATION/TRAINING PROGRAM

## 2023-08-28 PROCEDURE — 45380 COLONOSCOPY AND BIOPSY: CPT | Mod: ,,, | Performed by: INTERNAL MEDICINE

## 2023-08-28 PROCEDURE — 88305 TISSUE EXAM BY PATHOLOGIST: CPT | Mod: 26,,, | Performed by: STUDENT IN AN ORGANIZED HEALTH CARE EDUCATION/TRAINING PROGRAM

## 2023-08-28 PROCEDURE — 43239 EGD BIOPSY SINGLE/MULTIPLE: CPT | Performed by: INTERNAL MEDICINE

## 2023-08-28 PROCEDURE — 27201012 HC FORCEPS, HOT/COLD, DISP: Performed by: INTERNAL MEDICINE

## 2023-08-28 PROCEDURE — 88342 IMHCHEM/IMCYTCHM 1ST ANTB: CPT | Mod: 26,,, | Performed by: STUDENT IN AN ORGANIZED HEALTH CARE EDUCATION/TRAINING PROGRAM

## 2023-08-28 PROCEDURE — 88342 CHG IMMUNOCYTOCHEMISTRY: ICD-10-PCS | Mod: 26,,, | Performed by: STUDENT IN AN ORGANIZED HEALTH CARE EDUCATION/TRAINING PROGRAM

## 2023-08-28 RX ORDER — PROPOFOL 10 MG/ML
VIAL (ML) INTRAVENOUS
Status: DISCONTINUED | OUTPATIENT
Start: 2023-08-28 | End: 2023-08-28

## 2023-08-28 RX ORDER — LIDOCAINE HYDROCHLORIDE 10 MG/ML
INJECTION, SOLUTION EPIDURAL; INFILTRATION; INTRACAUDAL; PERINEURAL
Status: DISCONTINUED | OUTPATIENT
Start: 2023-08-28 | End: 2023-08-28

## 2023-08-28 RX ORDER — SODIUM CHLORIDE, SODIUM LACTATE, POTASSIUM CHLORIDE, CALCIUM CHLORIDE 600; 310; 30; 20 MG/100ML; MG/100ML; MG/100ML; MG/100ML
INJECTION, SOLUTION INTRAVENOUS CONTINUOUS PRN
Status: DISCONTINUED | OUTPATIENT
Start: 2023-08-28 | End: 2023-08-28

## 2023-08-28 RX ORDER — PANTOPRAZOLE SODIUM 40 MG/1
40 TABLET, DELAYED RELEASE ORAL DAILY
Qty: 30 TABLET | Refills: 11 | Status: SHIPPED | OUTPATIENT
Start: 2023-08-28 | End: 2023-09-11

## 2023-08-28 RX ADMIN — PROPOFOL 30 MG: 10 INJECTION, EMULSION INTRAVENOUS at 12:08

## 2023-08-28 RX ADMIN — SODIUM CHLORIDE, SODIUM LACTATE, POTASSIUM CHLORIDE, AND CALCIUM CHLORIDE: .6; .31; .03; .02 INJECTION, SOLUTION INTRAVENOUS at 11:08

## 2023-08-28 RX ADMIN — PROPOFOL 100 MG: 10 INJECTION, EMULSION INTRAVENOUS at 11:08

## 2023-08-28 RX ADMIN — PROPOFOL 30 MG: 10 INJECTION, EMULSION INTRAVENOUS at 11:08

## 2023-08-28 RX ADMIN — PROPOFOL 40 MG: 10 INJECTION, EMULSION INTRAVENOUS at 12:08

## 2023-08-28 RX ADMIN — LIDOCAINE HYDROCHLORIDE 50 MG: 10 SOLUTION INTRAVENOUS at 11:08

## 2023-08-28 NOTE — DISCHARGE SUMMARY
[facility] (Hospital)  Discharge Note  Short Stay    Procedure(s) (LRB):  EGD (ESOPHAGOGASTRODUODENOSCOPY) (N/A)  COLONOSCOPY (N/A)      OUTCOME: Patient tolerated treatment/procedure well without complication and is now ready for discharge.    DISPOSITION: Home or Self Care    FINAL DIAGNOSIS:  <principal problem not specified>    FOLLOWUP: With primary care provider    DISCHARGE INSTRUCTIONS:  No discharge procedures on file.     TIME SPENT ON DISCHARGE: 20 minutes

## 2023-08-28 NOTE — PLAN OF CARE
Dr. Lorenzo bedside reviewing results with pt and family. Back to baseline. No pain, no n/v. No bleeding. VSS. Discharge folder received. Pt discharged.

## 2023-08-28 NOTE — ANESTHESIA PREPROCEDURE EVALUATION
08/28/2023  Erin Soriano is a 68 y.o., female.      Pre-op Assessment    I have reviewed the Patient Summary Reports.     I have reviewed the Nursing Notes. I have reviewed the NPO Status.   I have reviewed the Medications.     Review of Systems  Anesthesia Hx:  No problems with previous Anesthesia  Denies Family Hx of Anesthesia complications.   Denies Personal Hx of Anesthesia complications.   Social:  Former Smoker    Hematology/Oncology:  Hematology Normal   Oncology Normal     EENT/Dental:EENT/Dental Normal   Cardiovascular:   Hypertension ECG has been reviewed.    Pulmonary:   Asthma mild and asymptomatic    Hepatic/GI:   GERD    Musculoskeletal:   Arthritis     Neurological:   Neuromuscular Disease,    Endocrine:  Endocrine Normal  Obesity / BMI > 30  Dermatological:  Skin Normal    Psych:   Psychiatric History anxiety depression          Physical Exam  General: Well nourished, Cooperative, Alert and Oriented    Airway:  Mallampati: II   Mouth Opening: Normal  TM Distance: Normal  Tongue: Normal  Neck ROM: Normal ROM    Dental:  Intact  Pt denies loose or removable dentition  Heart:  Rate: Normal  Rhythm: Regular Rhythm        Anesthesia Plan  Type of Anesthesia, risks & benefits discussed:    Anesthesia Type: MAC, Gen Natural Airway  Intra-op Monitoring Plan: Standard ASA Monitors  Post Op Pain Control Plan: multimodal analgesia  Induction:  IV  Informed Consent: Informed consent signed with the Patient and all parties understand the risks and agree with anesthesia plan.  All questions answered.   ASA Score: 3  Day of Surgery Review of History & Physical: H&P Update referred to the surgeon/provider.I have interviewed and examined the patient. I have reviewed the patient's H&P dated: There are no significant changes.     Ready For Surgery From Anesthesia Perspective.     .

## 2023-08-28 NOTE — H&P
Endoscopy History and Physical    PCP - Helena Jones MD  Referring Physician - Helena Jones MD  [id number] [address] [address]      ASA - per anesthesia  Mallampati - per anesthesia  History of Anesthesia problems - no  Family history Anesthesia problems -  no   Plan of anesthesia - General    HPI  68 y.o. female    Planned Procedure: Colonoscopy and EGD  Diagnosis: Anemia  Chief Complaint: Same as above    Personnel H/o colon polyps:yes  FH of colon cancer:no  Anticoagulation:no      ROS:  Constitutional: No fevers, chills, No weight loss  CV: No chest pain  Pulm: No cough, No shortness of breath  GI: see HPI    Medical History:  has a past medical history of Anxiety, Arthritis, Asthma, Atrial fibrillation, Depression, Hyperlipidemia, Hypertension, and RLS (restless legs syndrome).    Surgical History:  has a past surgical history that includes Hysterectomy; Tonsillectomy; Adenoidectomy;  section; carpel tunnel; golfers elbow; Oophorectomy; Colonoscopy (N/A, 10/01/2018); Esophagogastroduodenoscopy (N/A, 10/01/2018); and Cataract extraction.    Family History: family history includes Cancer in her maternal grandfather and maternal grandmother; Clotting disorder in her maternal grandfather, maternal grandmother, and mother; Hemochromatosis in her mother; Hypertension in her father and mother..    Social History:  reports that she quit smoking about 11 years ago. Her smoking use included cigarettes. She has been exposed to tobacco smoke. She has never used smokeless tobacco. She reports current alcohol use of about 1.0 standard drink of alcohol per week. She reports that she does not use drugs.    Review of patient's allergies indicates:   Allergen Reactions    Amlodipine     Benazepril Edema     angioedema    Pregabalin Hallucinations    Strawberry Hives    Chlorhexidine Rash       Medications:   Medications Prior to Admission   Medication Sig Dispense Refill Last Dose    ascorbic acid,  vitamin C, (VITAMIN C) 1000 MG tablet Take 1,000 mg by mouth once daily.   8/27/2023    buPROPion (WELLBUTRIN SR) 150 MG TBSR 12 hr tablet Take 1 tablet (150 mg total) by mouth 2 (two) times daily. 180 tablet 3 8/27/2023    carvediloL (COREG) 25 MG tablet Take 1 tablet (25 mg total) by mouth 2 (two) times daily with meals. 180 tablet 3 8/28/2023    cyclobenzaprine (FLEXERIL) 10 MG tablet Take 1 tablet (10 mg total) by mouth 2 (two) times daily as needed for Muscle spasms. 180 tablet 1 Past Month    DULoxetine (CYMBALTA) 30 MG capsule Take 1 capsule (30 mg total) by mouth once daily. 90 capsule 3 8/27/2023    ferrous gluconate (FERGON) 324 MG tablet Take 1 tablet (324 mg total) by mouth daily with breakfast. 180 tablet 3 8/27/2023    fluticasone propionate (FLONASE) 50 mcg/actuation nasal spray 1 spray (50 mcg total) by Each Nostril route once daily. 16 mL 3 Past Month    gabapentin (NEURONTIN) 300 MG capsule Take 1 capsule (300 mg total) by mouth 2 (two) times daily. 180 capsule 3 8/27/2023    ibuprofen (ADVIL,MOTRIN) 600 MG tablet    Past Month    losartan-hydrochlorothiazide 100-25 mg (HYZAAR) 100-25 mg per tablet Take 1 tablet by mouth once daily. 90 tablet 3 8/28/2023    meclizine (ANTIVERT) 25 mg tablet TAKE 1 TABLET (25 MG TOTAL) BY MOUTH 3 (THREE) TIMES DAILY AS NEEDED FOR DIZZINESS 60 tablet 0 Past Month    olopatadine (PATANOL) 0.1 % ophthalmic solution Place 1 drop into both eyes once daily. 5 mL 3 Past Month    pantoprazole (PROTONIX) 40 MG tablet Take 1 tablet (40 mg total) by mouth once daily. 90 tablet 3 8/27/2023    potassium chloride (K-TAB) 20 mEq Take 1 tablet (20 mEq total) by mouth once daily. 90 tablet 3 8/27/2023    rOPINIRole (REQUIP) 1 MG tablet Take 1 tablet (1 mg total) by mouth 2 (two) times daily as needed (restless leg). 180 tablet 3 8/28/2023    verapamiL (VERELAN) 120 MG C24P Take 1 capsule (120 mg total) by mouth once daily. 90 capsule 3 8/28/2023    acetaminophen (TYLENOL) 500 MG  tablet Take 500 mg by mouth every 6 (six) hours as needed for Pain.   More than a month    albuterol (PROVENTIL/VENTOLIN HFA) 90 mcg/actuation inhaler Inhale 2 puffs into the lungs every 4 (four) hours as needed for Wheezing. 18 g 2 More than a month    albuterol-ipratropium (DUO-NEB) 2.5 mg-0.5 mg/3 mL nebulizer solution Take 3 mLs by nebulization every 4 (four) hours as needed for Wheezing. Rescue 75 mL 0 More than a month    apixaban (ELIQUIS) 5 mg Tab Take 1 tablet (5 mg total) by mouth 2 (two) times daily. 180 tablet 3 8/25/2023    clonazepam (KLONOPIN ORAL) Klonopin Take No date recorded No form recorded No frequency recorded No route recorded No set duration recorded No set duration amount recorded suspended No dosage strength recorded No dosage strength units of measure recorded       clotrimazole (LOTRIMIN) 1 % cream Apply topically 2 (two) times daily. 120 g 1     diazePAM (VALIUM) 5 MG tablet TAKE 1 TAB BY MOUTH ONE HOUR PRIOR TO PROCEDURE AND REPEAT PRIOR TO PROCEDURE IF NEEDED. DO NOT DRIVE IF YOU TAKE THIS MED       evolocumab (REPATHA SURECLICK) 140 mg/mL PnIj Inject 1 mL (140 mg total) into the skin every 14 (fourteen) days. 2 mL 2     furosemide (LASIX) 20 MG tablet Take 1 tablet (20 mg total) by mouth daily as needed (for leg swelling). 30 tablet 11     hydrALAZINE (APRESOLINE) 50 MG tablet Take 1 tablet (50 mg total) by mouth 3 (three) times daily. 270 tablet 3     inhalat.spacing dev,large mask (BREATHERITE SPACER-MASK,ADULT) Spcr Use as directed for inhalation. 1 each 1     montelukast sodium (SINGULAIR ORAL) Singulair Take No date recorded No form recorded No frequency recorded No route recorded No set duration recorded No set duration amount recorded suspended No dosage strength recorded No dosage strength units of measure recorded       mupirocin (BACTROBAN) 2 % ointment Apply 1 application  topically 2 (two) times daily.       oxyCODONE (OXYCONTIN) 10 mg 12 hr tablet oxycodone Take every 6  hours as needed No date recorded tablet No frequency recorded No route recorded No set duration recorded No set duration amount recorded active 10 mg   More than a month    pravastatin sodium (PRAVASTATIN ORAL) pravastatin Take No date recorded No form recorded No frequency recorded No route recorded No set duration recorded No set duration amount recorded suspended No dosage strength recorded No dosage strength units of measure recorded          Physical Exam:    Vital Signs:   Vitals:    08/28/23 1104   BP: (!) 162/72   Pulse: 74   Resp: 15   Temp: 97.3 °F (36.3 °C)       General Appearance: Well appearing in no acute distress  Abdomen: Soft, non tender, non distended with normal bowel sounds, no masses    Labs:  Lab Results   Component Value Date    WBC 8.21 08/18/2023    HGB 11.6 (L) 08/18/2023    HCT 36.9 (L) 08/18/2023     08/18/2023    CHOL 194 08/18/2023    TRIG 153 (H) 08/18/2023    HDL 35 (L) 08/18/2023    ALT 41 08/18/2023    AST 31 08/18/2023     08/18/2023    K 4.3 08/18/2023     08/18/2023    CREATININE 0.8 08/18/2023    BUN 10 08/18/2023    CO2 26 08/18/2023    TSH 1.222 05/16/2012    INR 1.0 09/20/2010    HGBA1C 5.7 (H) 12/16/2021       I have explained the risks and benefits of this endoscopic procedure to the patient including but not limited to bleeding, inflammation, infection, perforation, and death.    SEDATION PLAN: per anesthesia       History reviewed, vital signs satisfactory, cardiopulmonary status satisfactory, sedation options, risks and plans have been discussed with the patient  All their questions were answered and the patient agrees to the sedation procedures as planned and the patient is deemed an appropriate candidate for the sedation as planned.     The risks, benefits and alternatives of the procedure were discussed with the patient in detail. This discussion was had in the presence of endoscopy staff. The risks include, risks of adverse reaction to sedation  requiring the use of reversal agents, bleeding requiring blood transfusion, perforation requiring surgical intervention and technical failure. Other risks include aspiration leading to respiratory distress and respiratory failure resulting in endotracheal intubation and mechanical ventilation including death. If anesthesia is being utilized for this procedure, it is up to the anesthesiologist to determine airway safety including elective endotracheal intubation. Questions were answered, they agree to proceed. There was no language barriers.       Procedure explained to patient, informed consent obtained and placed in chart.       Abdoul Lorenzo MD

## 2023-08-28 NOTE — PROVATION PATIENT INSTRUCTIONS
Discharge Summary/Instructions after an Endoscopic Procedure  Patient Name: Erin Soriano  Patient MRN: [id number]  Patient YOB: 1955 Monday, August 28, 2023 Abdoul Lorenzo MD  Dear patient,  As a result of recent federal legislation (The Federal Cures Act), you may   receive lab or pathology results from your procedure in your [facility]   account before your physician is able to contact you. Your physician or   their representative will relay the results to you with their   recommendations at their soonest availability.  Thank you,  RESTRICTIONS:  During your procedure today, you received medications for sedation.  These   medications may affect your judgment, balance and coordination.  Therefore,   for 24 hours, you have the following restrictions:   - DO NOT drive a car, operate machinery, make legal/financial decisions,   sign important papers or drink alcohol.    ACTIVITY:  Today: no heavy lifting, straining or running due to procedural   sedation/anesthesia.  The following day: return to full activity including work.  DIET:  Eat and drink normally unless instructed otherwise.     TREATMENT FOR COMMON SIDE EFFECTS:  - Mild abdominal pain, nausea, belching, bloating or excessive gas:  rest,   eat lightly and use a heating pad.  - Sore Throat: treat with throat lozenges and/or gargle with warm salt   water.  - Because air was used during the procedure, expelling large amounts of air   from your rectum or belching is normal.  - If a bowel prep was taken, you may not have a bowel movement for 1-3 days.    This is normal.  SYMPTOMS TO WATCH FOR AND REPORT TO YOUR PHYSICIAN:  1. Abdominal pain or bloating, other than gas cramps.  2. Chest pain.  3. Back pain.  4. Signs of infection such as: chills or fever occurring within 24 hours   after the procedure.  5. Rectal bleeding, which would show as bright red, maroon, or black stools.   (A tablespoon of blood from the rectum is not serious, especially  if   hemorrhoids are present.)  6. Vomiting.  7. Weakness or dizziness.  GO DIRECTLY TO THE NEAREST EMERGENCY ROOM IF YOU HAVE ANY OF THE FOLLOWING:      Difficulty breathing              Chills and/or fever over 101 F   Persistent vomiting and/or vomiting blood   Severe abdominal pain   Severe chest pain   Black, tarry stools   Bleeding- more than one tablespoon   Any other symptom or condition that you feel may need urgent attention  Your doctor recommends these additional instructions:  If any biopsies were taken, your doctors clinic will contact you in 1 to 2   weeks with any results.  - Discharge patient to home.   - Resume previous diet.   - Continue present medications.   - Await pathology results.   - Return to referring physician as previously scheduled.  For questions, problems or results please call your physician Abdoul Lorenzo MD at Work:  [phone]  If you have any questions about the above instructions, call the GI   department at [phone] or call the endoscopy unit at [phone]   from 7am until 3 pm.  [facility] [place], EMERGENCY ROOM PHONE NUMBER:   [phone]  IF A COMPLICATION OR EMERGENCY SITUATION ARISES AND YOU ARE UNABLE TO REACH   YOUR PHYSICIAN - GO DIRECTLY TO THE EMERGENCY ROOM.  I have read or have had read to me these discharge instructions for my   procedure and have received a written copy.  I understand these   instructions and will follow-up with my physician if I have any questions.     __________________________________       _____________________________________  Nurse Signature                                          Patient/Designated   Responsible Party Signature  MD Abdoul Diaz MD  8/28/2023 11:58:00 AM  This report has been verified and signed electronically.  Dear patient,  As a result of recent federal legislation (The Federal Cures Act), you may   receive lab or pathology results from your  procedure in your [facility]   account before your physician is able to contact you. Your physician or   their representative will relay the results to you with their   recommendations at their soonest availability.  Thank you,  PROVATION

## 2023-08-28 NOTE — PROVATION PATIENT INSTRUCTIONS
Discharge Summary/Instructions after an Endoscopic Procedure  Patient Name: Erin Soriano  Patient MRN: [id number]  Patient YOB: 1955 Monday, August 28, 2023 Abdoul Lorenzo MD  Dear patient,  As a result of recent federal legislation (The Federal Cures Act), you may   receive lab or pathology results from your procedure in your [facility]   account before your physician is able to contact you. Your physician or   their representative will relay the results to you with their   recommendations at their soonest availability.  Thank you,  RESTRICTIONS:  During your procedure today, you received medications for sedation.  These   medications may affect your judgment, balance and coordination.  Therefore,   for 24 hours, you have the following restrictions:   - DO NOT drive a car, operate machinery, make legal/financial decisions,   sign important papers or drink alcohol.    ACTIVITY:  Today: no heavy lifting, straining or running due to procedural   sedation/anesthesia.  The following day: return to full activity including work.  DIET:  Eat and drink normally unless instructed otherwise.     TREATMENT FOR COMMON SIDE EFFECTS:  - Mild abdominal pain, nausea, belching, bloating or excessive gas:  rest,   eat lightly and use a heating pad.  - Sore Throat: treat with throat lozenges and/or gargle with warm salt   water.  - Because air was used during the procedure, expelling large amounts of air   from your rectum or belching is normal.  - If a bowel prep was taken, you may not have a bowel movement for 1-3 days.    This is normal.  SYMPTOMS TO WATCH FOR AND REPORT TO YOUR PHYSICIAN:  1. Abdominal pain or bloating, other than gas cramps.  2. Chest pain.  3. Back pain.  4. Signs of infection such as: chills or fever occurring within 24 hours   after the procedure.  5. Rectal bleeding, which would show as bright red, maroon, or black stools.   (A tablespoon of blood from the rectum is not serious, especially  if   hemorrhoids are present.)  6. Vomiting.  7. Weakness or dizziness.  GO DIRECTLY TO THE NEAREST EMERGENCY ROOM IF YOU HAVE ANY OF THE FOLLOWING:      Difficulty breathing              Chills and/or fever over 101 F   Persistent vomiting and/or vomiting blood   Severe abdominal pain   Severe chest pain   Black, tarry stools   Bleeding- more than one tablespoon   Any other symptom or condition that you feel may need urgent attention  Your doctor recommends these additional instructions:  If any biopsies were taken, your doctors clinic will contact you in 1 to 2   weeks with any results.  - Discharge patient to home.   - Resume previous diet.   - Continue present medications.   - Await pathology results.   - Repeat colonoscopy in 3 years for surveillance as prerp was fair. Likly   from gastric erosions  - Return to referring physician as previously scheduled.   - Patient has a contact number available for emergencies.  The signs and   symptoms of potential delayed complications were discussed with the   patient.  Return to normal activities tomorrow.  Written discharge   instructions were provided to the patient.  For questions, problems or results please call your physician Abdoul Lorenzo MD at Work:  [phone]  If you have any questions about the above instructions, call the GI   department at [phone] or call the endoscopy unit at [phone]   from 7am until 3 pm.  [facility] [place], EMERGENCY ROOM PHONE NUMBER:   [phone]  IF A COMPLICATION OR EMERGENCY SITUATION ARISES AND YOU ARE UNABLE TO REACH   YOUR PHYSICIAN - GO DIRECTLY TO THE EMERGENCY ROOM.  I have read or have had read to me these discharge instructions for my   procedure and have received a written copy.  I understand these   instructions and will follow-up with my physician if I have any questions.     __________________________________       _____________________________________  Nurse Signature                                           Patient/Designated   Responsible Party Signature  MD Abdoul Diaz MD  8/28/2023 12:15:55 PM  This report has been verified and signed electronically.  Dear patient,  As a result of recent federal legislation (The Federal Cures Act), you may   receive lab or pathology results from your procedure in your [facility]   account before your physician is able to contact you. Your physician or   their representative will relay the results to you with their   recommendations at their soonest availability.  Thank you,  PROVATION

## 2023-08-28 NOTE — TRANSFER OF CARE
"Anesthesia Transfer of Care Note    Patient: Erin Soriano    Procedure(s) Performed: Procedure(s) (LRB):  EGD (ESOPHAGOGASTRODUODENOSCOPY) (N/A)  COLONOSCOPY (N/A)    Patient location: GI    Anesthesia Type: MAC    Transport from OR: Transported from OR on room air with adequate spontaneous ventilation    Post pain: adequate analgesia    Post assessment: no apparent anesthetic complications and tolerated procedure well    Post vital signs: stable    Level of consciousness: awake    Nausea/Vomiting: no nausea/vomiting    Complications: none    Transfer of care protocol was followed      Last vitals:   Visit Vitals  BP (!) 162/72 (BP Location: Left arm, Patient Position: Lying)   Pulse 74   Temp 36.3 °C (97.3 °F) (Temporal)   Resp 15   Ht 5' 6" (1.676 m)   Wt 88 kg (194 lb)   SpO2 96%   Breastfeeding No   BMI 31.31 kg/m²     "

## 2023-08-28 NOTE — ANESTHESIA POSTPROCEDURE EVALUATION
Anesthesia Post Evaluation    Patient: Erin Soriano    Procedure(s) Performed: Procedure(s) (LRB):  EGD (ESOPHAGOGASTRODUODENOSCOPY) (N/A)  COLONOSCOPY (N/A)    Final Anesthesia Type: MAC      Patient location during evaluation: GI PACU  Patient participation: Yes- Able to Participate  Level of consciousness: awake and alert  Post-procedure vital signs: reviewed and stable  Pain management: adequate  Airway patency: patent    PONV status at discharge: No PONV  Anesthetic complications: no      Cardiovascular status: blood pressure returned to baseline  Respiratory status: unassisted  Hydration status: euvolemic  Follow-up not needed.          Vitals Value Taken Time   /72 08/28/23 1225   Temp 36.2 °C (97.2 °F) 08/28/23 1225   Pulse 65 08/28/23 1225   Resp 16 08/28/23 1225   SpO2 97 % 08/28/23 1225         Event Time   Out of Recovery 12:44:19         Pain/Rowena Score: Rowena Score: 10 (8/28/2023 12:25 PM)

## 2023-08-30 ENCOUNTER — PATIENT MESSAGE (OUTPATIENT)
Dept: FAMILY MEDICINE | Facility: CLINIC | Age: 68
End: 2023-08-30
Payer: COMMERCIAL

## 2023-08-30 LAB
FINAL PATHOLOGIC DIAGNOSIS: NORMAL
GROSS: NORMAL
Lab: NORMAL
MICROSCOPIC EXAM: NORMAL

## 2023-08-31 VITALS
OXYGEN SATURATION: 97 % | SYSTOLIC BLOOD PRESSURE: 130 MMHG | DIASTOLIC BLOOD PRESSURE: 72 MMHG | WEIGHT: 194 LBS | HEART RATE: 65 BPM | TEMPERATURE: 97 F | HEIGHT: 66 IN | RESPIRATION RATE: 16 BRPM | BODY MASS INDEX: 31.18 KG/M2

## 2023-08-31 DIAGNOSIS — R09.82 POST-NASAL DRIP: ICD-10-CM

## 2023-08-31 RX ORDER — FLUTICASONE PROPIONATE 50 MCG
1 SPRAY, SUSPENSION (ML) NASAL DAILY
Qty: 16 ML | Refills: 3 | Status: SHIPPED | OUTPATIENT
Start: 2023-08-31

## 2023-08-31 NOTE — TELEPHONE ENCOUNTER
No care due was identified.  [facility] Embedded Care Due Messages. Reference number: [id number].   8/31/2023 10:57:00 AM CDT

## 2023-09-06 ENCOUNTER — LAB VISIT (OUTPATIENT)
Dept: LAB | Facility: HOSPITAL | Age: 68
End: 2023-09-06
Attending: FAMILY MEDICINE
Payer: COMMERCIAL

## 2023-09-06 DIAGNOSIS — R74.8 ELEVATED ALKALINE PHOSPHATASE LEVEL: ICD-10-CM

## 2023-09-06 PROCEDURE — 36415 COLL VENOUS BLD VENIPUNCTURE: CPT | Mod: PO | Performed by: FAMILY MEDICINE

## 2023-09-06 PROCEDURE — 84080 ASSAY ALKALINE PHOSPHATASES: CPT | Performed by: FAMILY MEDICINE

## 2023-09-06 PROCEDURE — 84075 ASSAY ALKALINE PHOSPHATASE: CPT | Performed by: FAMILY MEDICINE

## 2023-09-11 ENCOUNTER — OFFICE VISIT (OUTPATIENT)
Dept: FAMILY MEDICINE | Facility: CLINIC | Age: 68
End: 2023-09-11
Payer: COMMERCIAL

## 2023-09-11 VITALS
HEIGHT: 66 IN | BODY MASS INDEX: 30.74 KG/M2 | SYSTOLIC BLOOD PRESSURE: 120 MMHG | RESPIRATION RATE: 18 BRPM | HEART RATE: 94 BPM | DIASTOLIC BLOOD PRESSURE: 64 MMHG | OXYGEN SATURATION: 95 % | WEIGHT: 191.25 LBS

## 2023-09-11 DIAGNOSIS — M35.01 SICCA SYNDROME WITH KERATOCONJUNCTIVITIS: ICD-10-CM

## 2023-09-11 DIAGNOSIS — J45.909 CHRONIC ASTHMA, UNSPECIFIED ASTHMA SEVERITY, UNSPECIFIED WHETHER COMPLICATED, UNSPECIFIED WHETHER PERSISTENT: ICD-10-CM

## 2023-09-11 DIAGNOSIS — D50.0 IRON DEFICIENCY ANEMIA DUE TO CHRONIC BLOOD LOSS: ICD-10-CM

## 2023-09-11 DIAGNOSIS — M85.80 OSTEOPENIA, UNSPECIFIED LOCATION: ICD-10-CM

## 2023-09-11 DIAGNOSIS — M79.7 FIBROMYALGIA: ICD-10-CM

## 2023-09-11 DIAGNOSIS — Z12.31 SCREENING MAMMOGRAM FOR BREAST CANCER: ICD-10-CM

## 2023-09-11 DIAGNOSIS — Z79.01 CHRONIC ANTICOAGULATION: ICD-10-CM

## 2023-09-11 DIAGNOSIS — I10 ESSENTIAL HYPERTENSION: ICD-10-CM

## 2023-09-11 DIAGNOSIS — F39 MOOD DISORDER: ICD-10-CM

## 2023-09-11 DIAGNOSIS — Z00.00 ENCOUNTER FOR PREVENTIVE HEALTH EXAMINATION: Primary | ICD-10-CM

## 2023-09-11 DIAGNOSIS — I73.9 PVD (PERIPHERAL VASCULAR DISEASE): ICD-10-CM

## 2023-09-11 DIAGNOSIS — E78.2 MIXED HYPERLIPIDEMIA: ICD-10-CM

## 2023-09-11 DIAGNOSIS — I48.0 PAF (PAROXYSMAL ATRIAL FIBRILLATION): ICD-10-CM

## 2023-09-11 PROCEDURE — 3288F FALL RISK ASSESSMENT DOCD: CPT | Mod: CPTII,S$GLB,, | Performed by: NURSE PRACTITIONER

## 2023-09-11 PROCEDURE — 3008F BODY MASS INDEX DOCD: CPT | Mod: CPTII,S$GLB,, | Performed by: NURSE PRACTITIONER

## 2023-09-11 PROCEDURE — 1159F MED LIST DOCD IN RCRD: CPT | Mod: CPTII,S$GLB,, | Performed by: NURSE PRACTITIONER

## 2023-09-11 PROCEDURE — 3008F PR BODY MASS INDEX (BMI) DOCUMENTED: ICD-10-PCS | Mod: CPTII,S$GLB,, | Performed by: NURSE PRACTITIONER

## 2023-09-11 PROCEDURE — 1160F PR REVIEW ALL MEDS BY PRESCRIBER/CLIN PHARMACIST DOCUMENTED: ICD-10-PCS | Mod: CPTII,S$GLB,, | Performed by: NURSE PRACTITIONER

## 2023-09-11 PROCEDURE — G0439 PPPS, SUBSEQ VISIT: HCPCS | Mod: S$GLB,,, | Performed by: NURSE PRACTITIONER

## 2023-09-11 PROCEDURE — G0439 PR MEDICARE ANNUAL WELLNESS SUBSEQUENT VISIT: ICD-10-PCS | Mod: S$GLB,,, | Performed by: NURSE PRACTITIONER

## 2023-09-11 PROCEDURE — 1160F RVW MEDS BY RX/DR IN RCRD: CPT | Mod: CPTII,S$GLB,, | Performed by: NURSE PRACTITIONER

## 2023-09-11 PROCEDURE — 3288F PR FALLS RISK ASSESSMENT DOCUMENTED: ICD-10-PCS | Mod: CPTII,S$GLB,, | Performed by: NURSE PRACTITIONER

## 2023-09-11 PROCEDURE — 99999 PR PBB SHADOW E&M-EST. PATIENT-LVL V: CPT | Mod: PBBFAC,,, | Performed by: NURSE PRACTITIONER

## 2023-09-11 PROCEDURE — 1170F FXNL STATUS ASSESSED: CPT | Mod: CPTII,S$GLB,, | Performed by: NURSE PRACTITIONER

## 2023-09-11 PROCEDURE — 3078F PR MOST RECENT DIASTOLIC BLOOD PRESSURE < 80 MM HG: ICD-10-PCS | Mod: CPTII,S$GLB,, | Performed by: NURSE PRACTITIONER

## 2023-09-11 PROCEDURE — 1101F PT FALLS ASSESS-DOCD LE1/YR: CPT | Mod: CPTII,S$GLB,, | Performed by: NURSE PRACTITIONER

## 2023-09-11 PROCEDURE — 1125F AMNT PAIN NOTED PAIN PRSNT: CPT | Mod: CPTII,S$GLB,, | Performed by: NURSE PRACTITIONER

## 2023-09-11 PROCEDURE — 1170F PR FUNCTIONAL STATUS ASSESSED: ICD-10-PCS | Mod: CPTII,S$GLB,, | Performed by: NURSE PRACTITIONER

## 2023-09-11 PROCEDURE — 99999 PR PBB SHADOW E&M-EST. PATIENT-LVL V: ICD-10-PCS | Mod: PBBFAC,,, | Performed by: NURSE PRACTITIONER

## 2023-09-11 PROCEDURE — 1101F PR PT FALLS ASSESS DOC 0-1 FALLS W/OUT INJ PAST YR: ICD-10-PCS | Mod: CPTII,S$GLB,, | Performed by: NURSE PRACTITIONER

## 2023-09-11 PROCEDURE — 1125F PR PAIN SEVERITY QUANTIFIED, PAIN PRESENT: ICD-10-PCS | Mod: CPTII,S$GLB,, | Performed by: NURSE PRACTITIONER

## 2023-09-11 PROCEDURE — 3074F SYST BP LT 130 MM HG: CPT | Mod: CPTII,S$GLB,, | Performed by: NURSE PRACTITIONER

## 2023-09-11 PROCEDURE — 3074F PR MOST RECENT SYSTOLIC BLOOD PRESSURE < 130 MM HG: ICD-10-PCS | Mod: CPTII,S$GLB,, | Performed by: NURSE PRACTITIONER

## 2023-09-11 PROCEDURE — 3078F DIAST BP <80 MM HG: CPT | Mod: CPTII,S$GLB,, | Performed by: NURSE PRACTITIONER

## 2023-09-11 PROCEDURE — 1159F PR MEDICATION LIST DOCUMENTED IN MEDICAL RECORD: ICD-10-PCS | Mod: CPTII,S$GLB,, | Performed by: NURSE PRACTITIONER

## 2023-09-11 RX ORDER — HYDRALAZINE HYDROCHLORIDE 50 MG/1
50 TABLET, FILM COATED ORAL EVERY 12 HOURS
Qty: 270 TABLET | Refills: 3
Start: 2023-09-11 | End: 2024-02-21 | Stop reason: SDUPTHER

## 2023-09-11 RX ORDER — VITAMIN E 268 MG
400 CAPSULE ORAL DAILY
COMMUNITY

## 2023-09-12 PROBLEM — F39 MOOD DISORDER: Status: ACTIVE | Noted: 2018-11-13

## 2023-09-12 PROBLEM — F33.41 RECURRENT MAJOR DEPRESSIVE DISORDER, IN PARTIAL REMISSION: Status: ACTIVE | Noted: 2023-09-12

## 2023-09-12 PROBLEM — Z01.810 PREOP CARDIOVASCULAR EXAM: Status: RESOLVED | Noted: 2018-09-30 | Resolved: 2023-09-12

## 2023-09-12 PROBLEM — F33.41 RECURRENT MAJOR DEPRESSIVE DISORDER, IN PARTIAL REMISSION: Status: RESOLVED | Noted: 2023-09-12 | Resolved: 2023-09-12

## 2023-09-12 PROBLEM — I48.0 PAROXYSMAL ATRIAL FIBRILLATION: Status: RESOLVED | Noted: 2019-02-27 | Resolved: 2023-09-12

## 2023-09-12 PROBLEM — D50.0 IRON DEFICIENCY ANEMIA DUE TO CHRONIC BLOOD LOSS: Status: ACTIVE | Noted: 2023-09-12

## 2023-09-12 NOTE — PATIENT INSTRUCTIONS
Counseling and Referral of Other Preventative  (Italic type indicates deductible and co-insurance are waived)    Patient Name: Erin Soriano  Today's Date: 9/12/2023    Health Maintenance       Date Due Completion Date    COVID-19 Vaccine (3 - Mixed Product series) 09/28/2021 8/3/2021    Influenza Vaccine (1) 09/01/2023 11/8/2022    Mammogram 11/18/2023 11/18/2022    Override on 11/18/2011: Done (per [facility] records Imaging)    Hemoglobin A1c (Prediabetes) 11/04/2023 11/4/2022    Lipid Panel 08/18/2024 8/18/2023    DEXA Scan 12/06/2025 12/6/2022    Colorectal Cancer Screening 08/28/2026 8/28/2023    TETANUS VACCINE 08/30/2029 8/30/2019        Orders Placed This Encounter   Procedures    Mammo Digital Screening Bilat w/ [facility]     The following information is provided to all patients.  This information is to help you find resources for any of the problems found today that may be affecting your health:                Living healthy guide: www.[facility].[place].gov      Understanding Diabetes: www.diabetes.org      Eating healthy: www.cdc.gov/healthyweight      CDC home safety checklist: www.cdc.gov/steadi/patient.html      Agency on Aging: www.goea.[place].gov      Alcoholics anonymous (AA): www.aa.org      Physical Activity: www.rosalio.nih.gov/sn5arfo      Tobacco use: www.quitwithusla.org

## 2023-09-12 NOTE — PROGRESS NOTES
"  Erin Soriano presented for a  Medicare AWV and comprehensive Health Risk Assessment today. The following components were reviewed and updated:    Medical history  Family History  Social history  Allergies and Current Medications  Health Risk Assessment  Health Maintenance  Care Team         ** See Completed Assessments for Annual Wellness Visit within the encounter summary.**         The following assessments were completed:  Living Situation  CAGE  Depression Screening  Timed Get Up and Go  Whisper Test  Cognitive Function Screening  Nutrition Screening  ADL Screening  PAQ Screening        Vitals:    09/11/23 1406   BP: 120/64   Pulse: 94   Resp: 18   SpO2: 95%   Weight: 86.7 kg (191 lb 4 oz)   Height: 5' 6" (1.676 m)     Body mass index is 30.87 kg/m².  Physical Exam  Constitutional:       General: She is not in acute distress.     Appearance: Normal appearance. She is well-developed. She is not ill-appearing, toxic-appearing or diaphoretic.   HENT:      Head: Normocephalic and atraumatic.      Right Ear: External ear normal.      Left Ear: External ear normal.      Nose: Nose normal.      Mouth/Throat:      Pharynx: Oropharynx is clear.   Eyes:      General: No scleral icterus.        Right eye: No discharge.         Left eye: No discharge.      Conjunctiva/sclera: Conjunctivae normal.   Neck:      Thyroid: No thyromegaly.      Vascular: No carotid bruit.      Trachea: No tracheal deviation.   Cardiovascular:      Rate and Rhythm: Normal rate and regular rhythm.      Pulses: Normal pulses.      Heart sounds: Normal heart sounds. No murmur heard.     No friction rub. No gallop.   Pulmonary:      Effort: Pulmonary effort is normal. No respiratory distress.      Breath sounds: Normal breath sounds. No stridor. No wheezing, rhonchi or rales.   Chest:      Chest wall: No tenderness.   Abdominal:      General: Bowel sounds are normal. There is no distension.      Palpations: Abdomen is soft. There is no mass.     "  Tenderness: There is no abdominal tenderness. There is no guarding or rebound.      Hernia: No hernia is present.   Musculoskeletal:         General: No tenderness. Normal range of motion.      Cervical back: Normal range of motion and neck supple. No edema or tenderness. Normal range of motion.   Lymphadenopathy:      Head:      Right side of head: No tonsillar adenopathy.      Left side of head: No tonsillar adenopathy.      Cervical: No cervical adenopathy.      Upper Body:      Right upper body: No supraclavicular adenopathy.      Left upper body: No supraclavicular adenopathy.   Skin:     General: Skin is warm and dry.      Findings: No lesion or rash.   Neurological:      Mental Status: She is alert and oriented to person, place, and time.      Deep Tendon Reflexes: Reflexes are normal and symmetric.   Psychiatric:         Mood and Affect: Mood normal.         Behavior: Behavior normal.               Diagnoses and health risks identified today and associated recommendations/orders:    1. Encounter for preventive health examination  Screenings performed, as noted above.  Personal preventative testing needs reviewed.      2. Screening mammogram for breast cancer  Due for screening, she will schedule per her mychart  - Mammo Digital Screening Bilat w/ [facility]; Future    3. Sicca syndrome with keratoconjunctivitis  Monitored, stable, follow up with pcp    4. PAF (paroxysmal atrial fibrillation)  Treated with Eliquis, stable, cont tx    5. PVD (peripheral vascular disease)  Monitored, stable, elevated legs at home    6. Essential hypertension  Treated with Coreg, stable, cont tx    7. Mixed hyperlipidemia  Monitored, discussed diet    8. Chronic asthma, unspecified asthma severity, unspecified whether complicated, unspecified whether persistent  Treated with inhalers, stable, cont tx    9. Mood disorder  Treated with Wellbutrin and Cymbalta, stable, cont tx    10. Chronic anticoagulation  Treated with eliquis for the  Afib, cont tx    11. Iron deficiency anemia due to chronic blood loss  Monitored, stable scopes, follow up with pcp, taking supplements    12. Fibromyalgia  Treated with Cymbalta, stable cont tx    13. Osteopenia, unspecified location  Monitored, next DEXA 2025    Review for Substance Use Disorders: patient does not use substances per chart    Review for opioid screening: Patient is not prescribed opioids       Provided Erin with a 5-10 year written screening schedule and personal prevention plan. Recommendations were developed using the USPSTF age appropriate recommendations. Education, counseling, and referrals were provided as needed. After Visit Summary printed and given to patient which includes a list of additional screenings\tests needed.    No follow-ups on file.    Leland Alvarez, NP    I offered to discuss advanced care planning, including how to pick a person who would make decisions for you if you were unable to make them for yourself, called a health care power of , and what kind of decisions you might make such as use of life sustaining treatments such as ventilators and tube feeding when faced with a life limiting illness recorded on a living will that they will need to know. (How you want to be cared for as you near the end of your natural life)     X Patient is interested in learning more about how to make advanced directives.  I provided them paperwork and offered to discuss this with them.

## 2023-09-13 ENCOUNTER — PATIENT MESSAGE (OUTPATIENT)
Dept: FAMILY MEDICINE | Facility: CLINIC | Age: 68
End: 2023-09-13
Payer: COMMERCIAL

## 2023-09-14 ENCOUNTER — PATIENT MESSAGE (OUTPATIENT)
Dept: FAMILY MEDICINE | Facility: CLINIC | Age: 68
End: 2023-09-14
Payer: COMMERCIAL

## 2023-09-14 NOTE — TELEPHONE ENCOUNTER
"She is c/o excessive sleepiness and asking if any of her "new" meds can cause it  The new meds started in August was repatha and Lotrimin cream   Please advise    "

## 2023-09-15 LAB
ALP BONE CFR SERPL: 56 % (ref 28–66)
ALP INTEST CFR SERPL: 0 % (ref 1–24)
ALP ISOS SERPL-IMP: ABNORMAL
ALP LIVER CFR SERPL: 44 % (ref 25–69)
ALP MACROHEPATIC CFR SERPL: ABNORMAL %
ALP PLAC CFR SERPL: 0 %
ALP SERPL-CCNC: 156 U/L (ref 37–153)

## 2023-10-31 DIAGNOSIS — I10 ESSENTIAL HYPERTENSION: Primary | ICD-10-CM

## 2023-10-31 DIAGNOSIS — I48.0 PAF (PAROXYSMAL ATRIAL FIBRILLATION): ICD-10-CM

## 2023-11-01 ENCOUNTER — PATIENT MESSAGE (OUTPATIENT)
Dept: CARDIOLOGY | Facility: CLINIC | Age: 68
End: 2023-11-01

## 2023-11-04 ENCOUNTER — PATIENT MESSAGE (OUTPATIENT)
Dept: CARDIOLOGY | Facility: CLINIC | Age: 68
End: 2023-11-04
Payer: COMMERCIAL

## 2023-11-10 ENCOUNTER — PATIENT MESSAGE (OUTPATIENT)
Dept: FAMILY MEDICINE | Facility: CLINIC | Age: 68
End: 2023-11-10
Payer: COMMERCIAL

## 2023-11-10 DIAGNOSIS — G25.81 RESTLESS LEGS: ICD-10-CM

## 2023-11-10 RX ORDER — ROPINIROLE 2 MG/1
2 TABLET, FILM COATED ORAL 2 TIMES DAILY PRN
Qty: 90 TABLET | Refills: 3 | Status: SHIPPED | OUTPATIENT
Start: 2023-11-10 | End: 2024-02-21 | Stop reason: SDUPTHER

## 2023-11-21 ENCOUNTER — LAB VISIT (OUTPATIENT)
Dept: LAB | Facility: HOSPITAL | Age: 68
End: 2023-11-21
Attending: FAMILY MEDICINE
Payer: COMMERCIAL

## 2023-11-21 ENCOUNTER — OFFICE VISIT (OUTPATIENT)
Dept: FAMILY MEDICINE | Facility: CLINIC | Age: 68
End: 2023-11-21
Payer: COMMERCIAL

## 2023-11-21 VITALS
HEART RATE: 70 BPM | SYSTOLIC BLOOD PRESSURE: 122 MMHG | OXYGEN SATURATION: 97 % | DIASTOLIC BLOOD PRESSURE: 72 MMHG | BODY MASS INDEX: 30.17 KG/M2 | WEIGHT: 186.94 LBS

## 2023-11-21 DIAGNOSIS — R53.83 FATIGUE, UNSPECIFIED TYPE: Primary | ICD-10-CM

## 2023-11-21 DIAGNOSIS — T46.6X5A MYALGIA DUE TO HMG COA REDUCTASE INHIBITOR: ICD-10-CM

## 2023-11-21 DIAGNOSIS — I10 ESSENTIAL HYPERTENSION: ICD-10-CM

## 2023-11-21 DIAGNOSIS — R53.83 FATIGUE, UNSPECIFIED TYPE: ICD-10-CM

## 2023-11-21 DIAGNOSIS — G25.81 RESTLESS LEGS: Primary | ICD-10-CM

## 2023-11-21 DIAGNOSIS — E78.2 MIXED HYPERLIPIDEMIA: ICD-10-CM

## 2023-11-21 DIAGNOSIS — D50.0 IRON DEFICIENCY ANEMIA DUE TO CHRONIC BLOOD LOSS: ICD-10-CM

## 2023-11-21 DIAGNOSIS — Z23 NEED FOR VACCINATION: ICD-10-CM

## 2023-11-21 DIAGNOSIS — G89.4 CHRONIC PAIN SYNDROME: ICD-10-CM

## 2023-11-21 DIAGNOSIS — M79.10 MYALGIA DUE TO HMG COA REDUCTASE INHIBITOR: ICD-10-CM

## 2023-11-21 LAB
ALBUMIN SERPL BCP-MCNC: 4.1 G/DL (ref 3.5–5.2)
ALP SERPL-CCNC: 141 U/L (ref 55–135)
ALT SERPL W/O P-5'-P-CCNC: 29 U/L (ref 10–44)
ANION GAP SERPL CALC-SCNC: 13 MMOL/L (ref 8–16)
AST SERPL-CCNC: 29 U/L (ref 10–40)
BASOPHILS # BLD AUTO: 0.09 K/UL (ref 0–0.2)
BASOPHILS NFR BLD: 0.9 % (ref 0–1.9)
BILIRUB SERPL-MCNC: 0.5 MG/DL (ref 0.1–1)
BUN SERPL-MCNC: 20 MG/DL (ref 8–23)
CALCIUM SERPL-MCNC: 9.6 MG/DL (ref 8.7–10.5)
CHLORIDE SERPL-SCNC: 105 MMOL/L (ref 95–110)
CO2 SERPL-SCNC: 25 MMOL/L (ref 23–29)
CREAT SERPL-MCNC: 0.8 MG/DL (ref 0.5–1.4)
DIFFERENTIAL METHOD: ABNORMAL
EOSINOPHIL # BLD AUTO: 0.3 K/UL (ref 0–0.5)
EOSINOPHIL NFR BLD: 3.4 % (ref 0–8)
ERYTHROCYTE [DISTWIDTH] IN BLOOD BY AUTOMATED COUNT: 14.4 % (ref 11.5–14.5)
EST. GFR  (NO RACE VARIABLE): >60 ML/MIN/1.73 M^2
GLUCOSE SERPL-MCNC: 112 MG/DL (ref 70–110)
HCT VFR BLD AUTO: 42.8 % (ref 37–48.5)
HGB BLD-MCNC: 13.9 G/DL (ref 12–16)
IMM GRANULOCYTES # BLD AUTO: 0.03 K/UL (ref 0–0.04)
IMM GRANULOCYTES NFR BLD AUTO: 0.3 % (ref 0–0.5)
IRON SERPL-MCNC: 62 UG/DL (ref 30–160)
LYMPHOCYTES # BLD AUTO: 3.5 K/UL (ref 1–4.8)
LYMPHOCYTES NFR BLD: 34.9 % (ref 18–48)
MCH RBC QN AUTO: 26.8 PG (ref 27–31)
MCHC RBC AUTO-ENTMCNC: 32.5 G/DL (ref 32–36)
MCV RBC AUTO: 83 FL (ref 82–98)
MONOCYTES # BLD AUTO: 0.8 K/UL (ref 0.3–1)
MONOCYTES NFR BLD: 7.8 % (ref 4–15)
NEUTROPHILS # BLD AUTO: 5.3 K/UL (ref 1.8–7.7)
NEUTROPHILS NFR BLD: 52.7 % (ref 38–73)
NRBC BLD-RTO: 0 /100 WBC
PLATELET # BLD AUTO: 243 K/UL (ref 150–450)
PMV BLD AUTO: 12.3 FL (ref 9.2–12.9)
POTASSIUM SERPL-SCNC: 4.9 MMOL/L (ref 3.5–5.1)
PROT SERPL-MCNC: 7.9 G/DL (ref 6–8.4)
RBC # BLD AUTO: 5.19 M/UL (ref 4–5.4)
SATURATED IRON: 16 % (ref 20–50)
SODIUM SERPL-SCNC: 143 MMOL/L (ref 136–145)
TOTAL IRON BINDING CAPACITY: 391 UG/DL (ref 250–450)
TRANSFERRIN SERPL-MCNC: 264 MG/DL (ref 200–375)
TSH SERPL DL<=0.005 MIU/L-ACNC: 0.78 UIU/ML (ref 0.4–4)
WBC # BLD AUTO: 9.98 K/UL (ref 3.9–12.7)

## 2023-11-21 PROCEDURE — 3008F BODY MASS INDEX DOCD: CPT | Mod: CPTII,S$GLB,, | Performed by: FAMILY MEDICINE

## 2023-11-21 PROCEDURE — 1101F PT FALLS ASSESS-DOCD LE1/YR: CPT | Mod: CPTII,S$GLB,, | Performed by: FAMILY MEDICINE

## 2023-11-21 PROCEDURE — 3078F DIAST BP <80 MM HG: CPT | Mod: CPTII,S$GLB,, | Performed by: FAMILY MEDICINE

## 2023-11-21 PROCEDURE — 36415 COLL VENOUS BLD VENIPUNCTURE: CPT | Mod: PO | Performed by: FAMILY MEDICINE

## 2023-11-21 PROCEDURE — 99214 PR OFFICE/OUTPT VISIT, EST, LEVL IV, 30-39 MIN: ICD-10-PCS | Mod: S$GLB,,, | Performed by: FAMILY MEDICINE

## 2023-11-21 PROCEDURE — G0008 FLU VACCINE - QUADRIVALENT - ADJUVANTED: ICD-10-PCS | Mod: S$GLB,,, | Performed by: FAMILY MEDICINE

## 2023-11-21 PROCEDURE — 84443 ASSAY THYROID STIM HORMONE: CPT | Performed by: FAMILY MEDICINE

## 2023-11-21 PROCEDURE — 99999 PR PBB SHADOW E&M-EST. PATIENT-LVL II: CPT | Mod: PBBFAC,,, | Performed by: FAMILY MEDICINE

## 2023-11-21 PROCEDURE — 1159F PR MEDICATION LIST DOCUMENTED IN MEDICAL RECORD: ICD-10-PCS | Mod: CPTII,S$GLB,, | Performed by: FAMILY MEDICINE

## 2023-11-21 PROCEDURE — 99214 OFFICE O/P EST MOD 30 MIN: CPT | Mod: S$GLB,,, | Performed by: FAMILY MEDICINE

## 2023-11-21 PROCEDURE — 3074F SYST BP LT 130 MM HG: CPT | Mod: CPTII,S$GLB,, | Performed by: FAMILY MEDICINE

## 2023-11-21 PROCEDURE — 3288F PR FALLS RISK ASSESSMENT DOCUMENTED: ICD-10-PCS | Mod: CPTII,S$GLB,, | Performed by: FAMILY MEDICINE

## 2023-11-21 PROCEDURE — 99999 PR PBB SHADOW E&M-EST. PATIENT-LVL II: ICD-10-PCS | Mod: PBBFAC,,, | Performed by: FAMILY MEDICINE

## 2023-11-21 PROCEDURE — 80053 COMPREHEN METABOLIC PANEL: CPT | Performed by: FAMILY MEDICINE

## 2023-11-21 PROCEDURE — 3074F PR MOST RECENT SYSTOLIC BLOOD PRESSURE < 130 MM HG: ICD-10-PCS | Mod: CPTII,S$GLB,, | Performed by: FAMILY MEDICINE

## 2023-11-21 PROCEDURE — 90694 FLU VACCINE - QUADRIVALENT - ADJUVANTED: ICD-10-PCS | Mod: S$GLB,,, | Performed by: FAMILY MEDICINE

## 2023-11-21 PROCEDURE — G0008 ADMIN INFLUENZA VIRUS VAC: HCPCS | Mod: S$GLB,,, | Performed by: FAMILY MEDICINE

## 2023-11-21 PROCEDURE — 85025 COMPLETE CBC W/AUTO DIFF WBC: CPT | Performed by: FAMILY MEDICINE

## 2023-11-21 PROCEDURE — 3008F PR BODY MASS INDEX (BMI) DOCUMENTED: ICD-10-PCS | Mod: CPTII,S$GLB,, | Performed by: FAMILY MEDICINE

## 2023-11-21 PROCEDURE — 84466 ASSAY OF TRANSFERRIN: CPT | Performed by: FAMILY MEDICINE

## 2023-11-21 PROCEDURE — 1101F PR PT FALLS ASSESS DOC 0-1 FALLS W/OUT INJ PAST YR: ICD-10-PCS | Mod: CPTII,S$GLB,, | Performed by: FAMILY MEDICINE

## 2023-11-21 PROCEDURE — 1126F AMNT PAIN NOTED NONE PRSNT: CPT | Mod: CPTII,S$GLB,, | Performed by: FAMILY MEDICINE

## 2023-11-21 PROCEDURE — 83540 ASSAY OF IRON: CPT | Performed by: FAMILY MEDICINE

## 2023-11-21 PROCEDURE — 3078F PR MOST RECENT DIASTOLIC BLOOD PRESSURE < 80 MM HG: ICD-10-PCS | Mod: CPTII,S$GLB,, | Performed by: FAMILY MEDICINE

## 2023-11-21 PROCEDURE — 90694 VACC AIIV4 NO PRSRV 0.5ML IM: CPT | Mod: S$GLB,,, | Performed by: FAMILY MEDICINE

## 2023-11-21 PROCEDURE — 1159F MED LIST DOCD IN RCRD: CPT | Mod: CPTII,S$GLB,, | Performed by: FAMILY MEDICINE

## 2023-11-21 PROCEDURE — 1126F PR PAIN SEVERITY QUANTIFIED, NO PAIN PRESENT: ICD-10-PCS | Mod: CPTII,S$GLB,, | Performed by: FAMILY MEDICINE

## 2023-11-21 PROCEDURE — 3288F FALL RISK ASSESSMENT DOCD: CPT | Mod: CPTII,S$GLB,, | Performed by: FAMILY MEDICINE

## 2023-11-21 RX ORDER — TRAMADOL HYDROCHLORIDE 50 MG/1
50 TABLET ORAL EVERY 8 HOURS PRN
Qty: 90 EACH | Refills: 0 | Status: SHIPPED | OUTPATIENT
Start: 2023-11-21

## 2023-11-21 RX ORDER — TRAMADOL HYDROCHLORIDE 50 MG/1
50 TABLET ORAL EVERY 8 HOURS PRN
Qty: 90 EACH | Refills: 0 | OUTPATIENT
Start: 2023-11-21

## 2023-11-21 RX ORDER — ROPINIROLE 4 MG/1
4 TABLET, FILM COATED ORAL NIGHTLY
Qty: 60 TABLET | Refills: 3 | Status: SHIPPED | OUTPATIENT
Start: 2023-11-21 | End: 2024-01-10

## 2023-11-21 RX ORDER — ROPINIROLE 4 MG/1
4 TABLET, FILM COATED ORAL NIGHTLY
COMMUNITY
End: 2023-11-21 | Stop reason: SDUPTHER

## 2023-11-21 RX ORDER — EVOLOCUMAB 140 MG/ML
140 INJECTION, SOLUTION SUBCUTANEOUS
Qty: 2 ML | Refills: 2 | Status: ACTIVE | OUTPATIENT
Start: 2023-11-21 | End: 2024-02-15 | Stop reason: SDUPTHER

## 2023-11-21 NOTE — TELEPHONE ENCOUNTER
No care due was identified.  [facility] Embedded Care Due Messages. Reference number: [id number].   11/21/2023 3:50:38 PM CST

## 2023-11-21 NOTE — PROGRESS NOTES
Chief Complaint:    Chief Complaint   Patient presents with    Follow-up     F/u       History of Present Illness:  Patient with PMHx of aneity, asthma, depression, HLD, and HTN presents today for follow-up,      Feeling weak and extremely fatigue intermittently for a few weeks. She did check her blood pressure one of the days and it was 111/84. She is mildly anemic and on oral iron. Endoscopy in August show some Erosive gastropathy with stigmata of recent bleeding. Denies any blood in stool.    Has not followed with pain management in a while for chronic pain. She had stopped going because she kept receiving rx of Tramadol. She is about to run out and wont be able to set up an appt before she does.         ROS:  Review of Systems   Constitutional:  Positive for fatigue. Negative for appetite change, chills and fever.   HENT:  Negative for congestion, ear pain, postnasal drip, rhinorrhea, sinus pressure and sinus pain.    Eyes:  Negative for pain.   Respiratory:  Negative for cough, chest tightness and shortness of breath.    Cardiovascular:  Negative for chest pain and palpitations.   Gastrointestinal:  Negative for abdominal pain, blood in stool, constipation, diarrhea and nausea.   Genitourinary:  Negative for difficulty urinating, dysuria, flank pain and hematuria.   Musculoskeletal:  Negative for arthralgias, back pain and myalgias.   Skin:  Negative for pallor and wound.   Neurological:  Positive for weakness. Negative for dizziness, tremors, speech difficulty, light-headedness and headaches.   Psychiatric/Behavioral:  Negative for behavioral problems, dysphoric mood and sleep disturbance.    All other systems reviewed and are negative.      Past Medical History:   Diagnosis Date    Anxiety     Arthritis     Asthma     Atrial fibrillation     Depression     Hyperlipidemia     Hypertension     RLS (restless legs syndrome)        Social History:  Social History     Socioeconomic History    Marital status:      Number of children: 2   Tobacco Use    Smoking status: Former     Current packs/day: 0.00     Types: Cigarettes     Quit date: 6/10/2012     Years since quittin.4     Passive exposure: Past    Smokeless tobacco: Never    Tobacco comments:     Would smoke 1 cigarette every few months   Substance and Sexual Activity    Alcohol use: Yes     Alcohol/week: 1.0 standard drink of alcohol     Types: 1 Standard drinks or equivalent per week     Comment: social    Drug use: No    Sexual activity: Yes     Partners: Male     Birth control/protection: None     Social Determinants of Health     Financial Resource Strain: Low Risk  (2023)    Overall Financial Resource Strain (CARDIA)     Difficulty of Paying Living Expenses: Not hard at all   Food Insecurity: No Food Insecurity (2023)    Hunger Vital Sign     Worried About Running Out of Food in the Last Year: Never true     Ran Out of Food in the Last Year: Never true   Transportation Needs: No Transportation Needs (2023)    PRAPARE - Transportation     Lack of Transportation (Medical): No     Lack of Transportation (Non-Medical): No   Physical Activity: Inactive (2023)    Exercise Vital Sign     Days of Exercise per Week: 0 days     Minutes of Exercise per Session: 0 min   Stress: No Stress Concern Present (2023)    [employer] - Occupational Stress Questionnaire     Feeling of Stress : Not at all   Social Connections: Socially Isolated (2023)    Social Connection and Isolation Panel [NHANES]     Frequency of Communication with Friends and Family: Three times a week     Frequency of Social Gatherings with Friends and Family: Three times a week     Attends Jehovah's witness Services: Never     Active Member of Clubs or Organizations: No     Attends Club or Organization Meetings: Never     Marital Status:    Housing Stability: Low Risk  (2023)    Housing Stability Vital Sign     Unable to Pay for  Housing in the Last Year: No     Number of Places Lived in the Last Year: 1     Unstable Housing in the Last Year: No       Family History:   family history includes Cancer in her maternal grandfather and maternal grandmother; Clotting disorder in her maternal grandfather, maternal grandmother, and mother; Hemochromatosis in her mother; Hypertension in her father and mother.    Health Maintenance   Topic Date Due    Mammogram  11/18/2023    Lipid Panel  08/18/2024    DEXA Scan  12/06/2025    Colorectal Cancer Screening  08/28/2026    TETANUS VACCINE  08/30/2029    Hepatitis C Screening  Completed    Shingles Vaccine  Completed       Physical Exam:    Vital Signs  Pulse: 70  SpO2: 97 %  BP: 122/72  BP Location: Left arm  Patient Position: Sitting  Pain Score: 0-No pain  Height and Weight  Weight: 84.8 kg (186 lb 15.2 oz)]    Body mass index is 30.17 kg/m².    Physical Exam  Constitutional:       Appearance: She is well-developed.   HENT:      Head: Normocephalic and atraumatic.      Right Ear: External ear normal.      Left Ear: External ear normal.      Nose: Nose normal.   Eyes:      Conjunctiva/sclera: Conjunctivae normal.      Pupils: Pupils are equal, round, and reactive to light.   Neck:      Thyroid: No thyromegaly.      Vascular: No JVD.      Trachea: No tracheal deviation.   Cardiovascular:      Rate and Rhythm: Normal rate and regular rhythm.      Heart sounds: Normal heart sounds. No murmur heard.     No gallop.   Pulmonary:      Effort: Pulmonary effort is normal. No respiratory distress.      Breath sounds: Normal breath sounds. No stridor. No wheezing or rales.   Chest:      Chest wall: No tenderness.   Abdominal:      General: There is no distension.      Palpations: Abdomen is soft. There is no mass.      Tenderness: There is no abdominal tenderness. There is no guarding or rebound.   Genitourinary:     Vagina: Normal.   Musculoskeletal:         General: No tenderness. Normal range of motion.       Cervical back: Normal range of motion and neck supple.   Lymphadenopathy:      Cervical: No cervical adenopathy.   Skin:     General: Skin is warm and dry.   Neurological:      Mental Status: She is alert and oriented to person, place, and time.      Deep Tendon Reflexes: Reflexes are normal and symmetric.   Psychiatric:         Speech: Speech normal.         Behavior: Behavior normal.         Thought Content: Thought content normal.         Judgment: Judgment normal.           Assessment:      ICD-10-CM ICD-9-CM   1. Fatigue, unspecified type  R53.83 780.79   2. Iron deficiency anemia due to chronic blood loss  D50.0 280.0   3. Essential hypertension  I10 401.9   4. Chronic pain syndrome  G89.4 338.4         Plan:      Tramadol refill for a month.  See labs below.   Follow up 6 months    Orders Placed This Encounter    CBC Auto Differential    Iron and TIBC    Comprehensive Metabolic Panel    TSH    rOPINIRole (REQUIP) 4 MG tablet    traMADoL (ULTRAM) 50 mg tablet          Current Outpatient Medications   Medication Sig Dispense Refill    albuterol (PROVENTIL/VENTOLIN HFA) 90 mcg/actuation inhaler Inhale 2 puffs into the lungs every 4 (four) hours as needed for Wheezing. 18 g 2    albuterol-ipratropium (DUO-NEB) 2.5 mg-0.5 mg/3 mL nebulizer solution Take 3 mLs by nebulization every 4 (four) hours as needed for Wheezing. Rescue 75 mL 0    apixaban (ELIQUIS) 5 mg Tab Take 1 tablet (5 mg total) by mouth 2 (two) times daily. 180 tablet 3    ascorbic acid, vitamin C, (VITAMIN C) 1000 MG tablet Take 1,000 mg by mouth once daily.      buPROPion (WELLBUTRIN SR) 150 MG TBSR 12 hr tablet Take 1 tablet (150 mg total) by mouth 2 (two) times daily. 180 tablet 3    carvediloL (COREG) 25 MG tablet Take 1 tablet (25 mg total) by mouth 2 (two) times daily with meals. 180 tablet 3    clotrimazole (LOTRIMIN) 1 % cream Apply topically 2 (two) times daily. 120 g 1    cyclobenzaprine (FLEXERIL) 10 MG tablet Take 1 tablet (10 mg total)  by mouth 2 (two) times daily as needed for Muscle spasms. 180 tablet 1    DULoxetine (CYMBALTA) 30 MG capsule Take 1 capsule (30 mg total) by mouth once daily. 90 capsule 3    evolocumab (REPATHA SURECLICK) 140 mg/mL PnIj Inject 1 mL (140 mg total) into the skin every 14 (fourteen) days. 2 mL 2    ferrous gluconate (FERGON) 324 MG tablet Take 1 tablet (324 mg total) by mouth daily with breakfast. 180 tablet 3    fluticasone propionate (FLONASE) 50 mcg/actuation nasal spray 1 spray (50 mcg total) by Each Nostril route once daily. 16 mL 3    furosemide (LASIX) 20 MG tablet Take 1 tablet (20 mg total) by mouth daily as needed (for leg swelling). 30 tablet 11    gabapentin (NEURONTIN) 300 MG capsule Take 1 capsule (300 mg total) by mouth 2 (two) times daily. 180 capsule 3    hydrALAZINE (APRESOLINE) 50 MG tablet Take 1 tablet (50 mg total) by mouth every 12 (twelve) hours. 270 tablet 3    ibuprofen (ADVIL,MOTRIN) 600 MG tablet       inhalat.spacing dev,large mask (BREATHERITE SPACER-MASK,ADULT) Spcr Use as directed for inhalation. 1 each 1    losartan-hydrochlorothiazide 100-25 mg (HYZAAR) 100-25 mg per tablet Take 1 tablet by mouth once daily. 90 tablet 3    meclizine (ANTIVERT) 25 mg tablet TAKE 1 TABLET (25 MG TOTAL) BY MOUTH 3 (THREE) TIMES DAILY AS NEEDED FOR DIZZINESS 60 tablet 0    olopatadine (PATANOL) 0.1 % ophthalmic solution Place 1 drop into both eyes once daily. 5 mL 3    pantoprazole (PROTONIX) 40 MG tablet Take 1 tablet (40 mg total) by mouth once daily. 90 tablet 3    potassium chloride (K-TAB) 20 mEq Take 1 tablet (20 mEq total) by mouth once daily. 90 tablet 3    vitamin E 400 UNIT capsule Take 400 Units by mouth once daily.      mupirocin (BACTROBAN) 2 % ointment Apply 1 application  topically 2 (two) times daily.      rOPINIRole (REQUIP) 2 MG tablet Take 1 tablet (2 mg total) by mouth 2 (two) times daily as needed (restless leg). (Patient not taking: Reported on 11/21/2023) 90 tablet 3    rOPINIRole  (REQUIP) 4 MG tablet Take 1 tablet (4 mg total) by mouth every evening. Take 1 tab at night and (1 tab prn for restless leg) 60 tablet 3    traMADoL (ULTRAM) 50 mg tablet Take 1 tablet (50 mg total) by mouth every 8 (eight) hours as needed for Pain. 90 each 0     No current facility-administered medications for this visit.     Facility-Administered Medications Ordered in Other Visits   Medication Dose Route Frequency Provider Last Rate Last Admin    lactated ringers infusion   Intravenous Continuous Vanda Hickman MD   Stopped at 10/01/18 1140       Medications Discontinued During This Encounter   Medication Reason    rOPINIRole (REQUIP) 4 MG tablet Reorder       No follow-ups on file.      Helena Jones MD   Scribe Attestation:   I, Mathieu Melo, am scribing for, and in the presence of, Dr.Arif Jones I performed the above scribed service and the documentation accurately describes the services I performed. I attest to the accuracy of the note.    I, Dr. Helena Jones, reviewed documentation as scribed above. I performed the services described in this documentation.  I agree that the record reflects my personal performance and is accurate and complete. Helena Jones MD.  01/06/2023

## 2023-12-11 ENCOUNTER — PATIENT MESSAGE (OUTPATIENT)
Dept: CARDIOLOGY | Facility: CLINIC | Age: 68
End: 2023-12-11

## 2023-12-11 ENCOUNTER — PATIENT OUTREACH (OUTPATIENT)
Dept: ADMINISTRATIVE | Facility: HOSPITAL | Age: 68
End: 2023-12-11
Payer: COMMERCIAL

## 2023-12-11 ENCOUNTER — PATIENT MESSAGE (OUTPATIENT)
Dept: ADMINISTRATIVE | Facility: HOSPITAL | Age: 68
End: 2023-12-11
Payer: COMMERCIAL

## 2023-12-25 ENCOUNTER — PATIENT MESSAGE (OUTPATIENT)
Dept: ADMINISTRATIVE | Facility: OTHER | Age: 68
End: 2023-12-25
Payer: COMMERCIAL

## 2024-01-10 ENCOUNTER — HOSPITAL ENCOUNTER (OUTPATIENT)
Dept: RADIOLOGY | Facility: HOSPITAL | Age: 69
Discharge: HOME | End: 2024-01-10
Attending: NURSE PRACTITIONER
Payer: MEDICARE

## 2024-01-10 ENCOUNTER — HOSPITAL ENCOUNTER (OUTPATIENT)
Dept: RADIOLOGY | Facility: HOSPITAL | Age: 69
Discharge: HOME | End: 2024-01-10
Attending: PHYSICIAN ASSISTANT
Payer: MEDICARE

## 2024-01-10 ENCOUNTER — OFFICE VISIT (OUTPATIENT)
Dept: INTERNAL MEDICINE | Facility: CLINIC | Age: 69
End: 2024-01-10
Payer: MEDICARE

## 2024-01-10 VITALS
WEIGHT: 179.81 LBS | SYSTOLIC BLOOD PRESSURE: 118 MMHG | OXYGEN SATURATION: 96 % | HEART RATE: 97 BPM | BODY MASS INDEX: 29.02 KG/M2 | DIASTOLIC BLOOD PRESSURE: 66 MMHG | TEMPERATURE: 98 F

## 2024-01-10 DIAGNOSIS — S80.02XA CONTUSION OF LEFT KNEE, INITIAL ENCOUNTER: ICD-10-CM

## 2024-01-10 DIAGNOSIS — S80.02XA CONTUSION OF LEFT KNEE, INITIAL ENCOUNTER: Primary | ICD-10-CM
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
## 2024-01-10 DIAGNOSIS — Z12.31 SCREENING MAMMOGRAM FOR BREAST CANCER: ICD-10-CM

## 2024-01-10 PROCEDURE — 73562 X-RAY EXAM OF KNEE 3: CPT | Mod: 26,LT,, | Performed by: RADIOLOGY

## 2024-01-10 PROCEDURE — 77067 SCR MAMMO BI INCL CAD: CPT | Mod: TC

## 2024-01-10 PROCEDURE — 99213 OFFICE O/P EST LOW 20 MIN: CPT | Mod: S$GLB,,, | Performed by: PHYSICIAN ASSISTANT

## 2024-01-10 PROCEDURE — 99999 PR PBB SHADOW E&M-EST. PATIENT-LVL V: CPT | Mod: PBBFAC,,, | Performed by: PHYSICIAN ASSISTANT

## 2024-01-10 PROCEDURE — 73562 X-RAY EXAM OF KNEE 3: CPT | Mod: TC,LT

## 2024-01-10 PROCEDURE — 77067 SCR MAMMO BI INCL CAD: CPT | Mod: 26,,, | Performed by: RADIOLOGY

## 2024-01-10 PROCEDURE — 73560 X-RAY EXAM OF KNEE 1 OR 2: CPT | Mod: TC,RT

## 2024-01-10 PROCEDURE — 77063 BREAST TOMOSYNTHESIS BI: CPT | Mod: 26,,, | Performed by: RADIOLOGY

## 2024-01-10 RX ORDER — DICLOFENAC SODIUM 10 MG/G
2 GEL TOPICAL DAILY
Qty: 100 G | Refills: 2 | Status: SHIPPED | OUTPATIENT
Start: 2024-01-10

## 2024-01-10 NOTE — PROGRESS NOTES
Subjective:       Patient ID: Erin Soriano is a 68 y.o. female.    Chief Complaint: Fall (Patient stated that she fell last night and she landed on her left knee. )      HPI  68-year-old female presents to clinic with complaints of left knee contusion to anterior side last night when she fell landing on a flexed knee striking it on the floor.  She reports pain is constant, aching and worse with movement and palpation.  She denies any clicking or popping but does reports some instability with ambulation.  No treatments have been tried.  She denies radiation of pain into upper leg or lower leg, paresthesias, weakness or loss of movement.    Review of Systems   Constitutional:  Negative for chills and fever.   Musculoskeletal:  Positive for arthralgias and joint swelling.   Neurological:  Negative for weakness and numbness.       Objective:      Physical Exam  Vitals and nursing note reviewed.   Constitutional:       General: She is not in acute distress.     Appearance: She is well-developed.      Comments: Ambulatory with walking cane   HENT:      Head: Normocephalic and atraumatic.   Eyes:      General: Lids are normal. No scleral icterus.     Extraocular Movements: Extraocular movements intact.      Conjunctiva/sclera: Conjunctivae normal.   Cardiovascular:      Rate and Rhythm: Normal rate and regular rhythm.      Pulses: Normal pulses.   Pulmonary:      Effort: Pulmonary effort is normal.      Breath sounds: Normal breath sounds. No decreased breath sounds, wheezing, rhonchi or rales.   Musculoskeletal:      Left knee: Bony tenderness (patella) present. No effusion or erythema. Normal range of motion. No LCL laxity, MCL laxity, ACL laxity or PCL laxity.Normal pulse.      Right lower leg: No edema.      Left lower leg: No edema.        Legs:    Neurological:      Mental Status: She is alert.      Cranial Nerves: No cranial nerve deficit.   Psychiatric:         Mood and Affect: Mood and affect normal.          Assessment:       1. Contusion of left knee, initial encounter    2. Fall, initial encounter        Plan:   1. Contusion of left knee, initial encounter  -     X-Ray Knee 3 View Left; Future; Expected date: 01/10/2024  -     diclofenac sodium (VOLTAREN) 1 % Gel; Apply 2 g topically once daily.  Dispense: 100 g; Refill: 2    2. Fall, initial encounter      Rest, ice and elevate, doclofenac gel for pain, continue existing Rx for Tramadol for pain

## 2024-01-18 ENCOUNTER — PATIENT MESSAGE (OUTPATIENT)
Dept: ADMINISTRATIVE | Facility: OTHER | Age: 69
End: 2024-01-18
Payer: MEDICAID

## 2024-02-06 ENCOUNTER — OFFICE VISIT (OUTPATIENT)
Dept: PODIATRY | Facility: CLINIC | Age: 69
End: 2024-02-06
Payer: MEDICARE

## 2024-02-06 VITALS — BODY MASS INDEX: 28.77 KG/M2 | HEIGHT: 66 IN | WEIGHT: 179 LBS

## 2024-02-06 DIAGNOSIS — M20.41 HAMMER TOE OF RIGHT FOOT: ICD-10-CM

## 2024-02-06 DIAGNOSIS — Z79.01 CURRENT USE OF ANTICOAGULANT THERAPY: ICD-10-CM

## 2024-02-06 DIAGNOSIS — M20.11 HALLUX VALGUS OF RIGHT FOOT: Primary | ICD-10-CM

## 2024-02-06 DIAGNOSIS — M77.41 METATARSALGIA OF RIGHT FOOT: ICD-10-CM

## 2024-02-06 DIAGNOSIS — M79.7 FIBROMYALGIA: ICD-10-CM

## 2024-02-06 PROCEDURE — 99999 PR PBB SHADOW E&M-EST. PATIENT-LVL IV: CPT | Mod: PBBFAC,,, | Performed by: PODIATRIST

## 2024-02-06 PROCEDURE — 99214 OFFICE O/P EST MOD 30 MIN: CPT | Mod: S$GLB,,, | Performed by: PODIATRIST

## 2024-02-06 NOTE — PROGRESS NOTES
Subjective:       Patient ID: Erin Soriano is a 68 y.o. female.    Chief Complaint: Foot Pain (C/o right foot tingling and burning in toes, heavy feeling at the ball of foot when waking in the morning, causing pain x several months, rates pain 9/10, prev injury to the right foot, non-diabetic, wears casual shoes, last seen PCP Francesca Larkin on 01/10/24)      HPI: Erin Soriano presents to the office today with complaints of moderate to severe pains to the right foot at the great toe due to bunion deformity and the ball of the foot.  Relates this is been ongoing for some time.  States 5/10 pain currently but reports that this increases to a 9/10.  She reports worsening bunion deformity and pain to the plantar aspect of the foot.  She does have a history of a hammertoe. Patient's Primary Care Provider is Helena Jones MD.     Review of patient's allergies indicates:   Allergen Reactions    Amlodipine     Benazepril Edema     angioedema    Pregabalin Hallucinations       Past Medical History:   Diagnosis Date    Anxiety     Arthritis     Asthma     Atrial fibrillation     Depression     Hyperlipidemia     Hypertension     RLS (restless legs syndrome)        Family History   Problem Relation Age of Onset    Hypertension Mother     Clotting disorder Mother     Hemochromatosis Mother     Hypertension Father     Cancer Maternal Grandmother     Clotting disorder Maternal Grandmother     Cancer Maternal Grandfather         prostate    Clotting disorder Maternal Grandfather        Social History     Socioeconomic History    Marital status:     Number of children: 2   Tobacco Use    Smoking status: Former     Current packs/day: 0.00     Types: Cigarettes     Quit date: 6/10/2012     Years since quittin.6     Passive exposure: Past    Smokeless tobacco: Never    Tobacco comments:     Would smoke 1 cigarette every few months   Substance and Sexual Activity    Alcohol use: Yes     Alcohol/week: 1.0  standard drink of alcohol     Types: 1 Standard drinks or equivalent per week     Comment: social    Drug use: No    Sexual activity: Yes     Partners: Male     Birth control/protection: None     Social Determinants of Health     Financial Resource Strain: Low Risk  (2023)    Overall Financial Resource Strain (CARDIA)     Difficulty of Paying Living Expenses: Not hard at all   Food Insecurity: No Food Insecurity (2023)    Hunger Vital Sign     Worried About Running Out of Food in the Last Year: Never true     Ran Out of Food in the Last Year: Never true   Transportation Needs: No Transportation Needs (2023)    PRAPARE - Transportation     Lack of Transportation (Medical): No     Lack of Transportation (Non-Medical): No   Physical Activity: Inactive (2023)    Exercise Vital Sign     Days of Exercise per Week: 0 days     Minutes of Exercise per Session: 0 min   Stress: No Stress Concern Present (2023)    [employer] - Occupational Stress Questionnaire     Feeling of Stress : Not at all   Social Connections: Socially Isolated (2023)    Social Connection and Isolation Panel [NHANES]     Frequency of Communication with Friends and Family: Three times a week     Frequency of Social Gatherings with Friends and Family: Three times a week     Attends Spiritism Services: Never     Active Member of Clubs or Organizations: No     Attends Club or Organization Meetings: Never     Marital Status:    Housing Stability: Low Risk  (2023)    Housing Stability Vital Sign     Unable to Pay for Housing in the Last Year: No     Number of Places Lived in the Last Year: 1     Unstable Housing in the Last Year: No       Past Surgical History:   Procedure Laterality Date    ADENOIDECTOMY      pt was 3 yrs old    back sx      L 4-5-6-7 spinal fusion    carpel tunnel      2009    CATARACT EXTRACTION       SECTION      2 different ones    COLONOSCOPY N/A 10/01/2018     "Procedure: COLONOSCOPY;  Surgeon: Johsnon Bacon MD;  Location: [facility];  Service: Endoscopy;  Laterality: N/A;    COLONOSCOPY N/A 08/28/2023    Procedure: COLONOSCOPY;  Surgeon: Abdoul Lorenzo MD;  Location: [facility];  Service: Endoscopy;  Laterality: N/A;    ESOPHAGOGASTRODUODENOSCOPY N/A 10/01/2018    Procedure: ESOPHAGOGASTRODUODENOSCOPY (EGD);  Surgeon: Johnson Bacon MD;  Location: [facility] ENDO;  Service: Endoscopy;  Laterality: N/A;    ESOPHAGOGASTRODUODENOSCOPY N/A 08/28/2023    Procedure: EGD (ESOPHAGOGASTRODUODENOSCOPY);  Surgeon: Abdoul Lorenzo MD;  Location: [facility];  Service: Endoscopy;  Laterality: N/A;    golfers elbow      2009    HYSTERECTOMY      1994 total    OOPHORECTOMY      TONSILLECTOMY      pt was 3 yrs old at time       Review of Systems    Objective:   Ht 5' 6" (1.676 m)   Wt 81.2 kg (179 lb)   BMI 28.89 kg/m²     Mammo Digital Screening Bilat w/ Ovidio  Narrative: Result:  Mammo Digital Screening Bilat w/ Ovidio    History:  Patient is 68 y.o. and is seen for a screening mammogram.    Films Compared:  Prior images (if available) were compared.     Findings:  This procedure was performed using tomosynthesis.   Computer-aided detection was utilized in the interpretation of this   examination.    The breasts have scattered areas of fibroglandular density. There is no   evidence of suspicious masses, microcalcifications or architectural   distortion.  Impression:    No mammographic evidence of malignancy.    BI-RADS Category 1: Negative    Recommendation:  Routine screening mammogram in 1 year is recommended.    Your estimated lifetime risk of breast cancer (to age 85) based on   Tyrer-Cuzick risk assessment model is 4.15 %.  According to the American   Cancer Society, patients with a lifetime breast cancer risk of 20% or   higher might benefit from supplemental screening tests, such as screening   breast MRI.      Physical Exam  LOWER EXTREMITY PHYSICAL " EXAMINATION    VASCULAR: On the right foot, the dorsalis pedis pulse is 2/4 and the posterior tibial pulse is 2/4. Capillary refill time is less than 3 seconds. Hair growth is present on the dorsum of the foot and at the digits. Proximal to distal temperature is warm to warm    ORTHOPEDIC:  Moderate bunion deformity is noted to the right foot. Upon ROM in the sagittal plan, there is mild to minimal pain related at the end ROM, dorsally; no plantar pains at the 1st MTPJ are stated. No pains to palpation of the tibial or the fibular sesamoid. There is a small medial eminence appreciated. There is no significant dorsal spurring noted. Palpation of the dorsal-lateral aspect of the 1st MTPJ is not painful. Hallux abductus is  noted.  Hallux interphalangeus is not noted. There is tracking.  Instability noted at the 1st tarsometatarsal joint.  Semi-rigid hammertoe present to the 2nd digit with increase in severe pain on palpation the plantar aspect of the 2nd metatarsal head secondary to retrograde buckling    DERMATOLOGY: Skin is supple, dry and intact. No ecchymosis is noted. No ulcerations are noted. Skin is supple and moist.     NEUROLOGY: Protective sensation is intact via 5.07 [place] monofilament. Proprioception is intact. Sensation to light touch is intact.     Assessment:     1. Hallux valgus of right foot    2. Metatarsalgia of right foot    3. Hammer toe of right foot    4. Fibromyalgia    5. Current use of anticoagulant therapy          Plan:     Hallux valgus of right foot    Metatarsalgia of right foot    Hammer toe of right foot    Fibromyalgia    Current use of anticoagulant therapy    Thorough discussion is had with the patient today, concerning the diagnosis, its etiology, and the treatment algorithm at present.    Discussed pathology etiology associated with metatarsalgia of the right foot as relates to a hammertoe deformity.  We discussed conservative and surgical therapies.  Patient is  currently doing well.  We discussed utilizing metatarsal pads to offload the area if patient is having increased pain and pressure.  We also discussed metatarsal padding such as a crest pad to pull the toe down and assist with correction of the hammertoe.  If patient does not have significant improvement with this, would consider performing surgical intervention.    Previous x-rays were reviewed and interpreted by myself with the patient room.  The moderate bunion deformity present with intermetatarsal angle of 16° hallux abductus angle of 8° and tibial sesamoid position of 6.  No significant arthritis noted to the 1st metatarsophalangeal joint.    Discussed conservative and surgical outcomes.  We discuss continuation of conservative therapy, however in the event the patient does require surgical procedure, would recommend 1st tarsometatarsal joint arthrodesis to correct the bunion deformity as well as a Weil osteotomy with hammertoe correction of the 2nd digit.    Encourage patient to follow-up as needed for new worsening pathology.    No future appointments.

## 2024-02-15 DIAGNOSIS — M79.10 MYALGIA DUE TO HMG COA REDUCTASE INHIBITOR: ICD-10-CM

## 2024-02-15 DIAGNOSIS — E78.2 MIXED HYPERLIPIDEMIA: ICD-10-CM

## 2024-02-15 DIAGNOSIS — T46.6X5A MYALGIA DUE TO HMG COA REDUCTASE INHIBITOR: ICD-10-CM

## 2024-02-15 RX ORDER — EVOLOCUMAB 140 MG/ML
140 INJECTION, SOLUTION SUBCUTANEOUS
Qty: 2 ML | Refills: 2 | Status: ACTIVE | OUTPATIENT
Start: 2024-02-15 | End: 2024-04-29

## 2024-02-21 DIAGNOSIS — J45.901 MODERATE ASTHMA WITH EXACERBATION, UNSPECIFIED WHETHER PERSISTENT: ICD-10-CM

## 2024-02-21 DIAGNOSIS — M79.10 MUSCLE ACHE: ICD-10-CM

## 2024-02-21 DIAGNOSIS — G25.81 RESTLESS LEGS: ICD-10-CM

## 2024-02-21 RX ORDER — PANTOPRAZOLE SODIUM 40 MG/1
40 TABLET, DELAYED RELEASE ORAL DAILY
Qty: 90 TABLET | Refills: 3 | Status: SHIPPED | OUTPATIENT
Start: 2024-02-21

## 2024-02-21 RX ORDER — GABAPENTIN 300 MG/1
300 CAPSULE ORAL 2 TIMES DAILY
Qty: 180 CAPSULE | Refills: 3 | Status: SHIPPED | OUTPATIENT
Start: 2024-02-21

## 2024-02-21 RX ORDER — CARVEDILOL 25 MG/1
25 TABLET ORAL 2 TIMES DAILY WITH MEALS
Qty: 180 TABLET | Refills: 3 | Status: SHIPPED | OUTPATIENT
Start: 2024-02-21 | End: 2025-02-20

## 2024-02-21 RX ORDER — CYCLOBENZAPRINE HCL 10 MG
10 TABLET ORAL 2 TIMES DAILY PRN
Qty: 180 TABLET | Refills: 1 | Status: SHIPPED | OUTPATIENT
Start: 2024-02-21 | End: 2024-02-22 | Stop reason: SDUPTHER

## 2024-02-21 RX ORDER — MECLIZINE HYDROCHLORIDE 25 MG/1
TABLET ORAL
Qty: 60 TABLET | Refills: 0 | Status: SHIPPED | OUTPATIENT
Start: 2024-02-21 | End: 2024-02-22 | Stop reason: SDUPTHER

## 2024-02-21 RX ORDER — BUPROPION HYDROCHLORIDE 150 MG/1
150 TABLET, EXTENDED RELEASE ORAL 2 TIMES DAILY
Qty: 180 TABLET | Refills: 3 | Status: SHIPPED | OUTPATIENT
Start: 2024-02-21 | End: 2024-02-22 | Stop reason: SDUPTHER

## 2024-02-21 RX ORDER — ALBUTEROL SULFATE 90 UG/1
2 AEROSOL, METERED RESPIRATORY (INHALATION) EVERY 4 HOURS PRN
Qty: 18 G | Refills: 2 | Status: SHIPPED | OUTPATIENT
Start: 2024-02-21 | End: 2025-02-20

## 2024-02-21 RX ORDER — HYDRALAZINE HYDROCHLORIDE 50 MG/1
50 TABLET, FILM COATED ORAL EVERY 12 HOURS
Qty: 270 TABLET | Refills: 3
Start: 2024-02-21 | End: 2024-02-22 | Stop reason: SDUPTHER

## 2024-02-21 RX ORDER — DULOXETIN HYDROCHLORIDE 30 MG/1
30 CAPSULE, DELAYED RELEASE ORAL DAILY
Qty: 90 CAPSULE | Refills: 3 | Status: SHIPPED | OUTPATIENT
Start: 2024-02-21 | End: 2025-02-20

## 2024-02-21 RX ORDER — ROPINIROLE 2 MG/1
2 TABLET, FILM COATED ORAL 2 TIMES DAILY PRN
Qty: 90 TABLET | Refills: 3 | Status: SHIPPED | OUTPATIENT
Start: 2024-02-21 | End: 2024-03-01

## 2024-02-21 NOTE — TELEPHONE ENCOUNTER
No care due was identified.  [facility] Embedded Care Due Messages. Reference number: [id number].   2/21/2024 12:18:55 PM CST

## 2024-02-22 DIAGNOSIS — M79.10 MUSCLE ACHE: ICD-10-CM

## 2024-02-23 RX ORDER — MECLIZINE HYDROCHLORIDE 25 MG/1
TABLET ORAL
Qty: 60 TABLET | Refills: 0 | Status: SHIPPED | OUTPATIENT
Start: 2024-02-23

## 2024-02-23 RX ORDER — CYCLOBENZAPRINE HCL 10 MG
10 TABLET ORAL 2 TIMES DAILY PRN
Qty: 180 TABLET | Refills: 1 | Status: SHIPPED | OUTPATIENT
Start: 2024-02-23

## 2024-02-23 RX ORDER — HYDRALAZINE HYDROCHLORIDE 50 MG/1
50 TABLET, FILM COATED ORAL EVERY 12 HOURS
Qty: 270 TABLET | Refills: 3 | Status: SHIPPED | OUTPATIENT
Start: 2024-02-23 | End: 2025-08-16

## 2024-02-23 RX ORDER — BUPROPION HYDROCHLORIDE 150 MG/1
150 TABLET, EXTENDED RELEASE ORAL 2 TIMES DAILY
Qty: 180 TABLET | Refills: 3 | Status: SHIPPED | OUTPATIENT
Start: 2024-02-23 | End: 2025-02-17

## 2024-02-29 DIAGNOSIS — G25.81 RESTLESS LEGS: ICD-10-CM

## 2024-03-01 RX ORDER — ROPINIROLE 2 MG/1
TABLET, FILM COATED ORAL
Qty: 90 TABLET | Refills: 7 | Status: SHIPPED | OUTPATIENT
Start: 2024-03-01

## 2024-03-01 NOTE — TELEPHONE ENCOUNTER
No care due was identified.  [facility] Embedded Care Due Messages. Reference number: [id number].   2/29/2024 9:09:58 PM CST

## 2024-04-12 DIAGNOSIS — I10 ESSENTIAL HYPERTENSION: ICD-10-CM

## 2024-04-13 NOTE — TELEPHONE ENCOUNTER
No care due was identified.  [facility] Embedded Care Due Messages. Reference number: [id number].   4/12/2024 9:11:32 PM CDT

## 2024-04-14 RX ORDER — OLMESARTAN MEDOXOMIL AND HYDROCHLOROTHIAZIDE 40/25 40; 25 MG/1; MG/1
1 TABLET ORAL DAILY
Qty: 90 TABLET | Refills: 1 | Status: SHIPPED | OUTPATIENT
Start: 2024-04-14 | End: 2025-04-14

## 2024-04-14 NOTE — TELEPHONE ENCOUNTER
Refill Routing Note   Medication(s) are not appropriate for processing by [facility] for the following reason(s):        New or recently adjusted medication    [facility] action(s):  Defer             Appointments  past 12m or future 3m with PCP    Date Provider   Last Visit   11/21/2023 Helena Jones MD   Next Visit   Visit date not found Helena Jones MD   ED visits in past 90 days: 0        Note composed:10:57 PM 04/13/2024

## 2024-04-26 DIAGNOSIS — M79.10 MYALGIA DUE TO HMG COA REDUCTASE INHIBITOR: ICD-10-CM

## 2024-04-26 DIAGNOSIS — T46.6X5A MYALGIA DUE TO HMG COA REDUCTASE INHIBITOR: ICD-10-CM

## 2024-04-26 DIAGNOSIS — E78.2 MIXED HYPERLIPIDEMIA: ICD-10-CM

## 2024-04-29 RX ORDER — EVOLOCUMAB 140 MG/ML
INJECTION, SOLUTION SUBCUTANEOUS
Qty: 2 ML | Refills: 11 | Status: SHIPPED | OUTPATIENT
Start: 2024-04-29

## 2024-05-03 ENCOUNTER — HOSPITAL ENCOUNTER (OUTPATIENT)
Dept: RADIOLOGY | Facility: HOSPITAL | Age: 69
Discharge: HOME | End: 2024-05-03
Attending: NURSE PRACTITIONER
Payer: MEDICARE

## 2024-05-03 ENCOUNTER — OFFICE VISIT (OUTPATIENT)
Dept: FAMILY MEDICINE | Facility: CLINIC | Age: 69
End: 2024-05-03
Payer: MEDICARE

## 2024-05-03 ENCOUNTER — TELEPHONE (OUTPATIENT)
Dept: SLEEP MEDICINE | Facility: CLINIC | Age: 69
End: 2024-05-03
Payer: MEDICAID

## 2024-05-03 VITALS
DIASTOLIC BLOOD PRESSURE: 80 MMHG | HEART RATE: 84 BPM | OXYGEN SATURATION: 96 % | TEMPERATURE: 99 F | BODY MASS INDEX: 29.27 KG/M2 | WEIGHT: 182.13 LBS | SYSTOLIC BLOOD PRESSURE: 118 MMHG | HEIGHT: 66 IN

## 2024-05-03 DIAGNOSIS — R06.09 DYSPNEA ON EXERTION: Primary | ICD-10-CM

## 2024-05-03 DIAGNOSIS — R06.09 DYSPNEA ON EXERTION: ICD-10-CM

## 2024-05-03 DIAGNOSIS — G47.8 OTHER SLEEP DISORDERS: ICD-10-CM

## 2024-05-03 DIAGNOSIS — R73.09 OTHER ABNORMAL GLUCOSE: ICD-10-CM

## 2024-05-03 DIAGNOSIS — R53.83 FATIGUE, UNSPECIFIED TYPE: ICD-10-CM

## 2024-05-03 DIAGNOSIS — R06.83 SNORING: ICD-10-CM

## 2024-05-03 PROCEDURE — 3074F SYST BP LT 130 MM HG: CPT | Mod: CPTII,S$GLB,, | Performed by: NURSE PRACTITIONER

## 2024-05-03 PROCEDURE — 1100F PTFALLS ASSESS-DOCD GE2>/YR: CPT | Mod: CPTII,S$GLB,, | Performed by: NURSE PRACTITIONER

## 2024-05-03 PROCEDURE — 1159F MED LIST DOCD IN RCRD: CPT | Mod: CPTII,S$GLB,, | Performed by: NURSE PRACTITIONER

## 2024-05-03 PROCEDURE — 3079F DIAST BP 80-89 MM HG: CPT | Mod: CPTII,S$GLB,, | Performed by: NURSE PRACTITIONER

## 2024-05-03 PROCEDURE — 1160F RVW MEDS BY RX/DR IN RCRD: CPT | Mod: CPTII,S$GLB,, | Performed by: NURSE PRACTITIONER

## 2024-05-03 PROCEDURE — 3008F BODY MASS INDEX DOCD: CPT | Mod: CPTII,S$GLB,, | Performed by: NURSE PRACTITIONER

## 2024-05-03 PROCEDURE — 1126F AMNT PAIN NOTED NONE PRSNT: CPT | Mod: CPTII,S$GLB,, | Performed by: NURSE PRACTITIONER

## 2024-05-03 PROCEDURE — 3288F FALL RISK ASSESSMENT DOCD: CPT | Mod: CPTII,S$GLB,, | Performed by: NURSE PRACTITIONER

## 2024-05-03 PROCEDURE — 99214 OFFICE O/P EST MOD 30 MIN: CPT | Mod: S$GLB,,, | Performed by: NURSE PRACTITIONER

## 2024-05-03 PROCEDURE — 99999 PR PBB SHADOW E&M-EST. PATIENT-LVL V: CPT | Mod: PBBFAC,,, | Performed by: NURSE PRACTITIONER

## 2024-05-03 PROCEDURE — 71046 X-RAY EXAM CHEST 2 VIEWS: CPT | Mod: 26,,, | Performed by: RADIOLOGY

## 2024-05-03 PROCEDURE — 71046 X-RAY EXAM CHEST 2 VIEWS: CPT | Mod: TC,PO

## 2024-05-03 NOTE — TELEPHONE ENCOUNTER
----- Message from Sanjana Dolan sent at 5/3/2024  2:29 PM CDT -----  [facility]

## 2024-05-03 NOTE — PROGRESS NOTES
"Subjective:       Patient ID: Erin Soriano is a 68 y.o. female.    Chief Complaint: Shortness of Breath and Extremity Weakness  Pt reports to clinic with SOB and fatigue.  Present intermittently for months.  Reports SOB occurs on exertion.  Resolves with rest.  No palpitations.  Worsened on yesterday.  "Always tired" worsens after "eating sugar, I get my best sleep after eating ice cream".  Awakens fatigued.  Notes snoring.  Physically active caring for rescue animals.  Not monitoring diet.  Former smoker quit >10 years ago    Past Medical History:   Diagnosis Date    Anxiety     Arthritis     Asthma     Atrial fibrillation     Depression     Hyperlipidemia     Hypertension     RLS (restless legs syndrome)       Current Outpatient Medications on File Prior to Visit   Medication Sig Dispense Refill    albuterol (PROVENTIL/VENTOLIN HFA) 90 mcg/actuation inhaler Inhale 2 puffs into the lungs every 4 (four) hours as needed for Wheezing. 18 g 2    apixaban (ELIQUIS) 5 mg Tab Take 1 tablet (5 mg total) by mouth 2 (two) times daily. 180 tablet 3    ascorbic acid, vitamin C, (VITAMIN C) 1000 MG tablet Take 1,000 mg by mouth once daily.      buPROPion (WELLBUTRIN SR) 150 MG TBSR 12 hr tablet Take 1 tablet (150 mg total) by mouth 2 (two) times daily. 180 tablet 3    carvediloL (COREG) 25 MG tablet Take 1 tablet (25 mg total) by mouth 2 (two) times daily with meals. 180 tablet 3    clotrimazole (LOTRIMIN) 1 % cream Apply topically 2 (two) times daily. 120 g 1    cyclobenzaprine (FLEXERIL) 10 MG tablet Take 1 tablet (10 mg total) by mouth 2 (two) times daily as needed for Muscle spasms. 180 tablet 1    diclofenac sodium (VOLTAREN) 1 % Gel Apply 2 g topically once daily. 100 g 2    DULoxetine (CYMBALTA) 30 MG capsule Take 1 capsule (30 mg total) by mouth once daily. 90 capsule 3    ferrous gluconate (FERGON) 324 MG tablet Take 1 tablet (324 mg total) by mouth daily with breakfast. 180 tablet 3    fluticasone propionate " (FLONASE) 50 mcg/actuation nasal spray 1 spray (50 mcg total) by Each Nostril route once daily. 16 mL 3    furosemide (LASIX) 20 MG tablet Take 1 tablet (20 mg total) by mouth daily as needed (for leg swelling). 30 tablet 11    gabapentin (NEURONTIN) 300 MG capsule Take 1 capsule (300 mg total) by mouth 2 (two) times daily. 180 capsule 3    hydrALAZINE (APRESOLINE) 50 MG tablet Take 1 tablet (50 mg total) by mouth every 12 (twelve) hours. 270 tablet 3    inhalat.spacing dev,large mask (BREATHERITE SPACER-MASK,ADULT) Spcr Use as directed for inhalation. 1 each 1    meclizine (ANTIVERT) 25 mg tablet TAKE 1 TABLET (25 MG TOTAL) BY MOUTH 3 (THREE) TIMES DAILY AS NEEDED FOR DIZZINESS 60 tablet 0    olmesartan-hydrochlorothiazide (BENICAR HCT) 40-25 mg per tablet TAKE 1 TABLET BY MOUTH ONCE  DAILY 90 tablet 1    olopatadine (PATANOL) 0.1 % ophthalmic solution Place 1 drop into both eyes once daily. 5 mL 3    pantoprazole (PROTONIX) 40 MG tablet Take 1 tablet (40 mg total) by mouth once daily. 90 tablet 3    potassium chloride (K-TAB) 20 mEq Take 1 tablet (20 mEq total) by mouth once daily. 90 tablet 3    REPATHA SURECLICK 140 mg/mL PnIj INJECT 140MG SUBCUTANEOUSLY  EVERY 2 WEEKS 2 mL 11    rOPINIRole (REQUIP) 2 MG tablet TAKE 1 TABLET BY MOUTH TWICE  DAILY AS NEEDED FOR RESTLESS LEG 90 tablet 7    traMADoL (ULTRAM) 50 mg tablet Take 1 tablet (50 mg total) by mouth every 8 (eight) hours as needed for Pain. 90 each 0    vitamin E 400 UNIT capsule Take 400 Units by mouth once daily.      albuterol-ipratropium (DUO-NEB) 2.5 mg-0.5 mg/3 mL nebulizer solution Take 3 mLs by nebulization every 4 (four) hours as needed for Wheezing. Rescue 75 mL 0     Current Facility-Administered Medications on File Prior to Visit   Medication Dose Route Frequency Provider Last Rate Last Admin    lactated ringers infusion   Intravenous Continuous Vanda Hickman MD   Stopped at 10/01/18 1140      Shortness of Breath    Extremity Weakness        Review of Systems   Constitutional:  Positive for activity change, appetite change and fatigue.   HENT: Negative.     Respiratory:  Positive for shortness of breath.    Cardiovascular: Negative.    Gastrointestinal: Negative.    Endocrine: Negative for cold intolerance, heat intolerance, polydipsia, polyphagia and polyuria.   Genitourinary: Negative.    Musculoskeletal:  Positive for extremity weakness.   Skin: Negative.    Psychiatric/Behavioral: Negative.         Objective:      Physical Exam  Vitals reviewed.   Constitutional:       Appearance: She is well-developed. She is obese.   HENT:      Head: Normocephalic.      Mouth/Throat:      Pharynx: No oropharyngeal exudate.   Eyes:      Pupils: Pupils are equal, round, and reactive to light.   Neck:      Vascular: No JVD.      Trachea: No tracheal deviation.   Cardiovascular:      Rate and Rhythm: Normal rate and regular rhythm.      Heart sounds: Normal heart sounds. No murmur heard.  Pulmonary:      Effort: Pulmonary effort is normal. No respiratory distress.      Breath sounds: Normal breath sounds.   Abdominal:      General: Bowel sounds are normal. There is no distension.      Palpations: Abdomen is soft.      Tenderness: There is no abdominal tenderness.   Musculoskeletal:         General: Normal range of motion.      Cervical back: Normal range of motion.   Skin:     General: Skin is warm and dry.   Neurological:      Mental Status: She is alert and oriented to person, place, and time.      Deep Tendon Reflexes: Reflexes are normal and symmetric.   Psychiatric:         Speech: Speech normal.         Behavior: Behavior normal.         Assessment:       1. Dyspnea on exertion    2. Fatigue, unspecified type    3. Snoring    4. Other abnormal glucose    5. Other sleep disorders        Plan:   Dyspnea on exertion  -     X-Ray Chest PA And Lateral; Future; Expected date: 05/03/2024    Fatigue, unspecified type  -     CBC Auto Differential; Future; Expected  date: 05/03/2024  -     Comprehensive Metabolic Panel; Future; Expected date: 05/03/2024  -     Hemoglobin A1C; Future; Expected date: 05/03/2024    Snoring  -     Home Sleep Study; Future    Other abnormal glucose  -     Hemoglobin A1C; Future; Expected date: 05/03/2024    Other sleep disorders  -     Home Sleep Study; Future    Discussed diet and exercise// r/o HELEN    No follow-ups on file.

## 2024-07-02 NOTE — TELEPHONE ENCOUNTER
No care due was identified.  Health [facility] Embedded Care Due Messages. Reference number: [id number].   7/02/2024 5:37:05 PM CDT

## 2024-07-02 NOTE — TELEPHONE ENCOUNTER
No care due was identified.  Health [facility] Embedded Care Due Messages. Reference number: [id number].   7/02/2024 5:33:44 PM CDT

## 2024-07-03 RX ORDER — TRAMADOL HYDROCHLORIDE 50 MG/1
50 TABLET ORAL EVERY 8 HOURS PRN
Qty: 90 EACH | Refills: 0 | OUTPATIENT
Start: 2024-07-03

## 2024-07-03 RX ORDER — GABAPENTIN 300 MG/1
300 CAPSULE ORAL 2 TIMES DAILY
Qty: 180 CAPSULE | Refills: 3 | Status: SHIPPED | OUTPATIENT
Start: 2024-07-03

## 2024-07-23 RX ORDER — FUROSEMIDE 20 MG/1
20 TABLET ORAL DAILY PRN
Qty: 90 TABLET | Refills: 3 | Status: SHIPPED | OUTPATIENT
Start: 2024-07-23 | End: 2025-07-23

## 2024-08-05 ENCOUNTER — TELEPHONE (OUTPATIENT)
Dept: FAMILY MEDICINE | Facility: CLINIC | Age: 69
End: 2024-08-05
Payer: MEDICAID

## 2024-08-05 DIAGNOSIS — E78.2 MIXED HYPERLIPIDEMIA: Primary | ICD-10-CM

## 2024-08-05 RX ORDER — CARVEDILOL 25 MG/1
25 TABLET ORAL 2 TIMES DAILY WITH MEALS
Qty: 180 TABLET | Refills: 1 | Status: SHIPPED | OUTPATIENT
Start: 2024-08-05

## 2024-08-15 ENCOUNTER — HOSPITAL ENCOUNTER (EMERGENCY)
Facility: HOSPITAL | Age: 69
Discharge: HOME | End: 2024-08-15
Attending: EMERGENCY MEDICINE
Payer: MEDICARE

## 2024-08-15 VITALS
TEMPERATURE: 99 F | HEART RATE: 71 BPM | OXYGEN SATURATION: 96 % | DIASTOLIC BLOOD PRESSURE: 58 MMHG | SYSTOLIC BLOOD PRESSURE: 115 MMHG | RESPIRATION RATE: 15 BRPM

## 2024-08-15 DIAGNOSIS — E86.0 DEHYDRATION: ICD-10-CM

## 2024-08-15 DIAGNOSIS — T67.5XXA HEAT EXHAUSTION, INITIAL ENCOUNTER: Primary | ICD-10-CM

## 2024-08-15 DIAGNOSIS — R55 NEAR SYNCOPE: ICD-10-CM

## 2024-08-15 LAB
ALBUMIN SERPL BCP-MCNC: 3.9 G/DL (ref 3.5–5.2)
ALP SERPL-CCNC: 135 U/L (ref 55–135)
ALT SERPL W/O P-5'-P-CCNC: 22 U/L (ref 10–44)
ANION GAP SERPL CALC-SCNC: 14 MMOL/L (ref 8–16)
AST SERPL-CCNC: 23 U/L (ref 10–40)
BASOPHILS # BLD AUTO: 0.07 K/UL (ref 0–0.2)
BASOPHILS NFR BLD: 0.7 % (ref 0–1.9)
BILIRUB SERPL-MCNC: 0.4 MG/DL (ref 0.1–1)
BILIRUB UR QL STRIP: NEGATIVE
BUN SERPL-MCNC: 22 MG/DL (ref 8–23)
CALCIUM SERPL-MCNC: 8.8 MG/DL (ref 8.7–10.5)
CHLORIDE SERPL-SCNC: 107 MMOL/L (ref 95–110)
CK SERPL-CCNC: 315 U/L (ref 20–180)
CLARITY UR: CLEAR
CO2 SERPL-SCNC: 22 MMOL/L (ref 23–29)
COLOR UR: COLORLESS
CREAT SERPL-MCNC: 1 MG/DL (ref 0.5–1.4)
DIFFERENTIAL METHOD BLD: ABNORMAL
EOSINOPHIL # BLD AUTO: 0.2 K/UL (ref 0–0.5)
EOSINOPHIL NFR BLD: 2.1 % (ref 0–8)
ERYTHROCYTE [DISTWIDTH] IN BLOOD BY AUTOMATED COUNT: 12.8 % (ref 11.5–14.5)
EST. GFR  (NO RACE VARIABLE): >60 ML/MIN/1.73 M^2
GLUCOSE SERPL-MCNC: 112 MG/DL (ref 70–110)
GLUCOSE UR QL STRIP: NEGATIVE
HCT VFR BLD AUTO: 40.3 % (ref 37–48.5)
HCV AB SERPL QL IA: NEGATIVE
HEP C VIRUS HOLD SPECIMEN: NORMAL
HGB BLD-MCNC: 13.3 G/DL (ref 12–16)
HGB UR QL STRIP: NEGATIVE
HIV 1+2 AB+HIV1 P24 AG SERPL QL IA: NEGATIVE
IMM GRANULOCYTES # BLD AUTO: 0.05 K/UL (ref 0–0.04)
IMM GRANULOCYTES NFR BLD AUTO: 0.5 % (ref 0–0.5)
KETONES UR QL STRIP: NEGATIVE
LEUKOCYTE ESTERASE UR QL STRIP: NEGATIVE
LYMPHOCYTES # BLD AUTO: 2.4 K/UL (ref 1–4.8)
LYMPHOCYTES NFR BLD: 22.8 % (ref 18–48)
MCH RBC QN AUTO: 27.4 PG (ref 27–31)
MCHC RBC AUTO-ENTMCNC: 33 G/DL (ref 32–36)
MCV RBC AUTO: 83 FL (ref 82–98)
MONOCYTES # BLD AUTO: 0.6 K/UL (ref 0.3–1)
MONOCYTES NFR BLD: 6 % (ref 4–15)
NEUTROPHILS # BLD AUTO: 7.3 K/UL (ref 1.8–7.7)
NEUTROPHILS NFR BLD: 67.9 % (ref 38–73)
NITRITE UR QL STRIP: NEGATIVE
NRBC BLD-RTO: 0 /100 WBC
PH UR STRIP: 6 [PH] (ref 5–8)
PLATELET # BLD AUTO: 224 K/UL (ref 150–450)
PMV BLD AUTO: 10.2 FL (ref 9.2–12.9)
POTASSIUM SERPL-SCNC: 3.3 MMOL/L (ref 3.5–5.1)
PROT SERPL-MCNC: 7.5 G/DL (ref 6–8.4)
PROT UR QL STRIP: NEGATIVE
RBC # BLD AUTO: 4.85 M/UL (ref 4–5.4)
SODIUM SERPL-SCNC: 143 MMOL/L (ref 136–145)
SP GR UR STRIP: 1.01 (ref 1–1.03)
TROPONIN I SERPL DL<=0.01 NG/ML-MCNC: <0.006 NG/ML (ref 0–0.03)
URN SPEC COLLECT METH UR: ABNORMAL
UROBILINOGEN UR STRIP-ACNC: NEGATIVE EU/DL
WBC # BLD AUTO: 10.72 K/UL (ref 3.9–12.7)

## 2024-08-15 PROCEDURE — 82550 ASSAY OF CK (CPK): CPT | Performed by: EMERGENCY MEDICINE

## 2024-08-15 PROCEDURE — 96361 HYDRATE IV INFUSION ADD-ON: CPT

## 2024-08-15 PROCEDURE — 63600175 PHARM REV CODE 636 W HCPCS: Performed by: EMERGENCY MEDICINE

## 2024-08-15 PROCEDURE — 86803 HEPATITIS C AB TEST: CPT | Performed by: EMERGENCY MEDICINE

## 2024-08-15 PROCEDURE — 85025 COMPLETE CBC W/AUTO DIFF WBC: CPT | Performed by: EMERGENCY MEDICINE

## 2024-08-15 PROCEDURE — 80053 COMPREHEN METABOLIC PANEL: CPT | Performed by: EMERGENCY MEDICINE

## 2024-08-15 PROCEDURE — 99283 EMERGENCY DEPT VISIT LOW MDM: CPT | Mod: 25

## 2024-08-15 PROCEDURE — 81003 URINALYSIS AUTO W/O SCOPE: CPT | Performed by: EMERGENCY MEDICINE

## 2024-08-15 PROCEDURE — 87389 HIV-1 AG W/HIV-1&-2 AB AG IA: CPT | Performed by: EMERGENCY MEDICINE

## 2024-08-15 PROCEDURE — 25000003 PHARM REV CODE 250: Performed by: EMERGENCY MEDICINE

## 2024-08-15 PROCEDURE — 96360 HYDRATION IV INFUSION INIT: CPT

## 2024-08-15 PROCEDURE — 84484 ASSAY OF TROPONIN QUANT: CPT | Performed by: EMERGENCY MEDICINE

## 2024-08-15 RX ORDER — ACETAMINOPHEN 500 MG
1000 TABLET ORAL
Status: COMPLETED | OUTPATIENT
Start: 2024-08-15 | End: 2024-08-15

## 2024-08-15 RX ADMIN — ACETAMINOPHEN 1000 MG: 500 TABLET ORAL at 01:08

## 2024-08-15 RX ADMIN — SODIUM CHLORIDE, POTASSIUM CHLORIDE, SODIUM LACTATE AND CALCIUM CHLORIDE 1000 ML: 600; 310; 30; 20 INJECTION, SOLUTION INTRAVENOUS at 01:08

## 2024-08-15 RX ADMIN — SODIUM CHLORIDE 1000 ML: 9 INJECTION, SOLUTION INTRAVENOUS at 12:08

## 2024-08-15 NOTE — ED PROVIDER NOTES
"SCRIBE #1 NOTE: I, Shantell Tam, am scribing for, and in the presence of, Jaime Pineda MD. I have scribed the entire note.       History     Chief Complaint   Patient presents with    Heat Exposure     Was found in trailer working in heat no sweat noted on patient.      Review of patient's allergies indicates:   Allergen Reactions    Amlodipine     Benazepril Edema     angioedema    Pregabalin Hallucinations         History of Present Illness     HPI    8/15/2024, 1:06 PM  History obtained from the patient and daughter      History of Present Illness: Erin Soriano is a 69 y.o. female patient with a PMHx of anxiety, arthritis, depression, HLD, RLS, HTN, asthma, and Afib who presents to the Emergency Department for evaluation of heat exposure which onset gradually within the last 24 hours. She notes that yesterday she was working outside and cut 5 acres of grass and mentions that she was working through the night with her cats. Pt reports that today she was feeling dizzy and started "seeing stars". She states she was only out for a few seconds. Pt took her BP and notes it was 50/30. Per daughter, pt was having trouble speaking and looked pale. Pt reports she took her BP medications this morning. Symptoms are constant and moderate in severity. No mitigating or exacerbating factors reported. Associated sxs include weakness, headache, and confusion. Patient denies any fever, congestion, SOB, abd pain, and all other sxs at this time. Prior Tx includes 2 bags IV fluids en route. No further complaints or concerns at this time.       Arrival mode: Ambulance Services    PCP: No, Primary Doctor        Past Medical History:  Past Medical History:   Diagnosis Date    Anxiety     Arthritis     Asthma     Atrial fibrillation     Depression     Hyperlipidemia     Hypertension     RLS (restless legs syndrome)        Past Surgical History:  Past Surgical History:   Procedure Laterality Date    ADENOIDECTOMY      pt was 3 yrs " old    back sx      L 4-5-6-7 spinal fusion    carpel tunnel      2009    CATARACT EXTRACTION       SECTION      2 different ones    COLONOSCOPY N/A 10/01/2018    Procedure: COLONOSCOPY;  Surgeon: Johnson Bacon MD;  Location: [facility] ENDO;  Service: Endoscopy;  Laterality: N/A;    COLONOSCOPY N/A 2023    Procedure: COLONOSCOPY;  Surgeon: Abdoul Lorenzo MD;  Location: [facility] ENDO;  Service: Endoscopy;  Laterality: N/A;    ESOPHAGOGASTRODUODENOSCOPY N/A 10/01/2018    Procedure: ESOPHAGOGASTRODUODENOSCOPY (EGD);  Surgeon: Johnson Bacon MD;  Location: [facility] ENDO;  Service: Endoscopy;  Laterality: N/A;    ESOPHAGOGASTRODUODENOSCOPY N/A 2023    Procedure: EGD (ESOPHAGOGASTRODUODENOSCOPY);  Surgeon: Abdoul Lorenzo MD;  Location: [facility];  Service: Endoscopy;  Laterality: N/A;    golfers elbow          HYSTERECTOMY      1994 total    OOPHORECTOMY      TONSILLECTOMY      pt was 3 yrs old at time         Family History:  Family History   Problem Relation Name Age of Onset    Hypertension Mother      Clotting disorder Mother      Hemochromatosis Mother      Hypertension Father      Cancer Maternal Grandmother      Clotting disorder Maternal Grandmother      Cancer Maternal Grandfather          prostate    Clotting disorder Maternal Grandfather         Social History:  Social History     Tobacco Use    Smoking status: Former     Current packs/day: 0.00     Types: Cigarettes     Quit date: 6/10/2012     Years since quittin.1     Passive exposure: Past    Smokeless tobacco: Never    Tobacco comments:     Would smoke 1 cigarette every few months   Substance and Sexual Activity    Alcohol use: Yes     Alcohol/week: 1.0 standard drink of alcohol     Types: 1 Standard drinks or equivalent per week     Comment: social    Drug use: No    Sexual activity: Yes     Partners: Male     Birth control/protection: None        Review of Systems     Review of Systems   Constitutional:  Negative for  fever.   HENT:  Negative for congestion and sore throat.    Respiratory:  Negative for shortness of breath.    Cardiovascular:  Negative for chest pain.   Gastrointestinal:  Negative for abdominal pain and nausea.   Genitourinary:  Negative for dysuria.   Musculoskeletal:  Negative for back pain.   Skin:  Negative for rash.   Neurological:  Positive for dizziness, weakness and headaches.   Hematological:  Does not bruise/bleed easily.   Psychiatric/Behavioral:  Positive for confusion.    All other systems reviewed and are negative.       Physical Exam     Initial Vitals [08/15/24 1202]   BP Pulse Resp Temp SpO2   (!) 102/55 76 18 98.9 °F (37.2 °C) (!) 94 %      MAP       --          Physical Exam  Nursing Notes and Vital Signs Reviewed.  Constitutional: Patient is in no acute distress. Well-developed and well-nourished.  Head: Atraumatic. Normocephalic.  Eyes: PERRL. EOM intact. Conjunctivae are not pale. No scleral icterus.  ENT: Mucous membranes are moist. Oropharynx is clear and symmetric.    Neck: Supple. Full ROM. No lymphadenopathy.  Cardiovascular: Regular rate. Regular rhythm. No murmurs, rubs, or gallops. Distal pulses are 2+ and symmetric.  Pulmonary/Chest: No respiratory distress. Clear to auscultation bilaterally. No wheezing or rales.  Abdominal: Soft and non-distended.  There is no tenderness.  No rebound, guarding, or rigidity. Good bowel sounds.  Genitourinary: No CVA tenderness  Musculoskeletal: Moves all extremities. No obvious deformities. No edema. No calf tenderness.  Skin: Warm and dry.  Neurological:  Alert, awake, and appropriate.  Normal speech.  No acute focal neurological deficits are appreciated.  Psychiatric: Normal affect. Good eye contact. Appropriate in content.     ED Course   Procedures  ED Vital Signs:  Vitals:    08/15/24 1202 08/15/24 1254 08/15/24 1433 08/15/24 1514   BP: (!) 102/55 112/62 (!) 110/54 130/68   Pulse: 76 64 83 85   Resp: 18 15     Temp: 98.9 °F (37.2 °C)       TempSrc: Oral      SpO2: (!) 94% 98% 96% 96%    08/15/24 1516 08/15/24 1517 08/15/24 1547   BP: 118/69 124/62 (!) 115/58   Pulse: 68 71 71   Resp:      Temp:      TempSrc:      SpO2: 98% 97% 96%       Abnormal Lab Results:  Labs Reviewed   CBC W/ AUTO DIFFERENTIAL - Abnormal       Result Value    WBC 10.72      RBC 4.85      Hemoglobin 13.3      Hematocrit 40.3      MCV 83      MCH 27.4      MCHC 33.0      RDW 12.8      Platelets 224      MPV 10.2      Immature Granulocytes 0.5      Gran # (ANC) 7.3      Immature Grans (Abs) 0.05 (*)     Lymph # 2.4      Mono # 0.6      Eos # 0.2      Baso # 0.07      nRBC 0      Gran % 67.9      Lymph % 22.8      Mono % 6.0      Eosinophil % 2.1      Basophil % 0.7      Differential Method Automated      Narrative:     Release to patient->Immediate   COMPREHENSIVE METABOLIC PANEL - Abnormal    Sodium 143      Potassium 3.3 (*)     Chloride 107      CO2 22 (*)     Glucose 112 (*)     BUN 22      Creatinine 1.0      Calcium 8.8      Total Protein 7.5      Albumin 3.9      Total Bilirubin 0.4      Alkaline Phosphatase 135      AST 23      ALT 22      eGFR >60      Anion Gap 14      Narrative:     Release to patient->Immediate   URINALYSIS, REFLEX TO URINE CULTURE - Abnormal    Specimen UA Urine, Clean Catch      Color, UA Colorless (*)     Appearance, UA Clear      pH, UA 6.0      Specific Gravity, UA 1.010      Protein, UA Negative      Glucose, UA Negative      Ketones, UA Negative      Bilirubin (UA) Negative      Occult Blood UA Negative      Nitrite, UA Negative      Urobilinogen, UA Negative      Leukocytes, UA Negative      Narrative:     Specimen Source->Urine   CK - Abnormal     (*)     Narrative:     Release to patient->Immediate   HIV 1 / 2 ANTIBODY    HIV 1/2 Ag/Ab Negative      Narrative:     Release to patient->Immediate   HEPATITIS C ANTIBODY    Hepatitis C Ab Negative      Narrative:     Release to patient->Immediate   HEP C VIRUS HOLD SPECIMEN    HEP C Virus  Hold Specimen Hold for HCV sendout      Narrative:     Release to patient->Immediate   TROPONIN I    Troponin I <0.006      Narrative:     Release to patient->Immediate        All Lab Results:  Results for orders placed or performed during the hospital encounter of 08/15/24   HIV 1/2 Ag/Ab (4th Gen)   Result Value Ref Range    HIV 1/2 Ag/Ab Negative Negative   Hepatitis C Antibody   Result Value Ref Range    Hepatitis C Ab Negative Negative   HCV Virus Hold Specimen   Result Value Ref Range    HEP C Virus Hold Specimen Hold for HCV sendout    CBC auto differential   Result Value Ref Range    WBC 10.72 3.90 - 12.70 K/uL    RBC 4.85 4.00 - 5.40 M/uL    Hemoglobin 13.3 12.0 - 16.0 g/dL    Hematocrit 40.3 37.0 - 48.5 %    MCV 83 82 - 98 fL    MCH 27.4 27.0 - 31.0 pg    MCHC 33.0 32.0 - 36.0 g/dL    RDW 12.8 11.5 - 14.5 %    Platelets 224 150 - 450 K/uL    MPV 10.2 9.2 - 12.9 fL    Immature Granulocytes 0.5 0.0 - 0.5 %    Gran # (ANC) 7.3 1.8 - 7.7 K/uL    Immature Grans (Abs) 0.05 (H) 0.00 - 0.04 K/uL    Lymph # 2.4 1.0 - 4.8 K/uL    Mono # 0.6 0.3 - 1.0 K/uL    Eos # 0.2 0.0 - 0.5 K/uL    Baso # 0.07 0.00 - 0.20 K/uL    nRBC 0 0 /100 WBC    Gran % 67.9 38.0 - 73.0 %    Lymph % 22.8 18.0 - 48.0 %    Mono % 6.0 4.0 - 15.0 %    Eosinophil % 2.1 0.0 - 8.0 %    Basophil % 0.7 0.0 - 1.9 %    Differential Method Automated    Comprehensive metabolic panel   Result Value Ref Range    Sodium 143 136 - 145 mmol/L    Potassium 3.3 (L) 3.5 - 5.1 mmol/L    Chloride 107 95 - 110 mmol/L    CO2 22 (L) 23 - 29 mmol/L    Glucose 112 (H) 70 - 110 mg/dL    BUN 22 8 - 23 mg/dL    Creatinine 1.0 0.5 - 1.4 mg/dL    Calcium 8.8 8.7 - 10.5 mg/dL    Total Protein 7.5 6.0 - 8.4 g/dL    Albumin 3.9 3.5 - 5.2 g/dL    Total Bilirubin 0.4 0.1 - 1.0 mg/dL    Alkaline Phosphatase 135 55 - 135 U/L    AST 23 10 - 40 U/L    ALT 22 10 - 44 U/L    eGFR >60 >60 mL/min/1.73 m^2    Anion Gap 14 8 - 16 mmol/L   Urinalysis, Reflex to Urine Culture Urine, Clean  Catch    Specimen: Urine   Result Value Ref Range    Specimen UA Urine, Clean Catch     Color, UA Colorless (A) Yellow, Straw, Jonna    Appearance, UA Clear Clear    pH, UA 6.0 5.0 - 8.0    Specific Gravity, UA 1.010 1.005 - 1.030    Protein, UA Negative Negative    Glucose, UA Negative Negative    Ketones, UA Negative Negative    Bilirubin (UA) Negative Negative    Occult Blood UA Negative Negative    Nitrite, UA Negative Negative    Urobilinogen, UA Negative <2.0 EU/dL    Leukocytes, UA Negative Negative   Troponin I   Result Value Ref Range    Troponin I <0.006 0.000 - 0.026 ng/mL   CPK   Result Value Ref Range     (H) 20 - 180 U/L         Imaging Results:  Imaging Results    None                   The Emergency Provider reviewed the vital signs and test results, which are outlined above.     ED Discussion       3:48 PM: Reassessed pt at this time.  Discussed with pt all pertinent ED information and results. Discussed pt dx and plan of tx. Gave pt all f/u and return to the ED instructions. All questions and concerns were addressed at this time. Pt expresses understanding of information and instructions, and is comfortable with plan to discharge. Pt is stable for discharge.    I discussed with patient and/or family/caretaker that evaluation in the ED does not suggest any emergent or life threatening medical conditions requiring immediate intervention beyond what was provided in the ED, and I believe patient is safe for discharge.  Regardless, an unremarkable evaluation in the ED does not preclude the development or presence of a serious of life threatening condition. As such, patient was instructed to return immediately for any worsening or change in current symptoms.      ED Course as of 08/17/24 1147   Thu Aug 15, 2024   1417 CPK(!): 315 [TUCKER]   1417 Troponin I: <0.006 [TUCKER]   1417 WBC: 10.72 [TUCKER]   1417 Hemoglobin: 13.3 [TUCKER]   1417 Hematocrit: 40.3 [TUCKER]   1417 Anion Gap: 14 [TUCKER]      ED Course User  Index  [TUCKER] Jaime Pineda MD     Medical Decision Making  Problems Addressed:  Dehydration: acute illness or injury that poses a threat to life or bodily functions  Heat exhaustion, initial encounter: acute illness or injury that poses a threat to life or bodily functions  Syncope and collapse: acute illness or injury that poses a threat to life or bodily functions    Amount and/or Complexity of Data Reviewed  Independent Historian:      Details: Daughter stated pt was having trouble speaking and looked pale.  Labs: ordered. Decision-making details documented in ED Course.  Radiology: ordered. Decision-making details documented in ED Course.     Details: I had ordered imaging however the patient is not short of breath  and refuses any imaging at this time.  ECG/medicine tests:      Details: Patient was in normal sinus rhythm on cardiac monitor -     Risk  OTC drugs.  Decision regarding hospitalization.  Diagnosis or treatment significantly limited by social determinants of health.  Risk Details: Differential diagnosis includes heat exhaustion, dehydration, hypoglycemia, orthostatic hypotension, ACS, arrhythmia, etc.    Patient is without complaint after treatment in the emergency department.  She feels it is clear that she got overheated taking care of her animals and working outside.  She has some concerns about the cost of her ED visit and does not want any additional tests completed.  She understands the differential diagnosis including arrhythmia but feels very confident this is just simply heat related.  She will follow up with the primary care doctor for recheck and return emergency department immediately for any worsening signs or symptoms.    Patient presents with a syncopal or near-syncopal episode. I have low suspicion that the event is secondary to an arrhythmia such as WPW, prolonged QT syndrome, or ventricular tachycardia. I have lower suspicion for a seizure episode, intracranial hemorrhage, pulmonary  embolus, myocardial infarction, sepsis, hemorrhagic shock, or hypoglycemia. Based on my evaluation, there is no emergent medical condition. Syncope precautions were discussed with the patient.  With shared decision-making, the patient and I feel that she is safe for discharge without any further observation or workup in the emergency department today.                 ED Medication(s):  Medications   sodium chloride 0.9% bolus 1,000 mL 1,000 mL (0 mLs Intravenous Stopped 8/15/24 1433)   lactated ringers bolus 1,000 mL (0 mLs Intravenous Stopped 8/15/24 1608)   acetaminophen tablet 1,000 mg (1,000 mg Oral Given 8/15/24 1349)       Discharge Medication List as of 8/15/2024  3:46 PM           Follow-up Information       Your primary care physician. Schedule an appointment as soon as possible for a visit in 2 days.                                 Scribe Attestation:   Scribe #1: I performed the above scribed service and the documentation accurately describes the services I performed. I attest to the accuracy of the note.     Attending:   Physician Attestation Statement for Scribe #1: I, Jaime Pineda MD, personally performed the services described in this documentation, as scribed by Shantell Tam, in my presence, and it is both accurate and complete.           Clinical Impression       ICD-10-CM ICD-9-CM   1. Heat exhaustion, initial encounter  T67.5XXA 992.5   2. Dehydration  E86.0 276.51   3. Near syncope  R55 780.2       Disposition:   Disposition: Discharged  Condition: Stable         Jaime Pineda MD  08/17/24 1335

## 2024-09-05 RX ORDER — DULOXETIN HYDROCHLORIDE 30 MG/1
30 CAPSULE, DELAYED RELEASE ORAL
Qty: 90 CAPSULE | Refills: 0 | Status: SHIPPED | OUTPATIENT
Start: 2024-09-05

## 2024-09-05 NOTE — TELEPHONE ENCOUNTER
Refill Routing Note   Medication(s) are not appropriate for processing by [facility] for the following reason(s):        ED/Hospital Visit since last OV with provider  Patient not seen by provider within 15 months  Responsible provider unclear    [facility] action(s):  Defer             Appointments  past 12m or future 3m with PCP    Date Provider   Last Visit   11/21/2023 Helena Jones MD   Next Visit   Visit date not found Helena Jones MD   ED visits in past 90 days: 1        Note composed:7:47 AM 09/05/2024

## 2024-09-13 ENCOUNTER — NURSE TRIAGE (OUTPATIENT)
Dept: ADMINISTRATIVE | Facility: CLINIC | Age: 69
End: 2024-09-13
Payer: MEDICARE

## 2024-09-13 DIAGNOSIS — T46.6X5A MYALGIA DUE TO HMG COA REDUCTASE INHIBITOR: ICD-10-CM

## 2024-09-13 DIAGNOSIS — D50.0 IRON DEFICIENCY ANEMIA DUE TO CHRONIC BLOOD LOSS: ICD-10-CM

## 2024-09-13 DIAGNOSIS — I10 ESSENTIAL HYPERTENSION: ICD-10-CM

## 2024-09-13 DIAGNOSIS — M79.10 MYALGIA DUE TO HMG COA REDUCTASE INHIBITOR: ICD-10-CM

## 2024-09-13 DIAGNOSIS — E78.2 MIXED HYPERLIPIDEMIA: ICD-10-CM

## 2024-09-13 RX ORDER — FERROUS GLUCONATE 324(38)MG
324 TABLET ORAL
Qty: 180 TABLET | Refills: 3 | Status: SHIPPED | OUTPATIENT
Start: 2024-09-13

## 2024-09-13 RX ORDER — EVOLOCUMAB 140 MG/ML
140 INJECTION, SOLUTION SUBCUTANEOUS
Qty: 2 ML | Refills: 11 | Status: SHIPPED | OUTPATIENT
Start: 2024-09-13

## 2024-09-13 NOTE — TELEPHONE ENCOUNTER
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  She reports last time this happened she had a reaction to a new BP medication.  She recently started Olmesartan-HCTZ on 4/14/24.  Denies difficulty breathing/swallowing, CP, and fever.  She does report ant bites x 3-4 to her feet yesterday.  She also started a new vape several days ago.  Per protocol, care advised is go to UC/ED now (or to Office with PCP approval).  Pt VU.  Advised to call for worsening/questions/concerns.  VU.    Reason for Disposition   Patient sounds very sick or weak to the triager   Patient sounds very sick or weak to the triager    Additional Information   Negative: Life-threatening reaction (anaphylaxis) in the past to similar substance (e.g., food, insect bite/sting, chemical, etc.) and < 2 hours since exposure   Negative: Unresponsive, passed out or very weak   Negative: Swollen tongue   Negative: Difficulty breathing or wheezing   Negative: Sounds like a life-threatening emergency to the triager   Insect bite suspected   Negative: Passed out (i.e., fainted, collapsed and was not responding)   Negative: Wheezing or difficulty breathing   Negative: Hoarseness, cough or tightness in the throat or chest   Negative: Swollen tongue or difficulty swallowing   Negative: Life-threatening reaction (anaphylaxis) in the past to bite from same insect and < 2 hours since bite   Negative: Sounds like a life-threatening emergency to the triager   Negative: SEVERE swelling of entire face and < 2 hours since exposure to high-risk allergen (e.g., peanuts, tree nuts, fish, shellfish or 1st dose of drug) and no serious symptoms AND [4] no serious allergic reaction in the past   Negative: Pregnant > 20 weeks   Negative: Postpartum (from 0 to 6 weeks after delivery)   Negative: Fever   Negative: Taking an ACE Inhibitor medicine (e.g., benazepril/LOTENSIN, captopril/CAPOTEN, enalapril/VASOTEC, lisinopril/ZESTRIL)    Protocols  used: Face Swelling-A-OH, Insect Bite-A-OH

## 2024-09-14 NOTE — TELEPHONE ENCOUNTER
No care due was identified.  [facility] Embedded Care Due Messages. Reference number: [id number].   9/13/2024 9:06:46 PM CDT

## 2024-09-16 RX ORDER — OLMESARTAN MEDOXOMIL AND HYDROCHLOROTHIAZIDE 40/25 40; 25 MG/1; MG/1
1 TABLET ORAL DAILY
Qty: 90 TABLET | Refills: 3 | OUTPATIENT
Start: 2024-09-16 | End: 2025-09-16

## 2024-10-03 ENCOUNTER — OFFICE VISIT (OUTPATIENT)
Dept: URGENT CARE | Facility: CLINIC | Age: 69
End: 2024-10-03
Payer: MEDICARE

## 2024-10-03 ENCOUNTER — HOSPITAL ENCOUNTER (OUTPATIENT)
Dept: RADIOLOGY | Facility: HOSPITAL | Age: 69
Discharge: HOME | End: 2024-10-03
Attending: FAMILY MEDICINE
Payer: MEDICARE

## 2024-10-03 ENCOUNTER — TELEPHONE (OUTPATIENT)
Dept: URGENT CARE | Facility: CLINIC | Age: 69
End: 2024-10-03

## 2024-10-03 VITALS
OXYGEN SATURATION: 97 % | TEMPERATURE: 99 F | RESPIRATION RATE: 18 BRPM | HEART RATE: 73 BPM | DIASTOLIC BLOOD PRESSURE: 61 MMHG | HEIGHT: 66 IN | BODY MASS INDEX: 29.54 KG/M2 | WEIGHT: 183.81 LBS | SYSTOLIC BLOOD PRESSURE: 106 MMHG

## 2024-10-03 DIAGNOSIS — M54.50 ACUTE LEFT-SIDED LOW BACK PAIN, UNSPECIFIED WHETHER SCIATICA PRESENT: ICD-10-CM

## 2024-10-03 DIAGNOSIS — S39.012A BACK STRAIN, INITIAL ENCOUNTER: ICD-10-CM

## 2024-10-03 DIAGNOSIS — M43.10 SPONDYLOLISTHESIS, UNSPECIFIED SPINAL REGION: ICD-10-CM

## 2024-10-03 DIAGNOSIS — S39.012A BACK STRAIN, INITIAL ENCOUNTER: Primary | ICD-10-CM

## 2024-10-03 DIAGNOSIS — Z98.1 HISTORY OF SPINAL FUSION: ICD-10-CM

## 2024-10-03 PROCEDURE — 72100 X-RAY EXAM L-S SPINE 2/3 VWS: CPT | Mod: 26,,, | Performed by: RADIOLOGY

## 2024-10-03 PROCEDURE — 72100 X-RAY EXAM L-S SPINE 2/3 VWS: CPT | Mod: TC,FY,PN

## 2024-10-03 RX ORDER — DEXAMETHASONE SODIUM PHOSPHATE 10 MG/ML
10 INJECTION INTRAMUSCULAR; INTRAVENOUS
Status: COMPLETED | OUTPATIENT
Start: 2024-10-03 | End: 2024-10-03

## 2024-10-03 RX ORDER — TRAMADOL HYDROCHLORIDE 50 MG/1
50 TABLET ORAL EVERY 8 HOURS PRN
Qty: 9 TABLET | Refills: 0 | Status: SHIPPED | OUTPATIENT
Start: 2024-10-03

## 2024-10-03 RX ORDER — METHYLPREDNISOLONE 4 MG/1
TABLET ORAL
Qty: 21 EACH | Refills: 0 | Status: SHIPPED | OUTPATIENT
Start: 2024-10-04 | End: 2024-10-24

## 2024-10-03 RX ADMIN — DEXAMETHASONE SODIUM PHOSPHATE 10 MG: 10 INJECTION INTRAMUSCULAR; INTRAVENOUS at 10:10

## 2024-10-03 NOTE — PROGRESS NOTES
"Subjective:      Patient ID: Erin Soriano is a 69 y.o. female.    Vitals:  height is 5' 6" (1.676 m) and weight is 83.4 kg (183 lb 12.8 oz). Her oral temperature is 98.6 °F (37 °C). Her blood pressure is 106/61 and her pulse is 73. Her respiration is 18 and oxygen saturation is 97%.     Chief Complaint: Back Pain    70 y/o female presents to clinic with lower left back pain that began 9/30/24 after she pulled a muscle lifting and carrying something heavy. She lifting something else last night which aggravated it even more. This morning she woke up and states she could hardly walk. It burns and aches and it hurts for her to sit, walk or have any movement. The pain does not radiate anywhere. It's "confined to the lower left side". Rates the pain a 10/10 and states that it hasn't hurt this bad since before her spinal fusion surgery 2 years ago. She is a little nauseous but not too bad. No numbness. No urine or bowel incontinence. No sob. No fever.  Since the incident occurred she has used ice and heat and Ibuprofen. The first day she had a leftover Hydrocodone that she took. The Lortab helped a good bit and then the ice/heat helped temporarily.     Pt took her BP medicine about an hour ago so she says that's why it's low right now.     Back Pain  This is a new problem. The current episode started in the past 7 days. The problem has been gradually worsening since onset. The quality of the pain is described as burning and aching. The pain does not radiate. The pain is at a severity of 10/10. The pain is severe. The symptoms are aggravated by bending, lying down, coughing, twisting and sitting. Pertinent negatives include no numbness or tingling. She has tried heat and ice (Ibuprofen/ Lortab) for the symptoms. The treatment provided mild relief.       Constitution: Negative.   Cardiovascular: Negative.    Respiratory: Negative.     Gastrointestinal: Negative.    Genitourinary: Negative.    Musculoskeletal:  Positive " for back pain and history of spine disorder. Negative for trauma.   Skin:  Negative for rash.   Neurological:  Negative for numbness and tingling.   Psychiatric/Behavioral: Negative.        Objective:     Physical Exam   Constitutional: She is oriented to person, place, and time. No distress (but does appear in pain and very uncomfortable).   HENT:   Nose: Nose normal.   Mouth/Throat: Oropharynx is clear.   Eyes: Conjunctivae are normal.   Neck: Neck supple.   Cardiovascular: Normal rate, regular rhythm, normal heart sounds and normal pulses.   Pulmonary/Chest: Effort normal and breath sounds normal.   Abdominal: Normal appearance. She exhibits no distension. There is no abdominal tenderness.   Musculoskeletal:      Comments: Tender over the left upper to middle lumbar paraspinal muscles. No swelling or discoloration. No spinal tenderness. Pain here with extension and flexion and lateral rotation. SLR negative. Gait slowed due to pain.    Neurological: no focal deficit. She is alert and oriented to person, place, and time.   Skin: Skin is no rash.         Comments: Age appropriate turgor.    Psychiatric: Her behavior is normal. Mood, judgment and thought content normal.   Nursing note and vitals reviewed.      Assessment:     1. Back strain, initial encounter    2. Spondylolisthesis, unspecified spinal region    3. History of spinal fusion        Plan:       Back strain, initial encounter  -     XR LUMBAR SPINE 2 OR 3 VIEWS; Future; Expected date: 10/03/2024    Spondylolisthesis, unspecified spinal region  -     XR LUMBAR SPINE 2 OR 3 VIEWS; Future; Expected date: 10/03/2024    History of spinal fusion  -     XR LUMBAR SPINE 2 OR 3 VIEWS; Future; Expected date: 10/03/2024    Other orders  -     dexAMETHasone injection 10 mg  -     methylPREDNISolone (MEDROL DOSEPACK) 4 mg tablet; use as directed  Dispense: 21 each; Refill: 0  -     traMADoL (ULTRAM) 50 mg tablet; Take 1 tablet (50 mg total) by mouth every 8 (eight)  hours as needed for Pain (can cause sleepiness).  Dispense: 9 tablet; Refill: 0      Review of patient's allergies indicates:   Allergen Reactions    Amlodipine     Amlodipine-atorvastatin Swelling    Benazepril Edema     angioedema    Pregabalin Hallucinations       SUMMARY: See hpi. Back pain starting after lifting injury. Hx of spinal fusion. Xray order placed. Unable to do xrays in house today. Dexamethasone injection today with no adverse reaction while still in the office. Continue with Medrol. Short course with Tramadol (9 pills) to use for the severe pain. She has been on this in the past with pain relief. Narcotic registry reviewed. She will followup with her back specialist. Further testing/management may be needed if this continues.     Patient Instructions   Thank you for allowing our team to take care of you today.  The diagnosis is back strain from lifting injury. History of spondylolisthesis and lumbar back fusion.  No aggravating activities.  Dexamethasone injection steroid today. Can continue Medrol steroid pack tomorrow.  Tramadol if needed for severe pain which you have had before.  Continue alternating ice and heat and stick with what helps most.  Xray of the low back ordered. You can obtain these at any [facility] facility that has radiology capability. The result can be reviewed on [facility]. Our office will contact you also if any abnormality.  Followup with your primary/back specialist.  Immediate medical provider evaluation if worsens.  Followup here as needed.

## 2024-10-03 NOTE — PATIENT INSTRUCTIONS
Thank you for allowing our team to take care of you today.  The diagnosis is back strain from lifting injury. History of spondylolisthesis and lumbar back fusion.  No aggravating activities.  Dexamethasone injection steroid today. Can continue Medrol steroid pack tomorrow.  Tramadol if needed for severe pain which you have had before.  Continue alternating ice and heat and stick with what helps most.  Xray of the low back ordered. You can obtain these at any [facility] facility that has radiology capability. The result can be reviewed on [facility]. Our office will contact you also if any abnormality.  Followup with your primary/back specialist.  Immediate medical provider evaluation if worsens.  Followup here as needed.

## 2024-10-04 ENCOUNTER — TELEPHONE (OUTPATIENT)
Dept: URGENT CARE | Facility: CLINIC | Age: 69
End: 2024-10-04
Payer: MEDICARE

## 2024-10-04 NOTE — TELEPHONE ENCOUNTER
Left a voicemail for Ms. Khan to return the call. It was to discuss xray results. Radiology read returned on xrays. Hardware appears to be in place. No acute abnormality seen. Continue current therapy and followup with her back specialist. If worsens, ER evaluation.   Mayela Polo MD. 10/03/2024

## 2024-10-04 NOTE — TELEPHONE ENCOUNTER
"1st attempt at contact. Discussed imaging [facility] States she is feeling better today compared to yesterday. "The shot definitely helped and I am going to start taking the pack today." Informed patient to monitor BP due to steroid use. RTC precautions were reiterated. No further questions or concerns at this time.     XR LUMBAR SPINE 2 OR 3 VIEWS    Result Date: 10/3/2024  EXAMINATION: XR LUMBAR SPINE 2 OR 3 VIEWS CLINICAL HISTORY: left lower back pain after lifting injury. hx of lumbar spinal fusion.;Strain of muscle, fascia and tendon of lower back, initial encounter TECHNIQUE: AP, lateral and spot images were performed of the lumbar spine. COMPARISON: Study from 07/19/2010 outside films from 07/12/2020 23 unavailable for comparison. FINDINGS: Pedicle screws and fixation rods are seen bilaterally at the L2 through S1 levels with intradiscal spacers with screws seen at L4-5 and L5-S1.  No hardware failure suggested.  There is mild levocurvature of the lumbar spine which may be positional.  Vascular calcifications are noted.     1.  As above Electronically signed by: Gómez Vasquez DO Date:    10/03/2024 Time:    19:37     "

## 2024-10-11 ENCOUNTER — OFFICE VISIT (OUTPATIENT)
Dept: INTERNAL MEDICINE | Facility: CLINIC | Age: 69
End: 2024-10-11
Payer: MEDICARE

## 2024-10-11 ENCOUNTER — HOSPITAL ENCOUNTER (OUTPATIENT)
Dept: RADIOLOGY | Facility: HOSPITAL | Age: 69
Discharge: HOME | End: 2024-10-11
Attending: PHYSICIAN ASSISTANT
Payer: MEDICARE

## 2024-10-11 VITALS
BODY MASS INDEX: 29.37 KG/M2 | HEART RATE: 80 BPM | WEIGHT: 182.75 LBS | OXYGEN SATURATION: 96 % | HEIGHT: 66 IN | TEMPERATURE: 98 F | RESPIRATION RATE: 16 BRPM | DIASTOLIC BLOOD PRESSURE: 62 MMHG | SYSTOLIC BLOOD PRESSURE: 130 MMHG
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
## 2024-10-11 DIAGNOSIS — M54.50 ACUTE LEFT-SIDED LOW BACK PAIN WITHOUT SCIATICA: ICD-10-CM

## 2024-10-11 DIAGNOSIS — M54.2 NECK PAIN: ICD-10-CM

## 2024-10-11 DIAGNOSIS — I10 ESSENTIAL HYPERTENSION: ICD-10-CM

## 2024-10-11 LAB
BACTERIA #/AREA URNS HPF: ABNORMAL /HPF
BILIRUB UR QL STRIP: NEGATIVE
CLARITY UR: CLEAR
COLOR UR: YELLOW
GLUCOSE UR QL STRIP: NEGATIVE
HGB UR QL STRIP: NEGATIVE
KETONES UR QL STRIP: NEGATIVE
LEUKOCYTE ESTERASE UR QL STRIP: NEGATIVE
MICROSCOPIC COMMENT: ABNORMAL
NITRITE UR QL STRIP: POSITIVE
PH UR STRIP: 6 [PH] (ref 5–8)
PROT UR QL STRIP: NEGATIVE
RBC #/AREA URNS HPF: 0 /HPF (ref 0–4)
SP GR UR STRIP: 1.02 (ref 1–1.03)
SQUAMOUS #/AREA URNS HPF: 10 /HPF
URN SPEC COLLECT METH UR: ABNORMAL
WBC #/AREA URNS HPF: 5 /HPF (ref 0–5)

## 2024-10-11 PROCEDURE — 72040 X-RAY EXAM NECK SPINE 2-3 VW: CPT | Mod: TC

## 2024-10-11 PROCEDURE — 99999 PR PBB SHADOW E&M-EST. PATIENT-LVL V: CPT | Mod: PBBFAC,,, | Performed by: PHYSICIAN ASSISTANT

## 2024-10-11 PROCEDURE — 81000 URINALYSIS NONAUTO W/SCOPE: CPT | Performed by: PHYSICIAN ASSISTANT

## 2024-10-11 PROCEDURE — 72100 X-RAY EXAM L-S SPINE 2/3 VWS: CPT | Mod: TC

## 2024-10-11 RX ORDER — HYDROCHLOROTHIAZIDE 25 MG/1
240 TABLET ORAL
COMMUNITY

## 2024-10-11 RX ORDER — OLMESARTAN MEDOXOMIL 5 MG/1
1 TABLET ORAL EVERY MORNING
COMMUNITY

## 2024-10-11 RX ORDER — CARVEDILOL 6.25 MG/1
6.25 TABLET ORAL 2 TIMES DAILY WITH MEALS
COMMUNITY

## 2024-10-11 NOTE — PROGRESS NOTES
"Subjective:      Patient ID: Erin Soriano is a 69 y.o. female.    Chief Complaint: Back Pain (She is here due to back pain, currently day 8 of pain. Evaluated at urgent care 1 week ago for pulled muscle, states pain has gotten progressively worse. Fell earlier today and now having neck pain as well. )    Patient is new to me, being seen today for back pain.     PMH DDD, cervical, lumbar facet arthropath, spinal fusion, chronic pain      Evaluated at Urgent Care 10/3 for L lower back pain  Symptoms started 9/30 after carrying something heavy   X-ray lumbar spine "Pedicle screws and fixation rods are seen bilaterally at the L2 through S1 levels with intradiscal spacers with screws seen at L4-5 and L5-S1. No hardware failure suggested. There is mild levocurvature of the lumbar spine which may be positional. Vascular calcifications are noted."  Rx: steroid injection, tramadol, medrol dose pack   Other treatment includes: ice, heat, ibuprofen, leftover hydrocodone, flexeril (takes 5mg at night d/t drowsiness)     Symptoms were improving initially but recently returned from vacation and feels she aggravated it d/t increased activity     Reports she fell again today and now is having neck pain (L occipital down neck)  Leaned on light pole at home and landed on L side/back      Denies extremity pain or numbness/tingling    Not currently established with spinal specialist or pain mgmt   Previously saw Dr. Rendon @ [place]    Last visit May 2024 w Jeanette Miguel,RUBI.       Review of Systems   Constitutional:  Positive for appetite change and fatigue. Negative for chills, diaphoresis and fever.   HENT:  Negative for congestion, rhinorrhea and sore throat.    Respiratory:  Negative for cough, shortness of breath and wheezing.    Gastrointestinal:  Positive for constipation (improved, last BM last night). Negative for abdominal pain, blood in stool, diarrhea, nausea and vomiting.   Genitourinary:  Positive for flank pain " "(L). Negative for dysuria, frequency and hematuria.        Denies bowel/bladder incontinence   Musculoskeletal:  Positive for back pain and neck pain.   Skin:  Negative for rash.   Neurological:  Positive for headaches. Negative for dizziness and light-headedness.       Objective:   /62   Pulse 80   Temp 98.2 °F (36.8 °C) (Tympanic)   Resp 16   Ht 5' 6" (1.676 m)   Wt 82.9 kg (182 lb 12.2 oz)   SpO2 96%   BMI 29.50 kg/m²   Physical Exam  Constitutional:       General: She is not in acute distress.     Appearance: Normal appearance. She is well-developed. She is not ill-appearing.   HENT:      Head: Normocephalic and atraumatic.   Neck:     Cardiovascular:      Rate and Rhythm: Normal rate and regular rhythm.      Heart sounds: Normal heart sounds. No murmur heard.  Pulmonary:      Effort: Pulmonary effort is normal. No respiratory distress.      Breath sounds: Normal breath sounds. No decreased breath sounds.   Musculoskeletal:      Cervical back: No bony tenderness.      Thoracic back: No bony tenderness.      Lumbar back: No bony tenderness.      Right lower leg: No edema.      Left lower leg: No edema.   Skin:     General: Skin is warm and dry.      Findings: No rash.   Neurological:      Sensory: No sensory deficit.      Motor: Motor function is intact.   Psychiatric:         Speech: Speech normal.         Behavior: Behavior normal.         Thought Content: Thought content normal.       Assessment:      1. Fall, initial encounter    2. Neck pain    3. Acute left-sided low back pain without sciatica    4. Essential hypertension       Plan:   Fall, initial encounter  -     X-Ray Cervical Spine AP And Lateral; Future; Expected date: 10/11/2024  -     Ambulatory referral/consult to Physical/Occupational Therapy; Future; Expected date: 10/18/2024    Neck pain  -     X-Ray Cervical Spine AP And Lateral; Future; Expected date: 10/11/2024  -     Ambulatory referral/consult to Physical/Occupational Therapy; " Future; Expected date: 10/18/2024    Acute left-sided low back pain without sciatica  -     Urinalysis, Reflex to Urine Culture Urine, Clean Catch  -     Ambulatory referral/consult to Physical/Occupational Therapy; Future; Expected date: 10/18/2024    Essential hypertension   Controlled     Discussed RICE, Tylenol (should avoid NSAIDs d/t blood thinner)  Recommend muscle relaxer in evenings  Warm heat/epsom soaks   She will follow up with spine specialist, we will start PT in the meantime     Requests repeat lumbar x-ray d/t repeat fall     Discussed worsening signs/symptoms and when to return to clinic or go to ED.   Patient expresses understanding and agrees with treatment plan.

## 2024-10-14 ENCOUNTER — TELEPHONE (OUTPATIENT)
Dept: INTERNAL MEDICINE | Facility: CLINIC | Age: 69
End: 2024-10-14
Payer: MEDICARE

## 2024-10-14 ENCOUNTER — PATIENT MESSAGE (OUTPATIENT)
Dept: INTERNAL MEDICINE | Facility: CLINIC | Age: 69
End: 2024-10-14
Payer: MEDICARE

## 2024-10-14 DIAGNOSIS — N39.0 URINARY TRACT INFECTION WITHOUT HEMATURIA, SITE UNSPECIFIED: Primary | ICD-10-CM

## 2024-10-14 RX ORDER — SULFAMETHOXAZOLE AND TRIMETHOPRIM 800; 160 MG/1; MG/1
1 TABLET ORAL 2 TIMES DAILY
Qty: 14 TABLET | Refills: 0 | Status: SHIPPED | OUTPATIENT
Start: 2024-10-14 | End: 2024-10-21

## 2024-10-14 NOTE — TELEPHONE ENCOUNTER
Referral placed for Pain Med, please schedule.   If severe, worsening or without relief, recommend ER for further evaluation     UA +, will send antibiotics

## 2024-10-14 NOTE — TELEPHONE ENCOUNTER
Called and spoke to the pt and she states that she has been having severe back pain all weekend, has had no relief from pain. Neck pain has resolved but back is still severe. Also would like to know if she needs to be on antibiotic due to abnormal urinalysis results.     States previous round of steroids helped for approximately 1-2 day then pain came back. Severe pain when walking.

## 2024-10-14 NOTE — TELEPHONE ENCOUNTER
----- Message from Hafsa Hou PA-C sent at 10/14/2024  9:42 AM CDT -----  Contact: [phone] Pt    ----- Message -----  From: Susan Polk  Sent: 10/14/2024   9:33 AM CDT  To: Hafsa Hou PA-C    [email or web address]     Patient is calling for Medical Advice regarding:back pain     Patient wants a call back or thru [facility]:Call back     Comments:Pt states she's has back pain. Pt would like for nurse to call her back pt would like a call today     Please advise patient replies from provider may take up to 48 hours.

## 2024-10-24 ENCOUNTER — PATIENT MESSAGE (OUTPATIENT)
Dept: ADMINISTRATIVE | Facility: HOSPITAL | Age: 69
End: 2024-10-24
Payer: MEDICARE

## 2024-10-25 ENCOUNTER — PATIENT OUTREACH (OUTPATIENT)
Dept: ADMINISTRATIVE | Facility: HOSPITAL | Age: 69
End: 2024-10-25
Payer: MEDICARE

## 2024-10-25 DIAGNOSIS — Z12.31 ENCOUNTER FOR SCREENING MAMMOGRAM FOR MALIGNANT NEOPLASM OF BREAST: Primary | ICD-10-CM

## 2024-10-25 NOTE — PROGRESS NOTES
Replying to Campaign Questionnaire for Overdue HM: Mammogram    Order placed per [facility].  Called pt to schedule screening - LVM for patient to call back

## 2024-11-07 RX ORDER — DULOXETIN HYDROCHLORIDE 30 MG/1
30 CAPSULE, DELAYED RELEASE ORAL
Qty: 90 CAPSULE | Refills: 3 | Status: SHIPPED | OUTPATIENT
Start: 2024-11-07

## 2024-11-07 NOTE — TELEPHONE ENCOUNTER
No care due was identified.  [facility] Embedded Care Due Messages. Reference number: [id number].   11/06/2024 9:37:55 PM CST

## 2024-11-23 ENCOUNTER — OFFICE VISIT (OUTPATIENT)
Dept: URGENT CARE | Facility: CLINIC | Age: 69
End: 2024-11-23
Payer: MEDICARE

## 2024-11-23 VITALS
HEART RATE: 89 BPM | WEIGHT: 182 LBS | OXYGEN SATURATION: 95 % | SYSTOLIC BLOOD PRESSURE: 130 MMHG | TEMPERATURE: 100 F | DIASTOLIC BLOOD PRESSURE: 72 MMHG | RESPIRATION RATE: 18 BRPM | BODY MASS INDEX: 29.25 KG/M2 | HEIGHT: 66 IN

## 2024-11-23 DIAGNOSIS — R05.9 COUGH, UNSPECIFIED TYPE: ICD-10-CM

## 2024-11-23 DIAGNOSIS — R52 BODY ACHES: ICD-10-CM

## 2024-11-23 DIAGNOSIS — R50.9 FEVER, UNSPECIFIED FEVER CAUSE: ICD-10-CM

## 2024-11-23 DIAGNOSIS — J10.1 INFLUENZA A: Primary | ICD-10-CM

## 2024-11-23 DIAGNOSIS — J45.21 MILD INTERMITTENT ASTHMA WITH EXACERBATION: ICD-10-CM

## 2024-11-23 LAB
CTP QC/QA: YES
POC MOLECULAR INFLUENZA A AGN: POSITIVE
POC MOLECULAR INFLUENZA B AGN: NEGATIVE

## 2024-11-23 PROCEDURE — 87502 INFLUENZA DNA AMP PROBE: CPT | Mod: QW,S$GLB,, | Performed by: NURSE PRACTITIONER

## 2024-11-23 PROCEDURE — 99214 OFFICE O/P EST MOD 30 MIN: CPT | Mod: S$GLB,,, | Performed by: NURSE PRACTITIONER

## 2024-11-23 RX ORDER — IPRATROPIUM BROMIDE 42 UG/1
2 SPRAY, METERED NASAL 4 TIMES DAILY PRN
Qty: 15 ML | Refills: 1 | Status: SHIPPED | OUTPATIENT
Start: 2024-11-23

## 2024-11-23 RX ORDER — MONTELUKAST SODIUM 10 MG/1
10 TABLET ORAL NIGHTLY
Qty: 30 TABLET | Refills: 2 | Status: SHIPPED | OUTPATIENT
Start: 2024-11-23

## 2024-11-23 RX ORDER — OSELTAMIVIR PHOSPHATE 75 MG/1
75 CAPSULE ORAL 2 TIMES DAILY
Qty: 10 CAPSULE | Refills: 0 | Status: SHIPPED | OUTPATIENT
Start: 2024-11-23 | End: 2024-11-28

## 2024-11-23 RX ORDER — ALBUTEROL SULFATE 0.83 MG/ML
2.5 SOLUTION RESPIRATORY (INHALATION) EVERY 6 HOURS PRN
Qty: 25 EACH | Refills: 1 | Status: SHIPPED | OUTPATIENT
Start: 2024-11-23 | End: 2025-11-23

## 2024-11-23 RX ORDER — ALBUTEROL SULFATE 90 UG/1
2 INHALANT RESPIRATORY (INHALATION) EVERY 6 HOURS PRN
Qty: 18 G | Refills: 1 | Status: SHIPPED | OUTPATIENT
Start: 2024-11-23

## 2024-11-23 RX ORDER — PROMETHAZINE HYDROCHLORIDE AND DEXTROMETHORPHAN HYDROBROMIDE 6.25; 15 MG/5ML; MG/5ML
5 SYRUP ORAL EVERY 6 HOURS PRN
Qty: 120 ML | Refills: 0 | Status: SHIPPED | OUTPATIENT
Start: 2024-11-23

## 2024-11-23 NOTE — PATIENT INSTRUCTIONS
Patient Instructions   Increase fluids   Rest activity ad debra   Tylenol 650 mg every 4-6 hrs as needed for fever headache body aches   Advil 200 mg 2-3 tabs every 6 hrs as needed for fever, headache body aches---Must take with food   Tamiflu twice a day x 5 days    Supportive care measures   Viral infections usually resolve in 7-10 days; If symptoms persist or worsen follow up UC or PCP          May return to work/school   when fever free x 24 hrs without use of fever reducing medications

## 2024-11-23 NOTE — PROGRESS NOTES
"Subjective:      Patient ID: Erin Soriano is a 69 y.o. female.    Vitals:  height is 5' 6" (1.676 m) and weight is 82.6 kg (182 lb). Her oral temperature is 99.8 °F (37.7 °C). Her blood pressure is 130/72 and her pulse is 89. Her respiration is 18 and oxygen saturation is 95%.     Chief Complaint: Cough    Sx started 2 days ago  Home covid tested negative    Cough  The cough is Non-productive. Associated symptoms include headaches, nasal congestion, postnasal drip, rhinorrhea, a sore throat, shortness of breath and wheezing. Pertinent negatives include no ear congestion, ear pain, rash or sweats. Associated symptoms comments: Chest congestion, tight chest, skin is tender to touch, feels like sunburn, fever 100.something. The symptoms are aggravated by lying down. Treatments tried: albuterol inh, dayquil/nyquil, excedrin, otc decongestant. The treatment provided no relief. Her past medical history is significant for asthma, bronchitis and environmental allergies. There is no history of COPD, emphysema or pneumonia.       HENT:  Positive for postnasal drip and sore throat. Negative for ear pain.    Respiratory:  Positive for cough, shortness of breath and wheezing.    Skin:  Negative for rash.   Allergic/Immunologic: Positive for environmental allergies.   Neurological:  Positive for headaches.      Objective:     Vitals:    11/23/24 1038   BP: 130/72   BP Location: Left arm   Patient Position: Sitting   Pulse: 89   Resp: 18   Temp: 99.8 °F (37.7 °C)  Comment: last fever reducing med taken this AM   TempSrc: Oral   SpO2: 95%   Weight: 82.6 kg (182 lb)   Height: 5' 6" (1.676 m)       Physical Exam   Constitutional: She is oriented to person, place, and time. She appears well-developed. She is cooperative.  Non-toxic appearance. She appears ill. No distress.   HENT:   Head: Normocephalic and atraumatic.   Ears:   Right Ear: Hearing, tympanic membrane, external ear and ear canal normal.   Left Ear: Hearing, " tympanic membrane, external ear and ear canal normal.   Nose: Congestion present. No mucosal edema, rhinorrhea or nasal deformity. No epistaxis. Right sinus exhibits no maxillary sinus tenderness and no frontal sinus tenderness. Left sinus exhibits no maxillary sinus tenderness and no frontal sinus tenderness.   Mouth/Throat: Uvula is midline, oropharynx is clear and moist and mucous membranes are normal. Mucous membranes are moist. No trismus in the jaw. Normal dentition. No uvula swelling. No oropharyngeal exudate, posterior oropharyngeal edema or posterior oropharyngeal erythema.   Eyes: Conjunctivae and lids are normal. Pupils are equal, round, and reactive to light. No scleral icterus. Extraocular movement intact   Neck: Trachea normal and phonation normal. Neck supple. No edema present. No erythema present. No neck rigidity present.   Cardiovascular: Normal rate, regular rhythm, normal heart sounds and normal pulses.   Pulmonary/Chest: Effort normal. No respiratory distress. She has no decreased breath sounds. She has wheezes. She has no rhonchi.   Abdominal: Normal appearance.   Musculoskeletal: Normal range of motion.         General: No deformity. Normal range of motion.   Neurological: no focal deficit. She is alert and oriented to person, place, and time. She exhibits normal muscle tone. Coordination normal.   Skin: Skin is warm, dry, intact, not diaphoretic and not pale. Capillary refill takes less than 2 seconds.   Psychiatric: Her speech is normal and behavior is normal. Judgment and thought content normal.   Nursing note and vitals reviewed.      Assessment:     1. Influenza A    2. Cough, unspecified type    3. Body aches    4. Fever, unspecified fever cause    5. Mild intermittent asthma with exacerbation      Results for orders placed or performed in visit on 11/23/24   POCT Influenza A/B Molecular    Collection Time: 11/23/24 10:54 AM   Result Value Ref Range    POC Molecular Influenza A Ag  Positive (A) Negative    POC Molecular Influenza B Ag Negative Negative     Acceptable Yes        Plan:     Patient stable for discharge and home management of condition    Influenza A  -     oseltamivir (TAMIFLU) 75 MG capsule; Take 1 capsule (75 mg total) by mouth 2 (two) times daily. for 5 days  Dispense: 10 capsule; Refill: 0  -     ipratropium (ATROVENT) 42 mcg (0.06 %) nasal spray; 2 sprays by Each Nostril route 4 (four) times daily as needed for Rhinitis (congestion).  Dispense: 15 mL; Refill: 1    Cough, unspecified type  -     POCT Influenza A/B Molecular  -     albuterol (VENTOLIN HFA) 90 mcg/actuation inhaler; Inhale 2 puffs into the lungs every 6 (six) hours as needed for Wheezing. Inhaler  Dispense: 18 g; Refill: 1  -     promethazine-dextromethorphan (PROMETHAZINE-DM) 6.25-15 mg/5 mL Syrp; Take 5 mLs by mouth every 6 (six) hours as needed (cough).  Dispense: 120 mL; Refill: 0  -     albuterol (PROVENTIL) 2.5 mg /3 mL (0.083 %) nebulizer solution; Take 3 mLs (2.5 mg total) by nebulization every 6 (six) hours as needed for Wheezing. Rescue  Dispense: 25 each; Refill: 1  -     montelukast (SINGULAIR) 10 mg tablet; Take 1 tablet (10 mg total) by mouth every evening.  Dispense: 30 tablet; Refill: 2    Body aches  -     POCT Influenza A/B Molecular    Fever, unspecified fever cause  -     POCT Influenza A/B Molecular    Mild intermittent asthma with exacerbation      Patient Instructions     Patient Instructions   Increase fluids   Rest activity ad debra   Tylenol 650 mg every 4-6 hrs as needed for fever headache body aches   Advil 200 mg 2-3 tabs every 6 hrs as needed for fever, headache body aches---Must take with food   Tamiflu twice a day x 5 days    Supportive care measures   Viral infections usually resolve in 7-10 days; If symptoms persist or worsen follow up UC or PCP          May return to work/school   when fever free x 24 hrs without use of fever reducing medications       No  follow-ups on file.

## 2024-11-24 RX ORDER — LIDOCAINE HYDROCHLORIDE 20 MG/ML
SOLUTION OROPHARYNGEAL EVERY 6 HOURS PRN
Qty: 100 ML | Refills: 1 | Status: SHIPPED | OUTPATIENT
Start: 2024-11-24 | End: 2024-12-04

## 2024-11-29 ENCOUNTER — OFFICE VISIT (OUTPATIENT)
Dept: URGENT CARE | Facility: CLINIC | Age: 69
End: 2024-11-29
Payer: MEDICARE

## 2024-11-29 VITALS
WEIGHT: 182 LBS | OXYGEN SATURATION: 95 % | RESPIRATION RATE: 20 BRPM | HEIGHT: 66 IN | SYSTOLIC BLOOD PRESSURE: 115 MMHG | HEART RATE: 71 BPM | BODY MASS INDEX: 29.25 KG/M2 | TEMPERATURE: 98 F | DIASTOLIC BLOOD PRESSURE: 69 MMHG

## 2024-11-29 DIAGNOSIS — R68.89 FLU-LIKE SYMPTOMS: ICD-10-CM

## 2024-11-29 DIAGNOSIS — R06.2 WHEEZING: ICD-10-CM

## 2024-11-29 DIAGNOSIS — J10.1 INFLUENZA A: Primary | ICD-10-CM

## 2024-11-29 DIAGNOSIS — J45.909 CHRONIC ASTHMA, UNSPECIFIED ASTHMA SEVERITY, UNSPECIFIED WHETHER COMPLICATED, UNSPECIFIED WHETHER PERSISTENT: ICD-10-CM

## 2024-11-29 RX ORDER — PREDNISONE 20 MG/1
20 TABLET ORAL DAILY
Qty: 5 TABLET | Refills: 0 | Status: SHIPPED | OUTPATIENT
Start: 2024-11-29 | End: 2024-12-04

## 2024-11-29 NOTE — PATIENT INSTRUCTIONS
Discharge Instructions    -  Zyrtec D, Claritin D or Allegra D can help with symptoms of congestion and drainage.   - Over the counter pseudoephedrine can be used as a decongestant but beware that this can raise your blood pressure.   -  If you just have drainage you can take plain Zyrtec, Claritin or Allegra   - You can also take Emergen-C to help boost your immune system.  -  Flonase (fluticasone) is a nasal spray which is available over the counter and may help with your symptoms.   -  Rest and fluids are also important.   - You can treat your symptoms with DayQuil, NyQuil, Cepacol and/or cough drops. Do not take additional Tylenol while taking Dayquil and/or Nyquil, since these medications contain Tylenol.  -  Tylenol (acetaminophen) or Motrin (ibuprofen) every 4 hours can also be used as directed for pain unless you have an allergy to them or medical condition such as stomach ulcers, kidney or liver disease or blood thinners etc for which you should not be taking these type of medications. Do not exceed 4000 mg/day.  - Use Mucinex (guaifenisin) to break up mucous.  - Do not share any utensils or share drinks   - Wash hands frequently  - If for some reason your symptoms worsen or for any new or concerning symptoms, please return, see your primary care provider, or go to the emergency room.      - You will need to use your RESCUE inhaler every 4 hours if your symptoms persist, and then use as directed every 6 hours as needed. Use your nebulizer as needed.    - You were given steroids today. Please take full course. If you have diabetes, this may cause brief increase in blood sugar. Please monitor blood sugar at home. If you have high blood pressure, please check your blood pressure frequently. You will need to take the Oral Steroid (Prednisone) as directed. This medication should be taken in the morning, as it can cause you to feel jittery.     - Follow up with your PCP or specialty clinic as directed in the next  1-2 weeks if not improved or as needed.      - Acetaminophen (tylenol) or Ibuprofen (advil,motrin) as directed as needed for fever/pain. Avoid tylenol if you have a history of liver disease. Do not take ibuprofen if you have a history of GI bleeding, kidney disease, or if you take blood thinners.     - If your condition worsens or fails to improve we recommend that you receive another evaluation at the ER immediately or contact your PCP to discuss your concerns or return here.     -If you smoke, please stop smoking.    - You must understand that you have received an Urgent Care treatment only and that you may be released before all of your medical problems are known or treated. You, the patient, will arrange for follow up care as instructed.       You received a steroid prescription/shot today - Please be aware of potential side effects of steroids including elevating blood pressure, increased blood glucose levels, redness to the face, increased risk of opportunistic infections, peptic ulcer disease and GI bleeding, insomnia, tremors. If you received a shot you may also notice dimpling of the skin where the shot goes in.   Do not use steroids more than 3 times per year.   If you have diabetes, please check you blood sugar frequently.  If you have high blood pressure, please check your blood pressure frequently.     ORAL STEROIDS   A short course of prednisone or methylprednisolone will almost certainly make you feel better. Steroids boast your energy level, alleviate pain and nausea, block allergies, reduce swelling, shrink nasal polyps, alleviate asthma, and can even restore hearing in some patients with sudden deafness. However, steroids must be used with caution, because they can have significant addictive potential and cause serious side effects - especially with long-term use. For this reason, oral or systemic steroids are reserved for the most urgent uses, and TOPICAL or LOCAL steroids are preferred.     RISKS  OF SYSTEMIC STEROIDS     Steroids are the most effective anti-inflammatory drugs available, and are derivatives of natural hormones which the body creates to help the body cope with injury or stress.  However, prolonged use of oral or systemic steroids can result in suppression of normal steroid levels in the body.  Therefore, these medications should be taken exactly as prescribed, usually in a gradually decreasing dose, to avoid sudden withdrawal.  Withdrawal symptoms are uncommon in patients who have used steroids for less than two weeks at a time.  Continued or repeated use of steroids can reduce your ability to fight infection and can result in weight gain, fluid retention, acne, increased body hair, purple marks on the abdomen, collection of fatty deposits under the skin, and easy bruising.  High doses of steroids will frequently cause nervousness, sleeplessness, excitation, and sometimes depression or confusion.  Steroids can also cause elevation of blood sugar or blood pressure or change in salt balance.  Prolonged steroids can cause thinning of the bones, muscle weakness, glaucoma, and cataracts.  They can aggravate ulcers.  Patients who are pregnant, have a history of stomach ulcers, glaucoma, diabetes, high blood pressure, tuberculosis, osteoporosis, or recent vaccination, should not take steroids unless absolutely necessary.  A very rare complication of steroids is interruption of the blood supply to the hip bone which can result in a fracture that requires a hip replacement.   Fortunately, all of these complications are extremely rare in patients treated with short-term doses of steroids.  If your doctor has prescribed systemic steroids, he or she has likely judged that the risk of these complications is outweighed by the potential benefit for the treatment of your disease.  If you have any questions about this information or the instructions on how to take your steroids, please speak with your doctor  before you begin the medication.   Alternatives to systemic steroids include topical applications to the nose, skin, lung or ear, so that the systemic dose - that which distributes through the body - is greatly reduced. Topical steroids greatly reduce the risk of prolonged use of steroids.

## 2024-11-29 NOTE — PROGRESS NOTES
"Subjective:      Patient ID: Erin Soriano is a 69 y.o. female.    Vitals:  height is 5' 6" (1.676 m) and weight is 82.6 kg (182 lb). Her oral temperature is 98.3 °F (36.8 °C). Her blood pressure is 115/69 and her pulse is 71. Her respiration is 20 and oxygen saturation is 95%.     Chief Complaint: Cough    68 yo female Dx with flu a week ago.  Took tamiflu and completed medication along with cough syrup and montelukast.  States not getting worse, just not getting better. States she is having Constant cough, chest congestion, little nasal congestion. Has noticed increased wheezing and feels like asthma has worsened. Has been using nebulizer treatments, last was PTA today. Allergic to amlodipine, benazepril, pregabalin.       Cough  Associated symptoms include chills, ear congestion and wheezing. Pertinent negatives include no chest pain, ear pain, fever, nasal congestion, sore throat or shortness of breath. Treatments tried: nyquil, albuterol inh, neb albuterol. The treatment provided mild relief. Her past medical history is significant for asthma, bronchitis and environmental allergies. There is no history of COPD, emphysema or pneumonia.       Constitution: Positive for chills. Negative for fever.   HENT:  Positive for sinus pressure. Negative for ear pain, ear discharge and sore throat.    Neck: Negative for neck pain and neck stiffness.   Cardiovascular:  Negative for chest pain.   Respiratory:  Positive for chest tightness, cough, wheezing and asthma. Negative for shortness of breath.    Allergic/Immunologic: Positive for environmental allergies and asthma.      Objective:     Vitals:    11/29/24 1300   BP: 115/69   Pulse: 71   Resp: 20   Temp: 98.3 °F (36.8 °C)       Physical Exam   Constitutional: She is oriented to person, place, and time. She appears well-developed. She is cooperative.  Non-toxic appearance. She does not appear ill. No distress.   HENT:   Head: Normocephalic and atraumatic.   Ears: "   Right Ear: Hearing, tympanic membrane, external ear and ear canal normal. Tympanic membrane is not erythematous and not bulging. no impacted cerumen  Left Ear: Hearing, tympanic membrane, external ear and ear canal normal. Tympanic membrane is not erythematous and not bulging. no impacted cerumen  Nose: Nose normal. No mucosal edema, rhinorrhea or nasal deformity. No epistaxis. Right sinus exhibits no maxillary sinus tenderness and no frontal sinus tenderness. Left sinus exhibits no maxillary sinus tenderness and no frontal sinus tenderness.   Mouth/Throat: Uvula is midline, oropharynx is clear and moist and mucous membranes are normal. Mucous membranes are moist. No trismus in the jaw. Normal dentition. No uvula swelling. No oropharyngeal exudate, posterior oropharyngeal edema, posterior oropharyngeal erythema or cobblestoning.   Eyes: Conjunctivae and lids are normal. Pupils are equal, round, and reactive to light. Right eye exhibits no discharge. Left eye exhibits no discharge. No scleral icterus.   Neck: Trachea normal and phonation normal. Neck supple. No edema present. No erythema present. No neck rigidity present.   Cardiovascular: Normal rate, regular rhythm, normal heart sounds and normal pulses.   Pulmonary/Chest: Effort normal. No accessory muscle usage. No tachypnea. No respiratory distress. She has no decreased breath sounds. She has wheezes (diffuse). She has no rhonchi.   Abdominal: Normal appearance.   Musculoskeletal: Normal range of motion.         General: No deformity. Normal range of motion.   Neurological: She is alert and oriented to person, place, and time. She exhibits normal muscle tone. Coordination and gait normal.   Skin: Skin is warm, dry, intact, not diaphoretic, not pale and no rash.   Psychiatric: Her speech is normal and behavior is normal. Judgment and thought content normal.   Nursing note and vitals reviewed.      Assessment:     1. Influenza A    2. Flu-like symptoms    3.  Chronic asthma, unspecified asthma severity, unspecified whether complicated, unspecified whether persistent    4. Wheezing        Plan:       Influenza A    Flu-like symptoms    Chronic asthma, unspecified asthma severity, unspecified whether complicated, unspecified whether persistent  -     predniSONE (DELTASONE) 20 MG tablet; Take 1 tablet (20 mg total) by mouth once daily. for 5 days  Dispense: 5 tablet; Refill: 0    Wheezing  -     predniSONE (DELTASONE) 20 MG tablet; Take 1 tablet (20 mg total) by mouth once daily. for 5 days  Dispense: 5 tablet; Refill: 0        Patient Instructions   Discharge Instructions    -  Zyrtec D, Claritin D or Allegra D can help with symptoms of congestion and drainage.   - Over the counter pseudoephedrine can be used as a decongestant but beware that this can raise your blood pressure.   -  If you just have drainage you can take plain Zyrtec, Claritin or Allegra   - You can also take Emergen-C to help boost your immune system.  -  Flonase (fluticasone) is a nasal spray which is available over the counter and may help with your symptoms.   -  Rest and fluids are also important.   - You can treat your symptoms with DayQuil, NyQuil, Cepacol and/or cough drops. Do not take additional Tylenol while taking Dayquil and/or Nyquil, since these medications contain Tylenol.  -  Tylenol (acetaminophen) or Motrin (ibuprofen) every 4 hours can also be used as directed for pain unless you have an allergy to them or medical condition such as stomach ulcers, kidney or liver disease or blood thinners etc for which you should not be taking these type of medications. Do not exceed 4000 mg/day.  - Use Mucinex (guaifenisin) to break up mucous.  - Do not share any utensils or share drinks   - Wash hands frequently  - If for some reason your symptoms worsen or for any new or concerning symptoms, please return, see your primary care provider, or go to the emergency room.      - You will need to use your  RESCUE inhaler every 4 hours if your symptoms persist, and then use as directed every 6 hours as needed. Use your nebulizer as needed.    - You were given steroids today. Please take full course. If you have diabetes, this may cause brief increase in blood sugar. Please monitor blood sugar at home. If you have high blood pressure, please check your blood pressure frequently. You will need to take the Oral Steroid (Prednisone) as directed. This medication should be taken in the morning, as it can cause you to feel jittery.     - Follow up with your PCP or specialty clinic as directed in the next 1-2 weeks if not improved or as needed.      - Acetaminophen (tylenol) or Ibuprofen (advil,motrin) as directed as needed for fever/pain. Avoid tylenol if you have a history of liver disease. Do not take ibuprofen if you have a history of GI bleeding, kidney disease, or if you take blood thinners.     - If your condition worsens or fails to improve we recommend that you receive another evaluation at the ER immediately or contact your PCP to discuss your concerns or return here.     -If you smoke, please stop smoking.    - You must understand that you have received an Urgent Care treatment only and that you may be released before all of your medical problems are known or treated. You, the patient, will arrange for follow up care as instructed.       You received a steroid prescription/shot today - Please be aware of potential side effects of steroids including elevating blood pressure, increased blood glucose levels, redness to the face, increased risk of opportunistic infections, peptic ulcer disease and GI bleeding, insomnia, tremors. If you received a shot you may also notice dimpling of the skin where the shot goes in.   Do not use steroids more than 3 times per year.   If you have diabetes, please check you blood sugar frequently.  If you have high blood pressure, please check your blood pressure frequently.     ORAL  STEROIDS   A short course of prednisone or methylprednisolone will almost certainly make you feel better. Steroids boast your energy level, alleviate pain and nausea, block allergies, reduce swelling, shrink nasal polyps, alleviate asthma, and can even restore hearing in some patients with sudden deafness. However, steroids must be used with caution, because they can have significant addictive potential and cause serious side effects - especially with long-term use. For this reason, oral or systemic steroids are reserved for the most urgent uses, and TOPICAL or LOCAL steroids are preferred.     RISKS OF SYSTEMIC STEROIDS     Steroids are the most effective anti-inflammatory drugs available, and are derivatives of natural hormones which the body creates to help the body cope with injury or stress.  However, prolonged use of oral or systemic steroids can result in suppression of normal steroid levels in the body.  Therefore, these medications should be taken exactly as prescribed, usually in a gradually decreasing dose, to avoid sudden withdrawal.  Withdrawal symptoms are uncommon in patients who have used steroids for less than two weeks at a time.  Continued or repeated use of steroids can reduce your ability to fight infection and can result in weight gain, fluid retention, acne, increased body hair, purple marks on the abdomen, collection of fatty deposits under the skin, and easy bruising.  High doses of steroids will frequently cause nervousness, sleeplessness, excitation, and sometimes depression or confusion.  Steroids can also cause elevation of blood sugar or blood pressure or change in salt balance.  Prolonged steroids can cause thinning of the bones, muscle weakness, glaucoma, and cataracts.  They can aggravate ulcers.  Patients who are pregnant, have a history of stomach ulcers, glaucoma, diabetes, high blood pressure, tuberculosis, osteoporosis, or recent vaccination, should not take steroids unless  absolutely necessary.  A very rare complication of steroids is interruption of the blood supply to the hip bone which can result in a fracture that requires a hip replacement.   Fortunately, all of these complications are extremely rare in patients treated with short-term doses of steroids.  If your doctor has prescribed systemic steroids, he or she has likely judged that the risk of these complications is outweighed by the potential benefit for the treatment of your disease.  If you have any questions about this information or the instructions on how to take your steroids, please speak with your doctor before you begin the medication.   Alternatives to systemic steroids include topical applications to the nose, skin, lung or ear, so that the systemic dose - that which distributes through the body - is greatly reduced. Topical steroids greatly reduce the risk of prolonged use of steroids.

## 2024-12-09 ENCOUNTER — PATIENT MESSAGE (OUTPATIENT)
Dept: ADMINISTRATIVE | Facility: HOSPITAL | Age: 69
End: 2024-12-09
Payer: MEDICARE

## 2024-12-16 DIAGNOSIS — G25.81 RESTLESS LEGS: ICD-10-CM

## 2024-12-17 RX ORDER — ROPINIROLE 2 MG/1
TABLET, FILM COATED ORAL
Qty: 90 TABLET | Refills: 7 | Status: SHIPPED | OUTPATIENT
Start: 2024-12-17

## 2024-12-17 NOTE — TELEPHONE ENCOUNTER
Care Due:                  Date            Visit Type   Department     Provider  --------------------------------------------------------------------------------                                [place] -                              [place]      [facility]  Last Visit: 11-      CARE ([facility])   MEDICINE       Helena Jones  Next Visit: None Scheduled  None         None Found                                                            Last  Test          Frequency    Reason                     Performed    Due Date  --------------------------------------------------------------------------------    Office Visit  15 months..  DULoxetine, albuterol,     11- 02-                             buPROPion, carvediloL,                             hydrALAZINE,                             olmesartan-hydrochlorothi                             azide, pantoprazole,                             potassium................    [employer] Embedded Care Due Messages. Reference number: [id number].   12/16/2024 9:11:40 PM CST